# Patient Record
Sex: FEMALE | Race: BLACK OR AFRICAN AMERICAN | Employment: UNEMPLOYED | URBAN - METROPOLITAN AREA
[De-identification: names, ages, dates, MRNs, and addresses within clinical notes are randomized per-mention and may not be internally consistent; named-entity substitution may affect disease eponyms.]

---

## 2017-02-18 ENCOUNTER — TELEPHONE (OUTPATIENT)
Dept: INTERNAL MEDICINE CLINIC | Facility: CLINIC | Age: 47
End: 2017-02-18

## 2017-03-23 ENCOUNTER — PATIENT OUTREACH (OUTPATIENT)
Dept: INTERNAL MEDICINE CLINIC | Facility: CLINIC | Age: 47
End: 2017-03-23

## 2017-03-23 NOTE — PROGRESS NOTES
Spoke with pt. Reminded her that there were labs entered in October at her LOV that are due to be rechecked. Pt states she will go on Saturday to have them done. Instructed her to fast for blood work. PLAN:  Follow up 1 month to review results.  Pt in [place]

## 2017-03-25 ENCOUNTER — LAB ENCOUNTER (OUTPATIENT)
Dept: LAB | Facility: HOSPITAL | Age: 47
End: 2017-03-25
Attending: INTERNAL MEDICINE
Payer: COMMERCIAL

## 2017-03-25 DIAGNOSIS — IMO0001 UNCONTROLLED TYPE 2 DIABETES MELLITUS WITHOUT COMPLICATION, WITH LONG-TERM CURRENT USE OF INSULIN: ICD-10-CM

## 2017-03-25 LAB
ALBUMIN SERPL BCP-MCNC: 3.4 G/DL (ref 3.5–4.8)
ALBUMIN/GLOB SERPL: 0.8 {RATIO} (ref 1–2)
ALP SERPL-CCNC: 58 U/L (ref 32–100)
ALT SERPL-CCNC: 16 U/L (ref 14–54)
ANION GAP SERPL CALC-SCNC: 8 MMOL/L (ref 0–18)
AST SERPL-CCNC: 15 U/L (ref 15–41)
BASOPHILS # BLD: 0.1 K/UL (ref 0–0.2)
BASOPHILS NFR BLD: 1 %
BILIRUB SERPL-MCNC: 0.6 MG/DL (ref 0.3–1.2)
BUN SERPL-MCNC: 8 MG/DL (ref 8–20)
BUN/CREAT SERPL: 10.4 (ref 10–20)
CALCIUM SERPL-MCNC: 8.8 MG/DL (ref 8.5–10.5)
CHLORIDE SERPL-SCNC: 103 MMOL/L (ref 95–110)
CHOLEST SERPL-MCNC: 214 MG/DL (ref 110–200)
CO2 SERPL-SCNC: 24 MMOL/L (ref 22–32)
CREAT SERPL-MCNC: 0.77 MG/DL (ref 0.5–1.5)
EOSINOPHIL # BLD: 0.2 K/UL (ref 0–0.7)
EOSINOPHIL NFR BLD: 4 %
ERYTHROCYTE [DISTWIDTH] IN BLOOD BY AUTOMATED COUNT: 15 % (ref 11–15)
GLOBULIN PLAS-MCNC: 4.2 G/DL (ref 2.5–3.7)
GLUCOSE SERPL-MCNC: 242 MG/DL (ref 70–99)
HBA1C MFR BLD: 10.2 % (ref 4–6)
HCT VFR BLD AUTO: 41.1 % (ref 35–48)
HDLC SERPL-MCNC: 45 MG/DL
HGB BLD-MCNC: 13.8 G/DL (ref 12–16)
LDLC SERPL CALC-MCNC: 153 MG/DL (ref 0–99)
LYMPHOCYTES # BLD: 2.3 K/UL (ref 1–4)
LYMPHOCYTES NFR BLD: 40 %
MCH RBC QN AUTO: 29.2 PG (ref 27–32)
MCHC RBC AUTO-ENTMCNC: 33.5 G/DL (ref 32–37)
MCV RBC AUTO: 87.3 FL (ref 80–100)
MONOCYTES # BLD: 0.3 K/UL (ref 0–1)
MONOCYTES NFR BLD: 5 %
NEUTROPHILS # BLD AUTO: 2.9 K/UL (ref 1.8–7.7)
NEUTROPHILS NFR BLD: 50 %
NONHDLC SERPL-MCNC: 169 MG/DL
OSMOLALITY UR CALC.SUM OF ELEC: 286 MOSM/KG (ref 275–295)
PLATELET # BLD AUTO: 263 K/UL (ref 140–400)
PMV BLD AUTO: 7.9 FL (ref 7.4–10.3)
POTASSIUM SERPL-SCNC: 3.9 MMOL/L (ref 3.3–5.1)
PROT SERPL-MCNC: 7.6 G/DL (ref 5.9–8.4)
RBC # BLD AUTO: 4.71 M/UL (ref 3.7–5.4)
SODIUM SERPL-SCNC: 135 MMOL/L (ref 136–144)
TRIGL SERPL-MCNC: 80 MG/DL (ref 1–149)
WBC # BLD AUTO: 5.7 K/UL (ref 4–11)

## 2017-03-25 PROCEDURE — 85025 COMPLETE CBC W/AUTO DIFF WBC: CPT

## 2017-03-25 PROCEDURE — 80053 COMPREHEN METABOLIC PANEL: CPT

## 2017-03-25 PROCEDURE — 80061 LIPID PANEL: CPT

## 2017-03-25 PROCEDURE — 36415 COLL VENOUS BLD VENIPUNCTURE: CPT

## 2017-03-25 PROCEDURE — 83036 HEMOGLOBIN GLYCOSYLATED A1C: CPT

## 2017-05-09 ENCOUNTER — OFFICE VISIT (OUTPATIENT)
Dept: INTERNAL MEDICINE CLINIC | Facility: CLINIC | Age: 47
End: 2017-05-09

## 2017-05-09 VITALS
RESPIRATION RATE: 18 BRPM | SYSTOLIC BLOOD PRESSURE: 126 MMHG | TEMPERATURE: 98 F | WEIGHT: 230 LBS | HEIGHT: 63 IN | HEART RATE: 65 BPM | DIASTOLIC BLOOD PRESSURE: 76 MMHG | BODY MASS INDEX: 40.75 KG/M2

## 2017-05-09 DIAGNOSIS — IMO0001 UNCONTROLLED TYPE 2 DIABETES MELLITUS WITHOUT COMPLICATION, WITH LONG-TERM CURRENT USE OF INSULIN: ICD-10-CM

## 2017-05-09 DIAGNOSIS — E78.5 HYPERLIPIDEMIA, UNSPECIFIED HYPERLIPIDEMIA TYPE: ICD-10-CM

## 2017-05-09 DIAGNOSIS — Z12.31 VISIT FOR SCREENING MAMMOGRAM: ICD-10-CM

## 2017-05-09 DIAGNOSIS — E66.01 MORBID OBESITY DUE TO EXCESS CALORIES (HCC): ICD-10-CM

## 2017-05-09 DIAGNOSIS — I10 HYPERTENSION, BENIGN: Primary | ICD-10-CM

## 2017-05-09 DIAGNOSIS — J01.01 ACUTE RECURRENT MAXILLARY SINUSITIS: ICD-10-CM

## 2017-05-09 PROCEDURE — 99213 OFFICE O/P EST LOW 20 MIN: CPT | Performed by: INTERNAL MEDICINE

## 2017-05-09 PROCEDURE — 99215 OFFICE O/P EST HI 40 MIN: CPT | Performed by: INTERNAL MEDICINE

## 2017-05-09 RX ORDER — ATORVASTATIN CALCIUM 10 MG/1
TABLET, FILM COATED ORAL
Qty: 30 TABLET | Refills: 3 | Status: SHIPPED | OUTPATIENT
Start: 2017-05-09 | End: 2017-08-03

## 2017-05-09 RX ORDER — FLUTICASONE PROPIONATE 50 MCG
SPRAY, SUSPENSION (ML) NASAL
Qty: 1 BOTTLE | Refills: 5 | Status: ON HOLD | OUTPATIENT
Start: 2017-05-09 | End: 2019-11-21

## 2017-05-09 RX ORDER — INSULIN ASPART 100 [IU]/ML
INJECTION, SUSPENSION SUBCUTANEOUS
Qty: 10 DEVICE | Refills: 6 | Status: SHIPPED | OUTPATIENT
Start: 2017-05-09 | End: 2017-05-10

## 2017-05-09 RX ORDER — PREDNISONE 1 MG/1
TABLET ORAL
Qty: 15 TABLET | Refills: 0 | Status: SHIPPED | OUTPATIENT
Start: 2017-05-09 | End: 2017-06-01 | Stop reason: ALTCHOICE

## 2017-05-09 RX ORDER — AMOXICILLIN AND CLAVULANATE POTASSIUM 875; 125 MG/1; MG/1
1 TABLET, FILM COATED ORAL 2 TIMES DAILY
Qty: 20 TABLET | Refills: 0 | Status: SHIPPED | OUTPATIENT
Start: 2017-05-09 | End: 2017-05-19

## 2017-05-09 RX ORDER — METFORMIN HYDROCHLORIDE 500 MG/1
500 TABLET, EXTENDED RELEASE ORAL 2 TIMES DAILY WITH MEALS
Qty: 60 TABLET | Refills: 2 | Status: SHIPPED | OUTPATIENT
Start: 2017-05-09 | End: 2017-10-12

## 2017-05-09 RX ORDER — FLUCONAZOLE 150 MG/1
TABLET ORAL
Qty: 3 TABLET | Refills: 0 | Status: SHIPPED | OUTPATIENT
Start: 2017-05-09 | End: 2017-05-13 | Stop reason: ALTCHOICE

## 2017-05-10 ENCOUNTER — TELEPHONE (OUTPATIENT)
Dept: INTERNAL MEDICINE CLINIC | Facility: CLINIC | Age: 47
End: 2017-05-10

## 2017-05-10 NOTE — TELEPHONE ENCOUNTER
Pharmacy calling to inquire on two prescriptions:  (1) Insulin was sent over as a \"mix\" but patient has historically used the flexpen. Is this OK? If so, please send over revised script for Novolog flexpen.   (2) There is a potential drug interaction betw

## 2017-05-10 NOTE — ASSESSMENT & PLAN NOTE
History of recurrent sinus infections. This episode started at least 3 months back and has not resolved with over-the-counter medications and treatments. She does have severe hypertrophic turbinates with almost total occlusion of the nasal passages.   Sta

## 2017-05-10 NOTE — PATIENT INSTRUCTIONS
Problem List Items Addressed This Visit        Unprioritized    Acute maxillary sinusitis    Relevant Medications    Amoxicillin-Pot Clavulanate 875-125 MG Oral Tab    Other Relevant Orders    ENT - INTERNAL    Hyperlipidemia     Patient has been on atorva visit.          Relevant Medications    Insulin Aspart Prot & Aspart (INSULIN ASPART PROT & ASPART) (70-30) 100 UNIT/ML Subcutaneous Suspension    MetFORMIN HCl  MG Oral Tablet 24 Hr    Other Relevant Orders    COMP METABOLIC PANEL (14)    HEMOGLOBIN

## 2017-05-10 NOTE — ASSESSMENT & PLAN NOTE
Patient has been on atorvastatin at 10 mg 1 tablet once daily which she has tolerated well. Recheck labs have been ordered and refills have been ordered.

## 2017-05-10 NOTE — PROGRESS NOTES
HPI:    Patient ID: Roz Alarcon is a 56year old female. Diabetes  She presents for her follow-up diabetic visit. She has type 2 diabetes mellitus. Her disease course has been fluctuating. There are no hypoglycemic associated symptoms.  There are no provides moderate improvement. Compliance problems include diet and exercise. There is no history of angina, kidney disease, CVA, heart failure, PVD or a thyroid problem. There is no history of chronic renal disease or sleep apnea.    Hyperlipidemia  This with meals.  Disp: 60 tablet Rfl: 2   Glucose Blood (GITA CONTOUR NEXT TEST) In Vitro Strip Use to test Blood Sugar 2 times daily Disp: 100 each Rfl: 1   Blood Glucose Monitoring Suppl (GITA CONTOUR NEXT EZ) W/DEVICE Does not apply Kit 1 kit by Does not a are equal, round, and reactive to light. Right eye exhibits no discharge. Left eye exhibits no discharge. Neck: Normal range of motion. Neck supple. No JVD present. No thyromegaly present.    Cardiovascular: Normal rate, regular rhythm, normal heart sound provided. Relevant Orders    ENT - INTERNAL    Hyperlipidemia     Patient has been on atorvastatin at 10 mg 1 tablet once daily which she has tolerated well. Recheck labs have been ordered and refills have been ordered.          Relevant Medication PANEL      Other Visit Diagnoses     Visit for screening mammogram         Relevant Orders     GREGORY SCREENING BILAT (CPT=77067)          Return in about 4 weeks (around 6/6/2017).   PT UNDERSTANDS AND AGREES TO FOLLOW DIRECTIONS AND ADVICE      Orders Placed

## 2017-05-10 NOTE — ASSESSMENT & PLAN NOTE
Morbid obesity–gaining weight and patient is struggling with emotional distress from inability to lose it. Referral to bariatrics–Dr. eNttie Mccullough provided. We will follow-up.

## 2017-05-10 NOTE — ASSESSMENT & PLAN NOTE
Blood pressure 126/76, pulse 65, temperature 98 °F (36.7 °C), temperature source Oral, resp. rate 18, height 5' 3\" (1.6 m), weight 230 lb (104.327 kg). Stable blood pressure–well controlled on lisinopril at 2.5 mg once daily.   No lower extremity edema a

## 2017-05-10 NOTE — ASSESSMENT & PLAN NOTE
She does currently on NovoLog mix 70/30–50 units 2 times daily. She does not check her blood sugars and hence no sugar records available.   Last labs done in March showed extreme elevations in the hemoglobin A1c which have been persistent for greater than

## 2017-05-11 NOTE — TELEPHONE ENCOUNTER
Okay to override the interaction between the medications.   Prescription for the FlexPen has been sent in

## 2017-05-13 ENCOUNTER — OFFICE VISIT (OUTPATIENT)
Dept: OTOLARYNGOLOGY | Facility: CLINIC | Age: 47
End: 2017-05-13

## 2017-05-13 VITALS
TEMPERATURE: 98 F | SYSTOLIC BLOOD PRESSURE: 122 MMHG | DIASTOLIC BLOOD PRESSURE: 76 MMHG | BODY MASS INDEX: 39.27 KG/M2 | HEIGHT: 64 IN | WEIGHT: 230 LBS

## 2017-05-13 DIAGNOSIS — J32.9 CHRONIC SINUSITIS, UNSPECIFIED LOCATION: Primary | ICD-10-CM

## 2017-05-13 PROCEDURE — 99203 OFFICE O/P NEW LOW 30 MIN: CPT | Performed by: OTOLARYNGOLOGY

## 2017-05-13 PROCEDURE — 99212 OFFICE O/P EST SF 10 MIN: CPT | Performed by: OTOLARYNGOLOGY

## 2017-05-13 RX ORDER — AZELASTINE 1 MG/ML
2 SPRAY, METERED NASAL 2 TIMES DAILY
Qty: 1 BOTTLE | Refills: 0 | Status: ON HOLD | OUTPATIENT
Start: 2017-05-13 | End: 2019-11-21

## 2017-05-13 RX ORDER — MONTELUKAST SODIUM 10 MG/1
10 TABLET ORAL NIGHTLY
Qty: 30 TABLET | Refills: 3 | Status: ON HOLD | OUTPATIENT
Start: 2017-05-13 | End: 2019-11-21

## 2017-05-13 NOTE — PROGRESS NOTES
Aram Alejandro is a 56year old female.   Patient presents with:  Sinus Problem: nasal congestion, green discharge from nose, and headaches for 3 weeks, pt is currently on antibitoics and steroids       HISTORY OF PRESENT ILLNESS  She presents with a very Hyperlipidemia   • Heart Attack Cousin 47     Maternal cousin: MI, Cause of death   • Glaucoma Neg    • Macular degeneration Neg        Past Medical History   Diagnosis Date   • Other and unspecified hyperlipidemia    • Type II or unspecified type diabetes Normal.        Nasopharynx Normal External nose - Normal. Lips/teeth/gums - Normal. Tonsils - Normal. Oropharynx - Normal.   Ears Normal Inspection - Right: Normal, Left: Normal. Canal - Right: Normal, Left: Normal. TM - Right: Normal, Left: Normal.   Skin (GITA CONTOUR NEXT TEST) In Vitro Strip, Use to test Blood Sugar 2 times daily, Disp: 100 each, Rfl: 1  •  Blood Glucose Monitoring Suppl (GITA CONTOUR NEXT EZ) W/DEVICE Does not apply Kit, 1 kit by Does not apply route 2 (two) times daily. , Disp: 1 kit,

## 2017-05-22 ENCOUNTER — APPOINTMENT (OUTPATIENT)
Dept: ULTRASOUND IMAGING | Facility: HOSPITAL | Age: 47
End: 2017-05-22
Attending: EMERGENCY MEDICINE
Payer: COMMERCIAL

## 2017-05-22 ENCOUNTER — HOSPITAL ENCOUNTER (EMERGENCY)
Facility: HOSPITAL | Age: 47
Discharge: HOME | End: 2017-05-22
Attending: EMERGENCY MEDICINE
Payer: COMMERCIAL

## 2017-05-22 ENCOUNTER — TELEPHONE (OUTPATIENT)
Dept: INTERNAL MEDICINE CLINIC | Facility: CLINIC | Age: 47
End: 2017-05-22

## 2017-05-22 VITALS
OXYGEN SATURATION: 99 % | TEMPERATURE: 98 F | DIASTOLIC BLOOD PRESSURE: 71 MMHG | HEIGHT: 63 IN | WEIGHT: 230 LBS | RESPIRATION RATE: 19 BRPM | BODY MASS INDEX: 40.75 KG/M2 | HEART RATE: 66 BPM | SYSTOLIC BLOOD PRESSURE: 127 MMHG

## 2017-05-22 DIAGNOSIS — S83.92XA SPRAIN OF LEFT KNEE, UNSPECIFIED LIGAMENT, INITIAL ENCOUNTER: Primary | ICD-10-CM

## 2017-05-22 PROCEDURE — 93971 EXTREMITY STUDY: CPT | Performed by: EMERGENCY MEDICINE

## 2017-05-22 PROCEDURE — 99284 EMERGENCY DEPT VISIT MOD MDM: CPT

## 2017-05-22 PROCEDURE — 82962 GLUCOSE BLOOD TEST: CPT

## 2017-05-23 NOTE — ED NOTES
Pt woke with a knot in her left posterior knee and pain and swelling to left ankle this am. Pain is relieved by motrin. +CMS. Pt concerned she has a blood clot. Area is not warm, hard knot felt upon patient standing.  Pt informed of her blood sugar which sh

## 2017-05-23 NOTE — ED PROVIDER NOTES
Patient Seen in: [facility] AND [facility] Emergency Department    History   Patient presents with:  Pain      HPI    Patient presents complaining of a \"knot\" in the left lateral posterior knee and pain and swelling in her left medial ankle.   She states that s Comment:Coffee 1 cup daily; Review of Systems   Constitutional: Negative for fever and chills. HENT: Negative for congestion and sore throat. Eyes: Negative for pain and visual disturbance.    Respiratory: Negative for cough and shortness of br GLUCOSE - Abnormal; Notable for the following:     POC Glucose  358 (*)     All other components within normal limits         Us Venous Doppler Leg Left - Diag Img (cpt=93971)    5/22/2017  CONCLUSION: No evidence of left lower extremity DVT.          MDM

## 2017-05-23 NOTE — ED INITIAL ASSESSMENT (HPI)
Pt reports \"bump\" to left knee. Pt reports swelling to left lower extremity. PT denies injury/trauma. PT denies cp, or sob. Pt denies recent travel.

## 2017-06-01 ENCOUNTER — OFFICE VISIT (OUTPATIENT)
Dept: OTOLARYNGOLOGY | Facility: CLINIC | Age: 47
End: 2017-06-01

## 2017-06-01 VITALS
SYSTOLIC BLOOD PRESSURE: 106 MMHG | WEIGHT: 230 LBS | BODY MASS INDEX: 39.27 KG/M2 | DIASTOLIC BLOOD PRESSURE: 70 MMHG | TEMPERATURE: 98 F | HEIGHT: 64 IN

## 2017-06-01 DIAGNOSIS — J32.9 CHRONIC SINUSITIS, UNSPECIFIED LOCATION: Primary | ICD-10-CM

## 2017-06-01 DIAGNOSIS — J34.2 DEVIATED NASAL SEPTUM: ICD-10-CM

## 2017-06-01 PROCEDURE — 99214 OFFICE O/P EST MOD 30 MIN: CPT | Performed by: OTOLARYNGOLOGY

## 2017-06-01 PROCEDURE — 99212 OFFICE O/P EST SF 10 MIN: CPT | Performed by: OTOLARYNGOLOGY

## 2017-06-02 NOTE — PROGRESS NOTES
Orion Greene is a 56year old female. Patient presents with:   Follow - Up: 3 week follow up- chronic sinusitis-per pt there is some changes/improvement of symptoms      HISTORY OF PRESENT ILLNESS  She presents with a very long history of recurrent issu Alzheimer's disease, Cause of death   • Hypertension Mother    • Diabetes Mother    • Heart Attack Mother 62     MI, Cause of death   • Lipids Other      Family H/o Hyperlipidemia   • Heart Attack Cousin 47     Maternal cousin: MI, Cause of death   •  Reeves Neurological Normal Memory - Normal. Cranial nerves - Cranial nerves II through XII grossly intact.    Head/Face Normal Facial features - Normal. Eyebrows - Normal. Skull - Normal.        Nasopharynx Normal External nose - Normal. Lips/teeth/gums - Normal apply route 2 (two) times daily. , Disp: 1 kit, Rfl: 0  •  GITA MICROLET LANCETS Does not apply Misc, Use to test Blood Sugar 2 times daily, Disp: 100 each, Rfl: 1  •  Insulin Pen Needle (BD PEN NEEDLE MINI U/F) 31G X 5 MM Does not apply Misc, use as direc

## 2017-06-28 ENCOUNTER — OFFICE VISIT (OUTPATIENT)
Dept: OBGYN CLINIC | Facility: CLINIC | Age: 47
End: 2017-06-28

## 2017-06-28 VITALS
HEIGHT: 64 IN | BODY MASS INDEX: 38.89 KG/M2 | SYSTOLIC BLOOD PRESSURE: 130 MMHG | HEART RATE: 74 BPM | WEIGHT: 227.81 LBS | DIASTOLIC BLOOD PRESSURE: 78 MMHG

## 2017-06-28 DIAGNOSIS — Z01.419 ENCOUNTER FOR GYNECOLOGICAL EXAMINATION WITHOUT ABNORMAL FINDING: Primary | ICD-10-CM

## 2017-06-28 DIAGNOSIS — A64 STD (FEMALE): ICD-10-CM

## 2017-06-28 DIAGNOSIS — Z11.3 SCREENING FOR VENEREAL DISEASE: ICD-10-CM

## 2017-06-28 DIAGNOSIS — Z12.31 SCREENING MAMMOGRAM, ENCOUNTER FOR: ICD-10-CM

## 2017-06-28 DIAGNOSIS — Z12.4 SCREENING FOR MALIGNANT NEOPLASM OF CERVIX: ICD-10-CM

## 2017-06-28 PROCEDURE — 99396 PREV VISIT EST AGE 40-64: CPT | Performed by: OBSTETRICS & GYNECOLOGY

## 2017-06-29 ENCOUNTER — TELEPHONE (OUTPATIENT)
Dept: OBGYN CLINIC | Facility: CLINIC | Age: 47
End: 2017-06-29

## 2017-06-29 DIAGNOSIS — A64 STD (FEMALE): Primary | ICD-10-CM

## 2017-06-29 NOTE — PROGRESS NOTES
HPI:    Patient ID: Jef Carney is a 56year old female. HPI  with amenorrhea since ablation about 10 years ago. TL for [facility]. Rare hot flashes. She has struggled with diabetes management/ compliance with a current glycoHgb of 10.  She appears m 2 (two) times daily with meals.  Disp: 60 tablet Rfl: 2   Glucose Blood (GITA CONTOUR NEXT TEST) In Vitro Strip Use to test Blood Sugar 2 times daily Disp: 100 each Rfl: 1   Blood Glucose Monitoring Suppl (GITA CONTOUR NEXT EZ) W/DEVICE Does not apply Kit present. Neurological: She is alert. Skin: She is not diaphoretic.               ASSESSMENT/PLAN:   Encounter for gynecological examination without abnormal finding  (primary encounter diagnosis)  Screening mammogram, encounter for  Screening for malign

## 2017-06-30 LAB
C TRACH DNA SPEC QL NAA+PROBE: NEGATIVE
N GONORRHOEA DNA SPEC QL NAA+PROBE: NEGATIVE

## 2017-07-03 LAB — LAST PAP RESULT: NORMAL

## 2017-07-14 ENCOUNTER — APPOINTMENT (OUTPATIENT)
Dept: LAB | Facility: HOSPITAL | Age: 47
End: 2017-07-14
Attending: INTERNAL MEDICINE
Payer: COMMERCIAL

## 2017-07-14 ENCOUNTER — OFFICE VISIT (OUTPATIENT)
Dept: SURGERY | Facility: CLINIC | Age: 47
End: 2017-07-14

## 2017-07-14 VITALS
WEIGHT: 224.38 LBS | OXYGEN SATURATION: 100 % | HEIGHT: 64 IN | RESPIRATION RATE: 18 BRPM | SYSTOLIC BLOOD PRESSURE: 113 MMHG | DIASTOLIC BLOOD PRESSURE: 67 MMHG | HEART RATE: 65 BPM | BODY MASS INDEX: 38.31 KG/M2

## 2017-07-14 DIAGNOSIS — IMO0001 UNCONTROLLED TYPE 2 DIABETES MELLITUS WITHOUT COMPLICATION, WITH LONG-TERM CURRENT USE OF INSULIN: ICD-10-CM

## 2017-07-14 DIAGNOSIS — M16.0 OSTEOARTHRITIS OF BOTH HIPS, UNSPECIFIED OSTEOARTHRITIS TYPE: ICD-10-CM

## 2017-07-14 DIAGNOSIS — E66.9 OBESITY (BMI 30-39.9): ICD-10-CM

## 2017-07-14 DIAGNOSIS — E55.9 VITAMIN D DEFICIENCY: ICD-10-CM

## 2017-07-14 DIAGNOSIS — IMO0001 IDDM (INSULIN DEPENDENT DIABETES MELLITUS): Primary | ICD-10-CM

## 2017-07-14 DIAGNOSIS — E78.00 HYPERCHOLESTEREMIA: ICD-10-CM

## 2017-07-14 LAB
ALBUMIN SERPL BCP-MCNC: 3.4 G/DL (ref 3.5–4.8)
ALBUMIN/GLOB SERPL: 0.9 {RATIO} (ref 1–2)
ALP SERPL-CCNC: 55 U/L (ref 32–100)
ALT SERPL-CCNC: 14 U/L (ref 14–54)
ANION GAP SERPL CALC-SCNC: 5 MMOL/L (ref 0–18)
AST SERPL-CCNC: 13 U/L (ref 15–41)
BILIRUB SERPL-MCNC: 0.5 MG/DL (ref 0.3–1.2)
BUN SERPL-MCNC: 11 MG/DL (ref 8–20)
BUN/CREAT SERPL: 15.3 (ref 10–20)
CALCIUM SERPL-MCNC: 8.9 MG/DL (ref 8.5–10.5)
CHLORIDE SERPL-SCNC: 106 MMOL/L (ref 95–110)
CHOLEST SERPL-MCNC: 193 MG/DL (ref 110–200)
CO2 SERPL-SCNC: 26 MMOL/L (ref 22–32)
CREAT SERPL-MCNC: 0.72 MG/DL (ref 0.5–1.5)
GLOBULIN PLAS-MCNC: 3.6 G/DL (ref 2.5–3.7)
GLUCOSE SERPL-MCNC: 190 MG/DL (ref 70–99)
HBA1C MFR BLD: 10.6 % (ref 4–6)
HDLC SERPL-MCNC: 46 MG/DL
LDLC SERPL CALC-MCNC: 134 MG/DL (ref 0–99)
NONHDLC SERPL-MCNC: 147 MG/DL
OSMOLALITY UR CALC.SUM OF ELEC: 288 MOSM/KG (ref 275–295)
POTASSIUM SERPL-SCNC: 4 MMOL/L (ref 3.3–5.1)
PROT SERPL-MCNC: 7 G/DL (ref 5.9–8.4)
SODIUM SERPL-SCNC: 137 MMOL/L (ref 136–144)
TRIGL SERPL-MCNC: 63 MG/DL (ref 1–149)

## 2017-07-14 PROCEDURE — 99204 OFFICE O/P NEW MOD 45 MIN: CPT | Performed by: INTERNAL MEDICINE

## 2017-07-14 PROCEDURE — 80061 LIPID PANEL: CPT

## 2017-07-14 PROCEDURE — 83036 HEMOGLOBIN GLYCOSYLATED A1C: CPT

## 2017-07-14 PROCEDURE — 80053 COMPREHEN METABOLIC PANEL: CPT

## 2017-07-14 PROCEDURE — 36415 COLL VENOUS BLD VENIPUNCTURE: CPT

## 2017-07-14 NOTE — PROGRESS NOTES
[facility] Consultation Note       Date of Consult:  2017    Patient:  Pam España  :      3/4/1970  MRN:      [id number]    Referring Provider: Dr. Christofer Saravia       Chief Complaint:  Patient presents with:  Consult  Weight Lorena test Blood Sugar 2 times daily Disp: 100 each Rfl: 1   Blood Glucose Monitoring Suppl (GITA CONTOUR NEXT EZ) W/DEVICE Does not apply Kit 1 kit by Does not apply route 2 (two) times daily.  Disp: 1 kit Rfl: 0   GITA MICROLET LANCETS Does not apply Misc Use dd, cream and sugar.  Bagel, omlette,  Nuts, water [place], salad, chips, soda Mini twix Fast food, chicken, turkey, salmon, greens, corn     Soda Drinker?: Yes  If yes, how much?:  regular    Number of restaurant or fast food meals/week:  6 meals/week has been well controlled on her current medication.       Lab Results  Component Value Date/Time   CHOLEST 193 07/14/2017 11:56 AM    (H) 07/14/2017 11:56 AM   HDL 46 07/14/2017 11:56 AM   TRIG 63 07/14/2017 11:56 AM         Encounter Diagnosis(ses):

## 2017-08-03 ENCOUNTER — OFFICE VISIT (OUTPATIENT)
Dept: INTERNAL MEDICINE CLINIC | Facility: CLINIC | Age: 47
End: 2017-08-03

## 2017-08-03 VITALS
TEMPERATURE: 98 F | DIASTOLIC BLOOD PRESSURE: 75 MMHG | HEART RATE: 62 BPM | RESPIRATION RATE: 18 BRPM | SYSTOLIC BLOOD PRESSURE: 120 MMHG | BODY MASS INDEX: 38.73 KG/M2 | HEIGHT: 64 IN | WEIGHT: 226.88 LBS

## 2017-08-03 DIAGNOSIS — E11.8 TYPE 2 DIABETES MELLITUS WITH COMPLICATION, WITH LONG-TERM CURRENT USE OF INSULIN (HCC): Primary | ICD-10-CM

## 2017-08-03 DIAGNOSIS — Z79.4 TYPE 2 DIABETES MELLITUS WITH COMPLICATION, WITH LONG-TERM CURRENT USE OF INSULIN (HCC): Primary | ICD-10-CM

## 2017-08-03 DIAGNOSIS — E78.00 HYPERCHOLESTEREMIA: ICD-10-CM

## 2017-08-03 DIAGNOSIS — I10 HYPERTENSION, BENIGN: ICD-10-CM

## 2017-08-03 PROCEDURE — 99214 OFFICE O/P EST MOD 30 MIN: CPT | Performed by: INTERNAL MEDICINE

## 2017-08-03 PROCEDURE — 99212 OFFICE O/P EST SF 10 MIN: CPT | Performed by: INTERNAL MEDICINE

## 2017-08-03 RX ORDER — ATORVASTATIN CALCIUM 10 MG/1
TABLET, FILM COATED ORAL
Qty: 30 TABLET | Refills: 3 | Status: SHIPPED | OUTPATIENT
Start: 2017-08-03 | End: 2019-11-22

## 2017-08-04 NOTE — PROGRESS NOTES
HPI:    Patient ID: Maria Luisa Mcadams is a 56year old female. Hyperlipidemia   This is a chronic problem. The current episode started more than 1 year ago. The problem is controlled. Recent lipid tests were reviewed and are variable.  Exacerbating disease coronary artery disease include diabetes mellitus, dyslipidemia, obesity, hypertension and sedentary lifestyle. Current diabetic treatment includes diet and insulin injections. She is compliant with treatment some of the time. Her weight is stable.  She is daily. Disp: 1 Bottle Rfl: 0   Insulin Aspart Prot & Aspart (NOVOLOG MIX 70/30 FLEXPEN) (70-30) 100 UNIT/ML SC SUPN 50 units s/c bid with meals Disp: 20 pen Rfl: 5   Fluticasone Propionate 50 MCG/ACT Nasal Suspension USE TWO SPRAY IN EACH NOSTRIL EVERY DAY Normal range of motion. Neck supple. No JVD present. No thyromegaly present. Cardiovascular: Normal rate, regular rhythm, normal heart sounds and intact distal pulses. No murmur heard.   Pulmonary/Chest: Effort normal and breath sounds normal. She has hypoglycemic episode. Eat all day nurse by about 7 PM.  Bedtime snack at about 9 PM– a small pack of tuna.   Increase the morning insulin to 55 units with breakfast.  Wise to monitor the blood sugars before breakfast, before lunch and before dinner for the

## 2017-08-04 NOTE — ASSESSMENT & PLAN NOTE
Blood pressure 120/75, pulse 62, temperature 98.1 °F (36.7 °C), temperature source Oral, resp. rate 18, height 5' 4\" (1.626 m), weight 226 lb 14.4 oz (102.9 kg), not currently breastfeeding. Blood pressures look stable and well controlled.   Continue o

## 2017-08-04 NOTE — ASSESSMENT & PLAN NOTE
Lipid panel shows some improvement–LDL cholesterol remains elevated however. Patient has just started taking her atorvastatin. Will recheck her labs within next blood draw in about 4 weeks.

## 2017-08-04 NOTE — PATIENT INSTRUCTIONS
Problem List Items Addressed This Visit        Unprioritized    Hypercholesteremia     Lipid panel shows some improvement–LDL cholesterol remains elevated however. Patient has just started taking her atorvastatin.   Will recheck her labs within next blood

## 2017-08-04 NOTE — ASSESSMENT & PLAN NOTE
The hba1c was elevated. The sugars are dropping low at night and sugars are about 200 in am.    Currently on 50 units of novolog mix 2 times a day.   Will adv to reduce the night dose to 8 units with a meal.  Advised to eat a bedtime snack to avoid a midni

## 2017-08-15 ENCOUNTER — OFFICE VISIT (OUTPATIENT)
Dept: SURGERY | Facility: CLINIC | Age: 47
End: 2017-08-15

## 2017-08-15 VITALS
HEART RATE: 71 BPM | HEIGHT: 64 IN | BODY MASS INDEX: 39.16 KG/M2 | OXYGEN SATURATION: 98 % | RESPIRATION RATE: 16 BRPM | WEIGHT: 229.38 LBS | DIASTOLIC BLOOD PRESSURE: 62 MMHG | SYSTOLIC BLOOD PRESSURE: 120 MMHG

## 2017-08-15 DIAGNOSIS — Z79.4 TYPE 2 DIABETES MELLITUS WITH COMPLICATION, WITH LONG-TERM CURRENT USE OF INSULIN (HCC): Primary | ICD-10-CM

## 2017-08-15 DIAGNOSIS — E78.00 HYPERCHOLESTEREMIA: ICD-10-CM

## 2017-08-15 DIAGNOSIS — E66.9 OBESITY (BMI 30-39.9): ICD-10-CM

## 2017-08-15 DIAGNOSIS — I10 HYPERTENSION, BENIGN: ICD-10-CM

## 2017-08-15 DIAGNOSIS — E11.8 TYPE 2 DIABETES MELLITUS WITH COMPLICATION, WITH LONG-TERM CURRENT USE OF INSULIN (HCC): Primary | ICD-10-CM

## 2017-08-15 PROCEDURE — 99214 OFFICE O/P EST MOD 30 MIN: CPT | Performed by: INTERNAL MEDICINE

## 2017-08-15 NOTE — PROGRESS NOTES
[address], [address] [facility] [address]  Dept: [phone]     Date:   8/15/2017    Patient:  Bushra Ovalles  :      3/4/1970  MRN:      [id number]    Chief Complaint:  P times daily.  Disp: 1 Bottle Rfl: 0   Insulin Aspart Prot & Aspart (NOVOLOG MIX 70/30 FLEXPEN) (70-30) 100 UNIT/ML SC SUPN 50 units s/c bid with meals Disp: 20 pen Rfl: 5   Fluticasone Propionate 50 MCG/ACT Nasal Suspension USE TWO SPRAY IN EACH NOSTRIL IVONNE Father 61     Alzheimer's disease, Cause of death   • Hypertension Mother    • Diabetes Mother    • Heart Attack Mother 62     MI, Cause of death   • Heart Attack Cousin 47     Maternal cousin: MI, Cause of death   • Lipids Other      Family H/o Hyperlipid conjunctivae/corneas clear. PERRL, EOM's intact. Fundi benign.   Lungs: clear to auscultation bilaterally  Heart: S1, S2 normal, no murmur, click, rub or gallop, regular rate and rhythm  Abdomen: soft, obese, non tender  Extremities: extremities normal, atr Goals for next month:  1. Keep a food log. 2. Drink 48-64 ounces of non-caloric beverages per day. No fruit juices or regular soda. 3. Increase activity-upper body exercises, walk 10 minutes per day.   4. Increase fruit and vegetable servings to 5

## 2017-09-18 ENCOUNTER — OFFICE VISIT (OUTPATIENT)
Dept: SURGERY | Facility: CLINIC | Age: 47
End: 2017-09-18

## 2017-09-18 VITALS
BODY MASS INDEX: 39.48 KG/M2 | SYSTOLIC BLOOD PRESSURE: 131 MMHG | RESPIRATION RATE: 16 BRPM | HEIGHT: 64 IN | DIASTOLIC BLOOD PRESSURE: 74 MMHG | WEIGHT: 231.25 LBS

## 2017-09-18 DIAGNOSIS — Z79.4 TYPE 2 DIABETES MELLITUS WITH COMPLICATION, WITH LONG-TERM CURRENT USE OF INSULIN (HCC): ICD-10-CM

## 2017-09-18 DIAGNOSIS — E66.9 OBESITY (BMI 30-39.9): ICD-10-CM

## 2017-09-18 DIAGNOSIS — I10 ESSENTIAL HYPERTENSION: Primary | ICD-10-CM

## 2017-09-18 DIAGNOSIS — M16.0 OSTEOARTHRITIS OF BOTH HIPS, UNSPECIFIED OSTEOARTHRITIS TYPE: ICD-10-CM

## 2017-09-18 DIAGNOSIS — E11.8 TYPE 2 DIABETES MELLITUS WITH COMPLICATION, WITH LONG-TERM CURRENT USE OF INSULIN (HCC): ICD-10-CM

## 2017-09-18 PROCEDURE — 99213 OFFICE O/P EST LOW 20 MIN: CPT | Performed by: INTERNAL MEDICINE

## 2017-09-18 NOTE — PROGRESS NOTES
[address], [facility]  [address] [facility] [address] [facility] [address]  Dept: [phone]     Date:   8/15/2017    Patient:  Shakeel Ruiz  :      3/4/1970  MRN:      [id number]    Chief Complaint:  P Rfl: 0   Insulin Aspart Prot & Aspart (NOVOLOG MIX 70/30 FLEXPEN) (70-30) 100 UNIT/ML SC SUPN 50 units s/c bid with meals (Patient taking differently: 55 units s/c bid with meals ) Disp: 20 pen Rfl: 5   Fluticasone Propionate 50 MCG/ACT Nasal Suspension US Hypertension Mother    • Diabetes Mother    • Heart Attack Mother 62     MI, Cause of death   • Heart Attack Cousin 47     Maternal cousin: MI, Cause of death   • Lipids Other      Family H/o Hyperlipidemia   • Glaucoma Neg    • Macular degeneration Neg benign.   Lungs: clear to auscultation bilaterally  Heart: S1, S2 normal, no murmur, click, rub or gallop, regular rate and rhythm  Abdomen: soft, obese, non tender  Extremities: extremities normal, atraumatic, no cyanosis or edema  Pulses: 2+ and symmetric Goals for next month:  1. Keep a food log. 2. Drink 48-64 ounces of non-caloric beverages per day. No fruit juices or regular soda. 3. Increase activity-upper body exercises, walk 10 minutes per day.   4. Increase fruit and vegetable servings to 5

## 2017-09-23 ENCOUNTER — APPOINTMENT (OUTPATIENT)
Dept: GENERAL RADIOLOGY | Facility: HOSPITAL | Age: 47
End: 2017-09-23
Attending: EMERGENCY MEDICINE
Payer: COMMERCIAL

## 2017-09-23 ENCOUNTER — HOSPITAL ENCOUNTER (EMERGENCY)
Facility: HOSPITAL | Age: 47
Discharge: HOME | End: 2017-09-23
Attending: EMERGENCY MEDICINE
Payer: COMMERCIAL

## 2017-09-23 VITALS
RESPIRATION RATE: 18 BRPM | WEIGHT: 231 LBS | BODY MASS INDEX: 40.93 KG/M2 | HEART RATE: 70 BPM | SYSTOLIC BLOOD PRESSURE: 135 MMHG | HEIGHT: 63 IN | OXYGEN SATURATION: 99 % | TEMPERATURE: 98 F | DIASTOLIC BLOOD PRESSURE: 75 MMHG

## 2017-09-23 DIAGNOSIS — R73.9 HYPERGLYCEMIA: ICD-10-CM

## 2017-09-23 DIAGNOSIS — R07.89 CHEST PAIN, ATYPICAL: ICD-10-CM

## 2017-09-23 DIAGNOSIS — T50.905A MEDICATION REACTION, INITIAL ENCOUNTER: Primary | ICD-10-CM

## 2017-09-23 LAB
ANION GAP SERPL CALC-SCNC: 7 MMOL/L (ref 0–18)
BASOPHILS # BLD: 0.1 K/UL (ref 0–0.2)
BASOPHILS NFR BLD: 1 %
BNP SERPL-MCNC: 15 PG/ML (ref 0–100)
BUN SERPL-MCNC: 8 MG/DL (ref 8–20)
BUN/CREAT SERPL: 11.3 (ref 10–20)
CALCIUM SERPL-MCNC: 8.8 MG/DL (ref 8.5–10.5)
CHLORIDE SERPL-SCNC: 100 MMOL/L (ref 95–110)
CO2 SERPL-SCNC: 25 MMOL/L (ref 22–32)
CREAT SERPL-MCNC: 0.71 MG/DL (ref 0.5–1.5)
D DIMER PPP FEU-MCNC: <0.27 MCG/ML (ref ?–0.5)
EOSINOPHIL # BLD: 0.2 K/UL (ref 0–0.7)
EOSINOPHIL NFR BLD: 3 %
ERYTHROCYTE [DISTWIDTH] IN BLOOD BY AUTOMATED COUNT: 14.4 % (ref 11–15)
GLUCOSE SERPL-MCNC: 335 MG/DL (ref 70–99)
HCT VFR BLD AUTO: 40.8 % (ref 35–48)
HGB BLD-MCNC: 13.7 G/DL (ref 12–16)
LYMPHOCYTES # BLD: 1.9 K/UL (ref 1–4)
LYMPHOCYTES NFR BLD: 28 %
MCH RBC QN AUTO: 29.2 PG (ref 27–32)
MCHC RBC AUTO-ENTMCNC: 33.7 G/DL (ref 32–37)
MCV RBC AUTO: 86.7 FL (ref 80–100)
MONOCYTES # BLD: 0.5 K/UL (ref 0–1)
MONOCYTES NFR BLD: 7 %
NEUTROPHILS # BLD AUTO: 4.2 K/UL (ref 1.8–7.7)
NEUTROPHILS NFR BLD: 61 %
OSMOLALITY UR CALC.SUM OF ELEC: 285 MOSM/KG (ref 275–295)
PLATELET # BLD AUTO: 250 K/UL (ref 140–400)
PMV BLD AUTO: 7.5 FL (ref 7.4–10.3)
POTASSIUM SERPL-SCNC: 4 MMOL/L (ref 3.3–5.1)
RBC # BLD AUTO: 4.7 M/UL (ref 3.7–5.4)
SODIUM SERPL-SCNC: 132 MMOL/L (ref 136–144)
TROPONIN I SERPL-MCNC: 0 NG/ML (ref ?–0.03)
WBC # BLD AUTO: 6.8 K/UL (ref 4–11)

## 2017-09-23 PROCEDURE — 85379 FIBRIN DEGRADATION QUANT: CPT | Performed by: EMERGENCY MEDICINE

## 2017-09-23 PROCEDURE — 99285 EMERGENCY DEPT VISIT HI MDM: CPT

## 2017-09-23 PROCEDURE — 85025 COMPLETE CBC W/AUTO DIFF WBC: CPT | Performed by: EMERGENCY MEDICINE

## 2017-09-23 PROCEDURE — 83880 ASSAY OF NATRIURETIC PEPTIDE: CPT | Performed by: EMERGENCY MEDICINE

## 2017-09-23 PROCEDURE — 84484 ASSAY OF TROPONIN QUANT: CPT | Performed by: EMERGENCY MEDICINE

## 2017-09-23 PROCEDURE — 71020 XR CHEST PA + LAT CHEST (CPT=71020): CPT | Performed by: EMERGENCY MEDICINE

## 2017-09-23 PROCEDURE — 80048 BASIC METABOLIC PNL TOTAL CA: CPT | Performed by: EMERGENCY MEDICINE

## 2017-09-23 PROCEDURE — 36415 COLL VENOUS BLD VENIPUNCTURE: CPT

## 2017-09-23 PROCEDURE — 93010 ELECTROCARDIOGRAM REPORT: CPT | Performed by: EMERGENCY MEDICINE

## 2017-09-23 PROCEDURE — 93005 ELECTROCARDIOGRAM TRACING: CPT

## 2017-09-23 RX ORDER — INSULIN ASPART 100 [IU]/ML
0.15 INJECTION, SOLUTION INTRAVENOUS; SUBCUTANEOUS ONCE
Status: COMPLETED | OUTPATIENT
Start: 2017-09-23 | End: 2017-09-23

## 2017-09-23 NOTE — ED PROVIDER NOTES
Patient Seen in: [facility] AND [facility] Emergency Department    History   Patient presents with:  Fatigue (constitutional, neurologic)    Stated Complaint:     HPI    Patient is a 55-year-old female who presents with chest tightness, difficulty taking a deep 0142]  BP: 128/62  Pulse: 67  Resp: 18  Temp: 97.6 °F (36.4 °C)  Temp src: n/a  SpO2: 99 %  O2 Device: n/a    Current:/74   Pulse 68   Temp 97.6 °F (36.4 °C)   Resp 18   Ht 160 cm (5' 3\")   Wt 104.8 kg   SpO2 99%   BMI 40.92 kg/m²         Physical E edema  No effusion. No pneumothorax  Cardiomediastinal silhouette appears normal    Report faxed directly to ER 5 AM ET    Angel Edmondson M.D. Pulse ox 99% RA, normal  Cardiac monitor: NSR rate 60s  Pt notified of results.  Pt states the chest tightness ha

## 2017-10-12 ENCOUNTER — OFFICE VISIT (OUTPATIENT)
Dept: SURGERY | Facility: CLINIC | Age: 47
End: 2017-10-12

## 2017-10-12 VITALS
RESPIRATION RATE: 16 BRPM | BODY MASS INDEX: 40.43 KG/M2 | SYSTOLIC BLOOD PRESSURE: 130 MMHG | HEART RATE: 75 BPM | OXYGEN SATURATION: 98 % | DIASTOLIC BLOOD PRESSURE: 71 MMHG | WEIGHT: 228.19 LBS | HEIGHT: 63 IN

## 2017-10-12 DIAGNOSIS — E66.9 OBESITY (BMI 30-39.9): ICD-10-CM

## 2017-10-12 DIAGNOSIS — I10 ESSENTIAL HYPERTENSION: Primary | ICD-10-CM

## 2017-10-12 DIAGNOSIS — E78.00 HYPERCHOLESTEREMIA: ICD-10-CM

## 2017-10-12 DIAGNOSIS — E11.8 TYPE 2 DIABETES MELLITUS WITH COMPLICATION, WITH LONG-TERM CURRENT USE OF INSULIN (HCC): ICD-10-CM

## 2017-10-12 DIAGNOSIS — Z79.4 TYPE 2 DIABETES MELLITUS WITH COMPLICATION, WITH LONG-TERM CURRENT USE OF INSULIN (HCC): ICD-10-CM

## 2017-10-12 PROCEDURE — 99214 OFFICE O/P EST MOD 30 MIN: CPT | Performed by: INTERNAL MEDICINE

## 2017-10-12 NOTE — PROGRESS NOTES
[address] [place] [address] [address] [facility] [address]  Dept: [phone]     Date:  10/12/17    Patient:  Wilberto Abel  :      3/4/1970  MRN:      [id number]    Chief Complaint:  [facility] Insulin Aspart Prot & Aspart (NOVOLOG MIX 70/30 FLEXPEN) (70-30) 100 UNIT/ML SC SUPN 50 units s/c bid with meals (Patient taking differently: 55 units s/c bid with meals ) Disp: 20 pen Rfl: 5   Fluticasone Propionate 50 MCG/ACT Nasal Suspension USE TWO S Neurological Disorder Father 61     Alzheimer's disease, Cause of death   • Hypertension Mother    • Diabetes Mother    • Heart Attack Mother 62     MI, Cause of death   • Heart Attack Cousin 47     Maternal cousin: MI, Cause of death   • Lipids Other atraumatic  Eyes: conjunctivae/corneas clear. PERRL, EOM's intact. Fundi benign.   Lungs: clear to auscultation bilaterally  Heart: S1, S2 normal, no murmur, click, rub or gallop, regular rate and rhythm  Abdomen: soft, obese, non tender  Extremities: extre log.  2. Drink 48-64 ounces of non-caloric beverages per day. No fruit juices or regular soda. 3. Increase activity-upper body exercises, walk 10 minutes per day. 4. Increase fruit and vegetable servings to 5-6 per day.       Attended seminar    Started m

## 2017-10-14 ENCOUNTER — OFFICE VISIT (OUTPATIENT)
Dept: OPHTHALMOLOGY | Facility: CLINIC | Age: 47
End: 2017-10-14

## 2017-10-14 DIAGNOSIS — H25.13 AGE-RELATED NUCLEAR CATARACT OF BOTH EYES: ICD-10-CM

## 2017-10-14 DIAGNOSIS — E10.9 TYPE 1 DIABETES MELLITUS WITHOUT RETINOPATHY (HCC): Primary | ICD-10-CM

## 2017-10-14 PROCEDURE — 99243 OFF/OP CNSLTJ NEW/EST LOW 30: CPT | Performed by: OPHTHALMOLOGY

## 2017-10-14 PROCEDURE — 99212 OFFICE O/P EST SF 10 MIN: CPT | Performed by: OPHTHALMOLOGY

## 2017-10-14 NOTE — PROGRESS NOTES
James Velásquez is a 56year old female.     HPI:     HPI     Consult    Additional comments: Per Dr. Janeth Scott            Diabetic Eye Exam    Additional comments: Pt has been a diabetic for 17 years  0 years on pills/  17 years on Insulin   Pt checks her BS Loratadine-Pseudoephedrine ER 5-120 MG Oral Tablet 12 Hr Take 1 tablet by mouth every 12 (twelve) hours. Disp: 60 tablet Rfl: 3   Azelastine HCl 0.1 % Nasal Solution 2 sprays by Nasal route 2 (two) times daily.  Disp: 1 Bottle Rfl: 0   Insulin Aspart Prot Right Left     Full, Ortho Full, Ortho          Dilation     Both eyes:  1.0% Mydriacyl and 2.5% Rafal Synephrine @ 7:28 AM            Slit Lamp and Fundus Exam     Slit Lamp Exam       Right Left    Lids/Lashes Dermatochalasis, Meibomian gland dysfunction D

## 2017-10-14 NOTE — PATIENT INSTRUCTIONS
Type 1 diabetes mellitus without retinopathy ([facility] [address]  Diabetes type I: no background retinopathy, no signs of neovascularization noted. Discussed ocular and systemic benefits of blood sugar control.     Age-related nuclear cataract of both eyes  Discussed ea

## 2017-10-18 DIAGNOSIS — E10.9 TYPE 1 DIABETES MELLITUS WITHOUT COMPLICATION, WITH LONG-TERM CURRENT USE OF INSULIN (HCC): ICD-10-CM

## 2017-10-18 NOTE — TELEPHONE ENCOUNTER
Diabetes Medications  Protocol Criteria:  · Appointment scheduled in the past 6 months or the next 3 months  · A1C < 7.5 in the past 6 months  · Creatinine in the past 12 months  · Creatinine result < 1.5   Recent Outpatient Visits            4 days ago Ty

## 2017-10-23 ENCOUNTER — APPOINTMENT (OUTPATIENT)
Dept: OTHER | Facility: HOSPITAL | Age: 47
End: 2017-10-23
Attending: FAMILY MEDICINE

## 2017-10-24 ENCOUNTER — TELEPHONE (OUTPATIENT)
Dept: SURGERY | Facility: CLINIC | Age: 47
End: 2017-10-24

## 2017-10-24 NOTE — TELEPHONE ENCOUNTER
Per the request of DARRYL Paz, I called patient to discuss insurance benefits for 2017 versus 2018 for Bariatric services as patient expressed an interest in Bariatric surgery at her visit with Sanjana Castillo.  Patient currently has an HMO but empl

## 2017-11-03 ENCOUNTER — TELEPHONE (OUTPATIENT)
Dept: INTERNAL MEDICINE CLINIC | Facility: CLINIC | Age: 47
End: 2017-11-03

## 2017-11-03 DIAGNOSIS — E10.9 TYPE 1 DIABETES MELLITUS WITHOUT RETINOPATHY (HCC): Primary | ICD-10-CM

## 2017-11-03 NOTE — TELEPHONE ENCOUNTER
Patient is due for repeat Ha1c.   Please advise order pended. HEMOGLOBIN A1C   Order: [id number]   Collected:  7/14/2017 11:56 AM Status:  Final result Dx:  Uncontrolled type 2 diabetes mellitus. ..     Ref Range & Units 7/14/17 11:56 AM   Glycohemoglobin (H

## 2017-11-03 NOTE — TELEPHONE ENCOUNTER
Order CBC with differential, CMP, lipid panel, hemoglobin A1c, UA and a microalbumin as well as set up an appointment for this patient.   She is quite noncompliant

## 2017-11-06 ENCOUNTER — TELEPHONE (OUTPATIENT)
Dept: INTERNAL MEDICINE CLINIC | Facility: CLINIC | Age: 47
End: 2017-11-06

## 2017-11-06 DIAGNOSIS — E10.9 TYPE 1 DIABETES MELLITUS WITHOUT COMPLICATION, WITH LONG-TERM CURRENT USE OF INSULIN (HCC): ICD-10-CM

## 2017-11-06 NOTE — TELEPHONE ENCOUNTER
Pt called needs refill for     Current Outpatient Prescriptions:  Insulin Pen Needle (BD PEN NEEDLE MINI U/F) 31G X 5 MM Does not apply Misc use as directed Disp: 100 each Rfl: 1     Pt states dose was increased and [facility] will not refill due to new pres

## 2017-11-06 NOTE — TELEPHONE ENCOUNTER
Refill Protocol Appointment Criteria: Refilled per protocol    · Appointment scheduled in the past 12 months or in the next 3 months  Recent Outpatient Visits            3 weeks ago Type 1 diabetes mellitus without retinopathy [facility])    [facility]

## 2017-11-07 NOTE — TELEPHONE ENCOUNTER
Patient contacted advised on lab orders. She would like to come in November 28 first appointment. Everything is booked for that day. Patient states \" I have the day off and its the only day that will work. \"  Jayjay please advise.

## 2017-11-07 NOTE — TELEPHONE ENCOUNTER
The only appointment available in 28 November is 11:30 AM.  She does have the day off that day and hence she should take that appointment the morning appointments are taken already

## 2017-11-16 ENCOUNTER — OFFICE VISIT (OUTPATIENT)
Dept: SURGERY | Facility: CLINIC | Age: 47
End: 2017-11-16

## 2017-11-16 VITALS
WEIGHT: 230.69 LBS | HEIGHT: 63 IN | HEART RATE: 79 BPM | BODY MASS INDEX: 40.87 KG/M2 | OXYGEN SATURATION: 97 % | TEMPERATURE: 98 F | RESPIRATION RATE: 18 BRPM | DIASTOLIC BLOOD PRESSURE: 73 MMHG | SYSTOLIC BLOOD PRESSURE: 133 MMHG

## 2017-11-16 DIAGNOSIS — I10 ESSENTIAL HYPERTENSION: Primary | ICD-10-CM

## 2017-11-16 DIAGNOSIS — E11.8 TYPE 2 DIABETES MELLITUS WITH COMPLICATION, WITH LONG-TERM CURRENT USE OF INSULIN (HCC): ICD-10-CM

## 2017-11-16 DIAGNOSIS — Z79.4 TYPE 2 DIABETES MELLITUS WITH COMPLICATION, WITH LONG-TERM CURRENT USE OF INSULIN (HCC): ICD-10-CM

## 2017-11-16 DIAGNOSIS — E78.00 HYPERCHOLESTEREMIA: ICD-10-CM

## 2017-11-16 DIAGNOSIS — F32.A DEPRESSIVE DISORDER: ICD-10-CM

## 2017-11-16 DIAGNOSIS — E55.9 VITAMIN D DEFICIENCY: ICD-10-CM

## 2017-11-16 DIAGNOSIS — E66.01 MORBID OBESITY WITH BMI OF 40.0-44.9, ADULT (HCC): ICD-10-CM

## 2017-11-16 DIAGNOSIS — R73.09 ABNORMAL BLOOD SUGAR: ICD-10-CM

## 2017-11-16 PROCEDURE — 99214 OFFICE O/P EST MOD 30 MIN: CPT | Performed by: INTERNAL MEDICINE

## 2017-11-16 NOTE — PROGRESS NOTES
[address], [facility]  [address] [facility] [address] [facility] [address]  Dept: [phone]     Date:  17    Patient:  Pam España  :      3/4/1970  MRN:      [id number]    Chief Complaint:  Maxine English Rfl: 3   Azelastine HCl 0.1 % Nasal Solution 2 sprays by Nasal route 2 (two) times daily.  Disp: 1 Bottle Rfl: 0   Insulin Aspart Prot & Aspart (NOVOLOG MIX 70/30 FLEXPEN) (70-30) 100 UNIT/ML SC SUPN 50 units s/c bid with meals (Patient taking differently: • Heart Attack Cousin 47     Maternal cousin: MI, Cause of death   • Lipids Other      Family H/o Hyperlipidemia   • Glaucoma Neg    • Macular degeneration Neg        Food Journal  · Reviewed and Discussed:       · Patient has a Food Journal?: yes   · Pa S1, S2 normal, no murmur, click, rub or gallop, regular rate and rhythm  Abdomen: soft, obese, non tender  Extremities: extremities normal, atraumatic, no cyanosis or edema  Pulses: 2+ and symmetric  Skin: Skin color, texture, turgor normal. No rashes or l non-caloric beverages per day. No fruit juices or regular soda. 3. Increase activity-upper body exercises, walk 10 minutes per day. 4. Increase fruit and vegetable servings to 5-6 per day.       Attended seminar    Aware she needs to lose weight prior to

## 2017-11-24 ENCOUNTER — APPOINTMENT (OUTPATIENT)
Dept: LAB | Facility: HOSPITAL | Age: 47
End: 2017-11-24
Attending: INTERNAL MEDICINE
Payer: COMMERCIAL

## 2017-11-24 DIAGNOSIS — E11.8 TYPE 2 DIABETES MELLITUS WITH COMPLICATION, WITH LONG-TERM CURRENT USE OF INSULIN (HCC): ICD-10-CM

## 2017-11-24 DIAGNOSIS — E66.01 MORBID OBESITY WITH BMI OF 40.0-44.9, ADULT (HCC): ICD-10-CM

## 2017-11-24 DIAGNOSIS — E10.9 TYPE 1 DIABETES MELLITUS WITHOUT RETINOPATHY (HCC): ICD-10-CM

## 2017-11-24 DIAGNOSIS — R73.09 ABNORMAL BLOOD SUGAR: ICD-10-CM

## 2017-11-24 DIAGNOSIS — F32.A DEPRESSIVE DISORDER: ICD-10-CM

## 2017-11-24 DIAGNOSIS — E78.00 HYPERCHOLESTEREMIA: ICD-10-CM

## 2017-11-24 DIAGNOSIS — E55.9 VITAMIN D DEFICIENCY: ICD-10-CM

## 2017-11-24 DIAGNOSIS — Z79.4 TYPE 2 DIABETES MELLITUS WITH COMPLICATION, WITH LONG-TERM CURRENT USE OF INSULIN (HCC): ICD-10-CM

## 2017-11-24 DIAGNOSIS — I10 ESSENTIAL HYPERTENSION: ICD-10-CM

## 2017-11-24 PROCEDURE — 82570 ASSAY OF URINE CREATININE: CPT

## 2017-11-24 PROCEDURE — 84100 ASSAY OF PHOSPHORUS: CPT

## 2017-11-24 PROCEDURE — 83036 HEMOGLOBIN GLYCOSYLATED A1C: CPT | Performed by: INTERNAL MEDICINE

## 2017-11-24 PROCEDURE — 82607 VITAMIN B-12: CPT

## 2017-11-24 PROCEDURE — 83540 ASSAY OF IRON: CPT

## 2017-11-24 PROCEDURE — 80053 COMPREHEN METABOLIC PANEL: CPT | Performed by: INTERNAL MEDICINE

## 2017-11-24 PROCEDURE — 36415 COLL VENOUS BLD VENIPUNCTURE: CPT | Performed by: INTERNAL MEDICINE

## 2017-11-24 PROCEDURE — 80061 LIPID PANEL: CPT | Performed by: INTERNAL MEDICINE

## 2017-11-24 PROCEDURE — 84425 ASSAY OF VITAMIN B-1: CPT

## 2017-11-24 PROCEDURE — 83735 ASSAY OF MAGNESIUM: CPT

## 2017-11-24 PROCEDURE — 84466 ASSAY OF TRANSFERRIN: CPT

## 2017-11-24 PROCEDURE — 84443 ASSAY THYROID STIM HORMONE: CPT

## 2017-11-24 PROCEDURE — 85025 COMPLETE CBC W/AUTO DIFF WBC: CPT | Performed by: INTERNAL MEDICINE

## 2017-11-24 PROCEDURE — 82306 VITAMIN D 25 HYDROXY: CPT

## 2017-11-24 PROCEDURE — 81001 URINALYSIS AUTO W/SCOPE: CPT | Performed by: INTERNAL MEDICINE

## 2017-11-24 PROCEDURE — 82043 UR ALBUMIN QUANTITATIVE: CPT

## 2017-11-24 PROCEDURE — 82746 ASSAY OF FOLIC ACID SERUM: CPT

## 2017-11-28 ENCOUNTER — OFFICE VISIT (OUTPATIENT)
Dept: INTERNAL MEDICINE CLINIC | Facility: CLINIC | Age: 47
End: 2017-11-28

## 2017-11-28 VITALS
DIASTOLIC BLOOD PRESSURE: 78 MMHG | HEART RATE: 74 BPM | BODY MASS INDEX: 40 KG/M2 | WEIGHT: 227.88 LBS | SYSTOLIC BLOOD PRESSURE: 134 MMHG

## 2017-11-28 DIAGNOSIS — E11.8 TYPE 2 DIABETES MELLITUS WITH COMPLICATION, WITH LONG-TERM CURRENT USE OF INSULIN (HCC): Primary | ICD-10-CM

## 2017-11-28 DIAGNOSIS — Z79.4 TYPE 2 DIABETES MELLITUS WITH COMPLICATION, WITH LONG-TERM CURRENT USE OF INSULIN (HCC): Primary | ICD-10-CM

## 2017-11-28 DIAGNOSIS — E66.9 OBESITY (BMI 30-39.9): ICD-10-CM

## 2017-11-28 DIAGNOSIS — I10 ESSENTIAL HYPERTENSION: ICD-10-CM

## 2017-11-28 PROCEDURE — 99214 OFFICE O/P EST MOD 30 MIN: CPT | Performed by: INTERNAL MEDICINE

## 2017-11-28 PROCEDURE — 99212 OFFICE O/P EST SF 10 MIN: CPT | Performed by: INTERNAL MEDICINE

## 2017-11-28 NOTE — PATIENT INSTRUCTIONS
Problem List Items Addressed This Visit        Unprioritized    Essential hypertension     Blood pressure 134/78, pulse 74, weight 227 lb 14.4 oz (103.4 kg), not currently breastfeeding.      Stable blood pressure, patient has been on Zestril at 2.5 mg once slice whole-grain bread  ½ small whole-grain bagel  ½ whole-grain English muffin  ½ cup high-fiber cereal  ½ cup cooked cereal, oatmeal, grits  2 Tablespoons granola  1/3 cup cooked pasta  1/3 cup cooked brown rice  ½ whole-grain hamburger bun  1 small pot grill meats  **Lean beef or pork cuts include sirloin, flank, tenderloin, and loin chop  Fats – Limit to 3-5 serving per DAY  1 teaspoon – olive oil, mayonnaise, butter, margarine  1 tablespoon – regular salad dressing, light margarine, light machado  2 table calorie pack cinnamon roasted almonds or 2 tablespoons of any kind of nuts inside oatmeal  o 1 scoop of Whey protein powder inside oatmeal (add after cooking oatmeal)    Apple-Lime Nut Butter Toast:  o 1 slice 707% whole grain toast or 100 calorie bagel th a snack with hummus or low-fat dip     Cut crunchy vegetables (i.e. bell peppers) in shape of crackers/chips so it feels like a treat    Fresh veggies are good, but sometimes difficult to find and go bad easily; sometimes frozen can be even better, since t [place]     Step 1: Look at the Serving size; there may be more than 1 serving per container. Step 2: Total Carbohydrate. Your meals should be around 35-45 grams Total Carb and your snacks around 15-30 grams.  Fiber is good carb and helps keep us full; a

## 2017-11-28 NOTE — PROGRESS NOTES
HPI:    Patient ID: Rene Nova is a 56year old female. Labs discussed–hemoglobin A1c remains quite elevated. Patient's eating habits remain erratic. She does eat quite a bit of bread, pasta, potatoes and Western Kathia fries.   She does not eat much of d no hypoglycemic associated symptoms. There are no diabetic associated symptoms. Pertinent negatives for diabetes include no chest pain. There are no hypoglycemic complications. Symptoms are stable. There are no diabetic complications.  Pertinent negatives f apply Misc To use twice a day Disp: 100 each Rfl: 2   atorvastatin (LIPITOR) 10 MG Oral Tab 1 TAB PO QD Disp: 30 tablet Rfl: 3   Montelukast Sodium 10 MG Oral Tab Take 1 tablet (10 mg total) by mouth nightly.  Disp: 30 tablet Rfl: 3   Loratadine-Pseudoephed supple. No JVD present. No thyromegaly present. Cardiovascular: Normal rate, regular rhythm, normal heart sounds and intact distal pulses. No murmur heard. Pulmonary/Chest: Effort normal and breath sounds normal. She has no wheezes. She has no rales. potatoes, bread, pasta,     Reduce total calories in the diet to 1500 charlene per day. Reduce total starches as directed. Starches are all fruits, roots, grains.   All meals need to contain vegetables–2 cups, lean protein–15 g, a small starch–150 charlene that is carb:  One portion = 1 fist TOTAL    1 cup skim or 1% milk  6-8 ounces light yogurt  ¾ cup plain yogurt  1 cup vanilla 0% Greek yogurt  1 cup light soy milk    Tip: Try 1 cup unsweetened plain or vanilla almond milk instead… it doesn’t count as carb!    Veg “Laughing Cow” cheese wedge (protein) stacked with cucumber & tomato slices    1 high protein granola bar (both carb & protein - try [place])     half cup Edamame beans in shell (both carb & protein)    half turkey or peanut butter & jelly sandwich on whole grams or more of protein per slice)   o Add any spices like pepper, basil, oregano, rosemary, or garlic   o 1 slice 667% whole grain toast or bagel thin or whole grain English muffin    Breakfast Meal Replacements  o High protein meal bar like GINA snack     Cook 2 large batches veggies each week; keep leftovers in refrigerator to make easily accessible    Complete Your Perfect Plate    Soups make it effortless to achieve your half plate serving of veggies!  Add your extra cut up veggie scraps (carrot 1: Serving size. May be more than 1 serving/container  Step 2: Total Carb. Meals 35-45g; Snacks 15-30g  Step 3: Protein. > 5g for snacks; > 20g for meals   Step 4: Calories: Snacks 100 – 200; Meals 300-600  Step 5: Sodium: > 300mg = Danger food!  Meals < 60

## 2017-11-28 NOTE — ASSESSMENT & PLAN NOTE
Blood pressure 134/78, pulse 74, weight 227 lb 14.4 oz (103.4 kg), not currently breastfeeding. Stable blood pressure, patient has been on Zestril at 2.5 mg once daily. Continue the same dose of medications.

## 2017-11-28 NOTE — ASSESSMENT & PLAN NOTE
Blood sugars remained quite elevated–hemoglobin A1c is at 10.4. Currently on NovoLog mix 50 units 2 times daily. Advised to return in 2 weeks with blood sugar records before breakfast and before dinner in writing.   Diet control remains an issue as tigre fresh fruit (size of tennis ball)  1 cup melon  1 cup berries  ½ grapefruit  17 grapes   12 cherries  ½ banana  2 tbsp.  raisins or dried fruit  ½ cup canned fruit (light)  ½ cup fruit juice (limit ½c daily)      Limit carb portions to 1 fist at each meal M carrots, and bell pepper strips (free!) + 2 tablespoons of hummus or yogurt dip    1 small apple (carb) + 1 tablespoon of peanut butter (protein)    1 cup berries (carb) + half cup low-fat cottage cheese or plain 0% fat [place] yogurt (protein)     6 whole g Protein or Kashi – NOT granola; use only 2 Tablespoons if using granola)   o Optional: Sprinkled with cinnamon     Breakfast Reynolds:  o 2 egg whites or half cup egg substitute  o Add any vegetables like bell pepper strips, mushrooms, or onions   o 2 slic pork) and have a carb (such as corn, peas, or dried beans in the salad), or add fresh fruit, yogurt, or a glass of low-fat milk to the meal    Add extra vegetables to soups, chili, and stir-fries instead of starches like beans, rice, or pasta    Pack raw v to aim for no more than 2300mg of sodium per day. Step 6: Fat. There should be no more than 3 grams saturated fat and there should always be 0 grams trans fat per serving. Total Fat includes the good and the bad fats.  Saturated Fat and Trans Fat are bad

## 2017-11-28 NOTE — ASSESSMENT & PLAN NOTE
Wt Readings from Last 6 Encounters:  11/28/17 : 227 lb 14.4 oz (103.4 kg)  11/16/17 : 230 lb 11.2 oz (104.6 kg)  10/12/17 : 228 lb 3 oz (103.5 kg)  09/23/17 : 231 lb (104.8 kg)  09/18/17 : 231 lb 4 oz (104.9 kg)  08/15/17 : 229 lb 6 oz (104 kg)     Slight

## 2017-11-30 RX ORDER — INSULIN ASPART 100 [IU]/ML
INJECTION, SUSPENSION SUBCUTANEOUS
Qty: 20 PEN | Refills: 3 | Status: SHIPPED | OUTPATIENT
Start: 2017-11-30 | End: 2018-01-19

## 2017-12-14 ENCOUNTER — OFFICE VISIT (OUTPATIENT)
Dept: SURGERY | Facility: CLINIC | Age: 47
End: 2017-12-14

## 2017-12-14 ENCOUNTER — TELEPHONE (OUTPATIENT)
Dept: INTERNAL MEDICINE CLINIC | Facility: CLINIC | Age: 47
End: 2017-12-14

## 2017-12-14 VITALS
RESPIRATION RATE: 16 BRPM | WEIGHT: 230.5 LBS | SYSTOLIC BLOOD PRESSURE: 135 MMHG | OXYGEN SATURATION: 99 % | HEART RATE: 75 BPM | HEIGHT: 63 IN | BODY MASS INDEX: 40.84 KG/M2 | DIASTOLIC BLOOD PRESSURE: 75 MMHG

## 2017-12-14 DIAGNOSIS — E78.00 HYPERCHOLESTEREMIA: ICD-10-CM

## 2017-12-14 DIAGNOSIS — I10 ESSENTIAL HYPERTENSION: Primary | ICD-10-CM

## 2017-12-14 DIAGNOSIS — E66.01 MORBID OBESITY WITH BMI OF 40.0-44.9, ADULT (HCC): ICD-10-CM

## 2017-12-14 DIAGNOSIS — E10.9 TYPE 1 DIABETES MELLITUS WITHOUT RETINOPATHY (HCC): ICD-10-CM

## 2017-12-14 DIAGNOSIS — E55.9 VITAMIN D DEFICIENCY: ICD-10-CM

## 2017-12-14 PROCEDURE — 99214 OFFICE O/P EST MOD 30 MIN: CPT | Performed by: INTERNAL MEDICINE

## 2017-12-14 RX ORDER — ERGOCALCIFEROL 1.25 MG/1
50000 CAPSULE ORAL WEEKLY
Qty: 12 CAPSULE | Refills: 2 | Status: SHIPPED | OUTPATIENT
Start: 2017-12-14 | End: 2018-03-02

## 2017-12-14 NOTE — TELEPHONE ENCOUNTER
Blood sugar record (from 11/29 - 12/15) placed on [facility] [place] for review.

## 2017-12-14 NOTE — PROGRESS NOTES
[address], [facility]  [address]  [facility] [address] [facility] [address]  Dept: [phone]     Date:  17    Patient:  Yared Bagley  :      3/4/1970  MRN:      [id number]    Chief Complaint:  Esperanza Downy Sodium 10 MG Oral Tab Take 1 tablet (10 mg total) by mouth nightly. Disp: 30 tablet Rfl: 3   Loratadine-Pseudoephedrine ER 5-120 MG Oral Tablet 12 Hr Take 1 tablet by mouth every 12 (twelve) hours.  Disp: 60 tablet Rfl: 3   Azelastine HCl 0.1 % Nasal [place] Family History   Problem Relation Age of Onset   • Neurological Disorder Father 61     Alzheimer's disease, Cause of death   • Hypertension Mother    • Diabetes Mother    • Heart Attack Mother 62     MI, Cause of death   • Heart Attack Cousin 47     Mate appears stated age and cooperative  Head: Normocephalic, without obvious abnormality, atraumatic  Eyes: conjunctivae/corneas clear. PERRL, EOM's intact. Fundi benign.   Lungs: clear to auscultation bilaterally  Heart: S1, S2 normal, no murmur, click, rub or HDL 43 11/24/2017 10:02 AM   TRIG 61 11/24/2017 10:02 AM     Goals for next month:  1. Keep a food log. 2. Drink 48-64 ounces of non-caloric beverages per day. No fruit juices or regular soda.   3. Increase activity-upper body exercises, walk 10 minutes

## 2017-12-15 NOTE — TELEPHONE ENCOUNTER
Blood sugar records from home–  A.m.–123, 140, 136, 120, 147, 153, 133, 166, 120, 171, 164, 155, 178, 127, 139, 156, 173.  P.m. – 152, 174, 150, 183, 195, 169, 192, 209, 167, 201, 192, 188, 209, 137, 178, 146.     Patient is currently on NovoLog mix 50 units

## 2017-12-28 ENCOUNTER — TELEPHONE (OUTPATIENT)
Dept: INTERNAL MEDICINE CLINIC | Facility: CLINIC | Age: 47
End: 2017-12-28

## 2017-12-28 DIAGNOSIS — E66.01 MORBID OBESITY (HCC): Primary | ICD-10-CM

## 2017-12-28 NOTE — TELEPHONE ENCOUNTER
Dr. Jer Frias,    Pt needs a retro referral for Dr. Constantine Benitez. Pt was seen on 12/14/17. Please advise and sign off on referral.    Thank you, [facility] Care.

## 2018-01-19 ENCOUNTER — TELEPHONE (OUTPATIENT)
Dept: OTHER | Age: 48
End: 2018-01-19

## 2018-01-19 NOTE — TELEPHONE ENCOUNTER
Dr Barbara Allen, pt's pharmacy called, states that the Novolog mix 70/30 is not covered by insurance. They are requesting Humalog mix. There is not an equivalent in our formulary. I will pend the closest mix which is Humalog 75/25. Please advise.   According to t

## 2018-01-20 NOTE — TELEPHONE ENCOUNTER
[facility] pharmacy is closed but detailed VM left of MD message below about insulin pens being ordered and to call the office back if there are any questions. Pt contacted and informed of the insulin order change with the dosage.  Pt verbalizes Vaishali

## 2018-02-06 ENCOUNTER — TELEPHONE (OUTPATIENT)
Dept: SURGERY | Facility: CLINIC | Age: 48
End: 2018-02-06

## 2018-02-06 NOTE — TELEPHONE ENCOUNTER
1/17/18 @ 2:46pm Spoke to [place] [facility], [phone], PVE#[id number]. He verified that patient has following benefits for Bariatric services: No weight management criteria. No CECILY/Blue Distinction required.  LAVELL (ETW#[id number]) DX E66.01 Pt has benefits fo

## 2018-02-06 NOTE — TELEPHONE ENCOUNTER
Spoke to patient and reviewed all information received from insurance company regarding 2018 Bariatric Benefits. Patient is interviewing for a new job with [employer] and may be switching insurances as of 3/1/18.  Patient is going to ask a friend who also works f

## 2018-06-22 ENCOUNTER — TELEPHONE (OUTPATIENT)
Dept: INTERNAL MEDICINE CLINIC | Facility: CLINIC | Age: 48
End: 2018-06-22

## 2018-06-22 DIAGNOSIS — E11.8 TYPE 2 DIABETES MELLITUS WITH COMPLICATION, UNSPECIFIED WHETHER LONG TERM INSULIN USE: Primary | ICD-10-CM

## 2018-06-22 NOTE — TELEPHONE ENCOUNTER
Pt is scheduled to see Dr. Aure Ross for 8-8-17 and would like to do her order prior to her appt. Please, call pt when the orders are in the system.  Pt would also like to speak with the RN>

## 2018-06-25 RX ORDER — INSULIN ASPART 100 [IU]/ML
INJECTION, SUSPENSION SUBCUTANEOUS
Qty: 15 DEVICE | Refills: 3 | Status: SHIPPED | OUTPATIENT
Start: 2018-06-25 | End: 2019-07-31

## 2018-06-25 NOTE — TELEPHONE ENCOUNTER
CBC with differential, CMP, lipid panel, hemoglobin A1c, UA, urine microalbumin–T2 diabetes uncontrolled, insulin-dependent

## 2018-06-25 NOTE — TELEPHONE ENCOUNTER
Called patient - verified patient's name and  - informed pt of doctor's note - patient states she is very close to being out of insulin and will need a new order to last until OV in August.     Pt states the last insulin that was ordered was humalog but

## 2018-06-25 NOTE — TELEPHONE ENCOUNTER
I called pt. To let her know her labs were in the system and then she said her insurance has changed and now covers Novolog 70/30 55 units in am and pm.  Needs to be sent to [facility].

## 2018-06-25 NOTE — TELEPHONE ENCOUNTER
Diabetes Medications  Protocol Criteria:  · Appointment scheduled in the past 6 months or the next 3 months  · A1C < 7.5 in the past 6 months  · Creatinine in the past 12 months  · Creatinine result < 1.5   Recent Outpatient Visits            6 months ago

## 2018-06-27 ENCOUNTER — HOSPITAL ENCOUNTER (EMERGENCY)
Facility: HOSPITAL | Age: 48
Discharge: HOME | End: 2018-06-27
Attending: PHYSICIAN ASSISTANT
Payer: COMMERCIAL

## 2018-06-27 ENCOUNTER — APPOINTMENT (OUTPATIENT)
Dept: GENERAL RADIOLOGY | Facility: HOSPITAL | Age: 48
End: 2018-06-27
Attending: PHYSICIAN ASSISTANT
Payer: COMMERCIAL

## 2018-06-27 VITALS
BODY MASS INDEX: 40.75 KG/M2 | RESPIRATION RATE: 16 BRPM | WEIGHT: 230 LBS | HEART RATE: 66 BPM | TEMPERATURE: 98 F | DIASTOLIC BLOOD PRESSURE: 67 MMHG | SYSTOLIC BLOOD PRESSURE: 126 MMHG | OXYGEN SATURATION: 97 % | HEIGHT: 63 IN

## 2018-06-27 DIAGNOSIS — J02.9 VIRAL PHARYNGITIS: ICD-10-CM

## 2018-06-27 DIAGNOSIS — J01.00 ACUTE NON-RECURRENT MAXILLARY SINUSITIS: Primary | ICD-10-CM

## 2018-06-27 DIAGNOSIS — R05.9 COUGH: ICD-10-CM

## 2018-06-27 PROCEDURE — 71046 X-RAY EXAM CHEST 2 VIEWS: CPT | Performed by: PHYSICIAN ASSISTANT

## 2018-06-27 PROCEDURE — 99283 EMERGENCY DEPT VISIT LOW MDM: CPT

## 2018-06-27 PROCEDURE — 87430 STREP A AG IA: CPT

## 2018-06-27 RX ORDER — FLUTICASONE PROPIONATE 50 MCG
2 SPRAY, SUSPENSION (ML) NASAL DAILY
Qty: 16 G | Refills: 0 | Status: SHIPPED | OUTPATIENT
Start: 2018-06-27 | End: 2018-07-27

## 2018-06-27 RX ORDER — AMOXICILLIN AND CLAVULANATE POTASSIUM 875; 125 MG/1; MG/1
1 TABLET, FILM COATED ORAL 2 TIMES DAILY
Qty: 20 TABLET | Refills: 0 | Status: SHIPPED | OUTPATIENT
Start: 2018-06-27 | End: 2018-07-07

## 2018-06-27 NOTE — ED INITIAL ASSESSMENT (HPI)
Pt states she has \"had some sinus infection brewing for a week now. \" Subjective fevers. Productive cough with yellowish/grteenish phlegm.

## 2018-06-28 RX ORDER — INSULIN ASPART 100 [IU]/ML
INJECTION, SUSPENSION SUBCUTANEOUS
Qty: 15 ML | Refills: 5 | Status: SHIPPED | OUTPATIENT
Start: 2018-06-28 | End: 2019-07-31

## 2018-06-28 NOTE — ED NOTES
Pt provided with discharge instructions & prescriptions. Verbalized understanding for plan of care at home and follow up. All questions/concerns addressed prior to discharge.  Pt ambulated out of er with steady gait

## 2018-06-28 NOTE — ED NOTES
Pt is resting comfortably, watching tv. Pts work of breathing is easy an unlabored, skin color is appropriate. Will continue to monitor.

## 2018-06-28 NOTE — ED PROVIDER NOTES
Patient Seen in: [facility] AND [facility] Emergency Department    History   Patient presents with:  Sinus Problem    Stated Complaint: \"sinus infection\" x 1 week    HPI    55-year-old female presents with chief complaint of left-sided maxillary facial pain. NEEDLE MINI U/F) 31G X 5 MM Does not apply Misc,  To use twice a day   atorvastatin (LIPITOR) 10 MG Oral Tab,  1 TAB PO QD   Montelukast Sodium 10 MG Oral Tab,  Take 1 tablet (10 mg total) by mouth nightly.    Loratadine-Pseudoephedrine ER 5-120 MG Oral Tab (36.7 °C)  Temp src: Oral  SpO2: 100 %  O2 Device: None (Room air)    Current:/73   Pulse 67   Temp 98 °F (36.7 °C) (Oral)   Resp 16   Ht 160 cm (5' 3\")   Wt 104.3 kg   SpO2 98%   BMI 40.74 kg/m²      PULSE OX within normal limits on room air as int Dictated by (CST): Dylan Henriquez MD on 6/27/2018 at 19:05     Approved by (CST): Dylan Henriquez MD on 6/27/2018 at 19:06            Physical exam remained stable over serial reexaminations as previously documented.   Results reviewed and need for follow-up

## 2018-06-30 RX ORDER — INSULIN ASPART 100 [IU]/ML
INJECTION, SUSPENSION SUBCUTANEOUS
Qty: 15 ML | Refills: 0 | OUTPATIENT
Start: 2018-06-30

## 2018-08-06 ENCOUNTER — LAB ENCOUNTER (OUTPATIENT)
Dept: LAB | Facility: HOSPITAL | Age: 48
End: 2018-08-06
Attending: INTERNAL MEDICINE
Payer: COMMERCIAL

## 2018-08-06 DIAGNOSIS — E11.8 TYPE 2 DIABETES MELLITUS WITH COMPLICATION, UNSPECIFIED WHETHER LONG TERM INSULIN USE: ICD-10-CM

## 2018-08-06 LAB
ALBUMIN SERPL BCP-MCNC: 3.2 G/DL (ref 3.5–4.8)
ALBUMIN/GLOB SERPL: 0.9 {RATIO} (ref 1–2)
ALP SERPL-CCNC: 53 U/L (ref 32–100)
ALT SERPL-CCNC: 14 U/L (ref 14–54)
ANION GAP SERPL CALC-SCNC: 8 MMOL/L (ref 0–18)
AST SERPL-CCNC: 12 U/L (ref 15–41)
BASOPHILS # BLD: 0.1 K/UL (ref 0–0.2)
BASOPHILS NFR BLD: 1 %
BILIRUB SERPL-MCNC: 0.4 MG/DL (ref 0.3–1.2)
BUN SERPL-MCNC: 12 MG/DL (ref 8–20)
BUN/CREAT SERPL: 15.8 (ref 10–20)
CALCIUM SERPL-MCNC: 8.8 MG/DL (ref 8.5–10.5)
CHLORIDE SERPL-SCNC: 102 MMOL/L (ref 95–110)
CHOLEST SERPL-MCNC: 195 MG/DL (ref 110–200)
CO2 SERPL-SCNC: 23 MMOL/L (ref 22–32)
CREAT SERPL-MCNC: 0.76 MG/DL (ref 0.5–1.5)
CREAT UR-MCNC: 251.6 MG/DL
EOSINOPHIL # BLD: 0.4 K/UL (ref 0–0.7)
EOSINOPHIL NFR BLD: 5 %
ERYTHROCYTE [DISTWIDTH] IN BLOOD BY AUTOMATED COUNT: 15.2 % (ref 11–15)
GLOBULIN PLAS-MCNC: 3.7 G/DL (ref 2.5–3.7)
GLUCOSE SERPL-MCNC: 279 MG/DL (ref 70–99)
HBA1C MFR BLD: 12.4 % (ref 4–6)
HCT VFR BLD AUTO: 40.7 % (ref 35–48)
HDLC SERPL-MCNC: 41 MG/DL
HGB BLD-MCNC: 13.5 G/DL (ref 12–16)
LDLC SERPL CALC-MCNC: 132 MG/DL (ref 0–99)
LYMPHOCYTES # BLD: 2.7 K/UL (ref 1–4)
LYMPHOCYTES NFR BLD: 39 %
MCH RBC QN AUTO: 29.1 PG (ref 27–32)
MCHC RBC AUTO-ENTMCNC: 33.3 G/DL (ref 32–37)
MCV RBC AUTO: 87.3 FL (ref 80–100)
MICROALBUMIN UR-MCNC: 19.3 MG/DL (ref 0–1.8)
MICROALBUMIN/CREAT UR: 76.7 MG/G{CREAT} (ref 0–20)
MONOCYTES # BLD: 0.3 K/UL (ref 0–1)
MONOCYTES NFR BLD: 5 %
NEUTROPHILS # BLD AUTO: 3.5 K/UL (ref 1.8–7.7)
NEUTROPHILS NFR BLD: 50 %
NONHDLC SERPL-MCNC: 154 MG/DL
OSMOLALITY UR CALC.SUM OF ELEC: 286 MOSM/KG (ref 275–295)
PATIENT FASTING: YES
PLATELET # BLD AUTO: 277 K/UL (ref 140–400)
PMV BLD AUTO: 8.4 FL (ref 7.4–10.3)
POTASSIUM SERPL-SCNC: 3.8 MMOL/L (ref 3.3–5.1)
PROT SERPL-MCNC: 6.9 G/DL (ref 5.9–8.4)
RBC # BLD AUTO: 4.66 M/UL (ref 3.7–5.4)
SODIUM SERPL-SCNC: 133 MMOL/L (ref 136–144)
TRIGL SERPL-MCNC: 108 MG/DL (ref 1–149)
WBC # BLD AUTO: 6.9 K/UL (ref 4–11)

## 2018-08-06 PROCEDURE — 80061 LIPID PANEL: CPT

## 2018-08-06 PROCEDURE — 36415 COLL VENOUS BLD VENIPUNCTURE: CPT

## 2018-08-06 PROCEDURE — 80053 COMPREHEN METABOLIC PANEL: CPT

## 2018-08-06 PROCEDURE — 85025 COMPLETE CBC W/AUTO DIFF WBC: CPT

## 2018-08-06 PROCEDURE — 82570 ASSAY OF URINE CREATININE: CPT

## 2018-08-06 PROCEDURE — 83036 HEMOGLOBIN GLYCOSYLATED A1C: CPT

## 2018-08-06 PROCEDURE — 82043 UR ALBUMIN QUANTITATIVE: CPT

## 2018-08-08 ENCOUNTER — OFFICE VISIT (OUTPATIENT)
Dept: INTERNAL MEDICINE CLINIC | Facility: CLINIC | Age: 48
End: 2018-08-08
Payer: COMMERCIAL

## 2018-08-08 VITALS
HEIGHT: 63 IN | SYSTOLIC BLOOD PRESSURE: 126 MMHG | OXYGEN SATURATION: 97 % | DIASTOLIC BLOOD PRESSURE: 77 MMHG | RESPIRATION RATE: 22 BRPM | HEART RATE: 74 BPM | BODY MASS INDEX: 40.52 KG/M2 | WEIGHT: 228.69 LBS

## 2018-08-08 DIAGNOSIS — E10.9 TYPE 1 DIABETES MELLITUS WITHOUT COMPLICATION, WITH LONG-TERM CURRENT USE OF INSULIN (HCC): ICD-10-CM

## 2018-08-08 DIAGNOSIS — E11.8 TYPE 2 DIABETES MELLITUS WITH COMPLICATION, WITH LONG-TERM CURRENT USE OF INSULIN (HCC): Primary | ICD-10-CM

## 2018-08-08 DIAGNOSIS — Z79.4 TYPE 2 DIABETES MELLITUS WITH COMPLICATION, WITH LONG-TERM CURRENT USE OF INSULIN (HCC): Primary | ICD-10-CM

## 2018-08-08 PROCEDURE — 99215 OFFICE O/P EST HI 40 MIN: CPT | Performed by: INTERNAL MEDICINE

## 2018-08-08 PROCEDURE — 99212 OFFICE O/P EST SF 10 MIN: CPT | Performed by: INTERNAL MEDICINE

## 2018-08-09 NOTE — PROGRESS NOTES
HPI:    Patient ID: Elisabeth Abdi is a 53year old female. Labs discussed,  Diamond He elevated hemoglobin A1c with diabetic proteinuria and nephropathy.   Patient has been advised of the dangers of elevated protein in the urine and risk for dialysis if [facility] and calorie counting. She has not had a previous visit with a dietitian. She never participates in exercise. Her home blood glucose trend is fluctuating dramatically (no sugars at the visit today).  Her breakfast blood glucose is taken between 7-8 am. Her b breakfast lunch and dinner Disp: 15 pen Rfl: 3   Insulin Pen Needle (BD PEN NEEDLE MINI U/F) 31G X 5 MM Does not apply Misc Use 4 times a day as directed Disp: 400 each Rfl: 2   NOVOLOG MIX 70/30 FLEXPEN (70-30) 100 UNIT/ML Subcutaneous Suspension Pen-inje Nose: Nose normal.   Mouth/Throat: Oropharynx is clear and moist. No oropharyngeal exudate. Eyes: Conjunctivae and EOM are normal. Pupils are equal, round, and reactive to light. Right eye exhibits no discharge. Left eye exhibits no discharge.    Neck: NovoLog. If the blood sugars are below 90 take 5 unit of NovoLog. If the blood sugars are above 200 take 15+ 3–18 units of NovoLog. If the blood sugars are above 250 take 15+5 units–20 units of NovoLog.   If the blood sugars are higher than 300–take 15+

## 2018-08-09 NOTE — PATIENT INSTRUCTIONS
Problem List Items Addressed This Visit        Unprioritized    Type II diabetes mellitus ([facility] [address] - Primary     Diabetes very poorly controlled at this time. Patient had run out of her insurance and had not been compliant with medications.   Currently with d

## 2018-08-09 NOTE — ASSESSMENT & PLAN NOTE
Diabetes very poorly controlled at this time. Patient had run out of her insurance and had not been compliant with medications. Currently with diabetic nephropathy due to elevated urine microalbumin.   Patient has been informed of risks for kidney failure

## 2018-10-09 ENCOUNTER — TELEPHONE (OUTPATIENT)
Dept: OPHTHALMOLOGY | Facility: CLINIC | Age: 48
End: 2018-10-09

## 2018-10-09 NOTE — TELEPHONE ENCOUNTER
Patient did not show up for appointment. Unable to leave a message for patient. Missed appointment letter sent out via [address] [place] [address] [phone].

## 2018-10-31 ENCOUNTER — HOSPITAL ENCOUNTER (EMERGENCY)
Facility: HOSPITAL | Age: 48
Discharge: HOME | End: 2018-10-31
Attending: EMERGENCY MEDICINE
Payer: COMMERCIAL

## 2018-10-31 VITALS
RESPIRATION RATE: 20 BRPM | HEIGHT: 63 IN | HEART RATE: 56 BPM | OXYGEN SATURATION: 99 % | TEMPERATURE: 98 F | BODY MASS INDEX: 38.98 KG/M2 | WEIGHT: 220 LBS | DIASTOLIC BLOOD PRESSURE: 71 MMHG | SYSTOLIC BLOOD PRESSURE: 126 MMHG

## 2018-10-31 DIAGNOSIS — R42 ORTHOSTATIC DIZZINESS: Primary | ICD-10-CM

## 2018-10-31 PROCEDURE — 85025 COMPLETE CBC W/AUTO DIFF WBC: CPT | Performed by: EMERGENCY MEDICINE

## 2018-10-31 PROCEDURE — 80048 BASIC METABOLIC PNL TOTAL CA: CPT | Performed by: EMERGENCY MEDICINE

## 2018-10-31 PROCEDURE — 93005 ELECTROCARDIOGRAM TRACING: CPT

## 2018-10-31 PROCEDURE — 96360 HYDRATION IV INFUSION INIT: CPT

## 2018-10-31 PROCEDURE — 99284 EMERGENCY DEPT VISIT MOD MDM: CPT

## 2018-10-31 PROCEDURE — 83735 ASSAY OF MAGNESIUM: CPT | Performed by: EMERGENCY MEDICINE

## 2018-10-31 PROCEDURE — 93010 ELECTROCARDIOGRAM REPORT: CPT | Performed by: EMERGENCY MEDICINE

## 2018-10-31 RX ORDER — MECLIZINE HYDROCHLORIDE 25 MG/1
25 TABLET ORAL ONCE
Status: COMPLETED | OUTPATIENT
Start: 2018-10-31 | End: 2018-10-31

## 2018-10-31 RX ORDER — FLUTICASONE PROPIONATE 50 MCG
2 SPRAY, SUSPENSION (ML) NASAL DAILY
Qty: 16 G | Refills: 0 | Status: SHIPPED | OUTPATIENT
Start: 2018-10-31 | End: 2018-11-30

## 2018-10-31 NOTE — ED INITIAL ASSESSMENT (HPI)
Paradise Benavides is here for eval of intermittent episodes of positional dizziness x 3wks. Had episode of racing heart with dizziness, which brought her here today.

## 2018-10-31 NOTE — ED PROVIDER NOTES
Patient Seen in: [facility] AND [facility] Emergency Department    History   Patient presents with:  Arrythmia/Palpitations (cardiovascular)    Stated Complaint: dizziness, palpitations    HPI    56yoF with hx of obesity, DM, HTN, HL, here with c/o episodic dizz Negative for back pain. Skin: Negative for rash. Neurological: Positive for dizziness. Negative for weakness, light-headedness and headaches. All other systems reviewed and are negative.       Positive for stated complaint: dizziness, palpitations  [address]  Rhythm: Sinus Rhythm  Reading: abnormal for rate, normal for rhythm, T wave inversion in V1, unchanged from 9/2017      Cardiac Monitor:    Patient placed on the cardiac monitor and a rhythm strip obtained with the indication of palpitations and dizzin COURSE AND TREATMENT:  Patient's condition was stable during Emergency Department evaluation.      5yoF with dizziness and palpitations, now improved  - I personally reviewed and interpreted all the ED vitals  - afebrile, hemodynamically stable  - I ordered sinusitis; Strep pharyngitis; Impacted cerumen of left ear; Type 1 diabetes mellitus without retinopathy ([address]  Obesity (BMI 30-39.9); and Morbid obesity with BMI of 40.0-44.9, adult ([address] on their problem list. to contribute to the complexity of his ED ev

## 2018-10-31 NOTE — ED NOTES
53year old female here with dizziness not feeling well for a couple weeks, pt reports having an episode today with palpitations, diaphoresis and felt hot , pt reports at was work it passed after awhile waited 30 mins and then drove to ED for treatment, pt

## 2019-07-30 ENCOUNTER — TELEPHONE (OUTPATIENT)
Dept: INTERNAL MEDICINE CLINIC | Facility: CLINIC | Age: 49
End: 2019-07-30

## 2019-07-30 DIAGNOSIS — E11.69 TYPE 2 DIABETES MELLITUS WITH OTHER SPECIFIED COMPLICATION, WITH LONG-TERM CURRENT USE OF INSULIN (HCC): Primary | ICD-10-CM

## 2019-07-30 DIAGNOSIS — Z79.4 TYPE 2 DIABETES MELLITUS WITH OTHER SPECIFIED COMPLICATION, WITH LONG-TERM CURRENT USE OF INSULIN (HCC): Primary | ICD-10-CM

## 2019-07-30 NOTE — TELEPHONE ENCOUNTER
I called her to say that fasting labs are in system. She said she is going in the morning and is going to take her daughters appmt at 4pm tomorrow as her insurance ends tomorrow.   I said make sure your daughter knows as I don't want her coming here saying

## 2019-07-31 ENCOUNTER — OFFICE VISIT (OUTPATIENT)
Dept: INTERNAL MEDICINE CLINIC | Facility: CLINIC | Age: 49
End: 2019-07-31
Payer: COMMERCIAL

## 2019-07-31 ENCOUNTER — LAB ENCOUNTER (OUTPATIENT)
Dept: LAB | Facility: HOSPITAL | Age: 49
End: 2019-07-31
Attending: INTERNAL MEDICINE
Payer: COMMERCIAL

## 2019-07-31 VITALS
WEIGHT: 220.69 LBS | RESPIRATION RATE: 20 BRPM | HEIGHT: 63 IN | SYSTOLIC BLOOD PRESSURE: 119 MMHG | DIASTOLIC BLOOD PRESSURE: 72 MMHG | BODY MASS INDEX: 39.1 KG/M2 | HEART RATE: 74 BPM | TEMPERATURE: 98 F

## 2019-07-31 DIAGNOSIS — E11.69 TYPE 2 DIABETES MELLITUS WITH OTHER SPECIFIED COMPLICATION, WITH LONG-TERM CURRENT USE OF INSULIN (HCC): ICD-10-CM

## 2019-07-31 DIAGNOSIS — Z79.4 TYPE 2 DIABETES MELLITUS WITH OTHER SPECIFIED COMPLICATION, WITH LONG-TERM CURRENT USE OF INSULIN (HCC): ICD-10-CM

## 2019-07-31 DIAGNOSIS — Z79.4 TYPE 2 DIABETES MELLITUS WITH HYPERGLYCEMIA, WITH LONG-TERM CURRENT USE OF INSULIN (HCC): Primary | ICD-10-CM

## 2019-07-31 DIAGNOSIS — E11.65 TYPE 2 DIABETES MELLITUS WITH HYPERGLYCEMIA, WITH LONG-TERM CURRENT USE OF INSULIN (HCC): Primary | ICD-10-CM

## 2019-07-31 LAB
ALBUMIN SERPL-MCNC: 3.3 G/DL (ref 3.4–5)
ALBUMIN/GLOB SERPL: 0.8 {RATIO} (ref 1–2)
ALP LIVER SERPL-CCNC: 64 U/L (ref 39–100)
ALT SERPL-CCNC: 16 U/L (ref 13–56)
ANION GAP SERPL CALC-SCNC: 5 MMOL/L (ref 0–18)
AST SERPL-CCNC: 6 U/L (ref 15–37)
BASOPHILS # BLD AUTO: 0.06 X10(3) UL (ref 0–0.2)
BASOPHILS NFR BLD AUTO: 0.9 %
BILIRUB SERPL-MCNC: 0.4 MG/DL (ref 0.1–2)
BILIRUB UR QL: NEGATIVE
BUN BLD-MCNC: 11 MG/DL (ref 7–18)
BUN/CREAT SERPL: 15.1 (ref 10–20)
CALCIUM BLD-MCNC: 8.9 MG/DL (ref 8.5–10.1)
CHLORIDE SERPL-SCNC: 108 MMOL/L (ref 98–112)
CHOLEST SMN-MCNC: 193 MG/DL (ref ?–200)
CLARITY UR: CLEAR
CO2 SERPL-SCNC: 28 MMOL/L (ref 21–32)
COLOR UR: YELLOW
CREAT BLD-MCNC: 0.73 MG/DL (ref 0.55–1.02)
CREAT UR-SCNC: 185 MG/DL
DEPRECATED RDW RBC AUTO: 47.5 FL (ref 35.1–46.3)
EOSINOPHIL # BLD AUTO: 0.37 X10(3) UL (ref 0–0.7)
EOSINOPHIL NFR BLD AUTO: 5.4 %
ERYTHROCYTE [DISTWIDTH] IN BLOOD BY AUTOMATED COUNT: 14.4 % (ref 11–15)
EST. AVERAGE GLUCOSE BLD GHB EST-MCNC: 289 MG/DL (ref 68–126)
GLOBULIN PLAS-MCNC: 4.2 G/DL (ref 2.8–4.4)
GLUCOSE BLD-MCNC: 138 MG/DL (ref 70–99)
GLUCOSE UR-MCNC: NEGATIVE MG/DL
HBA1C MFR BLD HPLC: 11.7 % (ref ?–5.7)
HCT VFR BLD AUTO: 43.8 % (ref 35–48)
HDLC SERPL-MCNC: 48 MG/DL (ref 40–59)
HGB BLD-MCNC: 14 G/DL (ref 12–16)
HGB UR QL STRIP.AUTO: NEGATIVE
IMM GRANULOCYTES # BLD AUTO: 0.02 X10(3) UL (ref 0–1)
IMM GRANULOCYTES NFR BLD: 0.3 %
KETONES UR-MCNC: NEGATIVE MG/DL
LDLC SERPL CALC-MCNC: 130 MG/DL (ref ?–100)
LEUKOCYTE ESTERASE UR QL STRIP.AUTO: NEGATIVE
LYMPHOCYTES # BLD AUTO: 2.66 X10(3) UL (ref 1–4)
LYMPHOCYTES NFR BLD AUTO: 39.2 %
M PROTEIN MFR SERPL ELPH: 7.5 G/DL (ref 6.4–8.2)
MCH RBC QN AUTO: 28.9 PG (ref 26–34)
MCHC RBC AUTO-ENTMCNC: 32 G/DL (ref 31–37)
MCV RBC AUTO: 90.3 FL (ref 80–100)
MICROALBUMIN UR-MCNC: 5.25 MG/DL
MICROALBUMIN/CREAT 24H UR-RTO: 28.4 UG/MG (ref ?–30)
MONOCYTES # BLD AUTO: 0.36 X10(3) UL (ref 0.1–1)
MONOCYTES NFR BLD AUTO: 5.3 %
NEUTROPHILS # BLD AUTO: 3.32 X10 (3) UL (ref 1.5–7.7)
NEUTROPHILS # BLD AUTO: 3.32 X10(3) UL (ref 1.5–7.7)
NEUTROPHILS NFR BLD AUTO: 48.9 %
NITRITE UR QL STRIP.AUTO: NEGATIVE
NONHDLC SERPL-MCNC: 145 MG/DL (ref ?–130)
OSMOLALITY SERPL CALC.SUM OF ELEC: 294 MOSM/KG (ref 275–295)
PATIENT FASTING: YES
PATIENT FASTING: YES
PH UR: 7 [PH] (ref 5–8)
PLATELET # BLD AUTO: 289 10(3)UL (ref 150–450)
POTASSIUM SERPL-SCNC: 4.3 MMOL/L (ref 3.5–5.1)
PROT UR-MCNC: 30 MG/DL
RBC # BLD AUTO: 4.85 X10(6)UL (ref 3.8–5.3)
RBC #/AREA URNS AUTO: 1 /HPF
SODIUM SERPL-SCNC: 141 MMOL/L (ref 136–145)
SP GR UR STRIP: 1.02 (ref 1–1.03)
TRIGL SERPL-MCNC: 77 MG/DL (ref 30–149)
UROBILINOGEN UR STRIP-ACNC: <2
VIT C UR-MCNC: NEGATIVE MG/DL
VLDLC SERPL CALC-MCNC: 15 MG/DL (ref 0–30)
WBC # BLD AUTO: 6.8 X10(3) UL (ref 4–11)
WBC #/AREA URNS AUTO: 1 /HPF

## 2019-07-31 PROCEDURE — 83036 HEMOGLOBIN GLYCOSYLATED A1C: CPT

## 2019-07-31 PROCEDURE — 80053 COMPREHEN METABOLIC PANEL: CPT

## 2019-07-31 PROCEDURE — 82570 ASSAY OF URINE CREATININE: CPT

## 2019-07-31 PROCEDURE — 81001 URINALYSIS AUTO W/SCOPE: CPT

## 2019-07-31 PROCEDURE — 80061 LIPID PANEL: CPT

## 2019-07-31 PROCEDURE — 82043 UR ALBUMIN QUANTITATIVE: CPT

## 2019-07-31 PROCEDURE — 36415 COLL VENOUS BLD VENIPUNCTURE: CPT

## 2019-07-31 PROCEDURE — 99214 OFFICE O/P EST MOD 30 MIN: CPT | Performed by: INTERNAL MEDICINE

## 2019-07-31 PROCEDURE — 85025 COMPLETE CBC W/AUTO DIFF WBC: CPT

## 2019-07-31 RX ORDER — PENICILLIN V POTASSIUM 500 MG/1
TABLET ORAL
Refills: 0 | Status: ON HOLD | COMMUNITY
Start: 2019-07-23 | End: 2019-11-21

## 2019-07-31 RX ORDER — CHLORHEXIDINE GLUCONATE 0.12 MG/ML
RINSE ORAL
Refills: 0 | COMMUNITY
Start: 2019-07-23 | End: 2020-01-07

## 2019-07-31 NOTE — PATIENT INSTRUCTIONS
Problem List Items Addressed This Visit        Unprioritized    Type II diabetes mellitus ([facility] [address] - Primary     Blood sugars remain elevated–hemoglobin A1c is at 11.7. Patient has been taking her Tresiba 70 units once daily.   She has not taken her NovoLog m

## 2019-07-31 NOTE — ASSESSMENT & PLAN NOTE
Blood sugars remain elevated–hemoglobin A1c is at 11.7. Patient has been taking her Tresiba 70 units once daily. She has not taken her NovoLog mealtime insulin as prescribed. She thought controlling her diet would have helped.   Explained the need for me

## 2019-08-12 NOTE — PROGRESS NOTES
HPI:    Patient ID: Ronni Mercado is a 51year old female. Severe noncompliance with meds and follow up.   Discussed perils of such behaviour especially with kidney failure,strokes and heart attacks    Diabetes   She presents for her follow-up diabetic adherence to exercise. Risk factors for coronary artery disease include dyslipidemia, diabetes mellitus, hypertension, obesity and a sedentary lifestyle. Hypertension   This is a chronic problem. The current episode started more than 1 year ago.  The pro times a day as directed Disp: 400 each Rfl: 2   Montelukast Sodium 10 MG Oral Tab Take 1 tablet (10 mg total) by mouth nightly.  (Patient taking differently: Take 10 mg by mouth nightly as needed.  ) Disp: 30 tablet Rfl: 3   Loratadine-Pseudoephedrine ER 5- normal and breath sounds normal. She has no wheezes. She has no rales. She exhibits no tenderness. Abdominal: Soft. Bowel sounds are normal. She exhibits no distension and no mass. There is no tenderness. There is no rebound.    Musculoskeletal: Normal ra than 300–take 15+ 10–25 units of NovoLog. Relevant Medications    Insulin Aspart Pen (NOVOLOG FLEXPEN) 100 UNIT/ML Subcutaneous Solution Pen-injector          Return in about 4 weeks (around 8/28/2019), or if symptoms worsen or fail to improve.

## 2019-09-19 ENCOUNTER — HOSPITAL ENCOUNTER (EMERGENCY)
Facility: HOSPITAL | Age: 49
Discharge: HOME | End: 2019-09-19
Payer: MEDICAID

## 2019-09-19 ENCOUNTER — APPOINTMENT (OUTPATIENT)
Dept: GENERAL RADIOLOGY | Facility: HOSPITAL | Age: 49
End: 2019-09-19
Payer: MEDICAID

## 2019-09-19 VITALS
RESPIRATION RATE: 20 BRPM | BODY MASS INDEX: 38.45 KG/M2 | OXYGEN SATURATION: 100 % | HEIGHT: 63 IN | SYSTOLIC BLOOD PRESSURE: 138 MMHG | HEART RATE: 62 BPM | TEMPERATURE: 99 F | DIASTOLIC BLOOD PRESSURE: 61 MMHG | WEIGHT: 217 LBS

## 2019-09-19 DIAGNOSIS — J06.9 UPPER RESPIRATORY TRACT INFECTION, UNSPECIFIED TYPE: Primary | ICD-10-CM

## 2019-09-19 PROCEDURE — 99284 EMERGENCY DEPT VISIT MOD MDM: CPT

## 2019-09-19 PROCEDURE — 94640 AIRWAY INHALATION TREATMENT: CPT

## 2019-09-19 PROCEDURE — 71046 X-RAY EXAM CHEST 2 VIEWS: CPT

## 2019-09-19 RX ORDER — ALBUTEROL SULFATE 90 UG/1
2 AEROSOL, METERED RESPIRATORY (INHALATION) EVERY 4 HOURS PRN
Qty: 1 INHALER | Refills: 0 | Status: SHIPPED | OUTPATIENT
Start: 2019-09-19 | End: 2019-10-19

## 2019-09-19 RX ORDER — FLUTICASONE PROPIONATE 50 MCG
2 SPRAY, SUSPENSION (ML) NASAL DAILY
Qty: 16 G | Refills: 0 | Status: SHIPPED | OUTPATIENT
Start: 2019-09-19 | End: 2019-10-19

## 2019-09-19 RX ORDER — BENZONATATE 100 MG/1
100 CAPSULE ORAL 3 TIMES DAILY PRN
Qty: 15 CAPSULE | Refills: 0 | Status: SHIPPED | OUTPATIENT
Start: 2019-09-19 | End: 2019-09-26

## 2019-09-19 RX ORDER — ALBUTEROL SULFATE 2.5 MG/3ML
2.5 SOLUTION RESPIRATORY (INHALATION) ONCE
Status: COMPLETED | OUTPATIENT
Start: 2019-09-19 | End: 2019-09-19

## 2019-09-19 NOTE — ED PROVIDER NOTES
Patient Seen in: [facility] AND [facility] Emergency Department    History   CC: cough  HPI: Kate Ida 51year old female w/ hx insulin dependent DMII, HTN who presents to the ER c/o cough, runny nose, congestion x2 wks. No fever.  Has been taking Mucinex w needed for cough. Albuterol Sulfate  (90 Base) MCG/ACT Inhalation Aero Soln,  Inhale 2 puffs into the lungs every 4 (four) hours as needed for Wheezing.  Dispense w/ spacer   Chlorhexidine Gluconate 0.12 % Mouth/Throat Solution,  RINSE WITH 15 MLS (36.9 °C) (Oral)   Resp 20   Ht 160 cm (5' 3\")   Wt 98.4 kg   SpO2 98%   BMI 38.44 kg/m²         General - Appears well, non-toxic and in NAD  Head - Appears symmetrical without deformity/swelling cranium, scalp, or facial bones  Eyes - PERRL, sclera not as Tessalon as needed. Advise close follow-up with primary care as well as return precautions for ER reevaluation. Patient demonstrates understanding of all instruction and agrees with plan of care.       Disposition and Plan     Clinical Impression:  Upp

## 2019-09-19 NOTE — ED INITIAL ASSESSMENT (HPI)
Pt reports cough and cold for the past 2 weeks. Pt reports a productive cough with yellow and green sputum.  Pt denies reports recent travels to [place] [address]

## 2019-11-18 ENCOUNTER — TELEPHONE (OUTPATIENT)
Dept: INTERNAL MEDICINE CLINIC | Facility: CLINIC | Age: 49
End: 2019-11-18

## 2019-11-18 DIAGNOSIS — N63.20 LEFT BREAST LUMP: ICD-10-CM

## 2019-11-18 DIAGNOSIS — Z12.31 VISIT FOR SCREENING MAMMOGRAM: Primary | ICD-10-CM

## 2019-11-18 NOTE — TELEPHONE ENCOUNTER
Patient states feeling a lump in her left breast and wanting a mammogram order as well as an office visit with Dr. Ronda Valerio. Patient asking if Dr. Ronda Valerio can fit her into her schedule sometime soon. Please advise.

## 2019-11-20 ENCOUNTER — APPOINTMENT (OUTPATIENT)
Dept: GENERAL RADIOLOGY | Facility: HOSPITAL | Age: 49
End: 2019-11-20
Attending: EMERGENCY MEDICINE

## 2019-11-20 ENCOUNTER — APPOINTMENT (OUTPATIENT)
Dept: CT IMAGING | Facility: HOSPITAL | Age: 49
End: 2019-11-20
Attending: EMERGENCY MEDICINE

## 2019-11-20 ENCOUNTER — HOSPITAL ENCOUNTER (OUTPATIENT)
Facility: HOSPITAL | Age: 49
Setting detail: OBSERVATION
Discharge: HOME | End: 2019-11-21
Attending: EMERGENCY MEDICINE | Admitting: HOSPITALIST

## 2019-11-20 DIAGNOSIS — R07.9 ACUTE CHEST PAIN: Primary | ICD-10-CM

## 2019-11-20 DIAGNOSIS — R42 DIZZINESS: ICD-10-CM

## 2019-11-20 PROBLEM — R73.9 HYPERGLYCEMIA: Status: ACTIVE | Noted: 2019-11-20

## 2019-11-20 PROCEDURE — 99220 INITIAL OBSERVATION CARE,LEVL III: CPT | Performed by: HOSPITALIST

## 2019-11-20 PROCEDURE — 70450 CT HEAD/BRAIN W/O DYE: CPT | Performed by: EMERGENCY MEDICINE

## 2019-11-20 PROCEDURE — 71045 X-RAY EXAM CHEST 1 VIEW: CPT | Performed by: EMERGENCY MEDICINE

## 2019-11-20 RX ORDER — ENOXAPARIN SODIUM 100 MG/ML
40 INJECTION SUBCUTANEOUS DAILY
Status: DISCONTINUED | OUTPATIENT
Start: 2019-11-21 | End: 2019-11-21

## 2019-11-20 RX ORDER — ONDANSETRON 2 MG/ML
4 INJECTION INTRAMUSCULAR; INTRAVENOUS EVERY 6 HOURS PRN
Status: DISCONTINUED | OUTPATIENT
Start: 2019-11-20 | End: 2019-11-21

## 2019-11-20 RX ORDER — GUAIFENESIN 100 MG/5ML
200 SOLUTION ORAL EVERY 6 HOURS PRN
Status: DISCONTINUED | OUTPATIENT
Start: 2019-11-20 | End: 2019-11-21

## 2019-11-20 RX ORDER — ASPIRIN 325 MG
325 TABLET ORAL DAILY
Status: DISCONTINUED | OUTPATIENT
Start: 2019-11-20 | End: 2019-11-21

## 2019-11-20 RX ORDER — NITROGLYCERIN 0.4 MG/1
0.4 TABLET SUBLINGUAL EVERY 5 MIN PRN
Status: DISCONTINUED | OUTPATIENT
Start: 2019-11-20 | End: 2019-11-21

## 2019-11-20 RX ORDER — ACETAMINOPHEN 325 MG/1
650 TABLET ORAL EVERY 6 HOURS PRN
Status: DISCONTINUED | OUTPATIENT
Start: 2019-11-20 | End: 2019-11-21

## 2019-11-20 RX ORDER — SODIUM CHLORIDE 0.9 % (FLUSH) 0.9 %
3 SYRINGE (ML) INJECTION AS NEEDED
Status: DISCONTINUED | OUTPATIENT
Start: 2019-11-20 | End: 2019-11-21

## 2019-11-20 RX ORDER — TEMAZEPAM 15 MG/1
15 CAPSULE ORAL NIGHTLY PRN
Status: DISCONTINUED | OUTPATIENT
Start: 2019-11-20 | End: 2019-11-21

## 2019-11-20 RX ORDER — ATORVASTATIN CALCIUM 10 MG/1
10 TABLET, FILM COATED ORAL NIGHTLY
Status: DISCONTINUED | OUTPATIENT
Start: 2019-11-20 | End: 2019-11-21

## 2019-11-20 RX ORDER — DEXTROSE MONOHYDRATE 25 G/50ML
50 INJECTION, SOLUTION INTRAVENOUS AS NEEDED
Status: DISCONTINUED | OUTPATIENT
Start: 2019-11-20 | End: 2019-11-21

## 2019-11-20 RX ORDER — INSULIN LISPRO 100 [IU]/ML
16 INJECTION, SOLUTION INTRAVENOUS; SUBCUTANEOUS
COMMUNITY
End: 2020-05-01

## 2019-11-20 RX ORDER — FLUTICASONE PROPIONATE 50 MCG
1 SPRAY, SUSPENSION (ML) NASAL DAILY
Status: DISCONTINUED | OUTPATIENT
Start: 2019-11-21 | End: 2019-11-21

## 2019-11-21 ENCOUNTER — APPOINTMENT (OUTPATIENT)
Dept: CV DIAGNOSTICS | Facility: HOSPITAL | Age: 49
End: 2019-11-21
Attending: HOSPITALIST

## 2019-11-21 ENCOUNTER — APPOINTMENT (OUTPATIENT)
Dept: NUCLEAR MEDICINE | Facility: HOSPITAL | Age: 49
End: 2019-11-21
Attending: HOSPITALIST

## 2019-11-21 VITALS
TEMPERATURE: 98 F | RESPIRATION RATE: 20 BRPM | SYSTOLIC BLOOD PRESSURE: 111 MMHG | HEIGHT: 63 IN | OXYGEN SATURATION: 96 % | HEART RATE: 85 BPM | DIASTOLIC BLOOD PRESSURE: 92 MMHG | WEIGHT: 213.88 LBS | BODY MASS INDEX: 37.89 KG/M2

## 2019-11-21 PROCEDURE — 93017 CV STRESS TEST TRACING ONLY: CPT | Performed by: HOSPITALIST

## 2019-11-21 PROCEDURE — 99217 OBSERVATION CARE DISCHARGE: CPT | Performed by: HOSPITALIST

## 2019-11-21 PROCEDURE — 78452 HT MUSCLE IMAGE SPECT MULT: CPT | Performed by: HOSPITALIST

## 2019-11-21 RX ORDER — MONTELUKAST SODIUM 10 MG/1
10 TABLET ORAL NIGHTLY
Qty: 30 TABLET | Refills: 3 | Status: SHIPPED | OUTPATIENT
Start: 2019-11-21 | End: 2020-01-22

## 2019-11-21 RX ORDER — FLUTICASONE PROPIONATE 50 MCG
2 SPRAY, SUSPENSION (ML) NASAL DAILY
Qty: 1 BOTTLE | Refills: 5 | Status: SHIPPED | OUTPATIENT
Start: 2019-11-21 | End: 2020-07-30

## 2019-11-21 NOTE — PLAN OF CARE
Problem: Patient Centered Care  Goal: Patient preferences are identified and integrated in the patient's plan of care  Description  Interventions:  - What would you like us to know as we care for you?  Home with family   - Provide timely, complete, and ac visually impaired, hearing impaired and aphasic patients to use assistive/communication devices  Outcome: Progressing     Problem: PAIN - ADULT  Goal: Verbalizes/displays adequate comfort level or patient's stated pain goal  Description  INTERVENTIONS:  -

## 2019-11-21 NOTE — H&P
[facility]    PATIENT'S NAME: Louise Starkey   ATTENDING PHYSICIAN: Ely Flores MD   PATIENT ACCOUNT#:   [id number]    LOCATION:  [place] [address]  MEDICAL RECORD #:   [id number]       YOB: 1970  ADMISSION DATE:       11/20/2 Atraumatic. Oropharynx clear. Moist mucous membranes. Normal hard and soft palate. Eyes:  Anicteric sclerae. Pupils equal, round, reactive to light and accommodation. Extraocular muscle movement intact. Ears, nose normal.    NECK:  Supple.   No lymph

## 2019-11-21 NOTE — PLAN OF CARE
Problem: Patient Centered Care  Goal: Patient preferences are identified and integrated in the patient's plan of care  Description  Interventions:  - What would you like us to know as we care for you? I would like to return home.   - Provide timely, compl needed  - Reorient patient post seizure  - Seizure pads on all 4 side rails  - Instruct patient/family to notify RN of any seizure activity  - Instruct patient/family to call for assistance with activity based on assessment  Outcome: Adequate for Discharge Consider OT/PT consult to assist with strengthening/mobility  - Encourage toileting schedule  Outcome: Adequate for Discharge   Patient had a normal Nuclear stress test and can be discharged.

## 2019-11-21 NOTE — DISCHARGE SUMMARY
[facility]  Discharge Summary     Pacheco Richey  : 3/4/1970    Status: Observation  Day #: 0    Attending: Lakshmi Gomez MD  PCP: Kyra De La Cruz MD     Date of Admission: 2019  Date of Discharge: 2019     Hospital Discharge Diag FLONASE  What changed:    · how much to take  · how to take this  · when to take this      2 sprays by Nasal route daily.  USE TWO SPRAY IN EACH NOSTRIL EVERY DAY   Quantity:  1 Bottle  Refills:  5     Montelukast Sodium 10 MG Tabs  Commonly known as:  SING Take 1 tablet by mouth every 12 (twelve) hours.    Quantity:  60 tablet  Refills:  3        STOP taking these medications    Azelastine HCl 0.1 % Soln  Commonly known as:  ASTELIN        penicillin v potassium 500 MG Tabs  Commonly known as:  VEETID

## 2019-11-21 NOTE — ED PROVIDER NOTES
Patient Seen in: [facility] AND [facility] Emergency Department      History   Patient presents with:  Chest Pain Angina (cardiovascular)    Stated Complaint:     HPI    Patient presents to the emergency department complaining of chest pain.   She states that she (Oral)   Resp 18   Ht 160 cm (5' 3\")   Wt 99.8 kg   SpO2 98%   BMI 38.97 kg/m²         Physical Exam  Vitals signs and nursing note reviewed. Constitutional:       General: She is not in acute distress. Appearance: She is well-developed.  She is obes Sinus Rhythm  Reading: normal                      [facility]     Pulse Ox: 98%, Normal,     Cardiac Monitor: Pulse Readings from Last 1 Encounters:  11/20/19 : 66  , sinus,      Radiology findings: Ct Brain Or Head ([id number])    Result Date: 11/20/2019  CONCLUSION:

## 2019-11-22 ENCOUNTER — TELEPHONE (OUTPATIENT)
Dept: MEDSURG UNIT | Facility: HOSPITAL | Age: 49
End: 2019-11-22

## 2019-11-22 ENCOUNTER — PATIENT OUTREACH (OUTPATIENT)
Dept: CASE MANAGEMENT | Age: 49
End: 2019-11-22

## 2019-11-22 DIAGNOSIS — R07.9 ACUTE CHEST PAIN: ICD-10-CM

## 2019-11-22 PROCEDURE — 1111F DSCHRG MED/CURRENT MED MERGE: CPT

## 2019-11-22 NOTE — TELEPHONE ENCOUNTER
Spoke to pt for hospital f/u today. Pt needs to schedule HFU appt and pt prefers to wait until after 12-7-19.       While on the phone, pt stated her insurance lapsed on 10-31-19 and she will have coverage with [employer] on 12-1-19 (which is the reason she

## 2019-11-22 NOTE — PROGRESS NOTES
Initial Post Discharge Follow Up   Discharge Date: 11/21/19  Contact Date: 11/22/2019    Consent Verification:  Assessment Completed With: Patient  HIPAA Verified?   Yes    Discharge Dx:  Chest pain    General:   • How have you been since your discharge Nasal Suspension 2 sprays by Nasal route daily. USE TWO SPRAY IN EACH NOSTRIL EVERY DAY 1 Bottle 5   • Montelukast Sodium 10 MG Oral Tab Take 1 tablet (10 mg total) by mouth nightly.  30 tablet 3   • Insulin Lispro 100 UNIT/ML Subcutaneous Solution Inject 1 home.  Are you having any concerns with constipation? No    Referrals/orders at D/C:  Home Health/Services ordered at D/C? No  DME ordered at D/C? No     Needs post D/C:   Now that you are home, are there any needs or concerns you need addressed before you patient on symptoms of recurrent chest pain or her dizziness worsens, she is to return to the ER. Patient understands. Patient denies similar symptoms at time of call. Patient denies any concerns at this time.      NCM attempted to schedule HFU appt but

## 2019-11-25 RX ORDER — ATORVASTATIN CALCIUM 10 MG/1
10 TABLET, FILM COATED ORAL DAILY
Qty: 30 TABLET | Refills: 1 | Status: ON HOLD | OUTPATIENT
Start: 2019-11-25 | End: 2020-01-22

## 2019-11-25 RX ORDER — LISINOPRIL 2.5 MG/1
2.5 TABLET ORAL NIGHTLY
Qty: 30 TABLET | Refills: 1 | Status: SHIPPED | OUTPATIENT
Start: 2019-11-25 | End: 2020-03-12 | Stop reason: ALTCHOICE

## 2019-12-07 ENCOUNTER — HOSPITAL ENCOUNTER (OUTPATIENT)
Dept: MAMMOGRAPHY | Age: 49
Discharge: HOME | End: 2019-12-07
Attending: INTERNAL MEDICINE
Payer: COMMERCIAL

## 2019-12-07 DIAGNOSIS — Z12.31 VISIT FOR SCREENING MAMMOGRAM: ICD-10-CM

## 2019-12-07 PROCEDURE — 77063 BREAST TOMOSYNTHESIS BI: CPT | Performed by: INTERNAL MEDICINE

## 2019-12-07 PROCEDURE — 77067 SCR MAMMO BI INCL CAD: CPT | Performed by: INTERNAL MEDICINE

## 2019-12-27 ENCOUNTER — TELEPHONE (OUTPATIENT)
Dept: INTERNAL MEDICINE CLINIC | Facility: CLINIC | Age: 49
End: 2019-12-27

## 2019-12-27 ENCOUNTER — HOSPITAL ENCOUNTER (OUTPATIENT)
Dept: ULTRASOUND IMAGING | Facility: HOSPITAL | Age: 49
Discharge: HOME | End: 2019-12-27
Attending: INTERNAL MEDICINE
Payer: COMMERCIAL

## 2019-12-27 DIAGNOSIS — R92.8 ABNORMAL MAMMOGRAM: ICD-10-CM

## 2019-12-27 PROCEDURE — 76642 ULTRASOUND BREAST LIMITED: CPT | Performed by: INTERNAL MEDICINE

## 2019-12-27 NOTE — IMAGING NOTE
This nurse introduced self and role of breast coordinator. Discussed recommended breast biopsy with patient. Pt was recommended by Dr. Yari Barrios to have a Right breast ultrasound guided biopsy.    Pt history discussed as below:    INDICATIONS:  Abn Fluticasone Propionate 50 MCG/ACT Nasal Suspension 2 sprays by Nasal route daily. USE TWO SPRAY IN EACH NOSTRIL EVERY DAY 1 Bottle 5   • Montelukast Sodium 10 MG Oral Tab Take 1 tablet (10 mg total) by mouth nightly.  30 tablet 3   • Insulin Lispro 100 UNIT direction of your physician. Radiology's preference is to hold this medication for 7 days prior to biopsy. Informed patient to call cardiologist to go off aspirin. Denies use.     -Blood thinners/antiplatelet medications (Coumadin, Plavix  xeralto , eff hours after the procedure. Discussed with patient that some soreness and bruising is normal after biopsy but that prolonged or increased pain and bruising should be reported to the ordering physician.    Educated patient that they should bring a sports

## 2019-12-27 NOTE — TELEPHONE ENCOUNTER
Pt stopped by the  states her mammogram came back abnormal and would like orders placed right away for biopsy.  Pt would like  to give her a call

## 2019-12-30 ENCOUNTER — HOSPITAL ENCOUNTER (EMERGENCY)
Facility: HOSPITAL | Age: 49
Discharge: HOME | End: 2019-12-30
Attending: EMERGENCY MEDICINE
Payer: COMMERCIAL

## 2019-12-30 VITALS
RESPIRATION RATE: 20 BRPM | HEIGHT: 63 IN | HEART RATE: 69 BPM | DIASTOLIC BLOOD PRESSURE: 74 MMHG | TEMPERATURE: 97 F | SYSTOLIC BLOOD PRESSURE: 150 MMHG | WEIGHT: 218 LBS | OXYGEN SATURATION: 97 % | BODY MASS INDEX: 38.62 KG/M2

## 2019-12-30 DIAGNOSIS — M54.12 CERVICAL RADICULOPATHY: Primary | ICD-10-CM

## 2019-12-30 LAB — S PYO AG THROAT QL: NEGATIVE

## 2019-12-30 PROCEDURE — 99283 EMERGENCY DEPT VISIT LOW MDM: CPT

## 2019-12-30 PROCEDURE — 87430 STREP A AG IA: CPT

## 2019-12-30 RX ORDER — ORPHENADRINE CITRATE 100 MG/1
100 TABLET, EXTENDED RELEASE ORAL 2 TIMES DAILY PRN
Qty: 20 TABLET | Refills: 0 | Status: SHIPPED | OUTPATIENT
Start: 2019-12-30 | End: 2019-12-30

## 2019-12-30 RX ORDER — ORPHENADRINE CITRATE 100 MG/1
100 TABLET, EXTENDED RELEASE ORAL 2 TIMES DAILY PRN
Qty: 20 TABLET | Refills: 0 | Status: SHIPPED | OUTPATIENT
Start: 2019-12-30 | End: 2020-01-07

## 2019-12-30 RX ORDER — LIDOCAINE 50 MG/G
1 PATCH TOPICAL
Qty: 30 PATCH | Refills: 0 | Status: SHIPPED | OUTPATIENT
Start: 2019-12-30 | End: 2020-01-20 | Stop reason: ALTCHOICE

## 2019-12-30 RX ORDER — LIDOCAINE 50 MG/G
1 PATCH TOPICAL
Qty: 30 PATCH | Refills: 0 | Status: SHIPPED | OUTPATIENT
Start: 2019-12-30 | End: 2019-12-30

## 2019-12-30 NOTE — ED INITIAL ASSESSMENT (HPI)
PT c/o left sided facial pain which she reports is centered on her ear, also c/o pain when swallowing.

## 2019-12-30 NOTE — ED PROVIDER NOTES
Patient Seen in: [facility] AND [facility] Emergency Department      History   Patient presents with:  Pain    Stated Complaint: facial pain/ headache    HPI    51year old female with history of diabetes, hyperlipidemia, and hypertension who presents with pain Vitals signs and nursing note reviewed. Constitutional:       General: She is not in acute distress. Appearance: She is well-developed. HENT:      Head: Normocephalic and atraumatic. Jaw: No trismus, tenderness or pain on movement.       Right GCS: GCS eye subscore is 4. GCS verbal subscore is 5. GCS motor subscore is 6. Cranial Nerves: No cranial nerve deficit. Sensory: No sensory deficit. Motor: No tremor.    Psychiatric:         Behavior: Behavior normal.           ED Course

## 2020-01-02 ENCOUNTER — TELEPHONE (OUTPATIENT)
Dept: INTERNAL MEDICINE CLINIC | Facility: CLINIC | Age: 50
End: 2020-01-02

## 2020-01-02 NOTE — TELEPHONE ENCOUNTER
PA denied for Keya Chirinos. Patients plan states medication is not covered due to not meeting criteria. Preferred formulary medication is Christ Polka.

## 2020-01-02 NOTE — TELEPHONE ENCOUNTER
Called [facility] pharmacy to initiate PA, could not process request through [facility]. Completed PA request with [employer] turnaround time 24 hrs case ECD#[id number].

## 2020-01-07 ENCOUNTER — HOSPITAL ENCOUNTER (OUTPATIENT)
Dept: ULTRASOUND IMAGING | Facility: HOSPITAL | Age: 50
Discharge: HOME | End: 2020-01-07
Attending: INTERNAL MEDICINE
Payer: COMMERCIAL

## 2020-01-07 ENCOUNTER — HOSPITAL ENCOUNTER (OUTPATIENT)
Dept: MAMMOGRAPHY | Facility: HOSPITAL | Age: 50
Discharge: HOME | End: 2020-01-07
Attending: INTERNAL MEDICINE
Payer: COMMERCIAL

## 2020-01-07 ENCOUNTER — OFFICE VISIT (OUTPATIENT)
Dept: INTERNAL MEDICINE CLINIC | Facility: CLINIC | Age: 50
End: 2020-01-07
Payer: COMMERCIAL

## 2020-01-07 VITALS — SYSTOLIC BLOOD PRESSURE: 116 MMHG | DIASTOLIC BLOOD PRESSURE: 59 MMHG | HEART RATE: 75 BPM

## 2020-01-07 VITALS
SYSTOLIC BLOOD PRESSURE: 129 MMHG | RESPIRATION RATE: 16 BRPM | HEIGHT: 63 IN | BODY MASS INDEX: 37.92 KG/M2 | WEIGHT: 214 LBS | DIASTOLIC BLOOD PRESSURE: 79 MMHG | HEART RATE: 80 BPM

## 2020-01-07 DIAGNOSIS — Z79.4 TYPE 2 DIABETES MELLITUS WITH HYPERGLYCEMIA, WITH LONG-TERM CURRENT USE OF INSULIN (HCC): ICD-10-CM

## 2020-01-07 DIAGNOSIS — N63.0 BREAST LUMP: Primary | ICD-10-CM

## 2020-01-07 DIAGNOSIS — N63.10 BREAST MASS, RIGHT: ICD-10-CM

## 2020-01-07 DIAGNOSIS — C50.411 MALIGNANT NEOPLASM OF UPPER-OUTER QUADRANT OF RIGHT BREAST IN FEMALE, ESTROGEN RECEPTOR NEGATIVE (HCC): Primary | ICD-10-CM

## 2020-01-07 DIAGNOSIS — E11.65 TYPE 2 DIABETES MELLITUS WITH HYPERGLYCEMIA, WITH LONG-TERM CURRENT USE OF INSULIN (HCC): ICD-10-CM

## 2020-01-07 DIAGNOSIS — Z17.1 MALIGNANT NEOPLASM OF UPPER-OUTER QUADRANT OF RIGHT BREAST IN FEMALE, ESTROGEN RECEPTOR NEGATIVE (HCC): Primary | ICD-10-CM

## 2020-01-07 PROCEDURE — 88305 TISSUE EXAM BY PATHOLOGIST: CPT | Performed by: INTERNAL MEDICINE

## 2020-01-07 PROCEDURE — 88360 TUMOR IMMUNOHISTOCHEM/MANUAL: CPT | Performed by: INTERNAL MEDICINE

## 2020-01-07 PROCEDURE — 19083 BX BREAST 1ST LESION US IMAG: CPT | Performed by: INTERNAL MEDICINE

## 2020-01-07 PROCEDURE — 99213 OFFICE O/P EST LOW 20 MIN: CPT | Performed by: INTERNAL MEDICINE

## 2020-01-07 PROCEDURE — 77065 DX MAMMO INCL CAD UNI: CPT | Performed by: INTERNAL MEDICINE

## 2020-01-07 PROCEDURE — 88374 M/PHMTRC ALYS ISHQUANT/SEMIQ: CPT | Performed by: INTERNAL MEDICINE

## 2020-01-07 NOTE — ASSESSMENT & PLAN NOTE
Patient has been on Basaglar 70 units once daily with NovoLog 16 units with every meal.  Her blood sugars have been elevated. She is on NovoLog on the mealtime plan but she has not been adjusting it according to her blood sugars checked.   She has been lily

## 2020-01-07 NOTE — ASSESSMENT & PLAN NOTE
Right breast lump, status post biopsy today. Patient is quite anxious. Had an extensive discussion about possibilities at this time. If this is benign patient will still need to follow-up with surgical oncology to consider removal of this tissue.   If th

## 2020-01-07 NOTE — PROGRESS NOTES
HPI:    Patient ID: Tasha Brady is a 51year old female. 1/7/2020    PROCEDURE:  GREGORY POST PROCEDURE DIGITAL IMAGE RIGHT ([id number]=[id number])     COMPARISON: [facility] for Nusrat Ling ([id number]=[id number]), 12/27/2019, 13:23.   Javed Prado Dispense Refill   • lidocaine 5 % External Patch Place 1 patch onto the skin Q24H PRN (pain). 30 patch 0   • lisinopril 2.5 MG Oral Tab Take 1 tablet (2.5 mg total) by mouth nightly.  30 tablet 1   • atorvastatin (LIPITOR) 10 MG Oral Tab Take 1 tablet (10 m Oropharynx is clear and moist. No oropharyngeal exudate. Eyes: Pupils are equal, round, and reactive to light. Conjunctivae and EOM are normal. Right eye exhibits no discharge. Left eye exhibits no discharge. Neck: Normal range of motion. Neck supple. to 8 units  If the blood sugars are higher than 300, advised to give me a call.          Relevant Orders    COMP METABOLIC PANEL (14)    CBC WITH DIFFERENTIAL WITH PLATELET    HEMOGLOBIN A1C    LIPID PANEL    MICROALB/CREAT RATIO, RANDOM URINE    URINALYSIS

## 2020-01-07 NOTE — PROCEDURES
[facility] HOSP - [facility]  Procedure Note    Barbara Uri Patient Status:  Outpatient    3/4/1970 MRN [id number]   Location [address] [facility] Attending Jermaine Garcia MD   Hosp Day # 0 PCP Cesilia Ricketts MD     Procedure: Eleanor Hope

## 2020-01-07 NOTE — PATIENT INSTRUCTIONS
Problem List Items Addressed This Visit        Unprioritized    Breast lump - Primary     Right breast lump, status post biopsy today. Patient is quite anxious. Had an extensive discussion about possibilities at this time.   If this is benign patient will T4

## 2020-01-09 ENCOUNTER — APPOINTMENT (OUTPATIENT)
Dept: LAB | Facility: HOSPITAL | Age: 50
End: 2020-01-09
Attending: INTERNAL MEDICINE
Payer: COMMERCIAL

## 2020-01-09 ENCOUNTER — TELEPHONE (OUTPATIENT)
Dept: HEMATOLOGY/ONCOLOGY | Facility: HOSPITAL | Age: 50
End: 2020-01-09

## 2020-01-09 ENCOUNTER — TELEPHONE (OUTPATIENT)
Dept: INTERNAL MEDICINE CLINIC | Facility: CLINIC | Age: 50
End: 2020-01-09

## 2020-01-09 ENCOUNTER — NURSE NAVIGATOR ENCOUNTER (OUTPATIENT)
Dept: HEMATOLOGY/ONCOLOGY | Facility: HOSPITAL | Age: 50
End: 2020-01-09

## 2020-01-09 ENCOUNTER — OFFICE VISIT (OUTPATIENT)
Dept: HEMATOLOGY/ONCOLOGY | Facility: HOSPITAL | Age: 50
End: 2020-01-09
Attending: INTERNAL MEDICINE
Payer: COMMERCIAL

## 2020-01-09 VITALS
TEMPERATURE: 98 F | OXYGEN SATURATION: 98 % | HEART RATE: 79 BPM | HEIGHT: 63 IN | WEIGHT: 213 LBS | BODY MASS INDEX: 37.74 KG/M2 | RESPIRATION RATE: 16 BRPM | SYSTOLIC BLOOD PRESSURE: 141 MMHG | DIASTOLIC BLOOD PRESSURE: 60 MMHG

## 2020-01-09 DIAGNOSIS — E11.65 TYPE 2 DIABETES MELLITUS WITH HYPERGLYCEMIA, WITH LONG-TERM CURRENT USE OF INSULIN (HCC): ICD-10-CM

## 2020-01-09 DIAGNOSIS — Z17.1 MALIGNANT NEOPLASM OF UPPER-OUTER QUADRANT OF RIGHT BREAST IN FEMALE, ESTROGEN RECEPTOR NEGATIVE (HCC): ICD-10-CM

## 2020-01-09 DIAGNOSIS — C50.411 MALIGNANT NEOPLASM OF UPPER-OUTER QUADRANT OF RIGHT BREAST IN FEMALE, ESTROGEN RECEPTOR NEGATIVE (HCC): ICD-10-CM

## 2020-01-09 DIAGNOSIS — C50.411 MALIGNANT NEOPLASM OF UPPER-OUTER QUADRANT OF RIGHT BREAST IN FEMALE, ESTROGEN RECEPTOR NEGATIVE (HCC): Primary | ICD-10-CM

## 2020-01-09 DIAGNOSIS — Z51.81 ENCOUNTER FOR MONITORING CARDIOTOXIC DRUG THERAPY: ICD-10-CM

## 2020-01-09 DIAGNOSIS — Z17.1 MALIGNANT NEOPLASM OF UPPER-OUTER QUADRANT OF RIGHT BREAST IN FEMALE, ESTROGEN RECEPTOR NEGATIVE (HCC): Primary | ICD-10-CM

## 2020-01-09 DIAGNOSIS — Z79.899 ENCOUNTER FOR MONITORING CARDIOTOXIC DRUG THERAPY: ICD-10-CM

## 2020-01-09 DIAGNOSIS — Z79.4 TYPE 2 DIABETES MELLITUS WITH HYPERGLYCEMIA, WITH LONG-TERM CURRENT USE OF INSULIN (HCC): ICD-10-CM

## 2020-01-09 DIAGNOSIS — C50.911 NEOPLASM OF RIGHT BREAST, REGIONAL LYMPH NODE STAGING CATEGORY PN0 PER AMERICAN JOINT COMMITTEE ON CANCER STAGING GUIDELINES, 7TH EDITION (HCC): Primary | ICD-10-CM

## 2020-01-09 DIAGNOSIS — C50.911 NEOPLASM OF RIGHT BREAST, REGIONAL LYMPH NODE STAGING CATEGORY PN0 PER AMERICAN JOINT COMMITTEE ON CANCER STAGING GUIDELINES, 7TH EDITION (HCC): ICD-10-CM

## 2020-01-09 LAB
ALBUMIN SERPL-MCNC: 3.4 G/DL (ref 3.4–5)
ALBUMIN/GLOB SERPL: 0.7 {RATIO} (ref 1–2)
ALP LIVER SERPL-CCNC: 88 U/L (ref 39–100)
ALT SERPL-CCNC: 16 U/L (ref 13–56)
ANION GAP SERPL CALC-SCNC: 5 MMOL/L (ref 0–18)
AST SERPL-CCNC: 6 U/L (ref 15–37)
BASOPHILS # BLD AUTO: 0.06 X10(3) UL (ref 0–0.2)
BASOPHILS NFR BLD AUTO: 1 %
BILIRUB SERPL-MCNC: 0.3 MG/DL (ref 0.1–2)
BUN BLD-MCNC: 10 MG/DL (ref 7–18)
BUN/CREAT SERPL: 13 (ref 10–20)
CALCIUM BLD-MCNC: 9.5 MG/DL (ref 8.5–10.1)
CHLORIDE SERPL-SCNC: 104 MMOL/L (ref 98–112)
CO2 SERPL-SCNC: 29 MMOL/L (ref 21–32)
CREAT BLD-MCNC: 0.77 MG/DL (ref 0.55–1.02)
DEPRECATED RDW RBC AUTO: 44.6 FL (ref 35.1–46.3)
EOSINOPHIL # BLD AUTO: 0.28 X10(3) UL (ref 0–0.7)
EOSINOPHIL NFR BLD AUTO: 4.6 %
ERYTHROCYTE [DISTWIDTH] IN BLOOD BY AUTOMATED COUNT: 13.7 % (ref 11–15)
GLOBULIN PLAS-MCNC: 4.8 G/DL (ref 2.8–4.4)
GLUCOSE BLD-MCNC: 275 MG/DL (ref 70–99)
HCT VFR BLD AUTO: 45.5 % (ref 35–48)
HGB BLD-MCNC: 14.6 G/DL (ref 12–16)
IMM GRANULOCYTES # BLD AUTO: 0.01 X10(3) UL (ref 0–1)
IMM GRANULOCYTES NFR BLD: 0.2 %
LYMPHOCYTES # BLD AUTO: 2.25 X10(3) UL (ref 1–4)
LYMPHOCYTES NFR BLD AUTO: 36.6 %
M PROTEIN MFR SERPL ELPH: 8.2 G/DL (ref 6.4–8.2)
MCH RBC QN AUTO: 28.5 PG (ref 26–34)
MCHC RBC AUTO-ENTMCNC: 32.1 G/DL (ref 31–37)
MCV RBC AUTO: 88.7 FL (ref 80–100)
MONOCYTES # BLD AUTO: 0.28 X10(3) UL (ref 0.1–1)
MONOCYTES NFR BLD AUTO: 4.6 %
NEUTROPHILS # BLD AUTO: 3.27 X10 (3) UL (ref 1.5–7.7)
NEUTROPHILS # BLD AUTO: 3.27 X10(3) UL (ref 1.5–7.7)
NEUTROPHILS NFR BLD AUTO: 53 %
OSMOLALITY SERPL CALC.SUM OF ELEC: 295 MOSM/KG (ref 275–295)
PATIENT FASTING Y/N/NP: NO
PLATELET # BLD AUTO: 299 10(3)UL (ref 150–450)
POTASSIUM SERPL-SCNC: 3.8 MMOL/L (ref 3.5–5.1)
RBC # BLD AUTO: 5.13 X10(6)UL (ref 3.8–5.3)
SODIUM SERPL-SCNC: 138 MMOL/L (ref 136–145)
WBC # BLD AUTO: 6.2 X10(3) UL (ref 4–11)

## 2020-01-09 PROCEDURE — 99205 OFFICE O/P NEW HI 60 MIN: CPT | Performed by: INTERNAL MEDICINE

## 2020-01-09 PROCEDURE — 36415 COLL VENOUS BLD VENIPUNCTURE: CPT

## 2020-01-09 PROCEDURE — 93010 ELECTROCARDIOGRAM REPORT: CPT | Performed by: INTERNAL MEDICINE

## 2020-01-09 PROCEDURE — 93005 ELECTROCARDIOGRAM TRACING: CPT

## 2020-01-09 PROCEDURE — 85025 COMPLETE CBC W/AUTO DIFF WBC: CPT

## 2020-01-09 PROCEDURE — 80053 COMPREHEN METABOLIC PANEL: CPT

## 2020-01-09 NOTE — TELEPHONE ENCOUNTER
Leonora from the breast cancer center called to verify if patient's diabetes is controlled.  Patient is undergoing a breast cancer clinical trial study for immune therapy along with her chemotherapy and they want to know if patient is a candidate for this st

## 2020-01-09 NOTE — CONSULTS
[facility] History and Physical    Patient Name: Aracely Castañeda   YOB: 1970   Medical Record Number: [id number]   CSN: [id number]   Attending Physician:  Shayy Schmitt MD       Date of Visit: 1/9/2020     Chief Complaint:  Patient presents w Surgical History:  Past Surgical History:   Procedure Laterality Date   • ABLATION      abdominal   •        section   • NEEDLE BIOPSY RIGHT  2020    us bx       Family History:  Family History   Problem Relation Age of Onset   • times a day as directed, Disp: 400 each, Rfl: 2  •  Loratadine-Pseudoephedrine ER 5-120 MG Oral Tablet 12 Hr, Take 1 tablet by mouth every 12 (twelve) hours. , Disp: 60 tablet, Rfl: 3  •  Glucose Blood (GITA CONTOUR NEXT TEST) In Vitro Strip, Use to test B MCHC 32.1 01/09/2020    RDW 13.7 01/09/2020    .0 01/09/2020    MPV 7.5 10/31/2018     Lab Results   Component Value Date     01/09/2020    K 3.8 01/09/2020     01/09/2020    CO2 29.0 01/09/2020    BUN 10 01/09/2020    CREATSERUM 0.77 discharged in satisfactory condition. FINDINGS:  LOCATION, SPECIMEN #: 01:00 o'clock 8 cm from the nipple 5 core biopsies. BIOPSY NEEDLE: 12 gauge core biopsy gun. MARKERS(S) PLACED: A vision clip was placed in the appropriate targeted location.   Lindy Styles Dr exam.  Breast marker legend used on images  Triangle = Palpable lump [place] = Skin tag or mole BB = Nipple Linear dana = [place] = Pain    Dictated by (CST): Anu Sun MD on 1/07/2020 at 9:15     Approved by (CST): Anu Sun MD on 1/0 monoclonal, clone MM1) status: Unfavorable,  40%    Right breast mass, 1 o'clock, 8 cm from nipple; ultrasound guided core biopsy:   · Invasive ductal carcinoma, [place] grade III (tubules 3, nuclei 3, mitoses 3), measuring 12.5 mm in greatest linear di participation. Briefly, we discussed B-59 with neoadj immunotherapy with standard chemotherapy. Research nurse will be notified and screen the patient. All additional questions were answered today.  Will plan to  proceed with staging scans, labs,  PORT p

## 2020-01-09 NOTE — PROGRESS NOTES
Patient presents to the [facility] for consultation with Dr. Darrian Arcos. Patient is accompanied by her sister, Kailey Lee, and 2 of her cousins, Priya Toribio and Calvin Mobley. Patient is tearful following consultation. Emotional support provided to patient and her family.

## 2020-01-09 NOTE — TELEPHONE ENCOUNTER
KERI for Kira (in the [address] [place] [address]) informing her that pt has Type II DM, with current insulin use.

## 2020-01-09 NOTE — TELEPHONE ENCOUNTER
[place], Dr. Jiles [place] calling to confirm if patient has type 1 or type 2 diabetes due to she may be eligible for clinical study and requesting a callback.

## 2020-01-09 NOTE — TELEPHONE ENCOUNTER
Patient is s/p left breast biopsy, performed 1/7/20 at [address] [place] [address]. Patient has spoken with Dr. Ronda Valerio and is aware of the malignant pathology results. Patient has been referred to Dr. Deanna Maldonado and Dr. Risa Lyons for further management.     Phoned patient and introduced

## 2020-01-09 NOTE — TELEPHONE ENCOUNTER
Phoned patient and shared that all exams have been authorized by insurance. Exams scheduled as follows:    1. CT 1/10/20. Instructed patient to arrive at 1pm to [place].  No food after 11am.  Water encouraged.   Educated patient as to PO and IV c

## 2020-01-10 ENCOUNTER — HOSPITAL ENCOUNTER (OUTPATIENT)
Dept: CT IMAGING | Facility: HOSPITAL | Age: 50
Discharge: HOME | End: 2020-01-10
Attending: INTERNAL MEDICINE
Payer: COMMERCIAL

## 2020-01-10 DIAGNOSIS — Z17.1 MALIGNANT NEOPLASM OF UPPER-OUTER QUADRANT OF RIGHT BREAST IN FEMALE, ESTROGEN RECEPTOR NEGATIVE (HCC): ICD-10-CM

## 2020-01-10 DIAGNOSIS — C50.411 MALIGNANT NEOPLASM OF UPPER-OUTER QUADRANT OF RIGHT BREAST IN FEMALE, ESTROGEN RECEPTOR NEGATIVE (HCC): ICD-10-CM

## 2020-01-10 PROCEDURE — 71260 CT THORAX DX C+: CPT | Performed by: INTERNAL MEDICINE

## 2020-01-10 PROCEDURE — 74177 CT ABD & PELVIS W/CONTRAST: CPT | Performed by: INTERNAL MEDICINE

## 2020-01-11 ENCOUNTER — PATIENT MESSAGE (OUTPATIENT)
Dept: INTERNAL MEDICINE CLINIC | Facility: CLINIC | Age: 50
End: 2020-01-11

## 2020-01-11 NOTE — TELEPHONE ENCOUNTER
Blood sugars currently controlled due to compliance with medications. Insulin-dependent type 2 diabetes.

## 2020-01-12 NOTE — TELEPHONE ENCOUNTER
From: Rene Nova  To: Hector Friend MD  Sent: 1/11/2020 8:27 PM CST  Subject: Other    Blood sugar before dinner is 188.

## 2020-01-13 ENCOUNTER — HOSPITAL ENCOUNTER (OUTPATIENT)
Dept: MRI IMAGING | Facility: HOSPITAL | Age: 50
Discharge: HOME | End: 2020-01-13
Attending: INTERNAL MEDICINE
Payer: COMMERCIAL

## 2020-01-13 DIAGNOSIS — C50.411 MALIGNANT NEOPLASM OF UPPER-OUTER QUADRANT OF RIGHT BREAST IN FEMALE, ESTROGEN RECEPTOR NEGATIVE (HCC): ICD-10-CM

## 2020-01-13 DIAGNOSIS — Z17.1 MALIGNANT NEOPLASM OF UPPER-OUTER QUADRANT OF RIGHT BREAST IN FEMALE, ESTROGEN RECEPTOR NEGATIVE (HCC): ICD-10-CM

## 2020-01-13 PROCEDURE — 77049 MRI BREAST C-+ W/CAD BI: CPT | Performed by: INTERNAL MEDICINE

## 2020-01-13 PROCEDURE — A9575 INJ GADOTERATE MEGLUMI 0.1ML: HCPCS | Performed by: INTERNAL MEDICINE

## 2020-01-14 ENCOUNTER — TELEPHONE (OUTPATIENT)
Dept: HEMATOLOGY/ONCOLOGY | Facility: HOSPITAL | Age: 50
End: 2020-01-14

## 2020-01-14 ENCOUNTER — RESEARCH ENCOUNTER (OUTPATIENT)
Dept: HEMATOLOGY/ONCOLOGY | Facility: HOSPITAL | Age: 50
End: 2020-01-14

## 2020-01-14 NOTE — PROGRESS NOTES
B-59 Potential Candidate    Called patient to answer questions regarding clinical trial.  Patient currently reviewing consent with a friend who is a nurse practitioner and denied having any questions at this time.   Patient expressed interested in meeting t

## 2020-01-14 NOTE — TELEPHONE ENCOUNTER
Message left for patient concerning follow up appointment with Dr. Jorge Adhikari to discuss results from her work-up. This has been scheduled for tomorrow at 1:45 to coincide with previously scheduled appointments.

## 2020-01-15 ENCOUNTER — APPOINTMENT (OUTPATIENT)
Dept: HEMATOLOGY/ONCOLOGY | Facility: HOSPITAL | Age: 50
End: 2020-01-15
Attending: GENETIC COUNSELOR, MS
Payer: COMMERCIAL

## 2020-01-15 ENCOUNTER — OFFICE VISIT (OUTPATIENT)
Dept: SURGERY | Facility: CLINIC | Age: 50
End: 2020-01-15
Payer: COMMERCIAL

## 2020-01-15 ENCOUNTER — OFFICE VISIT (OUTPATIENT)
Dept: HEMATOLOGY/ONCOLOGY | Facility: HOSPITAL | Age: 50
End: 2020-01-15
Attending: INTERNAL MEDICINE
Payer: COMMERCIAL

## 2020-01-15 ENCOUNTER — GENETICS ENCOUNTER (OUTPATIENT)
Dept: GENETICS | Facility: HOSPITAL | Age: 50
End: 2020-01-15
Attending: INTERNAL MEDICINE
Payer: COMMERCIAL

## 2020-01-15 ENCOUNTER — RESEARCH ENCOUNTER (OUTPATIENT)
Dept: HEMATOLOGY/ONCOLOGY | Facility: HOSPITAL | Age: 50
End: 2020-01-15

## 2020-01-15 VITALS
BODY MASS INDEX: 37.74 KG/M2 | OXYGEN SATURATION: 99 % | DIASTOLIC BLOOD PRESSURE: 56 MMHG | WEIGHT: 213 LBS | HEART RATE: 88 BPM | RESPIRATION RATE: 18 BRPM | SYSTOLIC BLOOD PRESSURE: 133 MMHG | HEIGHT: 63 IN

## 2020-01-15 VITALS
DIASTOLIC BLOOD PRESSURE: 56 MMHG | OXYGEN SATURATION: 99 % | HEIGHT: 63 IN | RESPIRATION RATE: 18 BRPM | SYSTOLIC BLOOD PRESSURE: 133 MMHG | WEIGHT: 213 LBS | BODY MASS INDEX: 37.74 KG/M2 | HEART RATE: 88 BPM

## 2020-01-15 DIAGNOSIS — Z17.1 MALIGNANT NEOPLASM OF UPPER-OUTER QUADRANT OF RIGHT BREAST IN FEMALE, ESTROGEN RECEPTOR NEGATIVE (HCC): Primary | ICD-10-CM

## 2020-01-15 DIAGNOSIS — C50.411 MALIGNANT NEOPLASM OF UPPER-OUTER QUADRANT OF RIGHT BREAST IN FEMALE, ESTROGEN RECEPTOR NEGATIVE (HCC): Primary | ICD-10-CM

## 2020-01-15 DIAGNOSIS — C50.911 NEOPLASM OF RIGHT BREAST, REGIONAL LYMPH NODE STAGING CATEGORY PN0 PER AMERICAN JOINT COMMITTEE ON CANCER STAGING GUIDELINES, 7TH EDITION (HCC): Primary | ICD-10-CM

## 2020-01-15 DIAGNOSIS — R92.8 ABNORMAL FINDINGS ON DIAGNOSTIC IMAGING OF BREAST: ICD-10-CM

## 2020-01-15 DIAGNOSIS — N63.20 LEFT BREAST MASS: ICD-10-CM

## 2020-01-15 DIAGNOSIS — Z79.4 TYPE 2 DIABETES MELLITUS WITH HYPERGLYCEMIA, WITH LONG-TERM CURRENT USE OF INSULIN (HCC): ICD-10-CM

## 2020-01-15 DIAGNOSIS — E11.65 TYPE 2 DIABETES MELLITUS WITH HYPERGLYCEMIA, WITH LONG-TERM CURRENT USE OF INSULIN (HCC): ICD-10-CM

## 2020-01-15 PROCEDURE — 96040 MEDICAL GENETICS COUNSELING EA 30 MIN: CPT | Performed by: GENETIC COUNSELOR, MS

## 2020-01-15 PROCEDURE — 99214 OFFICE O/P EST MOD 30 MIN: CPT | Performed by: INTERNAL MEDICINE

## 2020-01-15 PROCEDURE — 96040 HC GENETIC COUNSELING EA 30 MIN: CPT | Performed by: GENETIC COUNSELOR, MS

## 2020-01-15 PROCEDURE — 99245 OFF/OP CONSLTJ NEW/EST HI 55: CPT | Performed by: SURGERY

## 2020-01-15 NOTE — PROGRESS NOTES
Reason for visit: Ms. Le French is a 67-year-old woman referred to genetic counseling due to her recent diagnosis of triple negative breast cancer.   She was seen today to discuss the option of genetic testing and how this may help with her management she is unaware of other family members on her maternal side with cancers. On her paternal side, she is unaware of any malignancies, but this side is of limited family structure as her father was an only child.   Her heritage is [place] American on her mate surgical decision-making, cancer risk assessment for the patient and relatives, and psychological/emotional well-being. Mutations in the genes BRCA1 and BRCA2 account for most cases (but not all) of hereditary breast cancer.   Mutations in other genes have this “STAT” priority, as she is pending clinical management. Turn-around-time is typically 7-12 calendar days from receipt of the sample. My office will call Ms. Federico Baltazar as soon as results are received; post-test counseling can be scheduled at that time.

## 2020-01-15 NOTE — PROGRESS NOTES
STUDY: NSABP B59  CONSENT NOTE  STUDY ID: [id number]     Research nurse met with patient and sisters at [facility] today.    Patient has previously discussed participation in the clinical trial, NASBP B-59, \"A randomized, Double-Blind, P through menopause currently.        No study related procedures were performed prior to obtaining informed consent.       Patient pre-registered to B-59 study. 12 unstained slides from patients diagnostic biopsy 1/7/2020 requested from [facility].

## 2020-01-16 ENCOUNTER — HOSPITAL ENCOUNTER (OUTPATIENT)
Dept: CV DIAGNOSTICS | Facility: HOSPITAL | Age: 50
Discharge: HOME | End: 2020-01-16
Attending: INTERNAL MEDICINE
Payer: COMMERCIAL

## 2020-01-16 ENCOUNTER — TELEPHONE (OUTPATIENT)
Dept: MAMMOGRAPHY | Facility: HOSPITAL | Age: 50
End: 2020-01-16

## 2020-01-16 ENCOUNTER — TELEPHONE (OUTPATIENT)
Dept: HEMATOLOGY/ONCOLOGY | Facility: HOSPITAL | Age: 50
End: 2020-01-16

## 2020-01-16 ENCOUNTER — NURSE ONLY (OUTPATIENT)
Dept: HEMATOLOGY/ONCOLOGY | Facility: HOSPITAL | Age: 50
End: 2020-01-16
Attending: INTERNAL MEDICINE
Payer: COMMERCIAL

## 2020-01-16 DIAGNOSIS — C50.411 MALIGNANT NEOPLASM OF UPPER-OUTER QUADRANT OF RIGHT BREAST IN FEMALE, ESTROGEN RECEPTOR NEGATIVE (HCC): Primary | ICD-10-CM

## 2020-01-16 DIAGNOSIS — Z17.1 MALIGNANT NEOPLASM OF UPPER-OUTER QUADRANT OF RIGHT BREAST IN FEMALE, ESTROGEN RECEPTOR NEGATIVE (HCC): Primary | ICD-10-CM

## 2020-01-16 DIAGNOSIS — C50.411 MALIGNANT NEOPLASM OF UPPER-OUTER QUADRANT OF RIGHT BREAST IN FEMALE, ESTROGEN RECEPTOR NEGATIVE (HCC): ICD-10-CM

## 2020-01-16 DIAGNOSIS — Z79.899 ENCOUNTER FOR MONITORING CARDIOTOXIC DRUG THERAPY: ICD-10-CM

## 2020-01-16 DIAGNOSIS — Z17.1 MALIGNANT NEOPLASM OF UPPER-OUTER QUADRANT OF RIGHT BREAST IN FEMALE, ESTROGEN RECEPTOR NEGATIVE (HCC): ICD-10-CM

## 2020-01-16 DIAGNOSIS — Z51.81 ENCOUNTER FOR MONITORING CARDIOTOXIC DRUG THERAPY: ICD-10-CM

## 2020-01-16 LAB
CORTIS SERPL-MCNC: 9.8 UG/DL
HBV CORE AB SERPL QL IA: NONREACTIVE
HBV SURFACE AG SER-ACNC: <0.1 [IU]/L
HBV SURFACE AG SERPL QL IA: NONREACTIVE
HCG SERPL QL: NEGATIVE
HCV AB SERPL QL IA: NONREACTIVE
TSI SER-ACNC: 1.76 MIU/ML (ref 0.36–3.74)

## 2020-01-16 PROCEDURE — 84703 CHORIONIC GONADOTROPIN ASSAY: CPT

## 2020-01-16 PROCEDURE — 84443 ASSAY THYROID STIM HORMONE: CPT

## 2020-01-16 PROCEDURE — 86803 HEPATITIS C AB TEST: CPT

## 2020-01-16 PROCEDURE — 93306 TTE W/DOPPLER COMPLETE: CPT | Performed by: INTERNAL MEDICINE

## 2020-01-16 PROCEDURE — 36415 COLL VENOUS BLD VENIPUNCTURE: CPT

## 2020-01-16 PROCEDURE — 82533 TOTAL CORTISOL: CPT

## 2020-01-16 PROCEDURE — 86704 HEP B CORE ANTIBODY TOTAL: CPT

## 2020-01-16 PROCEDURE — 87340 HEPATITIS B SURFACE AG IA: CPT

## 2020-01-16 NOTE — TELEPHONE ENCOUNTER
Spoke with anastasia.   Let her know that for the study, we need to do another biopsy of the right breast.  I have arranged for that to happen on the 21 st prior to the MRI biopsy of the left breast.  I also let her know that the study is requiring a nuc med

## 2020-01-16 NOTE — PROGRESS NOTES
Breast Surgery New Patient Consultation    This is the first visit for this 51year old woman, referred by Dr. Daniel Gonsales, who presents for evaluation of breast cancer.     History of Present Illness:   Ms. Elisabeth Abdi is a 51year old woman who presents wi abdominal   •        section   • NEEDLE BIOPSY RIGHT  2020    us bx       Gynecological History:  Pt is a   Pt was 26years old at time of first pregnancy. She denies any cumulative breastfeeding history.   She achieved 12 Hr, Take 1 tablet by mouth every 12 (twelve) hours. , Disp: 60 tablet, Rfl: 3  Glucose Blood (GITA CONTOUR NEXT TEST) In Vitro Strip, Use to test Blood Sugar 2 times daily, Disp: 100 each, Rfl: 1  Blood Glucose Monitoring Suppl (GITA CONTOUR NEXT EZ) W or abnormal sound when breathing. Cardiovascular:   There is no history of chest pain, chest pressure/discomfort, palpitations, irregular heartbeat, fainting or near-fainting, difficulty breathing when lying flat, SOB/Coughing at night, swelling of the 88   Resp 18   Ht 1.6 m (5' 3\")   Wt 96.6 kg (213 lb)   SpO2 99%   BMI 37.73 kg/m²     Physical Exam:  The patient is an alert, oriented, well-nourished and  well-developed woman who appears her stated age.  Her speech patterns and movements are normal. He upper-outer quadrant of right breast in female, estrogen receptor negative ([facility] [address]  Staging form: Breast, AJCC 8th Edition  - Clinical: Stage IIIB (cT3, cN0, cM0, G3, ER-, IA-, HER2-) - Signed by Lidia Quezada MD on 1/16/2020    Discussion and Plan:  I had a pending. The risks and possible complications of the procedure were explained to the patient and her family and she understood and agreed to the proposed plan.  She was given ample opportunity for questions and those questions were answered to her satisfac

## 2020-01-16 NOTE — PROGRESS NOTES
Cancer Center Progress Note    Patient Name: Shakeel Ruiz   YOB: 1970   Medical Record Number: [id number]   Attending Physician: Gaston Marsh M.D.      Chief Complaint:  Breast cancer    Oncology History:  Shakeel Ruiz is a 51year old fe abdominal   •        section   • NEEDLE BIOPSY RIGHT  2020    us bx       Family History:  Family History   Problem Relation Age of Onset   • Neurological Disorder Father 61        Alzheimer's disease, Cause of death   • Hyperten •  Insulin Aspart Pen (NOVOLOG FLEXPEN) 100 UNIT/ML Subcutaneous Solution Pen-injector, 15 units subcutaneous with breakfast lunch and dinner, Disp: 15 pen, Rfl: 3  •  Insulin Pen Needle (BD PEN NEEDLE MINI U/F) 31G X 5 MM Does not apply Misc, Use 4 time ALKPHO 88 01/09/2020    AST 6 (L) 01/09/2020    ALT 16 01/09/2020    ALKPHOS 51 08/13/2016    BILT 0.3 01/09/2020    TP 8.2 01/09/2020    ALB 3.4 01/09/2020    GLOBULIN 4.8 (H) 01/09/2020    AGRATIO 0.9 (L) 08/13/2016     01/09/2020    K 3.8 01/09/20 consolidation. No pleural effusion. The trachea and central airways are unremarkable. CHEST WALL/BONES: There is degenerative disease of the thoracic spine.   Enhancing right blast mass measuring 2.2 cm with a biopsy clip consistent with patient's known b Right axillary and right subpectoral lymph nodes measure less than 1 cm in short axis and are likely reactive. No metastatic disease within the chest, abdomen or pelvis. Hepatic steatosis. Diverticulosis without diverticulitis.   Mild wall thickening of with post biopsy aftercare instructions. Prior to discharge the patient did go to mammography and the clip was within the mammographic finding. She was discharged in satisfactory condition.    FINDINGS:  LOCATION, SPECIMEN #: 01:00 o'clock 8 cm from the n for the patient's next mammogram has been entered into a reminder system.    Patient received a discharge summary from the technologist after completion of exam.  Breast marker legend used on images  Triangle = Palpable lump [place] = Skin tag or mole BB = N the level of the skin. No other abnormal enhancement is seen. There is an axillary lymph node with focal cortical thickening measuring up to 0.9 centimeters is seen in the high right axilla. No other abnormal lymph nodes are seen in the right axilla. ([id number])    Result Date: 12/27/2019  PROCEDURE: US BREAST RIGHT TARGETED (SJA=[id number])  COMPARISON: [facility] for [facility] PF DIGITAL SCREEN W CAD, 10/03/2015, 11:03.   [facility] in [place], [place] ISIDRA 2D+3D SCREENING BILA support for 4 cycles followed by weekly Taxol for 12 weeks with or without carboplatin.   I explained the rationale for this treatment regimen, the curative intent, and the side effect profile including gastrointestinal toxicity,  myelosuppression, fatigue,

## 2020-01-16 NOTE — TELEPHONE ENCOUNTER
Attempted to reach Yared Crease to review mri procedure no answer left message to return call to [phone]

## 2020-01-17 ENCOUNTER — TELEPHONE (OUTPATIENT)
Dept: HEMATOLOGY/ONCOLOGY | Facility: HOSPITAL | Age: 50
End: 2020-01-17

## 2020-01-20 ENCOUNTER — APPOINTMENT (OUTPATIENT)
Dept: LAB | Facility: HOSPITAL | Age: 50
End: 2020-01-20
Attending: INTERNAL MEDICINE
Payer: COMMERCIAL

## 2020-01-20 ENCOUNTER — HOSPITAL ENCOUNTER (OUTPATIENT)
Dept: NUCLEAR MEDICINE | Facility: HOSPITAL | Age: 50
Discharge: HOME | End: 2020-01-20
Attending: INTERNAL MEDICINE
Payer: COMMERCIAL

## 2020-01-20 DIAGNOSIS — C50.411 MALIGNANT NEOPLASM OF UPPER-OUTER QUADRANT OF RIGHT BREAST IN FEMALE, ESTROGEN RECEPTOR NEGATIVE (HCC): ICD-10-CM

## 2020-01-20 DIAGNOSIS — Z17.1 MALIGNANT NEOPLASM OF UPPER-OUTER QUADRANT OF RIGHT BREAST IN FEMALE, ESTROGEN RECEPTOR NEGATIVE (HCC): ICD-10-CM

## 2020-01-20 DIAGNOSIS — C50.911 NEOPLASM OF RIGHT BREAST, REGIONAL LYMPH NODE STAGING CATEGORY PN0 PER AMERICAN JOINT COMMITTEE ON CANCER STAGING GUIDELINES, 7TH EDITION (HCC): ICD-10-CM

## 2020-01-20 LAB
INR BLD: 1 (ref 0.9–1.2)
PROTHROMBIN TIME: 13 SECONDS (ref 11.8–14.5)

## 2020-01-20 PROCEDURE — 36415 COLL VENOUS BLD VENIPUNCTURE: CPT

## 2020-01-20 PROCEDURE — 78306 BONE IMAGING WHOLE BODY: CPT | Performed by: INTERNAL MEDICINE

## 2020-01-20 PROCEDURE — 85610 PROTHROMBIN TIME: CPT

## 2020-01-21 ENCOUNTER — HOSPITAL ENCOUNTER (OUTPATIENT)
Dept: ULTRASOUND IMAGING | Facility: HOSPITAL | Age: 50
Discharge: HOME | End: 2020-01-21
Attending: INTERNAL MEDICINE
Payer: COMMERCIAL

## 2020-01-21 ENCOUNTER — HOSPITAL ENCOUNTER (OUTPATIENT)
Dept: MRI IMAGING | Facility: HOSPITAL | Age: 50
Discharge: HOME | End: 2020-01-21
Attending: INTERNAL MEDICINE
Payer: COMMERCIAL

## 2020-01-21 ENCOUNTER — APPOINTMENT (OUTPATIENT)
Dept: MAMMOGRAPHY | Facility: HOSPITAL | Age: 50
End: 2020-01-21
Attending: INTERNAL MEDICINE
Payer: COMMERCIAL

## 2020-01-21 ENCOUNTER — TELEPHONE (OUTPATIENT)
Dept: HEMATOLOGY/ONCOLOGY | Facility: HOSPITAL | Age: 50
End: 2020-01-21

## 2020-01-21 VITALS
SYSTOLIC BLOOD PRESSURE: 121 MMHG | HEART RATE: 74 BPM | RESPIRATION RATE: 16 BRPM | DIASTOLIC BLOOD PRESSURE: 62 MMHG | BODY MASS INDEX: 37.74 KG/M2 | HEIGHT: 63 IN | WEIGHT: 213 LBS

## 2020-01-21 DIAGNOSIS — Z17.1 MALIGNANT NEOPLASM OF UPPER-OUTER QUADRANT OF RIGHT BREAST IN FEMALE, ESTROGEN RECEPTOR NEGATIVE (HCC): ICD-10-CM

## 2020-01-21 DIAGNOSIS — R92.8 ABNORMAL FINDINGS ON DIAGNOSTIC IMAGING OF BREAST: ICD-10-CM

## 2020-01-21 DIAGNOSIS — C50.411 MALIGNANT NEOPLASM OF UPPER-OUTER QUADRANT OF RIGHT BREAST IN FEMALE, ESTROGEN RECEPTOR NEGATIVE (HCC): ICD-10-CM

## 2020-01-21 RX ORDER — LIDOCAINE HYDROCHLORIDE 10 MG/ML
5 INJECTION, SOLUTION EPIDURAL; INFILTRATION; INTRACAUDAL; PERINEURAL ONCE
Status: DISCONTINUED | OUTPATIENT
Start: 2020-01-21 | End: 2020-01-23

## 2020-01-21 RX ORDER — LIDOCAINE HYDROCHLORIDE AND EPINEPHRINE 10; 10 MG/ML; UG/ML
INJECTION, SOLUTION INFILTRATION; PERINEURAL
Status: DISCONTINUED
Start: 2020-01-21 | End: 2020-01-21

## 2020-01-21 RX ORDER — LIDOCAINE HYDROCHLORIDE AND EPINEPHRINE 10; 10 MG/ML; UG/ML
20 INJECTION, SOLUTION INFILTRATION; PERINEURAL ONCE
Status: DISCONTINUED | OUTPATIENT
Start: 2020-01-21 | End: 2020-01-23

## 2020-01-21 RX ORDER — LIDOCAINE HYDROCHLORIDE 10 MG/ML
INJECTION, SOLUTION EPIDURAL; INFILTRATION; INTRACAUDAL; PERINEURAL
Status: DISCONTINUED
Start: 2020-01-21 | End: 2020-01-21

## 2020-01-22 ENCOUNTER — HOSPITAL ENCOUNTER (OUTPATIENT)
Dept: MAMMOGRAPHY | Facility: HOSPITAL | Age: 50
Discharge: HOME | End: 2020-01-22
Attending: INTERNAL MEDICINE
Payer: COMMERCIAL

## 2020-01-22 ENCOUNTER — HOSPITAL ENCOUNTER (OUTPATIENT)
Dept: INTERVENTIONAL RADIOLOGY/VASCULAR | Facility: HOSPITAL | Age: 50
Discharge: HOME | End: 2020-01-22
Attending: INTERNAL MEDICINE | Admitting: INTERNAL MEDICINE
Payer: COMMERCIAL

## 2020-01-22 ENCOUNTER — HOSPITAL ENCOUNTER (OUTPATIENT)
Dept: MRI IMAGING | Facility: HOSPITAL | Age: 50
Discharge: HOME | End: 2020-01-22
Attending: INTERNAL MEDICINE
Payer: COMMERCIAL

## 2020-01-22 VITALS
RESPIRATION RATE: 15 BRPM | DIASTOLIC BLOOD PRESSURE: 64 MMHG | BODY MASS INDEX: 38 KG/M2 | HEART RATE: 68 BPM | SYSTOLIC BLOOD PRESSURE: 116 MMHG | OXYGEN SATURATION: 95 % | WEIGHT: 212.94 LBS

## 2020-01-22 VITALS
HEART RATE: 72 BPM | BODY MASS INDEX: 37.74 KG/M2 | RESPIRATION RATE: 18 BRPM | OXYGEN SATURATION: 97 % | SYSTOLIC BLOOD PRESSURE: 153 MMHG | HEIGHT: 63 IN | DIASTOLIC BLOOD PRESSURE: 72 MMHG | WEIGHT: 213 LBS

## 2020-01-22 DIAGNOSIS — R92.8 ABNORMAL FINDINGS ON DIAGNOSTIC IMAGING OF BREAST: ICD-10-CM

## 2020-01-22 DIAGNOSIS — Z17.1 MALIGNANT NEOPLASM OF UPPER-OUTER QUADRANT OF RIGHT BREAST IN FEMALE, ESTROGEN RECEPTOR NEGATIVE (HCC): ICD-10-CM

## 2020-01-22 DIAGNOSIS — C50.411 MALIGNANT NEOPLASM OF UPPER-OUTER QUADRANT OF RIGHT BREAST IN FEMALE, ESTROGEN RECEPTOR NEGATIVE (HCC): ICD-10-CM

## 2020-01-22 LAB — GLUCOSE BLDC GLUCOMTR-MCNC: 229 MG/DL (ref 70–99)

## 2020-01-22 PROCEDURE — 36561 INSERT TUNNELED CV CATH: CPT

## 2020-01-22 PROCEDURE — A9575 INJ GADOTERATE MEGLUMI 0.1ML: HCPCS | Performed by: INTERNAL MEDICINE

## 2020-01-22 PROCEDURE — B5181ZA FLUOROSCOPY OF SUPERIOR VENA CAVA USING LOW OSMOLAR CONTRAST, GUIDANCE: ICD-10-PCS | Performed by: RADIOLOGY

## 2020-01-22 PROCEDURE — 02H633Z INSERTION OF INFUSION DEVICE INTO RIGHT ATRIUM, PERCUTANEOUS APPROACH: ICD-10-PCS | Performed by: RADIOLOGY

## 2020-01-22 PROCEDURE — 88305 TISSUE EXAM BY PATHOLOGIST: CPT | Performed by: INTERNAL MEDICINE

## 2020-01-22 PROCEDURE — 99152 MOD SED SAME PHYS/QHP 5/>YRS: CPT

## 2020-01-22 PROCEDURE — 19085 BX BREAST 1ST LESION MR IMAG: CPT | Performed by: INTERNAL MEDICINE

## 2020-01-22 PROCEDURE — 77001 FLUOROGUIDE FOR VEIN DEVICE: CPT

## 2020-01-22 PROCEDURE — 99153 MOD SED SAME PHYS/QHP EA: CPT

## 2020-01-22 PROCEDURE — 82962 GLUCOSE BLOOD TEST: CPT

## 2020-01-22 PROCEDURE — 77065 DX MAMMO INCL CAD UNI: CPT | Performed by: INTERNAL MEDICINE

## 2020-01-22 RX ORDER — HEPARIN SODIUM (PORCINE) LOCK FLUSH IV SOLN 100 UNIT/ML 100 UNIT/ML
SOLUTION INTRAVENOUS
Status: COMPLETED
Start: 2020-01-22 | End: 2020-01-22

## 2020-01-22 RX ORDER — LIDOCAINE HYDROCHLORIDE AND EPINEPHRINE 10; 10 MG/ML; UG/ML
INJECTION, SOLUTION INFILTRATION; PERINEURAL
Status: DISCONTINUED
Start: 2020-01-22 | End: 2020-01-22

## 2020-01-22 RX ORDER — SODIUM CHLORIDE 9 MG/ML
INJECTION, SOLUTION INTRAVENOUS CONTINUOUS
Status: DISCONTINUED | OUTPATIENT
Start: 2020-01-22 | End: 2020-01-22

## 2020-01-22 RX ORDER — LIDOCAINE HYDROCHLORIDE 10 MG/ML
INJECTION, SOLUTION EPIDURAL; INFILTRATION; INTRACAUDAL; PERINEURAL
Status: DISCONTINUED
Start: 2020-01-22 | End: 2020-01-22

## 2020-01-22 RX ORDER — LIDOCAINE HYDROCHLORIDE 20 MG/ML
INJECTION, SOLUTION EPIDURAL; INFILTRATION; INTRACAUDAL; PERINEURAL
Status: COMPLETED
Start: 2020-01-22 | End: 2020-01-22

## 2020-01-22 RX ORDER — CEFAZOLIN SODIUM/WATER 2 G/20 ML
SYRINGE (ML) INTRAVENOUS
Status: COMPLETED
Start: 2020-01-22 | End: 2020-01-22

## 2020-01-22 RX ORDER — ACETAMINOPHEN 325 MG/1
TABLET ORAL
Status: COMPLETED
Start: 2020-01-22 | End: 2020-01-22

## 2020-01-22 RX ORDER — MIDAZOLAM HYDROCHLORIDE 1 MG/ML
INJECTION INTRAMUSCULAR; INTRAVENOUS
Status: COMPLETED
Start: 2020-01-22 | End: 2020-01-22

## 2020-01-22 RX ADMIN — ACETAMINOPHEN 650 MG: 325 TABLET ORAL at 12:33:00

## 2020-01-22 NOTE — PRE-SEDATION ASSESSMENT
[facility]  IR Pre-Procedure Sedation Assessment    History of snoring or sleep or apnea?    No    History of previous problems with anesthesia or sedation  No    Physical Findings:  Neck: nl ROM  CV: RRR  PULM: normal respiratory rate/effor

## 2020-01-22 NOTE — INTERVAL H&P NOTE
The above referenced H&P was reviewed by Rafita Islas MD on 1/22/2020, the patient was examined and no significant changes have occurred in the patient's condition since the H&P was performed.   Risks, benefits, alternative treatments and consequences

## 2020-01-22 NOTE — PROCEDURES
[facility]  Procedure Note    Aracely Castañeda Patient Status:  Outpatient in a Bed    3/4/1970 MRN [id number]   Location [facility] Attending Sindhu Quiroz,  [facility] Day # 0 PCP Victorino Mccullough MD     Procedure:

## 2020-01-22 NOTE — IMAGING NOTE
6533TN Radiology holding area hx as follow:     0650  IV 22 gauge started by Luigi Andrews Dr [facility]  here procedure explained questions answered .     6367  Consent verified and obtained  Site marked LEFT LATERAL Breast    0705 Patient to mri table

## 2020-01-22 NOTE — PROCEDURES
[facility]  Procedure Note    Wilberto Abel Patient Status:  Outpatient    3/4/1970 MRN [id number]   Location [address] [facility] Attending Salma Lord MD   [facility] Day # 0 PCP Eduardo Coombs MD     Procedure

## 2020-01-23 ENCOUNTER — TELEPHONE (OUTPATIENT)
Dept: GENETICS | Facility: HOSPITAL | Age: 50
End: 2020-01-23

## 2020-01-23 ENCOUNTER — TELEPHONE (OUTPATIENT)
Dept: HEMATOLOGY/ONCOLOGY | Facility: HOSPITAL | Age: 50
End: 2020-01-23

## 2020-01-23 NOTE — TELEPHONE ENCOUNTER
Spoke with Deb. Let her know, per Dr Peterson Barbosa, the biopsy was negative. Pt was elated. We will see her tomorrow for her education.

## 2020-01-23 NOTE — TELEPHONE ENCOUNTER
Reported genetic testing results to Nereyda Martinez.  She had opted for 9 gene breast-focused panel through [facility] ([facility] Breast Cancer STAT Panel with DONTE and CHEK2) with a re-requisition to their 84 gene panel ([facility] Multi-Cancer Panel.)  Testing did not yie

## 2020-01-24 ENCOUNTER — OFFICE VISIT (OUTPATIENT)
Dept: HEMATOLOGY/ONCOLOGY | Facility: HOSPITAL | Age: 50
End: 2020-01-24
Attending: INTERNAL MEDICINE
Payer: COMMERCIAL

## 2020-01-24 DIAGNOSIS — Z17.1 MALIGNANT NEOPLASM OF UPPER-OUTER QUADRANT OF RIGHT BREAST IN FEMALE, ESTROGEN RECEPTOR NEGATIVE (HCC): Primary | ICD-10-CM

## 2020-01-24 DIAGNOSIS — C50.411 MALIGNANT NEOPLASM OF UPPER-OUTER QUADRANT OF RIGHT BREAST IN FEMALE, ESTROGEN RECEPTOR NEGATIVE (HCC): Primary | ICD-10-CM

## 2020-01-24 PROCEDURE — 99215 OFFICE O/P EST HI 40 MIN: CPT | Performed by: PHYSICIAN ASSISTANT

## 2020-01-24 RX ORDER — PROCHLORPERAZINE MALEATE 10 MG
10 TABLET ORAL EVERY 6 HOURS PRN
Qty: 60 TABLET | Refills: 3 | Status: SHIPPED | OUTPATIENT
Start: 2020-01-24 | End: 2020-01-24

## 2020-01-24 RX ORDER — ONDANSETRON HYDROCHLORIDE 8 MG/1
8 TABLET, FILM COATED ORAL EVERY 8 HOURS PRN
Qty: 30 TABLET | Refills: 3 | Status: SHIPPED | OUTPATIENT
Start: 2020-01-24 | End: 2020-01-24

## 2020-01-24 RX ORDER — ONDANSETRON HYDROCHLORIDE 8 MG/1
8 TABLET, FILM COATED ORAL EVERY 8 HOURS PRN
Qty: 30 TABLET | Refills: 3 | Status: SHIPPED | OUTPATIENT
Start: 2020-01-24 | End: 2020-07-30

## 2020-01-24 RX ORDER — PROCHLORPERAZINE MALEATE 10 MG
10 TABLET ORAL EVERY 6 HOURS PRN
Qty: 60 TABLET | Refills: 3 | Status: SHIPPED | OUTPATIENT
Start: 2020-01-24 | End: 2020-07-30

## 2020-01-24 NOTE — PROGRESS NOTES
Medication Education Record: IV Therapy    Learner:  Patient and Family Member    Barriers / Limitations:  None    Psychosocial Assessment:  patient psychosocial response appropriate    Diagnosis:   Breast cancer    IV Cancer Treatment Name(s): Paclitaxel, swelling, pain, burning at the site of administration  o Notify Infusion Nurse immediately if any of these symptoms occur during or after the infusion  Allergic reaction: there is a chance for allergic reaction with some medications.   If your prescribed th then take 1 tab (2mg) every 2 hours until you have had no diarrhea for at least 12 hours; during the night take 2 tabs (4mg) every 4 hours as needed. Maximum of 8 tablets in 24 hours.    o Constipation:  Over-the-counter recommendations include Senokot, Du IV site  • If you go to Emergency Room for any reason or seek medical attention elsewhere    What Phone Number to Call:  [facility] [phone] / Triage Nurse    Teaching Materials Provided:   Chemo Care chemotherapy information sheets  and Safety an visit, sit with, hug and kiss the children in your life. 3. You can be around pregnant women, though (if possible) they should not clean up any of your body fluids after you have treatment. 4. Sexual activity is safe while throughout treatment.   It is

## 2020-01-24 NOTE — PATIENT INSTRUCTIONS
Medication Education Record: IV Therapy    Learner:  Patient and Family Member    Barriers / Limitations:  None    Psychosocial Assessment:  patient psychosocial response appropriate    Diagnosis:   Breast cancer    IV Cancer Treatment Name(s): Paclitaxel, swelling, pain, burning at the site of administration  o Notify Infusion Nurse immediately if any of these symptoms occur during or after the infusion  Allergic reaction: there is a chance for allergic reaction with some medications.   If your prescribed th then take 1 tab (2mg) every 2 hours until you have had no diarrhea for at least 12 hours; during the night take 2 tabs (4mg) every 4 hours as needed. Maximum of 8 tablets in 24 hours.    o Constipation:  Over-the-counter recommendations include Senokot, Du IV site  • If you go to Emergency Room for any reason or seek medical attention elsewhere    What Phone Number to Call:  [facility] [phone] / Triage Nurse    Teaching Materials Provided:   Chemo Care chemotherapy information sheets  and Safety an though (if possible) they should not clean up any of your body fluids after you have treatment. 4. Sexual activity is safe while throughout treatment.   It is possible that traces of the oral chemotherapy may be present in vaginal fluid or semen for 48 ho

## 2020-01-30 ENCOUNTER — NURSE ONLY (OUTPATIENT)
Dept: HEMATOLOGY/ONCOLOGY | Facility: HOSPITAL | Age: 50
End: 2020-01-30
Attending: INTERNAL MEDICINE
Payer: COMMERCIAL

## 2020-01-30 ENCOUNTER — RESEARCH ENCOUNTER (OUTPATIENT)
Dept: HEMATOLOGY/ONCOLOGY | Facility: HOSPITAL | Age: 50
End: 2020-01-30

## 2020-01-30 VITALS
TEMPERATURE: 98 F | SYSTOLIC BLOOD PRESSURE: 117 MMHG | HEART RATE: 78 BPM | OXYGEN SATURATION: 98 % | WEIGHT: 218 LBS | DIASTOLIC BLOOD PRESSURE: 53 MMHG | BODY MASS INDEX: 39 KG/M2 | RESPIRATION RATE: 18 BRPM

## 2020-01-30 VITALS
HEIGHT: 63 IN | RESPIRATION RATE: 18 BRPM | DIASTOLIC BLOOD PRESSURE: 56 MMHG | WEIGHT: 218 LBS | TEMPERATURE: 99 F | SYSTOLIC BLOOD PRESSURE: 130 MMHG | HEART RATE: 76 BPM | BODY MASS INDEX: 38.62 KG/M2 | OXYGEN SATURATION: 99 %

## 2020-01-30 DIAGNOSIS — R11.2 CINV (CHEMOTHERAPY-INDUCED NAUSEA AND VOMITING): ICD-10-CM

## 2020-01-30 DIAGNOSIS — Z17.1 MALIGNANT NEOPLASM OF UPPER-OUTER QUADRANT OF RIGHT BREAST IN FEMALE, ESTROGEN RECEPTOR NEGATIVE (HCC): Primary | ICD-10-CM

## 2020-01-30 DIAGNOSIS — Z51.11 CHEMOTHERAPY MANAGEMENT, ENCOUNTER FOR: ICD-10-CM

## 2020-01-30 DIAGNOSIS — T45.1X5A CINV (CHEMOTHERAPY-INDUCED NAUSEA AND VOMITING): ICD-10-CM

## 2020-01-30 DIAGNOSIS — D70.1 CHEMOTHERAPY INDUCED NEUTROPENIA (HCC): ICD-10-CM

## 2020-01-30 DIAGNOSIS — T45.1X5A CHEMOTHERAPY INDUCED NEUTROPENIA (HCC): ICD-10-CM

## 2020-01-30 DIAGNOSIS — C50.411 MALIGNANT NEOPLASM OF UPPER-OUTER QUADRANT OF RIGHT BREAST IN FEMALE, ESTROGEN RECEPTOR NEGATIVE (HCC): Primary | ICD-10-CM

## 2020-01-30 DIAGNOSIS — T80.90XA INFUSION REACTION, INITIAL ENCOUNTER: ICD-10-CM

## 2020-01-30 DIAGNOSIS — E11.65 TYPE 2 DIABETES MELLITUS WITH HYPERGLYCEMIA, WITH LONG-TERM CURRENT USE OF INSULIN (HCC): ICD-10-CM

## 2020-01-30 DIAGNOSIS — Z79.4 TYPE 2 DIABETES MELLITUS WITH HYPERGLYCEMIA, WITH LONG-TERM CURRENT USE OF INSULIN (HCC): ICD-10-CM

## 2020-01-30 PROBLEM — R73.9 HYPERGLYCEMIA: Status: RESOLVED | Noted: 2019-11-20 | Resolved: 2020-01-30

## 2020-01-30 PROBLEM — R07.9 ACUTE CHEST PAIN: Status: RESOLVED | Noted: 2019-11-20 | Resolved: 2020-01-30

## 2020-01-30 PROBLEM — R42 DIZZINESS: Status: RESOLVED | Noted: 2019-11-20 | Resolved: 2020-01-30

## 2020-01-30 PROBLEM — R07.9 CHEST PAIN: Status: RESOLVED | Noted: 2019-11-20 | Resolved: 2020-01-30

## 2020-01-30 LAB
ALBUMIN SERPL-MCNC: 2.9 G/DL (ref 3.4–5)
ALBUMIN/GLOB SERPL: 0.6 {RATIO} (ref 1–2)
ALP LIVER SERPL-CCNC: 66 U/L (ref 39–100)
ALT SERPL-CCNC: 13 U/L (ref 13–56)
ANION GAP SERPL CALC-SCNC: 5 MMOL/L (ref 0–18)
AST SERPL-CCNC: 12 U/L (ref 15–37)
BASOPHILS # BLD AUTO: 0.05 X10(3) UL (ref 0–0.2)
BASOPHILS NFR BLD AUTO: 0.7 %
BILIRUB SERPL-MCNC: 0.3 MG/DL (ref 0.1–2)
BUN BLD-MCNC: 7 MG/DL (ref 7–18)
BUN/CREAT SERPL: 11.3 (ref 10–20)
CALCIUM BLD-MCNC: 8.8 MG/DL (ref 8.5–10.1)
CHLORIDE SERPL-SCNC: 110 MMOL/L (ref 98–112)
CO2 SERPL-SCNC: 26 MMOL/L (ref 21–32)
CREAT BLD-MCNC: 0.62 MG/DL (ref 0.55–1.02)
DEPRECATED RDW RBC AUTO: 44.1 FL (ref 35.1–46.3)
EOSINOPHIL # BLD AUTO: 0.37 X10(3) UL (ref 0–0.7)
EOSINOPHIL NFR BLD AUTO: 4.9 %
ERYTHROCYTE [DISTWIDTH] IN BLOOD BY AUTOMATED COUNT: 13.8 % (ref 11–15)
GLOBULIN PLAS-MCNC: 4.6 G/DL (ref 2.8–4.4)
GLUCOSE BLD-MCNC: 129 MG/DL (ref 70–99)
HCT VFR BLD AUTO: 39.5 % (ref 35–48)
HGB BLD-MCNC: 13 G/DL (ref 12–16)
IMM GRANULOCYTES # BLD AUTO: 0.02 X10(3) UL (ref 0–1)
IMM GRANULOCYTES NFR BLD: 0.3 %
LYMPHOCYTES # BLD AUTO: 2.42 X10(3) UL (ref 1–4)
LYMPHOCYTES NFR BLD AUTO: 31.8 %
M PROTEIN MFR SERPL ELPH: 7.5 G/DL (ref 6.4–8.2)
MCH RBC QN AUTO: 29 PG (ref 26–34)
MCHC RBC AUTO-ENTMCNC: 32.9 G/DL (ref 31–37)
MCV RBC AUTO: 88 FL (ref 80–100)
MONOCYTES # BLD AUTO: 0.35 X10(3) UL (ref 0.1–1)
MONOCYTES NFR BLD AUTO: 4.6 %
NEUTROPHILS # BLD AUTO: 4.41 X10 (3) UL (ref 1.5–7.7)
NEUTROPHILS # BLD AUTO: 4.41 X10(3) UL (ref 1.5–7.7)
NEUTROPHILS NFR BLD AUTO: 57.7 %
OSMOLALITY SERPL CALC.SUM OF ELEC: 292 MOSM/KG (ref 275–295)
PATIENT FASTING Y/N/NP: NO
PLATELET # BLD AUTO: 266 10(3)UL (ref 150–450)
POTASSIUM SERPL-SCNC: 3.7 MMOL/L (ref 3.5–5.1)
RBC # BLD AUTO: 4.49 X10(6)UL (ref 3.8–5.3)
SODIUM SERPL-SCNC: 141 MMOL/L (ref 136–145)
WBC # BLD AUTO: 7.6 X10(3) UL (ref 4–11)

## 2020-01-30 PROCEDURE — 96375 TX/PRO/DX INJ NEW DRUG ADDON: CPT

## 2020-01-30 PROCEDURE — 96415 CHEMO IV INFUSION ADDL HR: CPT

## 2020-01-30 PROCEDURE — 96367 TX/PROPH/DG ADDL SEQ IV INF: CPT

## 2020-01-30 PROCEDURE — 85025 COMPLETE CBC W/AUTO DIFF WBC: CPT

## 2020-01-30 PROCEDURE — 99215 OFFICE O/P EST HI 40 MIN: CPT | Performed by: INTERNAL MEDICINE

## 2020-01-30 PROCEDURE — S0028 INJECTION, FAMOTIDINE, 20 MG: HCPCS

## 2020-01-30 PROCEDURE — 96413 CHEMO IV INFUSION 1 HR: CPT

## 2020-01-30 PROCEDURE — 96376 TX/PRO/DX INJ SAME DRUG ADON: CPT

## 2020-01-30 PROCEDURE — 96417 CHEMO IV INFUS EACH ADDL SEQ: CPT

## 2020-01-30 PROCEDURE — 80053 COMPREHEN METABOLIC PANEL: CPT

## 2020-01-30 RX ORDER — 0.9 % SODIUM CHLORIDE 0.9 %
VIAL (ML) INJECTION
Status: DISCONTINUED
Start: 2020-01-30 | End: 2020-01-30

## 2020-01-30 RX ORDER — FAMOTIDINE 10 MG/ML
20 INJECTION, SOLUTION INTRAVENOUS ONCE
Status: CANCELLED | OUTPATIENT
Start: 2020-01-30

## 2020-01-30 RX ORDER — HEPARIN SODIUM (PORCINE) LOCK FLUSH IV SOLN 100 UNIT/ML 100 UNIT/ML
5 SOLUTION INTRAVENOUS ONCE
Status: CANCELLED | OUTPATIENT
Start: 2020-01-30

## 2020-01-30 RX ORDER — 0.9 % SODIUM CHLORIDE 0.9 %
10 VIAL (ML) INJECTION ONCE
Status: CANCELLED | OUTPATIENT
Start: 2020-01-30

## 2020-01-30 RX ORDER — HEPARIN SODIUM (PORCINE) LOCK FLUSH IV SOLN 100 UNIT/ML 100 UNIT/ML
SOLUTION INTRAVENOUS
Status: COMPLETED
Start: 2020-01-30 | End: 2020-01-30

## 2020-01-30 RX ORDER — HEPARIN SODIUM (PORCINE) LOCK FLUSH IV SOLN 100 UNIT/ML 100 UNIT/ML
5 SOLUTION INTRAVENOUS ONCE
Status: COMPLETED | OUTPATIENT
Start: 2020-01-30 | End: 2020-01-30

## 2020-01-30 RX ORDER — DEXAMETHASONE SODIUM PHOSPHATE 4 MG/ML
VIAL (ML) INJECTION
Status: COMPLETED
Start: 2020-01-30 | End: 2020-01-30

## 2020-01-30 RX ORDER — FAMOTIDINE 10 MG/ML
20 INJECTION, SOLUTION INTRAVENOUS ONCE
Status: COMPLETED | OUTPATIENT
Start: 2020-01-30 | End: 2020-01-30

## 2020-01-30 RX ORDER — FAMOTIDINE 10 MG/ML
INJECTION, SOLUTION INTRAVENOUS
Status: COMPLETED
Start: 2020-01-30 | End: 2020-01-30

## 2020-01-30 RX ORDER — DIPHENHYDRAMINE HYDROCHLORIDE 50 MG/ML
25 INJECTION INTRAMUSCULAR; INTRAVENOUS ONCE
Status: CANCELLED | OUTPATIENT
Start: 2020-01-30

## 2020-01-30 RX ORDER — DIPHENHYDRAMINE HYDROCHLORIDE 50 MG/ML
25 INJECTION INTRAMUSCULAR; INTRAVENOUS ONCE
Status: COMPLETED | OUTPATIENT
Start: 2020-01-30 | End: 2020-01-30

## 2020-01-30 RX ORDER — DEXAMETHASONE SODIUM PHOSPHATE 4 MG/ML
10 VIAL (ML) INJECTION ONCE
Status: COMPLETED | OUTPATIENT
Start: 2020-01-30 | End: 2020-01-30

## 2020-01-30 RX ORDER — LIDOCAINE AND PRILOCAINE 25; 25 MG/G; MG/G
CREAM TOPICAL
Qty: 1 TUBE | Refills: 0 | Status: SHIPPED | OUTPATIENT
Start: 2020-01-30 | End: 2020-03-12

## 2020-01-30 RX ORDER — DIPHENHYDRAMINE HYDROCHLORIDE 50 MG/ML
INJECTION INTRAMUSCULAR; INTRAVENOUS
Status: COMPLETED
Start: 2020-01-30 | End: 2020-01-30

## 2020-01-30 RX ADMIN — HEPARIN SODIUM (PORCINE) LOCK FLUSH IV SOLN 100 UNIT/ML 500 UNITS: 100 SOLUTION INTRAVENOUS at 15:30:00

## 2020-01-30 RX ADMIN — FAMOTIDINE 20 MG: 10 INJECTION, SOLUTION INTRAVENOUS at 09:27:00

## 2020-01-30 RX ADMIN — DIPHENHYDRAMINE HYDROCHLORIDE 25 MG: 50 INJECTION INTRAMUSCULAR; INTRAVENOUS at 09:31:00

## 2020-01-30 RX ADMIN — DIPHENHYDRAMINE HYDROCHLORIDE 25 MG: 50 INJECTION INTRAMUSCULAR; INTRAVENOUS at 10:41:00

## 2020-01-30 RX ADMIN — DEXAMETHASONE SODIUM PHOSPHATE 10 MG: 4 MG/ML VIAL (ML) INJECTION at 10:52:00

## 2020-01-30 NOTE — PROGRESS NOTES
STUDY: [facility] B-59  PATIENT ID: [id number]  Cycle 1 Day 1 Taxol,Carboplatin, Atezolizumab/polacebo        Patient here with family to receive her first cycle of Taxol/Carbo and Atezo/Placebo. Reviewed her current medications.  Patient denies any new m contact numbers for research and Dr. Peterson Barbosa Encouraged patient to call with any questions or concerns. Patient d/c’d ambulatory accompanied by family.

## 2020-01-30 NOTE — PROGRESS NOTES
Pt here for C1D1 Taxol ,Carboplatin and atezolizomab /Placebo (Study B-59). Arrives Ambulating independently from MD visit, accompanied by Other sisters and children.            Patient reports possible pregnancy since last therapy cycle: No    Modificatio management  monitor lab values  Expected outcomes:  free of infection  maintains/gains weight  nutritional needs met  patient supported/coping well  symptoms relieved/minimized  understands plan of care  verbalize how to care for self  Progress towards out

## 2020-01-30 NOTE — PROGRESS NOTES
Cancer Center Progress Note    Patient Name: Nellie Soulier   YOB: 1970   Medical Record Number: [id number]   Attending Physician: Cralos Pickett M.D.      Chief Complaint:  Breast cancer    Oncology History:  12/2019: mammogram (last mammogram • Unspecified essential hypertension        Past Surgical History:  Past Surgical History:   Procedure Laterality Date   • ABLATION      abdominal   •        section   • NEEDLE BIOPSY LEFT  2020    MRI bx   • NEEDLE BIOPSY RIG EVERY DAY, Disp: 1 Bottle, Rfl: 5  •  Insulin Lispro 100 UNIT/ML Subcutaneous Solution, Inject 16 Units into the skin 2 (two) times daily before meals. , Disp: , Rfl:   •  Insulin Aspart Pen (NOVOLOG FLEXPEN) 100 UNIT/ML Subcutaneous Solution Pen-injector, Value Date     (H) 01/30/2020    BUN 7 01/30/2020    BUNCREA 11.3 01/30/2020    CREATSERUM 0.62 01/30/2020    ANIONGAP 5 01/30/2020    GFRNAA 107 01/30/2020    GFRAA 123 01/30/2020    CA 8.8 01/30/2020    OSMOCALC 292 01/30/2020    ALKPHO 66 01/30/2 3.2 cm which can be seen with pulmonary hypertension. There is a retroesophageal course of the right subclavian artery, normal variant. LUNGS AND PLEURA: There is bibasilar and lingular atelectasis and scarring. No pneumothorax. No consolidation.   No pl Degenerative changes are seen within the sacroiliac joints and pubic symphysis. Degenerative changes are seen in the bilateral hips. CONCLUSION:   2.2 cm enhancing right breast mass consistent with patient's biopsy-proven neoplasm.   Right axillary below). A vision clip was placed. These were sent to the laboratory for processing.   Compression was applied to the puncture site to control bleeding, and a sterile dressing was placed by the department nurse, who also provided the patient with post biop the upper inner breast.  Please see the complete ultrasound-guided biopsy report.          CONCLUSION: Successful ultrasound-guided biopsy of lobulated mass in the right upper inner breast.   For patients over the age of [address], the target due date for the tigre irregular mass seen in the right breast upper inner quadrant representing the biopsy-proven invasive ductal carcinoma.   A 4.2 centimeter area of linear enhancement is seen extending medially  from the biopsy-proven invasive ductal carcinoma to the level of CLINICALLY SUSPICIOUS PALPABLE LUMP SHOULD BE BIOPSIED.          Dictated by (CST): Sylvester Mishra MD on 1/14/2020 at 7:42     Approved by (CST): Sylvester Mishra MD on 1/14/2020 at 16:00           Breast Right Targeted (YSJ=[id number])    Result Date: taxol/carboplatin B8X5--nsxidpvynmi by infusion reaction, treated with H1, incr steroid, benadryl    CINV- add Emend with zofran    CI neutropenia- neupogen pending labs. Left breast mass  -bx benigin    Insulin dependent DMII  - on insulin.  Will have

## 2020-01-30 NOTE — PATIENT INSTRUCTIONS
Recommended Anti-nausea medications (as directed by your provider):  Prochloperazine (Compazine) 10 mg every 6 hours and Ondansetron (Zofran) 8 mg every 8 hours  Take as needed    Helpful hints during cancer treatment:    Diet:  o Avoid greasy or spicy constipation, please contact the triage nurse for further instructions. Skin Care  o Avoid direct sunlight.  o Wear a broad-spectrum sunscreen with an SPF of 30 or higher on any skin exposed to the sun.   Re-apply every 2 hours if in the sun and after bath

## 2020-02-06 ENCOUNTER — NURSE ONLY (OUTPATIENT)
Dept: HEMATOLOGY/ONCOLOGY | Facility: HOSPITAL | Age: 50
End: 2020-02-06
Attending: INTERNAL MEDICINE
Payer: COMMERCIAL

## 2020-02-06 ENCOUNTER — RESEARCH ENCOUNTER (OUTPATIENT)
Dept: HEMATOLOGY/ONCOLOGY | Facility: HOSPITAL | Age: 50
End: 2020-02-06

## 2020-02-06 VITALS
HEART RATE: 79 BPM | WEIGHT: 220 LBS | BODY MASS INDEX: 39 KG/M2 | SYSTOLIC BLOOD PRESSURE: 119 MMHG | DIASTOLIC BLOOD PRESSURE: 59 MMHG | TEMPERATURE: 98 F | RESPIRATION RATE: 18 BRPM | OXYGEN SATURATION: 99 %

## 2020-02-06 VITALS
HEART RATE: 79 BPM | RESPIRATION RATE: 18 BRPM | DIASTOLIC BLOOD PRESSURE: 59 MMHG | OXYGEN SATURATION: 99 % | WEIGHT: 220 LBS | SYSTOLIC BLOOD PRESSURE: 119 MMHG | HEIGHT: 63 IN | TEMPERATURE: 98 F | BODY MASS INDEX: 38.98 KG/M2

## 2020-02-06 DIAGNOSIS — E11.65 TYPE 2 DIABETES MELLITUS WITH HYPERGLYCEMIA, WITH LONG-TERM CURRENT USE OF INSULIN (HCC): ICD-10-CM

## 2020-02-06 DIAGNOSIS — Z17.1 MALIGNANT NEOPLASM OF UPPER-OUTER QUADRANT OF RIGHT BREAST IN FEMALE, ESTROGEN RECEPTOR NEGATIVE (HCC): ICD-10-CM

## 2020-02-06 DIAGNOSIS — C50.411 MALIGNANT NEOPLASM OF UPPER-OUTER QUADRANT OF RIGHT BREAST IN FEMALE, ESTROGEN RECEPTOR NEGATIVE (HCC): Primary | ICD-10-CM

## 2020-02-06 DIAGNOSIS — R11.2 CINV (CHEMOTHERAPY-INDUCED NAUSEA AND VOMITING): ICD-10-CM

## 2020-02-06 DIAGNOSIS — Z51.11 CHEMOTHERAPY MANAGEMENT, ENCOUNTER FOR: Primary | ICD-10-CM

## 2020-02-06 DIAGNOSIS — T80.90XD INFUSION REACTION, SUBSEQUENT ENCOUNTER: ICD-10-CM

## 2020-02-06 DIAGNOSIS — C50.411 MALIGNANT NEOPLASM OF UPPER-OUTER QUADRANT OF RIGHT BREAST IN FEMALE, ESTROGEN RECEPTOR NEGATIVE (HCC): ICD-10-CM

## 2020-02-06 DIAGNOSIS — T45.1X5A CINV (CHEMOTHERAPY-INDUCED NAUSEA AND VOMITING): ICD-10-CM

## 2020-02-06 DIAGNOSIS — Z79.4 TYPE 2 DIABETES MELLITUS WITH HYPERGLYCEMIA, WITH LONG-TERM CURRENT USE OF INSULIN (HCC): ICD-10-CM

## 2020-02-06 DIAGNOSIS — Z17.1 MALIGNANT NEOPLASM OF UPPER-OUTER QUADRANT OF RIGHT BREAST IN FEMALE, ESTROGEN RECEPTOR NEGATIVE (HCC): Primary | ICD-10-CM

## 2020-02-06 DIAGNOSIS — D70.1 CHEMOTHERAPY INDUCED NEUTROPENIA (HCC): Primary | ICD-10-CM

## 2020-02-06 DIAGNOSIS — T45.1X5A CHEMOTHERAPY INDUCED NEUTROPENIA (HCC): Primary | ICD-10-CM

## 2020-02-06 LAB
BASOPHILS # BLD AUTO: 0.04 X10(3) UL (ref 0–0.2)
BASOPHILS NFR BLD AUTO: 1 %
DEPRECATED RDW RBC AUTO: 42.7 FL (ref 35.1–46.3)
EOSINOPHIL # BLD AUTO: 0.14 X10(3) UL (ref 0–0.7)
EOSINOPHIL NFR BLD AUTO: 3.5 %
ERYTHROCYTE [DISTWIDTH] IN BLOOD BY AUTOMATED COUNT: 13.4 % (ref 11–15)
HCT VFR BLD AUTO: 36.3 % (ref 35–48)
HGB BLD-MCNC: 11.8 G/DL (ref 12–16)
IMM GRANULOCYTES # BLD AUTO: 0.02 X10(3) UL (ref 0–1)
IMM GRANULOCYTES NFR BLD: 0.5 %
LYMPHOCYTES # BLD AUTO: 1.47 X10(3) UL (ref 1–4)
LYMPHOCYTES NFR BLD AUTO: 36.6 %
MCH RBC QN AUTO: 28.4 PG (ref 26–34)
MCHC RBC AUTO-ENTMCNC: 32.5 G/DL (ref 31–37)
MCV RBC AUTO: 87.3 FL (ref 80–100)
MONOCYTES # BLD AUTO: 0.12 X10(3) UL (ref 0.1–1)
MONOCYTES NFR BLD AUTO: 3 %
NEUTROPHILS # BLD AUTO: 2.23 X10 (3) UL (ref 1.5–7.7)
NEUTROPHILS # BLD AUTO: 2.23 X10(3) UL (ref 1.5–7.7)
NEUTROPHILS NFR BLD AUTO: 55.4 %
PLATELET # BLD AUTO: 248 10(3)UL (ref 150–450)
RBC # BLD AUTO: 4.16 X10(6)UL (ref 3.8–5.3)
WBC # BLD AUTO: 4 X10(3) UL (ref 4–11)

## 2020-02-06 PROCEDURE — 85025 COMPLETE CBC W/AUTO DIFF WBC: CPT

## 2020-02-06 PROCEDURE — S0028 INJECTION, FAMOTIDINE, 20 MG: HCPCS

## 2020-02-06 PROCEDURE — 96375 TX/PRO/DX INJ NEW DRUG ADDON: CPT

## 2020-02-06 PROCEDURE — 96413 CHEMO IV INFUSION 1 HR: CPT

## 2020-02-06 PROCEDURE — 96367 TX/PROPH/DG ADDL SEQ IV INF: CPT

## 2020-02-06 PROCEDURE — 99215 OFFICE O/P EST HI 40 MIN: CPT | Performed by: INTERNAL MEDICINE

## 2020-02-06 RX ORDER — HEPARIN SODIUM (PORCINE) LOCK FLUSH IV SOLN 100 UNIT/ML 100 UNIT/ML
5 SOLUTION INTRAVENOUS ONCE
Status: COMPLETED | OUTPATIENT
Start: 2020-02-06 | End: 2020-02-06

## 2020-02-06 RX ORDER — DIPHENHYDRAMINE HYDROCHLORIDE 50 MG/ML
50 INJECTION INTRAMUSCULAR; INTRAVENOUS ONCE
Status: CANCELLED | OUTPATIENT
Start: 2020-02-06

## 2020-02-06 RX ORDER — HEPARIN SODIUM (PORCINE) LOCK FLUSH IV SOLN 100 UNIT/ML 100 UNIT/ML
SOLUTION INTRAVENOUS
Status: COMPLETED
Start: 2020-02-06 | End: 2020-02-06

## 2020-02-06 RX ORDER — FAMOTIDINE 10 MG/ML
20 INJECTION, SOLUTION INTRAVENOUS ONCE
Status: CANCELLED | OUTPATIENT
Start: 2020-02-06

## 2020-02-06 RX ORDER — FAMOTIDINE 10 MG/ML
INJECTION, SOLUTION INTRAVENOUS
Status: COMPLETED
Start: 2020-02-06 | End: 2020-02-06

## 2020-02-06 RX ORDER — 0.9 % SODIUM CHLORIDE 0.9 %
VIAL (ML) INJECTION
Status: DISCONTINUED
Start: 2020-02-06 | End: 2020-02-06

## 2020-02-06 RX ORDER — DIPHENHYDRAMINE HYDROCHLORIDE 50 MG/ML
50 INJECTION INTRAMUSCULAR; INTRAVENOUS ONCE
Status: COMPLETED | OUTPATIENT
Start: 2020-02-06 | End: 2020-02-06

## 2020-02-06 RX ORDER — FAMOTIDINE 10 MG/ML
20 INJECTION, SOLUTION INTRAVENOUS ONCE
Status: COMPLETED | OUTPATIENT
Start: 2020-02-06 | End: 2020-02-06

## 2020-02-06 RX ORDER — DIPHENHYDRAMINE HYDROCHLORIDE 50 MG/ML
INJECTION INTRAMUSCULAR; INTRAVENOUS
Status: COMPLETED
Start: 2020-02-06 | End: 2020-02-06

## 2020-02-06 RX ORDER — 0.9 % SODIUM CHLORIDE 0.9 %
10 VIAL (ML) INJECTION ONCE
Status: CANCELLED | OUTPATIENT
Start: 2020-02-06

## 2020-02-06 RX ORDER — HEPARIN SODIUM (PORCINE) LOCK FLUSH IV SOLN 100 UNIT/ML 100 UNIT/ML
5 SOLUTION INTRAVENOUS ONCE
Status: CANCELLED | OUTPATIENT
Start: 2020-02-06

## 2020-02-06 RX ADMIN — FAMOTIDINE 20 MG: 10 INJECTION, SOLUTION INTRAVENOUS at 08:53:00

## 2020-02-06 RX ADMIN — HEPARIN SODIUM (PORCINE) LOCK FLUSH IV SOLN 100 UNIT/ML 500 UNITS: 100 SOLUTION INTRAVENOUS at 11:03:00

## 2020-02-06 RX ADMIN — DIPHENHYDRAMINE HYDROCHLORIDE 50 MG: 50 INJECTION INTRAMUSCULAR; INTRAVENOUS at 08:56:00

## 2020-02-06 NOTE — PROGRESS NOTES
Cancer Center Progress Note    Patient Name: Ashwin Cordova   YOB: 1970   Medical Record Number: [id number]   Attending Physician: Kendell Chung M.D.      Chief Complaint:  Breast cancer    Oncology History:  12/2019: mammogram (last mammogram and unspecified hyperlipidemia    • Type II or unspecified type diabetes mellitus without mention of complication, not stated as uncontrolled    • Unspecified essential hypertension        Past Surgical History:  Past Surgical History:   Procedure Erika Billing subcutaneous once daily at 6pm, Disp: 15 pen, Rfl: 2  •  lisinopril 2.5 MG Oral Tab, Take 1 tablet (2.5 mg total) by mouth nightly., Disp: 30 tablet, Rfl: 1  •  Fluticasone Propionate 50 MCG/ACT Nasal Suspension, 2 sprays by Nasal route daily.  USE TWO SPRA close to 43 cm in size, improved  Extremities: No edema, cyanosis, or bruising  Neurological: motor strength grossly intact  Psych: appropriate mood and affect    Skin: no changes.        Laboratory assessed and reviewed:  Lab Results   Component Value Date nonspecific and measure up to 1 cm in short axis. The thoracic aorta is unremarkable and not dilated. Mediastinal fat planes are preserved. Cardiac chambers are unremarkable.  Pericardium is normal. Main pulmonary artery is dilated measuring up to 3.2 cm wh hernia with wall thickening at the gastroesophageal junction. MESENTERY: Normal.  No mass or hernia. PELVIS: No enlarged mass or adenopathy. No bladder wall thickening. BONES:   There is degenerative disease of the thoracic and lumbar spine.  Degenerative time out was taken. The skin was cleaned with Chloraprep. 1% lidocaine was injected for local anesthesia. A nick was made in the skin with a #11 scalpel. Under ultrasound guidance, 5 biopsy specimens were obtained, (using the needle listed below).   JACOB v unspecified quadrant. 56-year-old female status post ultrasound-guided biopsy of mass in the right breast in the upper inner breast with vision clip placement.   BREAST COMPOSITION:      FINDINGS: The vision clip is within the lobulated mass seen in the up TISSUE:  There is heterogeneous fibroglandular tissue  BACKGROUND PARENCHYMAL ENHANCEMENT PATTERN:  Mild, with 25-50% of glandular tissue demonstrating enhancement.    FINDINGS: Right breast:  There is a 5.2 x 2.9 x 3.4 centimeter heterogeneously enhancing RECOMMENDATIONS:  ULTRASOUND: RIGHT AXILLA  --We will call the patient back for an ultrasound and provide an additional report. MRI-GUIDED BREAST BIOPSY: LEFT BREAST     PLEASE NOTE: A NORMAL MRI DOES NOT EXCLUDE THE POSSIBILITY OF BREAST CANCER.   A CLI breast in female, estrogen receptor negative ([facility] [address]  Staging form: Breast, AJCC 8th Edition  - Clinical: Stage IIIB (cT3, cN0, cM0, G3, ER-, DC-, HER2-) - Signed by Tricia Zarate MD on 1/16/2020  - Echo reviewed, EF preserved  - C1D8 taxol/carboplatin per B5

## 2020-02-06 NOTE — PROGRESS NOTES
Porsha to infusion today for C1 D8 Taxol (Study B-59). Arrives Ambulating independently from MD visit, accompanied by one sister and one daughter. She states she did well after her first treatment.  She had some mild nausea which was relieved with use of relieved/minimized  understands plan of care  verbalize how to care for self  Progress towards outcome:  making progress    Education Record    Learner:  Patient and Family Member  Barriers / Limitations:  Drowsy from medication  Method:  Nell Millan

## 2020-02-13 ENCOUNTER — OFFICE VISIT (OUTPATIENT)
Dept: HEMATOLOGY/ONCOLOGY | Facility: HOSPITAL | Age: 50
End: 2020-02-13
Attending: INTERNAL MEDICINE
Payer: COMMERCIAL

## 2020-02-13 VITALS
OXYGEN SATURATION: 98 % | WEIGHT: 224.19 LBS | SYSTOLIC BLOOD PRESSURE: 131 MMHG | TEMPERATURE: 98 F | DIASTOLIC BLOOD PRESSURE: 63 MMHG | BODY MASS INDEX: 40 KG/M2 | RESPIRATION RATE: 18 BRPM | HEART RATE: 81 BPM

## 2020-02-13 VITALS
BODY MASS INDEX: 40 KG/M2 | SYSTOLIC BLOOD PRESSURE: 131 MMHG | OXYGEN SATURATION: 98 % | WEIGHT: 224.19 LBS | RESPIRATION RATE: 18 BRPM | HEART RATE: 81 BPM | DIASTOLIC BLOOD PRESSURE: 63 MMHG | TEMPERATURE: 98 F

## 2020-02-13 DIAGNOSIS — Z79.4 TYPE 2 DIABETES MELLITUS WITH HYPERGLYCEMIA, WITH LONG-TERM CURRENT USE OF INSULIN (HCC): ICD-10-CM

## 2020-02-13 DIAGNOSIS — T45.1X5A CINV (CHEMOTHERAPY-INDUCED NAUSEA AND VOMITING): ICD-10-CM

## 2020-02-13 DIAGNOSIS — D70.1 CHEMOTHERAPY INDUCED NEUTROPENIA (HCC): ICD-10-CM

## 2020-02-13 DIAGNOSIS — Z17.1 MALIGNANT NEOPLASM OF UPPER-OUTER QUADRANT OF RIGHT BREAST IN FEMALE, ESTROGEN RECEPTOR NEGATIVE (HCC): Primary | ICD-10-CM

## 2020-02-13 DIAGNOSIS — C50.411 MALIGNANT NEOPLASM OF UPPER-OUTER QUADRANT OF RIGHT BREAST IN FEMALE, ESTROGEN RECEPTOR NEGATIVE (HCC): Primary | ICD-10-CM

## 2020-02-13 DIAGNOSIS — T45.1X5A CHEMOTHERAPY INDUCED NEUTROPENIA (HCC): ICD-10-CM

## 2020-02-13 DIAGNOSIS — E11.65 TYPE 2 DIABETES MELLITUS WITH HYPERGLYCEMIA, WITH LONG-TERM CURRENT USE OF INSULIN (HCC): ICD-10-CM

## 2020-02-13 DIAGNOSIS — R11.2 CINV (CHEMOTHERAPY-INDUCED NAUSEA AND VOMITING): ICD-10-CM

## 2020-02-13 DIAGNOSIS — Z51.11 CHEMOTHERAPY MANAGEMENT, ENCOUNTER FOR: ICD-10-CM

## 2020-02-13 DIAGNOSIS — I10 ESSENTIAL HYPERTENSION: ICD-10-CM

## 2020-02-13 LAB
BASOPHILS # BLD AUTO: 0.05 X10(3) UL (ref 0–0.2)
BASOPHILS NFR BLD AUTO: 1 %
DEPRECATED RDW RBC AUTO: 44.2 FL (ref 35.1–46.3)
EOSINOPHIL # BLD AUTO: 0.09 X10(3) UL (ref 0–0.7)
EOSINOPHIL NFR BLD AUTO: 1.8 %
ERYTHROCYTE [DISTWIDTH] IN BLOOD BY AUTOMATED COUNT: 13.9 % (ref 11–15)
HCT VFR BLD AUTO: 33.4 % (ref 35–48)
HGB BLD-MCNC: 11 G/DL (ref 12–16)
IMM GRANULOCYTES # BLD AUTO: 0.02 X10(3) UL (ref 0–1)
IMM GRANULOCYTES NFR BLD: 0.4 %
LYMPHOCYTES # BLD AUTO: 2.37 X10(3) UL (ref 1–4)
LYMPHOCYTES NFR BLD AUTO: 47.2 %
MCH RBC QN AUTO: 29 PG (ref 26–34)
MCHC RBC AUTO-ENTMCNC: 32.9 G/DL (ref 31–37)
MCV RBC AUTO: 88.1 FL (ref 80–100)
MONOCYTES # BLD AUTO: 0.25 X10(3) UL (ref 0.1–1)
MONOCYTES NFR BLD AUTO: 5 %
NEUTROPHILS # BLD AUTO: 2.24 X10 (3) UL (ref 1.5–7.7)
NEUTROPHILS # BLD AUTO: 2.24 X10(3) UL (ref 1.5–7.7)
NEUTROPHILS NFR BLD AUTO: 44.6 %
PLATELET # BLD AUTO: 280 10(3)UL (ref 150–450)
RBC # BLD AUTO: 3.79 X10(6)UL (ref 3.8–5.3)
WBC # BLD AUTO: 5 X10(3) UL (ref 4–11)

## 2020-02-13 PROCEDURE — 96413 CHEMO IV INFUSION 1 HR: CPT

## 2020-02-13 PROCEDURE — 85025 COMPLETE CBC W/AUTO DIFF WBC: CPT

## 2020-02-13 PROCEDURE — 99215 OFFICE O/P EST HI 40 MIN: CPT | Performed by: INTERNAL MEDICINE

## 2020-02-13 PROCEDURE — 96375 TX/PRO/DX INJ NEW DRUG ADDON: CPT

## 2020-02-13 PROCEDURE — S0028 INJECTION, FAMOTIDINE, 20 MG: HCPCS

## 2020-02-13 RX ORDER — FAMOTIDINE 10 MG/ML
20 INJECTION, SOLUTION INTRAVENOUS ONCE
Status: COMPLETED | OUTPATIENT
Start: 2020-02-13 | End: 2020-02-13

## 2020-02-13 RX ORDER — FAMOTIDINE 10 MG/ML
20 INJECTION, SOLUTION INTRAVENOUS ONCE
Status: CANCELLED | OUTPATIENT
Start: 2020-02-13

## 2020-02-13 RX ORDER — FAMOTIDINE 10 MG/ML
INJECTION, SOLUTION INTRAVENOUS
Status: COMPLETED
Start: 2020-02-13 | End: 2020-02-13

## 2020-02-13 RX ORDER — 0.9 % SODIUM CHLORIDE 0.9 %
VIAL (ML) INJECTION
Status: DISCONTINUED
Start: 2020-02-13 | End: 2020-02-13

## 2020-02-13 RX ORDER — HEPARIN SODIUM (PORCINE) LOCK FLUSH IV SOLN 100 UNIT/ML 100 UNIT/ML
5 SOLUTION INTRAVENOUS ONCE
Status: COMPLETED | OUTPATIENT
Start: 2020-02-13 | End: 2020-02-13

## 2020-02-13 RX ORDER — DIPHENHYDRAMINE HYDROCHLORIDE 50 MG/ML
50 INJECTION INTRAMUSCULAR; INTRAVENOUS ONCE
Status: CANCELLED
Start: 2020-02-13

## 2020-02-13 RX ORDER — 0.9 % SODIUM CHLORIDE 0.9 %
10 VIAL (ML) INJECTION ONCE
Status: CANCELLED | OUTPATIENT
Start: 2020-02-13

## 2020-02-13 RX ORDER — DIPHENHYDRAMINE HYDROCHLORIDE 50 MG/ML
INJECTION INTRAMUSCULAR; INTRAVENOUS
Status: COMPLETED
Start: 2020-02-13 | End: 2020-02-13

## 2020-02-13 RX ORDER — DIPHENHYDRAMINE HYDROCHLORIDE 50 MG/ML
50 INJECTION INTRAMUSCULAR; INTRAVENOUS ONCE
Status: COMPLETED | OUTPATIENT
Start: 2020-02-13 | End: 2020-02-13

## 2020-02-13 RX ORDER — HEPARIN SODIUM (PORCINE) LOCK FLUSH IV SOLN 100 UNIT/ML 100 UNIT/ML
SOLUTION INTRAVENOUS
Status: COMPLETED
Start: 2020-02-13 | End: 2020-02-13

## 2020-02-13 RX ORDER — HEPARIN SODIUM (PORCINE) LOCK FLUSH IV SOLN 100 UNIT/ML 100 UNIT/ML
5 SOLUTION INTRAVENOUS ONCE
Status: CANCELLED | OUTPATIENT
Start: 2020-02-13

## 2020-02-13 RX ORDER — DIPHENHYDRAMINE HYDROCHLORIDE 50 MG/ML
25 INJECTION INTRAMUSCULAR; INTRAVENOUS ONCE
Status: CANCELLED | OUTPATIENT
Start: 2020-02-13

## 2020-02-13 RX ORDER — DIPHENHYDRAMINE HYDROCHLORIDE 50 MG/ML
25 INJECTION INTRAMUSCULAR; INTRAVENOUS ONCE
Status: DISCONTINUED | OUTPATIENT
Start: 2020-02-13 | End: 2020-02-13

## 2020-02-13 RX ADMIN — FAMOTIDINE 20 MG: 10 INJECTION, SOLUTION INTRAVENOUS at 11:31:00

## 2020-02-13 RX ADMIN — HEPARIN SODIUM (PORCINE) LOCK FLUSH IV SOLN 100 UNIT/ML 500 UNITS: 100 SOLUTION INTRAVENOUS at 13:45:00

## 2020-02-13 RX ADMIN — DIPHENHYDRAMINE HYDROCHLORIDE 50 MG: 50 INJECTION INTRAMUSCULAR; INTRAVENOUS at 11:24:00

## 2020-02-13 NOTE — PROGRESS NOTES
Cancer Center Progress Note    Patient Name: Ricky Carlin   YOB: 1970   Medical Record Number: [id number]   Attending Physician: Lexi Thompson M.D.      Chief Complaint:  Breast cancer    Oncology History:  12/2019: mammogram (last mammogram unspecified type diabetes mellitus without mention of complication, not stated as uncontrolled    • Unspecified essential hypertension        Past Surgical History:  Past Surgical History:   Procedure Laterality Date   • ABLATION      abdominal   • C-SECTI Rfl: 2  •  lisinopril 2.5 MG Oral Tab, Take 1 tablet (2.5 mg total) by mouth nightly., Disp: 30 tablet, Rfl: 1  •  Fluticasone Propionate 50 MCG/ACT Nasal Suspension, 2 sprays by Nasal route daily.  USE TWO SPRAY IN EACH NOSTRIL EVERY DAY, Disp: 1 Bottle, R bruising  Neurological: motor strength grossly intact  Psych: appropriate mood and affect    Skin: no changes.        Laboratory assessed and reviewed:  Lab Results   Component Value Date     (H) 01/30/2020    BUN 7 01/30/2020    BUNCREA 11.3 01/30/2 is unremarkable and not dilated. Mediastinal fat planes are preserved. Cardiac chambers are unremarkable. Pericardium is normal. Main pulmonary artery is dilated measuring up to 3.2 cm which can be seen with pulmonary hypertension.   There is a retroesophag MESENTERY: Normal.  No mass or hernia. PELVIS: No enlarged mass or adenopathy. No bladder wall thickening. BONES:   There is degenerative disease of the thoracic and lumbar spine.  Degenerative changes are seen within the sacroiliac joints and pubic symph lidocaine was injected for local anesthesia. A nick was made in the skin with a #11 scalpel. Under ultrasound guidance, 5 biopsy specimens were obtained, (using the needle listed below). A vision clip was placed.   These were sent to the laboratory for p biopsy of mass in the right breast in the upper inner breast with vision clip placement.   BREAST COMPOSITION:      FINDINGS: The vision clip is within the lobulated mass seen in the upper inner breast.  Please see the complete ultrasound-guided biopsy repo ENHANCEMENT PATTERN:  Mild, with 25-50% of glandular tissue demonstrating enhancement.    FINDINGS: Right breast:  There is a 5.2 x 2.9 x 3.4 centimeter heterogeneously enhancing irregular mass seen in the right breast upper inner quadrant representing the for an ultrasound and provide an additional report. MRI-GUIDED BREAST BIOPSY: LEFT BREAST     PLEASE NOTE: A NORMAL MRI DOES NOT EXCLUDE THE POSSIBILITY OF BREAST CANCER. A CLINICALLY SUSPICIOUS PALPABLE LUMP SHOULD BE BIOPSIED.          Dictated by (CST 8th Edition  - Clinical: Stage IIIB (cT3, cN0, cM0, G3, ER-, SC-, HER2-) - Signed by Barby Foley MD on 1/16/2020  - Echo reviewed, EF preserved  - C1D15 taxol/carboplatin per B59 trial.     Taxol reaction  - improved with slow rate and increase steroids.

## 2020-02-13 NOTE — PROGRESS NOTES
Porsha to infusion today for C1 D15 Taxol (Study B-59). Arrives Ambulating independently from MD visit, accompanied by family. States she did feel very fatigued for a few days and had occasional mild nausea.      Patient reports possible pregnancy since

## 2020-02-20 ENCOUNTER — OFFICE VISIT (OUTPATIENT)
Dept: HEMATOLOGY/ONCOLOGY | Facility: HOSPITAL | Age: 50
End: 2020-02-20
Attending: INTERNAL MEDICINE
Payer: COMMERCIAL

## 2020-02-20 ENCOUNTER — RESEARCH ENCOUNTER (OUTPATIENT)
Dept: HEMATOLOGY/ONCOLOGY | Facility: HOSPITAL | Age: 50
End: 2020-02-20

## 2020-02-20 VITALS
RESPIRATION RATE: 16 BRPM | SYSTOLIC BLOOD PRESSURE: 131 MMHG | HEIGHT: 63 IN | DIASTOLIC BLOOD PRESSURE: 93 MMHG | HEART RATE: 80 BPM | WEIGHT: 215 LBS | OXYGEN SATURATION: 99 % | TEMPERATURE: 98 F | BODY MASS INDEX: 38.09 KG/M2

## 2020-02-20 VITALS
TEMPERATURE: 98 F | RESPIRATION RATE: 16 BRPM | OXYGEN SATURATION: 99 % | BODY MASS INDEX: 38 KG/M2 | HEART RATE: 80 BPM | SYSTOLIC BLOOD PRESSURE: 131 MMHG | WEIGHT: 215 LBS | DIASTOLIC BLOOD PRESSURE: 93 MMHG

## 2020-02-20 DIAGNOSIS — C50.411 MALIGNANT NEOPLASM OF UPPER-OUTER QUADRANT OF RIGHT BREAST IN FEMALE, ESTROGEN RECEPTOR NEGATIVE (HCC): ICD-10-CM

## 2020-02-20 DIAGNOSIS — Z51.11 CHEMOTHERAPY MANAGEMENT, ENCOUNTER FOR: Primary | ICD-10-CM

## 2020-02-20 DIAGNOSIS — C50.411 MALIGNANT NEOPLASM OF UPPER-OUTER QUADRANT OF RIGHT BREAST IN FEMALE, ESTROGEN RECEPTOR NEGATIVE (HCC): Primary | ICD-10-CM

## 2020-02-20 DIAGNOSIS — Z17.1 MALIGNANT NEOPLASM OF UPPER-OUTER QUADRANT OF RIGHT BREAST IN FEMALE, ESTROGEN RECEPTOR NEGATIVE (HCC): Primary | ICD-10-CM

## 2020-02-20 DIAGNOSIS — R21 SKIN RASH: ICD-10-CM

## 2020-02-20 DIAGNOSIS — R11.2 CINV (CHEMOTHERAPY-INDUCED NAUSEA AND VOMITING): ICD-10-CM

## 2020-02-20 DIAGNOSIS — T45.1X5A CINV (CHEMOTHERAPY-INDUCED NAUSEA AND VOMITING): ICD-10-CM

## 2020-02-20 DIAGNOSIS — E11.65 TYPE 2 DIABETES MELLITUS WITH HYPERGLYCEMIA, WITH LONG-TERM CURRENT USE OF INSULIN (HCC): ICD-10-CM

## 2020-02-20 DIAGNOSIS — Z17.1 MALIGNANT NEOPLASM OF UPPER-OUTER QUADRANT OF RIGHT BREAST IN FEMALE, ESTROGEN RECEPTOR NEGATIVE (HCC): ICD-10-CM

## 2020-02-20 DIAGNOSIS — Z79.4 TYPE 2 DIABETES MELLITUS WITH HYPERGLYCEMIA, WITH LONG-TERM CURRENT USE OF INSULIN (HCC): ICD-10-CM

## 2020-02-20 DIAGNOSIS — D70.1 CHEMOTHERAPY INDUCED NEUTROPENIA (HCC): ICD-10-CM

## 2020-02-20 DIAGNOSIS — T45.1X5A CHEMOTHERAPY INDUCED NEUTROPENIA (HCC): ICD-10-CM

## 2020-02-20 LAB
ALBUMIN SERPL-MCNC: 3 G/DL (ref 3.4–5)
ALBUMIN/GLOB SERPL: 0.7 {RATIO} (ref 1–2)
ALP LIVER SERPL-CCNC: 66 U/L (ref 39–100)
ALT SERPL-CCNC: 29 U/L (ref 13–56)
ANION GAP SERPL CALC-SCNC: 5 MMOL/L (ref 0–18)
AST SERPL-CCNC: 14 U/L (ref 15–37)
BASOPHILS # BLD AUTO: 0.07 X10(3) UL (ref 0–0.2)
BASOPHILS NFR BLD AUTO: 1.6 %
BILIRUB SERPL-MCNC: 0.3 MG/DL (ref 0.1–2)
BILIRUB UR QL: NEGATIVE
BUN BLD-MCNC: 8 MG/DL (ref 7–18)
BUN/CREAT SERPL: 12.5 (ref 10–20)
CALCIUM BLD-MCNC: 8.9 MG/DL (ref 8.5–10.1)
CHLORIDE SERPL-SCNC: 107 MMOL/L (ref 98–112)
CHOLEST SMN-MCNC: 222 MG/DL (ref ?–200)
CLARITY UR: CLEAR
CO2 SERPL-SCNC: 27 MMOL/L (ref 21–32)
COLOR UR: YELLOW
CREAT BLD-MCNC: 0.64 MG/DL (ref 0.55–1.02)
CREAT UR-SCNC: 87.3 MG/DL
DEPRECATED RDW RBC AUTO: 45.4 FL (ref 35.1–46.3)
EOSINOPHIL # BLD AUTO: 0.05 X10(3) UL (ref 0–0.7)
EOSINOPHIL NFR BLD AUTO: 1.2 %
ERYTHROCYTE [DISTWIDTH] IN BLOOD BY AUTOMATED COUNT: 14.4 % (ref 11–15)
EST. AVERAGE GLUCOSE BLD GHB EST-MCNC: 295 MG/DL (ref 68–126)
GLOBULIN PLAS-MCNC: 4.2 G/DL (ref 2.8–4.4)
GLUCOSE BLD-MCNC: 190 MG/DL (ref 70–99)
GLUCOSE UR-MCNC: >=500 MG/DL
HBA1C MFR BLD HPLC: 11.9 % (ref ?–5.7)
HCT VFR BLD AUTO: 36.5 % (ref 35–48)
HDLC SERPL-MCNC: 44 MG/DL (ref 40–59)
HGB BLD-MCNC: 12 G/DL (ref 12–16)
HGB UR QL STRIP.AUTO: NEGATIVE
IMM GRANULOCYTES # BLD AUTO: 0.02 X10(3) UL (ref 0–1)
IMM GRANULOCYTES NFR BLD: 0.5 %
KETONES UR-MCNC: NEGATIVE MG/DL
LDLC SERPL CALC-MCNC: 154 MG/DL (ref ?–100)
LEUKOCYTE ESTERASE UR QL STRIP.AUTO: NEGATIVE
LYMPHOCYTES # BLD AUTO: 2.09 X10(3) UL (ref 1–4)
LYMPHOCYTES NFR BLD AUTO: 49.1 %
M PROTEIN MFR SERPL ELPH: 7.2 G/DL (ref 6.4–8.2)
MCH RBC QN AUTO: 29.1 PG (ref 26–34)
MCHC RBC AUTO-ENTMCNC: 32.9 G/DL (ref 31–37)
MCV RBC AUTO: 88.4 FL (ref 80–100)
MICROALBUMIN UR-MCNC: 8.26 MG/DL
MICROALBUMIN/CREAT 24H UR-RTO: 94.6 UG/MG (ref ?–30)
MONOCYTES # BLD AUTO: 0.22 X10(3) UL (ref 0.1–1)
MONOCYTES NFR BLD AUTO: 5.2 %
NEUTROPHILS # BLD AUTO: 1.81 X10 (3) UL (ref 1.5–7.7)
NEUTROPHILS # BLD AUTO: 1.81 X10(3) UL (ref 1.5–7.7)
NEUTROPHILS NFR BLD AUTO: 42.4 %
NITRITE UR QL STRIP.AUTO: NEGATIVE
NONHDLC SERPL-MCNC: 178 MG/DL (ref ?–130)
OSMOLALITY SERPL CALC.SUM OF ELEC: 291 MOSM/KG (ref 275–295)
PATIENT FASTING Y/N/NP: NO
PATIENT FASTING Y/N/NP: YES
PH UR: 5 [PH] (ref 5–8)
PLATELET # BLD AUTO: 195 10(3)UL (ref 150–450)
POTASSIUM SERPL-SCNC: 3.8 MMOL/L (ref 3.5–5.1)
PROT UR-MCNC: NEGATIVE MG/DL
RBC # BLD AUTO: 4.13 X10(6)UL (ref 3.8–5.3)
SODIUM SERPL-SCNC: 139 MMOL/L (ref 136–145)
SP GR UR STRIP: 1.02 (ref 1–1.03)
TRIGL SERPL-MCNC: 119 MG/DL (ref 30–149)
TSI SER-ACNC: 1.85 MIU/ML (ref 0.36–3.74)
UROBILINOGEN UR STRIP-ACNC: <2
VLDLC SERPL CALC-MCNC: 24 MG/DL (ref 0–30)
WBC # BLD AUTO: 4.3 X10(3) UL (ref 4–11)

## 2020-02-20 PROCEDURE — 80061 LIPID PANEL: CPT

## 2020-02-20 PROCEDURE — 96417 CHEMO IV INFUS EACH ADDL SEQ: CPT

## 2020-02-20 PROCEDURE — 96413 CHEMO IV INFUSION 1 HR: CPT

## 2020-02-20 PROCEDURE — 80053 COMPREHEN METABOLIC PANEL: CPT

## 2020-02-20 PROCEDURE — 85025 COMPLETE CBC W/AUTO DIFF WBC: CPT

## 2020-02-20 PROCEDURE — 96367 TX/PROPH/DG ADDL SEQ IV INF: CPT

## 2020-02-20 PROCEDURE — 81003 URINALYSIS AUTO W/O SCOPE: CPT

## 2020-02-20 PROCEDURE — 83036 HEMOGLOBIN GLYCOSYLATED A1C: CPT

## 2020-02-20 PROCEDURE — 82043 UR ALBUMIN QUANTITATIVE: CPT

## 2020-02-20 PROCEDURE — 84443 ASSAY THYROID STIM HORMONE: CPT

## 2020-02-20 PROCEDURE — 96375 TX/PRO/DX INJ NEW DRUG ADDON: CPT

## 2020-02-20 PROCEDURE — 80050 GENERAL HEALTH PANEL: CPT

## 2020-02-20 PROCEDURE — S0028 INJECTION, FAMOTIDINE, 20 MG: HCPCS

## 2020-02-20 PROCEDURE — 82570 ASSAY OF URINE CREATININE: CPT

## 2020-02-20 PROCEDURE — 99215 OFFICE O/P EST HI 40 MIN: CPT | Performed by: INTERNAL MEDICINE

## 2020-02-20 RX ORDER — HEPARIN SODIUM (PORCINE) LOCK FLUSH IV SOLN 100 UNIT/ML 100 UNIT/ML
5 SOLUTION INTRAVENOUS ONCE
Status: COMPLETED | OUTPATIENT
Start: 2020-02-20 | End: 2020-02-20

## 2020-02-20 RX ORDER — DIPHENHYDRAMINE HYDROCHLORIDE 50 MG/ML
50 INJECTION INTRAMUSCULAR; INTRAVENOUS ONCE
Status: COMPLETED | OUTPATIENT
Start: 2020-02-20 | End: 2020-02-20

## 2020-02-20 RX ORDER — FAMOTIDINE 10 MG/ML
INJECTION, SOLUTION INTRAVENOUS
Status: COMPLETED
Start: 2020-02-20 | End: 2020-02-20

## 2020-02-20 RX ORDER — 0.9 % SODIUM CHLORIDE 0.9 %
VIAL (ML) INJECTION
Status: DISCONTINUED
Start: 2020-02-20 | End: 2020-02-20

## 2020-02-20 RX ORDER — DIPHENHYDRAMINE HYDROCHLORIDE 50 MG/ML
INJECTION INTRAMUSCULAR; INTRAVENOUS
Status: COMPLETED
Start: 2020-02-20 | End: 2020-02-20

## 2020-02-20 RX ORDER — FAMOTIDINE 10 MG/ML
20 INJECTION, SOLUTION INTRAVENOUS ONCE
Status: COMPLETED | OUTPATIENT
Start: 2020-02-20 | End: 2020-02-20

## 2020-02-20 RX ORDER — HEPARIN SODIUM (PORCINE) LOCK FLUSH IV SOLN 100 UNIT/ML 100 UNIT/ML
5 SOLUTION INTRAVENOUS ONCE
Status: CANCELLED | OUTPATIENT
Start: 2020-02-20

## 2020-02-20 RX ORDER — AZITHROMYCIN 250 MG/1
250 TABLET, FILM COATED ORAL DAILY
Qty: 6 TABLET | Refills: 0 | Status: SHIPPED | OUTPATIENT
Start: 2020-02-20 | End: 2020-03-19

## 2020-02-20 RX ORDER — DIPHENHYDRAMINE HYDROCHLORIDE 50 MG/ML
50 INJECTION INTRAMUSCULAR; INTRAVENOUS ONCE
Status: CANCELLED
Start: 2020-02-20

## 2020-02-20 RX ORDER — HEPARIN SODIUM (PORCINE) LOCK FLUSH IV SOLN 100 UNIT/ML 100 UNIT/ML
SOLUTION INTRAVENOUS
Status: COMPLETED
Start: 2020-02-20 | End: 2020-02-20

## 2020-02-20 RX ORDER — 0.9 % SODIUM CHLORIDE 0.9 %
10 VIAL (ML) INJECTION ONCE
Status: CANCELLED | OUTPATIENT
Start: 2020-02-20

## 2020-02-20 RX ORDER — FAMOTIDINE 10 MG/ML
20 INJECTION, SOLUTION INTRAVENOUS ONCE
Status: CANCELLED | OUTPATIENT
Start: 2020-02-20

## 2020-02-20 RX ADMIN — DIPHENHYDRAMINE HYDROCHLORIDE 50 MG: 50 INJECTION INTRAMUSCULAR; INTRAVENOUS at 08:58:00

## 2020-02-20 RX ADMIN — FAMOTIDINE 20 MG: 10 INJECTION, SOLUTION INTRAVENOUS at 09:00:00

## 2020-02-20 RX ADMIN — HEPARIN SODIUM (PORCINE) LOCK FLUSH IV SOLN 100 UNIT/ML 500 UNITS: 100 SOLUTION INTRAVENOUS at 13:25:00

## 2020-02-20 NOTE — PROGRESS NOTES
STUDY: [facility] B-59  PATIENT ID: [id number]  Cycle 2 Day 1 Taxol,Carboplatin, Atezolizumab/placebo     Patient seen and examined per MD. Jacinta Marshall noted within parameters for study treatment. Patient denies any fevers, chills, cough, diarrhea, or pain.  Mil

## 2020-02-20 NOTE — PROGRESS NOTES
Porsha to infusion today for C2 D1 Taxol/Carbo/Atezo or placebo (Study B-59). Arrives Ambulating independently from MD visit, accompanied by family. States she has occasional mild nausea.   Reports that MD is aware and she has a plan in place for anti-na understanding  Comments:

## 2020-02-20 NOTE — PROGRESS NOTES
Cancer Center Progress Note    Patient Name: Deepthi Loza   YOB: 1970   Medical Record Number: [id number]   Attending Physician: Firman Cockayne, M.D.      Chief Complaint:  Breast cancer    Oncology History:  12/2019: mammogram (last mammogram unspecified type diabetes mellitus without mention of complication, not stated as uncontrolled    • Unspecified essential hypertension        Past Surgical History:  Past Surgical History:   Procedure Laterality Date   • ABLATION      abdominal   • C-SECTI Rfl: 2  •  lisinopril 2.5 MG Oral Tab, Take 1 tablet (2.5 mg total) by mouth nightly., Disp: 30 tablet, Rfl: 1  •  Fluticasone Propionate 50 MCG/ACT Nasal Suspension, 2 sprays by Nasal route daily.  USE TWO SPRAY IN EACH NOSTRIL EVERY DAY, Disp: 1 Bottle, R bruising  Neurological: motor strength grossly intact  Psych: appropriate mood and affect    Skin: mild karatotic changes upper cheek both sides.      Laboratory assessed and reviewed:  Lab Results   Component Value Date     (H) 01/30/2020    BUN 7 0 short axis. The thoracic aorta is unremarkable and not dilated. Mediastinal fat planes are preserved. Cardiac chambers are unremarkable.  Pericardium is normal. Main pulmonary artery is dilated measuring up to 3.2 cm which can be seen with pulmonary hyperte gastroesophageal junction. MESENTERY: Normal.  No mass or hernia. PELVIS: No enlarged mass or adenopathy. No bladder wall thickening. BONES:   There is degenerative disease of the thoracic and lumbar spine.  Degenerative changes are seen within the sacroi cleaned with Chloraprep. 1% lidocaine was injected for local anesthesia. A nick was made in the skin with a #11 scalpel. Under ultrasound guidance, 5 biopsy specimens were obtained, (using the needle listed below). A vision clip was placed.   These were status post ultrasound-guided biopsy of mass in the right breast in the upper inner breast with vision clip placement.   BREAST COMPOSITION:      FINDINGS: The vision clip is within the lobulated mass seen in the upper inner breast.  Please see the complete tissue  BACKGROUND PARENCHYMAL ENHANCEMENT PATTERN:  Mild, with 25-50% of glandular tissue demonstrating enhancement.    FINDINGS: Right breast:  There is a 5.2 x 2.9 x 3.4 centimeter heterogeneously enhancing irregular mass seen in the right breast upper i --We will call the patient back for an ultrasound and provide an additional report. MRI-GUIDED BREAST BIOPSY: LEFT BREAST     PLEASE NOTE: A NORMAL MRI DOES NOT EXCLUDE THE POSSIBILITY OF BREAST CANCER.   A CLINICALLY SUSPICIOUS PALPABLE LUMP SHOULD BE BI [facility])  Staging form: Breast, AJCC 8th Edition  - Clinical: Stage IIIB (cT3, cN0, cM0, G3, ER-, OK-, HER2-) - Signed by Allyn Sandifer, MD on 1/16/2020  - Echo reviewed, EF preserved  - C2 taxol/carboplatin per B59 trial.     Taxol reaction  - improved with sl

## 2020-02-27 ENCOUNTER — OFFICE VISIT (OUTPATIENT)
Dept: HEMATOLOGY/ONCOLOGY | Facility: HOSPITAL | Age: 50
End: 2020-02-27
Attending: INTERNAL MEDICINE
Payer: COMMERCIAL

## 2020-02-27 ENCOUNTER — RESEARCH ENCOUNTER (OUTPATIENT)
Dept: HEMATOLOGY/ONCOLOGY | Facility: HOSPITAL | Age: 50
End: 2020-02-27

## 2020-02-27 VITALS
HEART RATE: 75 BPM | TEMPERATURE: 98 F | BODY MASS INDEX: 39 KG/M2 | DIASTOLIC BLOOD PRESSURE: 60 MMHG | WEIGHT: 221.81 LBS | SYSTOLIC BLOOD PRESSURE: 130 MMHG | RESPIRATION RATE: 16 BRPM | OXYGEN SATURATION: 99 %

## 2020-02-27 VITALS
RESPIRATION RATE: 16 BRPM | HEART RATE: 75 BPM | TEMPERATURE: 98 F | WEIGHT: 221 LBS | DIASTOLIC BLOOD PRESSURE: 60 MMHG | BODY MASS INDEX: 39.16 KG/M2 | SYSTOLIC BLOOD PRESSURE: 130 MMHG | OXYGEN SATURATION: 99 % | HEIGHT: 63 IN

## 2020-02-27 DIAGNOSIS — Z17.1 MALIGNANT NEOPLASM OF UPPER-OUTER QUADRANT OF RIGHT BREAST IN FEMALE, ESTROGEN RECEPTOR NEGATIVE (HCC): Primary | ICD-10-CM

## 2020-02-27 DIAGNOSIS — E11.65 TYPE 2 DIABETES MELLITUS WITH HYPERGLYCEMIA, WITH LONG-TERM CURRENT USE OF INSULIN (HCC): ICD-10-CM

## 2020-02-27 DIAGNOSIS — Z51.11 CHEMOTHERAPY MANAGEMENT, ENCOUNTER FOR: ICD-10-CM

## 2020-02-27 DIAGNOSIS — L27.0 DRUG-INDUCED SKIN RASH: ICD-10-CM

## 2020-02-27 DIAGNOSIS — T45.1X5A CHEMOTHERAPY INDUCED NEUTROPENIA (HCC): Primary | ICD-10-CM

## 2020-02-27 DIAGNOSIS — Z17.1 MALIGNANT NEOPLASM OF UPPER-OUTER QUADRANT OF RIGHT BREAST IN FEMALE, ESTROGEN RECEPTOR NEGATIVE (HCC): ICD-10-CM

## 2020-02-27 DIAGNOSIS — C50.411 MALIGNANT NEOPLASM OF UPPER-OUTER QUADRANT OF RIGHT BREAST IN FEMALE, ESTROGEN RECEPTOR NEGATIVE (HCC): Primary | ICD-10-CM

## 2020-02-27 DIAGNOSIS — C50.411 MALIGNANT NEOPLASM OF UPPER-OUTER QUADRANT OF RIGHT BREAST IN FEMALE, ESTROGEN RECEPTOR NEGATIVE (HCC): ICD-10-CM

## 2020-02-27 DIAGNOSIS — R11.2 CINV (CHEMOTHERAPY-INDUCED NAUSEA AND VOMITING): ICD-10-CM

## 2020-02-27 DIAGNOSIS — T45.1X5A CINV (CHEMOTHERAPY-INDUCED NAUSEA AND VOMITING): ICD-10-CM

## 2020-02-27 DIAGNOSIS — K59.03 DRUG INDUCED CONSTIPATION: ICD-10-CM

## 2020-02-27 DIAGNOSIS — D70.1 CHEMOTHERAPY INDUCED NEUTROPENIA (HCC): Primary | ICD-10-CM

## 2020-02-27 DIAGNOSIS — Z79.4 TYPE 2 DIABETES MELLITUS WITH HYPERGLYCEMIA, WITH LONG-TERM CURRENT USE OF INSULIN (HCC): ICD-10-CM

## 2020-02-27 PROBLEM — T80.90XA INFUSION REACTION: Status: RESOLVED | Noted: 2020-01-30 | Resolved: 2020-02-27

## 2020-02-27 LAB
BASOPHILS # BLD AUTO: 0.04 X10(3) UL (ref 0–0.2)
BASOPHILS NFR BLD AUTO: 1 %
DEPRECATED RDW RBC AUTO: 44.2 FL (ref 35.1–46.3)
EOSINOPHIL # BLD AUTO: 0.06 X10(3) UL (ref 0–0.7)
EOSINOPHIL NFR BLD AUTO: 1.5 %
ERYTHROCYTE [DISTWIDTH] IN BLOOD BY AUTOMATED COUNT: 14.1 % (ref 11–15)
HCT VFR BLD AUTO: 35.7 % (ref 35–48)
HGB BLD-MCNC: 11.9 G/DL (ref 12–16)
IMM GRANULOCYTES # BLD AUTO: 0.01 X10(3) UL (ref 0–1)
IMM GRANULOCYTES NFR BLD: 0.2 %
LYMPHOCYTES # BLD AUTO: 2.02 X10(3) UL (ref 1–4)
LYMPHOCYTES NFR BLD AUTO: 50 %
MCH RBC QN AUTO: 29 PG (ref 26–34)
MCHC RBC AUTO-ENTMCNC: 33.3 G/DL (ref 31–37)
MCV RBC AUTO: 87.1 FL (ref 80–100)
MONOCYTES # BLD AUTO: 0.12 X10(3) UL (ref 0.1–1)
MONOCYTES NFR BLD AUTO: 3 %
NEUTROPHILS # BLD AUTO: 1.79 X10 (3) UL (ref 1.5–7.7)
NEUTROPHILS # BLD AUTO: 1.79 X10(3) UL (ref 1.5–7.7)
NEUTROPHILS NFR BLD AUTO: 44.3 %
PLATELET # BLD AUTO: 175 10(3)UL (ref 150–450)
RBC # BLD AUTO: 4.1 X10(6)UL (ref 3.8–5.3)
WBC # BLD AUTO: 4 X10(3) UL (ref 4–11)

## 2020-02-27 PROCEDURE — 99215 OFFICE O/P EST HI 40 MIN: CPT | Performed by: INTERNAL MEDICINE

## 2020-02-27 PROCEDURE — 96413 CHEMO IV INFUSION 1 HR: CPT

## 2020-02-27 PROCEDURE — 96367 TX/PROPH/DG ADDL SEQ IV INF: CPT

## 2020-02-27 PROCEDURE — S0028 INJECTION, FAMOTIDINE, 20 MG: HCPCS

## 2020-02-27 PROCEDURE — 85025 COMPLETE CBC W/AUTO DIFF WBC: CPT

## 2020-02-27 PROCEDURE — 96375 TX/PRO/DX INJ NEW DRUG ADDON: CPT

## 2020-02-27 RX ORDER — FAMOTIDINE 10 MG/ML
INJECTION, SOLUTION INTRAVENOUS
Status: COMPLETED
Start: 2020-02-27 | End: 2020-02-27

## 2020-02-27 RX ORDER — HEPARIN SODIUM (PORCINE) LOCK FLUSH IV SOLN 100 UNIT/ML 100 UNIT/ML
5 SOLUTION INTRAVENOUS ONCE
Status: CANCELLED | OUTPATIENT
Start: 2020-02-27

## 2020-02-27 RX ORDER — DIPHENHYDRAMINE HYDROCHLORIDE 50 MG/ML
50 INJECTION INTRAMUSCULAR; INTRAVENOUS ONCE
Status: COMPLETED | OUTPATIENT
Start: 2020-02-27 | End: 2020-02-27

## 2020-02-27 RX ORDER — DIPHENHYDRAMINE HYDROCHLORIDE 50 MG/ML
INJECTION INTRAMUSCULAR; INTRAVENOUS
Status: COMPLETED
Start: 2020-02-27 | End: 2020-02-27

## 2020-02-27 RX ORDER — HEPARIN SODIUM (PORCINE) LOCK FLUSH IV SOLN 100 UNIT/ML 100 UNIT/ML
5 SOLUTION INTRAVENOUS ONCE
Status: COMPLETED | OUTPATIENT
Start: 2020-02-27 | End: 2020-02-27

## 2020-02-27 RX ORDER — HEPARIN SODIUM (PORCINE) LOCK FLUSH IV SOLN 100 UNIT/ML 100 UNIT/ML
SOLUTION INTRAVENOUS
Status: COMPLETED
Start: 2020-02-27 | End: 2020-02-27

## 2020-02-27 RX ORDER — FAMOTIDINE 10 MG/ML
20 INJECTION, SOLUTION INTRAVENOUS ONCE
Status: CANCELLED | OUTPATIENT
Start: 2020-02-27

## 2020-02-27 RX ORDER — 0.9 % SODIUM CHLORIDE 0.9 %
VIAL (ML) INJECTION
Status: DISCONTINUED
Start: 2020-02-27 | End: 2020-02-27

## 2020-02-27 RX ORDER — DIPHENHYDRAMINE HYDROCHLORIDE 50 MG/ML
50 INJECTION INTRAMUSCULAR; INTRAVENOUS ONCE
Status: CANCELLED
Start: 2020-02-27

## 2020-02-27 RX ORDER — 0.9 % SODIUM CHLORIDE 0.9 %
10 VIAL (ML) INJECTION ONCE
Status: CANCELLED | OUTPATIENT
Start: 2020-02-27

## 2020-02-27 RX ORDER — FAMOTIDINE 10 MG/ML
20 INJECTION, SOLUTION INTRAVENOUS ONCE
Status: COMPLETED | OUTPATIENT
Start: 2020-02-27 | End: 2020-02-27

## 2020-02-27 RX ORDER — 0.9 % SODIUM CHLORIDE 0.9 %
10 VIAL (ML) INJECTION ONCE
Status: DISCONTINUED | OUTPATIENT
Start: 2020-02-27 | End: 2020-02-27

## 2020-02-27 RX ADMIN — FAMOTIDINE 20 MG: 10 INJECTION, SOLUTION INTRAVENOUS at 09:53:00

## 2020-02-27 RX ADMIN — HEPARIN SODIUM (PORCINE) LOCK FLUSH IV SOLN 100 UNIT/ML 500 UNITS: 100 SOLUTION INTRAVENOUS at 11:52:00

## 2020-02-27 RX ADMIN — DIPHENHYDRAMINE HYDROCHLORIDE 50 MG: 50 INJECTION INTRAMUSCULAR; INTRAVENOUS at 09:55:00

## 2020-02-27 NOTE — PROGRESS NOTES
STUDY: [facility] B-59  PATIENT ID: [id number]  Cycle 2 Day 8 Taxol (Week 5)    Patient seen and examined by MD. Labs noted within parameters for treatment. Patient denies any fevers, chills, cough, diarrhea, or pain.  Nasal congestion/drainage remains cl

## 2020-02-27 NOTE — PROGRESS NOTES
Cancer Center Progress Note    Patient Name: Nellie Soulier   YOB: 1970   Medical Record Number: [id number]   Attending Physician: Carlos Pickett M.D.      Chief Complaint:  Breast cancer    Oncology History:  12/2019: mammogram (last mammogram OU   • Obesity (BMI 30-39. 9)    • Obesity, unspecified    • Other and unspecified hyperlipidemia    • Type II or unspecified type diabetes mellitus without mention of complication, not stated as uncontrolled    • Unspecified essential hypertension Glucose Blood (ACCU-CHEK JOSE PLUS) In Vitro Strip, Use 3 times a day, Disp: 100 strip, Rfl: 3  •  insulin glargine (BASAGLAR KWIKPEN) 100 UNIT/ML Subcutaneous Solution Pen-injector, 70 units subcutaneous once daily at 6pm, Disp: 15 pen, Rfl: 2  •  lisino Oropharynx is clear. Chest: nonlabored breathing. CTAB  Cardiovascular: Regular, no murmur  Abdomen: Soft, non tender. Obese  Breast: right breast mass about 1: 00 location, unable to palpate today.   Bilateral axilla normal.  Extremities: No edema, cyano transmitted to the [facility]) [address]) which includes the Dose Index Registry. FINDINGS: LINES AND TUBES: None.   MEDIASTINUM/VASCULATURE: There are multiple mildly prominent mediastinal lymph nodes w There is diverticulosis involving the distal descending colon and sigmoid colon without evidence of diverticulitis. There is no dilation or wall thickening. The appendix is normal. There are no inflammatory changes within the right lower quadrant.   Small infection. Informed consent was obtained. An ultrasound guided percutaneous biopsy of the right breast was performed in the following manner: The breast was scanned and the region of interest was localized.  A site for needle puncture was selected and ma COMPARISON: [facility], [address] BREAST RIGHT TARGETED ([id number]), 12/27/2019, 13:23. [facility] in [place], [place] ISIDRA 2D+3D SCREENING BILAT ([id number]), 12/07/2019, 8:10.   INDICATIONS: Unspecified lump in the right b sections, and a dedicated breast coil. Contrast enhancement analysis was assisted by computer-aided detection.    LMP:  Per patient 14 years ago  Of note: (#/#)= (series/image number) All measurements are listed in AP x trans x CC  AMOUNT OF FIBROGLANDULAR evaluation. 3. 0.8 x 0.9 x 0.9 centimeter enhancing irregular mass in the left breast lower outer quadrant (best seen at location-67.0) is low suspicion for malignancy. MRI-guided biopsy is recommended.    BI-RADS CATEGORY:   DIAGNOSTIC CATEGORY 4--SUSPI RIGHT BREAST             Dictated by (CST): Nicola Gonzalez MD on 12/27/2019 at 13:41     Approved by (CST): Nicola Gonzalez MD on 12/27/2019 at 13:52             Impression and Plan:  Cancer Staging  Malignant neoplasm of upper-outer quadrant of r

## 2020-02-27 NOTE — PROGRESS NOTES
Porsha to infusion today for C2 D8 Taxol  Arrives Ambulating independently from MD visit, accompanied by family.     Premed was changed to Aloxi due to nausea, she is going to be more on top of her nausea meds at home this week, reinforced education on yulissa

## 2020-03-05 ENCOUNTER — HOSPITAL ENCOUNTER (EMERGENCY)
Facility: HOSPITAL | Age: 50
Discharge: HOME | End: 2020-03-06
Attending: EMERGENCY MEDICINE
Payer: COMMERCIAL

## 2020-03-05 ENCOUNTER — OFFICE VISIT (OUTPATIENT)
Dept: HEMATOLOGY/ONCOLOGY | Facility: HOSPITAL | Age: 50
End: 2020-03-05
Attending: INTERNAL MEDICINE
Payer: COMMERCIAL

## 2020-03-05 ENCOUNTER — RESEARCH ENCOUNTER (OUTPATIENT)
Dept: HEMATOLOGY/ONCOLOGY | Facility: HOSPITAL | Age: 50
End: 2020-03-05

## 2020-03-05 VITALS
HEIGHT: 63 IN | BODY MASS INDEX: 39.34 KG/M2 | TEMPERATURE: 98 F | HEART RATE: 88 BPM | SYSTOLIC BLOOD PRESSURE: 134 MMHG | WEIGHT: 222 LBS | RESPIRATION RATE: 16 BRPM | OXYGEN SATURATION: 100 % | DIASTOLIC BLOOD PRESSURE: 63 MMHG

## 2020-03-05 VITALS
DIASTOLIC BLOOD PRESSURE: 63 MMHG | RESPIRATION RATE: 16 BRPM | BODY MASS INDEX: 39.34 KG/M2 | WEIGHT: 222 LBS | SYSTOLIC BLOOD PRESSURE: 134 MMHG | TEMPERATURE: 98 F | HEIGHT: 63 IN | OXYGEN SATURATION: 100 % | HEART RATE: 88 BPM

## 2020-03-05 DIAGNOSIS — C50.411 MALIGNANT NEOPLASM OF UPPER-OUTER QUADRANT OF RIGHT BREAST IN FEMALE, ESTROGEN RECEPTOR NEGATIVE (HCC): Primary | ICD-10-CM

## 2020-03-05 DIAGNOSIS — Z51.11 CHEMOTHERAPY MANAGEMENT, ENCOUNTER FOR: ICD-10-CM

## 2020-03-05 DIAGNOSIS — T45.1X5A CHEMOTHERAPY INDUCED NEUTROPENIA (HCC): ICD-10-CM

## 2020-03-05 DIAGNOSIS — D70.1 CHEMOTHERAPY INDUCED NEUTROPENIA (HCC): ICD-10-CM

## 2020-03-05 DIAGNOSIS — Z17.1 MALIGNANT NEOPLASM OF UPPER-OUTER QUADRANT OF RIGHT BREAST IN FEMALE, ESTROGEN RECEPTOR NEGATIVE (HCC): Primary | ICD-10-CM

## 2020-03-05 DIAGNOSIS — L27.0 DRUG-INDUCED SKIN RASH: ICD-10-CM

## 2020-03-05 DIAGNOSIS — R73.9 HYPERGLYCEMIA: ICD-10-CM

## 2020-03-05 DIAGNOSIS — K59.03 DRUG INDUCED CONSTIPATION: ICD-10-CM

## 2020-03-05 DIAGNOSIS — R00.2 PALPITATIONS: Primary | ICD-10-CM

## 2020-03-05 DIAGNOSIS — R11.2 CINV (CHEMOTHERAPY-INDUCED NAUSEA AND VOMITING): ICD-10-CM

## 2020-03-05 DIAGNOSIS — R07.89 CHEST PAIN, ATYPICAL: ICD-10-CM

## 2020-03-05 DIAGNOSIS — T45.1X5A CINV (CHEMOTHERAPY-INDUCED NAUSEA AND VOMITING): ICD-10-CM

## 2020-03-05 LAB
ANION GAP SERPL CALC-SCNC: 8 MMOL/L (ref 0–18)
BASOPHILS # BLD AUTO: 0.03 X10(3) UL (ref 0–0.2)
BASOPHILS # BLD: 0.04 X10(3) UL (ref 0–0.2)
BASOPHILS NFR BLD AUTO: 0.7 %
BASOPHILS NFR BLD: 1 %
BUN BLD-MCNC: 13 MG/DL (ref 7–18)
BUN/CREAT SERPL: 12.7 (ref 10–20)
CALCIUM BLD-MCNC: 8.9 MG/DL (ref 8.5–10.1)
CHLORIDE SERPL-SCNC: 104 MMOL/L (ref 98–112)
CO2 SERPL-SCNC: 23 MMOL/L (ref 21–32)
CREAT BLD-MCNC: 1.02 MG/DL (ref 0.55–1.02)
D DIMER PPP FEU-MCNC: 0.49 UG/ML FEU (ref ?–0.5)
DEPRECATED RDW RBC AUTO: 46.5 FL (ref 35.1–46.3)
DEPRECATED RDW RBC AUTO: 46.5 FL (ref 35.1–46.3)
EOSINOPHIL # BLD AUTO: 0.05 X10(3) UL (ref 0–0.7)
EOSINOPHIL # BLD: 0 X10(3) UL (ref 0–0.7)
EOSINOPHIL NFR BLD AUTO: 1.1 %
EOSINOPHIL NFR BLD: 0 %
ERYTHROCYTE [DISTWIDTH] IN BLOOD BY AUTOMATED COUNT: 14.7 % (ref 11–15)
ERYTHROCYTE [DISTWIDTH] IN BLOOD BY AUTOMATED COUNT: 14.9 % (ref 11–15)
GLUCOSE BLD-MCNC: 536 MG/DL (ref 70–99)
HCT VFR BLD AUTO: 33.2 % (ref 35–48)
HCT VFR BLD AUTO: 34.9 % (ref 35–48)
HGB BLD-MCNC: 11.3 G/DL (ref 12–16)
HGB BLD-MCNC: 11.5 G/DL (ref 12–16)
IMM GRANULOCYTES # BLD AUTO: 0.02 X10(3) UL (ref 0–1)
IMM GRANULOCYTES NFR BLD: 0.4 %
LYMPHOCYTES # BLD AUTO: 2.38 X10(3) UL (ref 1–4)
LYMPHOCYTES NFR BLD AUTO: 53 %
LYMPHOCYTES NFR BLD: 0.98 X10(3) UL (ref 1–4)
LYMPHOCYTES NFR BLD: 25 %
MCH RBC QN AUTO: 29.2 PG (ref 26–34)
MCH RBC QN AUTO: 30 PG (ref 26–34)
MCHC RBC AUTO-ENTMCNC: 33 G/DL (ref 31–37)
MCHC RBC AUTO-ENTMCNC: 34 G/DL (ref 31–37)
MCV RBC AUTO: 88.1 FL (ref 80–100)
MCV RBC AUTO: 88.6 FL (ref 80–100)
MONOCYTES # BLD AUTO: 0.22 X10(3) UL (ref 0.1–1)
MONOCYTES # BLD: 0.08 X10(3) UL (ref 0.1–1)
MONOCYTES NFR BLD AUTO: 4.9 %
MONOCYTES NFR BLD: 2 %
MYELOCYTES # BLD: 0.04 X10(3) UL
MYELOCYTES NFR BLD: 1 %
NEUTROPHILS # BLD AUTO: 1.79 X10 (3) UL (ref 1.5–7.7)
NEUTROPHILS # BLD AUTO: 1.79 X10(3) UL (ref 1.5–7.7)
NEUTROPHILS # BLD AUTO: 2.71 X10 (3) UL (ref 1.5–7.7)
NEUTROPHILS NFR BLD AUTO: 39.9 %
NEUTROPHILS NFR BLD: 64 %
NEUTS BAND NFR BLD: 7 %
NEUTS HYPERSEG # BLD: 2.77 X10(3) UL (ref 1.5–7.7)
OSMOLALITY SERPL CALC.SUM OF ELEC: 304 MOSM/KG (ref 275–295)
PLATELET # BLD AUTO: 296 10(3)UL (ref 150–450)
PLATELET # BLD AUTO: 301 10(3)UL (ref 150–450)
PLATELET MORPHOLOGY: NORMAL
POTASSIUM SERPL-SCNC: 4.6 MMOL/L (ref 3.5–5.1)
RBC # BLD AUTO: 3.77 X10(6)UL (ref 3.8–5.3)
RBC # BLD AUTO: 3.94 X10(6)UL (ref 3.8–5.3)
SODIUM SERPL-SCNC: 135 MMOL/L (ref 136–145)
TOTAL CELLS COUNTED: 100
TROPONIN I SERPL-MCNC: <0.045 NG/ML (ref ?–0.04)
WBC # BLD AUTO: 3.9 X10(3) UL (ref 4–11)
WBC # BLD AUTO: 4.5 X10(3) UL (ref 4–11)

## 2020-03-05 PROCEDURE — 85379 FIBRIN DEGRADATION QUANT: CPT | Performed by: EMERGENCY MEDICINE

## 2020-03-05 PROCEDURE — S0028 INJECTION, FAMOTIDINE, 20 MG: HCPCS

## 2020-03-05 PROCEDURE — 85025 COMPLETE CBC W/AUTO DIFF WBC: CPT | Performed by: EMERGENCY MEDICINE

## 2020-03-05 PROCEDURE — 93010 ELECTROCARDIOGRAM REPORT: CPT | Performed by: EMERGENCY MEDICINE

## 2020-03-05 PROCEDURE — 96361 HYDRATE IV INFUSION ADD-ON: CPT

## 2020-03-05 PROCEDURE — 85027 COMPLETE CBC AUTOMATED: CPT | Performed by: EMERGENCY MEDICINE

## 2020-03-05 PROCEDURE — 99284 EMERGENCY DEPT VISIT MOD MDM: CPT

## 2020-03-05 PROCEDURE — 85007 BL SMEAR W/DIFF WBC COUNT: CPT | Performed by: EMERGENCY MEDICINE

## 2020-03-05 PROCEDURE — 96413 CHEMO IV INFUSION 1 HR: CPT

## 2020-03-05 PROCEDURE — 96374 THER/PROPH/DIAG INJ IV PUSH: CPT

## 2020-03-05 PROCEDURE — 96375 TX/PRO/DX INJ NEW DRUG ADDON: CPT

## 2020-03-05 PROCEDURE — 84484 ASSAY OF TROPONIN QUANT: CPT | Performed by: EMERGENCY MEDICINE

## 2020-03-05 PROCEDURE — 93005 ELECTROCARDIOGRAM TRACING: CPT

## 2020-03-05 PROCEDURE — 99215 OFFICE O/P EST HI 40 MIN: CPT | Performed by: INTERNAL MEDICINE

## 2020-03-05 PROCEDURE — 80048 BASIC METABOLIC PNL TOTAL CA: CPT | Performed by: EMERGENCY MEDICINE

## 2020-03-05 PROCEDURE — 85025 COMPLETE CBC W/AUTO DIFF WBC: CPT

## 2020-03-05 PROCEDURE — 96372 THER/PROPH/DIAG INJ SC/IM: CPT

## 2020-03-05 RX ORDER — INSULIN ASPART 100 [IU]/ML
14 INJECTION, SOLUTION INTRAVENOUS; SUBCUTANEOUS ONCE
Status: COMPLETED | OUTPATIENT
Start: 2020-03-05 | End: 2020-03-06

## 2020-03-05 RX ORDER — DIPHENHYDRAMINE HYDROCHLORIDE 50 MG/ML
50 INJECTION INTRAMUSCULAR; INTRAVENOUS ONCE
Status: CANCELLED
Start: 2020-03-05

## 2020-03-05 RX ORDER — DIPHENHYDRAMINE HYDROCHLORIDE 50 MG/ML
50 INJECTION INTRAMUSCULAR; INTRAVENOUS ONCE
Status: COMPLETED | OUTPATIENT
Start: 2020-03-05 | End: 2020-03-05

## 2020-03-05 RX ORDER — 0.9 % SODIUM CHLORIDE 0.9 %
10 VIAL (ML) INJECTION ONCE
Status: CANCELLED | OUTPATIENT
Start: 2020-03-05

## 2020-03-05 RX ORDER — FAMOTIDINE 10 MG/ML
20 INJECTION, SOLUTION INTRAVENOUS ONCE
Status: CANCELLED | OUTPATIENT
Start: 2020-03-05

## 2020-03-05 RX ORDER — HEPARIN SODIUM (PORCINE) LOCK FLUSH IV SOLN 100 UNIT/ML 100 UNIT/ML
SOLUTION INTRAVENOUS
Status: COMPLETED
Start: 2020-03-05 | End: 2020-03-05

## 2020-03-05 RX ORDER — 0.9 % SODIUM CHLORIDE 0.9 %
VIAL (ML) INJECTION
Status: DISCONTINUED
Start: 2020-03-05 | End: 2020-03-05

## 2020-03-05 RX ORDER — DEXAMETHASONE SODIUM PHOSPHATE 10 MG/ML
10 INJECTION, SOLUTION INTRAMUSCULAR; INTRAVENOUS ONCE
Status: COMPLETED | OUTPATIENT
Start: 2020-03-05 | End: 2020-03-05

## 2020-03-05 RX ORDER — HEPARIN SODIUM (PORCINE) LOCK FLUSH IV SOLN 100 UNIT/ML 100 UNIT/ML
5 SOLUTION INTRAVENOUS ONCE
Status: CANCELLED | OUTPATIENT
Start: 2020-03-05

## 2020-03-05 RX ORDER — FAMOTIDINE 10 MG/ML
20 INJECTION, SOLUTION INTRAVENOUS ONCE
Status: COMPLETED | OUTPATIENT
Start: 2020-03-05 | End: 2020-03-05

## 2020-03-05 RX ORDER — DIPHENHYDRAMINE HYDROCHLORIDE 50 MG/ML
INJECTION INTRAMUSCULAR; INTRAVENOUS
Status: COMPLETED
Start: 2020-03-05 | End: 2020-03-05

## 2020-03-05 RX ORDER — FAMOTIDINE 10 MG/ML
INJECTION, SOLUTION INTRAVENOUS
Status: COMPLETED
Start: 2020-03-05 | End: 2020-03-05

## 2020-03-05 RX ORDER — HEPARIN SODIUM (PORCINE) LOCK FLUSH IV SOLN 100 UNIT/ML 100 UNIT/ML
5 SOLUTION INTRAVENOUS ONCE
Status: COMPLETED | OUTPATIENT
Start: 2020-03-05 | End: 2020-03-05

## 2020-03-05 RX ADMIN — HEPARIN SODIUM (PORCINE) LOCK FLUSH IV SOLN 100 UNIT/ML 500 UNITS: 100 SOLUTION INTRAVENOUS at 16:10:00

## 2020-03-05 RX ADMIN — FAMOTIDINE 20 MG: 10 INJECTION, SOLUTION INTRAVENOUS at 14:06:00

## 2020-03-05 RX ADMIN — DIPHENHYDRAMINE HYDROCHLORIDE 50 MG: 50 INJECTION INTRAMUSCULAR; INTRAVENOUS at 14:02:00

## 2020-03-05 NOTE — PROGRESS NOTES
Porsha to infusion today for C2 D15 Taxol  Arrives Ambulating independently from MD visit, accompanied by family.     Patient reports possible pregnancy since last therapy cycle: No    Modifications in dose or schedule:  No.  She still has nausea post merrick

## 2020-03-05 NOTE — PROGRESS NOTES
Cancer Center Progress Note    Patient Name: Donal Henderson   YOB: 1970   Medical Record Number: [id number]   Attending Physician: Nic Ramey M.D.      Chief Complaint:  Breast cancer    Oncology History:  12/2019: mammogram (last mammogram hyperlipidemia    • Type II or unspecified type diabetes mellitus without mention of complication, not stated as uncontrolled    • Unspecified essential hypertension        Past Surgical History:  Past Surgical History:   Procedure Laterality Date   • ABLA Rfl: 3  •  insulin glargine (BASAGLAR KWIKPEN) 100 UNIT/ML Subcutaneous Solution Pen-injector, 70 units subcutaneous once daily at 6pm, Disp: 15 pen, Rfl: 2  •  lisinopril 2.5 MG Oral Tab, Take 1 tablet (2.5 mg total) by mouth nightly., Disp: 30 tablet, Rf no murmur  Abdomen: Soft, non tender. Obese  Breast: right breast mass about 1: 00 location, some assymtry about 1cm in size.   Bilateral axilla normal.  Extremities: No edema, cyanosis, or bruising  Neurological: motor strength grossly intact  Psych: appro which includes the Dose Index Registry. FINDINGS: LINES AND TUBES: None. MEDIASTINUM/VASCULATURE: There are multiple mildly prominent mediastinal lymph nodes which are nonspecific and measure up to 1 cm in short axis.  The thoracic aorta is unremarkable a of diverticulitis. There is no dilation or wall thickening. The appendix is normal. There are no inflammatory changes within the right lower quadrant. Small hiatal hernia with wall thickening at the gastroesophageal junction.  MESENTERY: Normal.  No mass o breast was performed in the following manner: The breast was scanned and the region of interest was localized. A site for needle puncture was selected and marked. A time out was taken. The skin was cleaned with Chloraprep.   1% lidocaine was injected for 13:23. [facility] in [place], [place] ISIDRA 2D+3D SCREENING BILAT ([id number]), 12/07/2019, 8:10. INDICATIONS: Unspecified lump in the right breast, unspecified quadrant.   61-year-old female status post ultrasound-guided biopsy of mass in th computer-aided detection.    LMP:  Per patient 14 years ago  Of note: (#/#)= (series/image number) All measurements are listed in AP x trans x CC  AMOUNT OF FIBROGLANDULAR TISSUE:  There is heterogeneous fibroglandular tissue  BACKGROUND PARENCHYMAL ENHANCE lower outer quadrant (best seen at location-67.0) is low suspicion for malignancy. MRI-guided biopsy is recommended.    BI-RADS CATEGORY:   DIAGNOSTIC CATEGORY 4--SUSPICIOUS   RECOMMENDATIONS:  ULTRASOUND: RIGHT AXILLA  --We will call the patient back for Approved by (CST): Sylvester Mishra MD on 12/27/2019 at 13:52             Impression and Plan:  Cancer Staging  Malignant neoplasm of upper-outer quadrant of right breast in female, estrogen receptor negative ([facility] [address]  Staging form: Breast, AJCC 8th Ed

## 2020-03-05 NOTE — PROGRESS NOTES
STUDY: [facility] B-59  PATIENT ID: [id number]  Cycle 2 Day 15 Taxol (Week 6)     Patient seen and examined by MD and research RN prior to infusion. Vital signs stable. Labs reviewed by research RN and Dr. Kathrine Armijo. Hgb 11.3, asymptomatic, Grade 1 anemia.

## 2020-03-06 ENCOUNTER — APPOINTMENT (OUTPATIENT)
Dept: GENERAL RADIOLOGY | Facility: HOSPITAL | Age: 50
End: 2020-03-06
Attending: EMERGENCY MEDICINE
Payer: COMMERCIAL

## 2020-03-06 ENCOUNTER — TELEPHONE (OUTPATIENT)
Dept: HEMATOLOGY/ONCOLOGY | Facility: HOSPITAL | Age: 50
End: 2020-03-06

## 2020-03-06 VITALS
SYSTOLIC BLOOD PRESSURE: 140 MMHG | TEMPERATURE: 98 F | RESPIRATION RATE: 19 BRPM | HEART RATE: 73 BPM | DIASTOLIC BLOOD PRESSURE: 68 MMHG | OXYGEN SATURATION: 99 %

## 2020-03-06 LAB
GLUCOSE BLDC GLUCOMTR-MCNC: 468 MG/DL (ref 70–99)
TROPONIN I SERPL-MCNC: <0.045 NG/ML (ref ?–0.04)

## 2020-03-06 PROCEDURE — 82962 GLUCOSE BLOOD TEST: CPT

## 2020-03-06 PROCEDURE — 93010 ELECTROCARDIOGRAM REPORT: CPT | Performed by: EMERGENCY MEDICINE

## 2020-03-06 PROCEDURE — 84484 ASSAY OF TROPONIN QUANT: CPT | Performed by: EMERGENCY MEDICINE

## 2020-03-06 PROCEDURE — 93005 ELECTROCARDIOGRAM TRACING: CPT

## 2020-03-06 RX ORDER — HEPARIN SODIUM (PORCINE) LOCK FLUSH IV SOLN 100 UNIT/ML 100 UNIT/ML
5 SOLUTION INTRAVENOUS ONCE
Status: COMPLETED | OUTPATIENT
Start: 2020-03-06 | End: 2020-03-06

## 2020-03-06 NOTE — TELEPHONE ENCOUNTER
Spoke with Deb. She states that she feels so much better after receiving the additional steroids in the ER yesterday. No heart palpitations or sweating. She feels energetic today. Verbalized understanding. We will f/u next week with her.

## 2020-03-06 NOTE — ED NOTES
Pt alert and interactive. Last received chemo this afternoon around 2pm. Chest palpitations started right after treatment.  This was patients 6th round of chemo-this happened to her after her first round and was placed on steroids; the palpitations did not

## 2020-03-06 NOTE — ED NOTES
Port to left chest accessed with sterile technique per hospital protocol. No complications, with good blood return.

## 2020-03-06 NOTE — ED PROVIDER NOTES
Patient Seen in: [facility] AND [facility] Emergency Department      History   Patient presents with:  Chest Pain Angina    Stated Complaint: chest pain after chemo with SOB.      HPI    52 y/o female who received chemo today for breast CA w/ Dr. Max Diaz who is expe SpO2 98%         Physical Exam    Constitutional: Oriented to person, place, and time. Appears well-developed. No distress. Head: Normocephalic and atraumatic. Eyes: Conjunctivae are normal. Pupils are equal, round, and reactive to light.    Neck: Norm -----------         ------                     CBC W/ DIFFERENTIAL[[id number]]          Abnormal            Final result                 Please view results for these tests on the individual orders.    [address]

## 2020-03-06 NOTE — ED INITIAL ASSESSMENT (HPI)
Pt c/o midsternal CP described as a \"tightness. \" Pt denies any radiation to L side, does endorse MCGILL. Pt has hx of stage II breast CA, currently receiving chemo treatments, last infusion at 1400 this afternoon.  Pt states when she is up moving, she feels

## 2020-03-06 NOTE — ED NOTES
Discharge instructions reviewed. Pt verbalized understanding with no further questions. Pain controlled. Steady gait. Speaking in full clear sentences at discharge. Port to left chest de-accessed without complication.

## 2020-03-12 ENCOUNTER — OFFICE VISIT (OUTPATIENT)
Dept: HEMATOLOGY/ONCOLOGY | Facility: HOSPITAL | Age: 50
End: 2020-03-12
Attending: INTERNAL MEDICINE
Payer: COMMERCIAL

## 2020-03-12 ENCOUNTER — RESEARCH ENCOUNTER (OUTPATIENT)
Dept: HEMATOLOGY/ONCOLOGY | Facility: HOSPITAL | Age: 50
End: 2020-03-12

## 2020-03-12 VITALS
RESPIRATION RATE: 16 BRPM | HEIGHT: 63 IN | SYSTOLIC BLOOD PRESSURE: 134 MMHG | WEIGHT: 220 LBS | BODY MASS INDEX: 38.98 KG/M2 | TEMPERATURE: 98 F | OXYGEN SATURATION: 98 % | DIASTOLIC BLOOD PRESSURE: 66 MMHG | HEART RATE: 84 BPM

## 2020-03-12 VITALS
HEART RATE: 84 BPM | WEIGHT: 220 LBS | DIASTOLIC BLOOD PRESSURE: 66 MMHG | OXYGEN SATURATION: 98 % | SYSTOLIC BLOOD PRESSURE: 134 MMHG | TEMPERATURE: 98 F | BODY MASS INDEX: 39 KG/M2 | RESPIRATION RATE: 16 BRPM

## 2020-03-12 DIAGNOSIS — C50.411 MALIGNANT NEOPLASM OF UPPER-OUTER QUADRANT OF RIGHT BREAST IN FEMALE, ESTROGEN RECEPTOR NEGATIVE (HCC): Primary | ICD-10-CM

## 2020-03-12 DIAGNOSIS — K59.03 DRUG INDUCED CONSTIPATION: ICD-10-CM

## 2020-03-12 DIAGNOSIS — R11.2 CINV (CHEMOTHERAPY-INDUCED NAUSEA AND VOMITING): ICD-10-CM

## 2020-03-12 DIAGNOSIS — Z17.1 MALIGNANT NEOPLASM OF UPPER-OUTER QUADRANT OF RIGHT BREAST IN FEMALE, ESTROGEN RECEPTOR NEGATIVE (HCC): Primary | ICD-10-CM

## 2020-03-12 DIAGNOSIS — Z51.11 CHEMOTHERAPY MANAGEMENT, ENCOUNTER FOR: ICD-10-CM

## 2020-03-12 DIAGNOSIS — K12.31 MUCOSITIS DUE TO CHEMOTHERAPY: ICD-10-CM

## 2020-03-12 DIAGNOSIS — T45.1X5A CINV (CHEMOTHERAPY-INDUCED NAUSEA AND VOMITING): ICD-10-CM

## 2020-03-12 LAB
ALBUMIN SERPL-MCNC: 3.2 G/DL (ref 3.4–5)
ALBUMIN/GLOB SERPL: 0.8 {RATIO} (ref 1–2)
ALP LIVER SERPL-CCNC: 74 U/L (ref 39–100)
ALT SERPL-CCNC: 28 U/L (ref 13–56)
ANION GAP SERPL CALC-SCNC: 8 MMOL/L (ref 0–18)
AST SERPL-CCNC: 10 U/L (ref 15–37)
BASOPHILS # BLD AUTO: 0.04 X10(3) UL (ref 0–0.2)
BASOPHILS NFR BLD AUTO: 0.9 %
BILIRUB SERPL-MCNC: 0.3 MG/DL (ref 0.1–2)
BUN BLD-MCNC: 10 MG/DL (ref 7–18)
BUN/CREAT SERPL: 15.2 (ref 10–20)
CALCIUM BLD-MCNC: 8.7 MG/DL (ref 8.5–10.1)
CHLORIDE SERPL-SCNC: 106 MMOL/L (ref 98–112)
CO2 SERPL-SCNC: 24 MMOL/L (ref 21–32)
CREAT BLD-MCNC: 0.66 MG/DL (ref 0.55–1.02)
DEPRECATED RDW RBC AUTO: 46.7 FL (ref 35.1–46.3)
EOSINOPHIL # BLD AUTO: 0.04 X10(3) UL (ref 0–0.7)
EOSINOPHIL NFR BLD AUTO: 0.9 %
ERYTHROCYTE [DISTWIDTH] IN BLOOD BY AUTOMATED COUNT: 15.3 % (ref 11–15)
GLOBULIN PLAS-MCNC: 4 G/DL (ref 2.8–4.4)
GLUCOSE BLD-MCNC: 337 MG/DL (ref 70–99)
HCT VFR BLD AUTO: 36.3 % (ref 35–48)
HGB BLD-MCNC: 12.3 G/DL (ref 12–16)
IMM GRANULOCYTES # BLD AUTO: 0.03 X10(3) UL (ref 0–1)
IMM GRANULOCYTES NFR BLD: 0.7 %
LYMPHOCYTES # BLD AUTO: 2.46 X10(3) UL (ref 1–4)
LYMPHOCYTES NFR BLD AUTO: 53.8 %
M PROTEIN MFR SERPL ELPH: 7.2 G/DL (ref 6.4–8.2)
MCH RBC QN AUTO: 29.6 PG (ref 26–34)
MCHC RBC AUTO-ENTMCNC: 33.9 G/DL (ref 31–37)
MCV RBC AUTO: 87.3 FL (ref 80–100)
MONOCYTES # BLD AUTO: 0.17 X10(3) UL (ref 0.1–1)
MONOCYTES NFR BLD AUTO: 3.7 %
NEUTROPHILS # BLD AUTO: 1.83 X10 (3) UL (ref 1.5–7.7)
NEUTROPHILS # BLD AUTO: 1.83 X10(3) UL (ref 1.5–7.7)
NEUTROPHILS NFR BLD AUTO: 40 %
OSMOLALITY SERPL CALC.SUM OF ELEC: 298 MOSM/KG (ref 275–295)
PATIENT FASTING Y/N/NP: NO
PLATELET # BLD AUTO: 202 10(3)UL (ref 150–450)
POTASSIUM SERPL-SCNC: 4.1 MMOL/L (ref 3.5–5.1)
RBC # BLD AUTO: 4.16 X10(6)UL (ref 3.8–5.3)
SODIUM SERPL-SCNC: 138 MMOL/L (ref 136–145)
WBC # BLD AUTO: 4.6 X10(3) UL (ref 4–11)

## 2020-03-12 PROCEDURE — 96417 CHEMO IV INFUS EACH ADDL SEQ: CPT

## 2020-03-12 PROCEDURE — 96413 CHEMO IV INFUSION 1 HR: CPT

## 2020-03-12 PROCEDURE — S0028 INJECTION, FAMOTIDINE, 20 MG: HCPCS

## 2020-03-12 PROCEDURE — 80053 COMPREHEN METABOLIC PANEL: CPT

## 2020-03-12 PROCEDURE — 96367 TX/PROPH/DG ADDL SEQ IV INF: CPT

## 2020-03-12 PROCEDURE — 96375 TX/PRO/DX INJ NEW DRUG ADDON: CPT

## 2020-03-12 PROCEDURE — 99215 OFFICE O/P EST HI 40 MIN: CPT | Performed by: INTERNAL MEDICINE

## 2020-03-12 PROCEDURE — 85025 COMPLETE CBC W/AUTO DIFF WBC: CPT

## 2020-03-12 RX ORDER — DIPHENHYDRAMINE HYDROCHLORIDE 50 MG/ML
INJECTION INTRAMUSCULAR; INTRAVENOUS
Status: DISCONTINUED
Start: 2020-03-12 | End: 2020-03-12

## 2020-03-12 RX ORDER — DIPHENHYDRAMINE HYDROCHLORIDE 50 MG/ML
50 INJECTION INTRAMUSCULAR; INTRAVENOUS ONCE
Status: COMPLETED | OUTPATIENT
Start: 2020-03-12 | End: 2020-03-12

## 2020-03-12 RX ORDER — FAMOTIDINE 10 MG/ML
20 INJECTION, SOLUTION INTRAVENOUS ONCE
Status: CANCELLED | OUTPATIENT
Start: 2020-03-12

## 2020-03-12 RX ORDER — DIPHENHYDRAMINE HYDROCHLORIDE 50 MG/ML
50 INJECTION INTRAMUSCULAR; INTRAVENOUS ONCE
Status: CANCELLED
Start: 2020-03-12

## 2020-03-12 RX ORDER — 0.9 % SODIUM CHLORIDE 0.9 %
VIAL (ML) INJECTION
Status: DISCONTINUED
Start: 2020-03-12 | End: 2020-03-12

## 2020-03-12 RX ORDER — FAMOTIDINE 10 MG/ML
INJECTION, SOLUTION INTRAVENOUS
Status: DISCONTINUED
Start: 2020-03-12 | End: 2020-03-12

## 2020-03-12 RX ORDER — HEPARIN SODIUM (PORCINE) LOCK FLUSH IV SOLN 100 UNIT/ML 100 UNIT/ML
SOLUTION INTRAVENOUS
Status: DISCONTINUED
Start: 2020-03-12 | End: 2020-03-12

## 2020-03-12 RX ORDER — FAMOTIDINE 10 MG/ML
20 INJECTION, SOLUTION INTRAVENOUS ONCE
Status: COMPLETED | OUTPATIENT
Start: 2020-03-12 | End: 2020-03-12

## 2020-03-12 RX ADMIN — FAMOTIDINE 20 MG: 10 INJECTION, SOLUTION INTRAVENOUS at 10:43:00

## 2020-03-12 RX ADMIN — DIPHENHYDRAMINE HYDROCHLORIDE 50 MG: 50 INJECTION INTRAMUSCULAR; INTRAVENOUS at 10:46:00

## 2020-03-12 NOTE — PROGRESS NOTES
Porsha to infusion today for C3 D 1 Taxol/carbo/Atezo (study drug)   Research RN's to infusion, labs reviewed, ok to proceed  , ok to proceed with treatment today. MD aware of glucose today per research RN.    This RN dd reach out to Kyra Wong RN regardi

## 2020-03-12 NOTE — PROGRESS NOTES
Cancer Center Progress Note    Patient Name: Nellie Soulier   YOB: 1970   Medical Record Number: [id number]   Attending Physician: Carlos Pickett M.D.      Chief Complaint:  Breast cancer    Oncology History:  12/2019: mammogram (last mammogram yet.      Past Medical History:  Past Medical History:   Diagnosis Date   • Anemia    • Cup to disc asymmetry 2006    OU   • No diabetic retinopathy in both eyes 2006    OU   • Obesity (BMI 30-39. 9)    • Obesity, unspecified    • Other and unspecified hype Disp: 30 tablet, Rfl: 3  •  Blood Glucose Monitoring Suppl (ACCU-CHEK JOSE) Does not apply Device, 1 Device by Does not apply route 3 (three) times daily. , Disp: 1 Device, Rfl: 0  •  Glucose Blood (ACCU-CHEK JOSE PLUS) In Vitro Strip, Use 3 times a day, Patient is alert and oriented x 3, not in acute distress. HEENT: EOMs intact. PERRL. Oropharynx is clear. Chest: nonlabored breathing. CTAB  Cardiovascular: Regular, no murmur  Abdomen: Soft, non tender.  Obese  Breast: right breast mass about 1: 00 loca reconstruction technique for dose reduction was used. Dose information is transmitted to the ACR [facility] [employer] of Radiology) NRDR ([address]) which includes the Dose Index Registry. FINDINGS: LINES AND TUBES: None.   MEDIASTINUM scattered subcentimeter nonspecific retroperitoneal lymph nodes. BOWEL:  There is diverticulosis involving the distal descending colon and sigmoid colon without evidence of diverticulitis. There is no dilation or wall thickening.  The appendix is normal. Th the benefits and risks including, but not limited to, hemorrhage and infection. Informed consent was obtained. An ultrasound guided percutaneous biopsy of the right breast was performed in the following manner:   The breast was scanned and the region of i 1/7/2020  PROCEDURE: GREGORY POST PROCEDURE DIGITAL IMAGE RIGHT ([id number]=[id number])  COMPARISON: [facility] for N[address] (MHC=[id number]), 12/27/2019, 13:23.   [facility] in [place], [place] ISIDRA 2D+3D SCREENING BILAT (6519 TECHNIQUE: Breast MRI was performed using dynamic intravenous gadolinium infusion, thin sections, and a dedicated breast coil. Contrast enhancement analysis was assisted by computer-aided detection.    LMP:  Per patient 14 years ago  Of note: (#/#)= (serie borderline suspicious. Targeted right axillary ultrasound is recommended for further evaluation. 3. 0.8 x 0.9 x 0.9 centimeter enhancing irregular mass in the left breast lower outer quadrant (best seen at location-67.0) is low suspicion for malignancy. (moderate suspicion for malignancy)  RECOMMENDATIONS:  ULTRASOUND-GUIDED CORE BIOPSY: RIGHT BREAST             Dictated by (CST): Nela Grant MD on 12/27/2019 at 13:41     Approved by (CST): Nela Grant MD on 12/27/2019 at 13:52

## 2020-03-12 NOTE — PROGRESS NOTES
STUDY: [facility] B-59  PATIENT ID: [id number]  Cycle 3 Day 1 Taxol,Carboplatin, Atezolizumab/placebo (Week 7)    Patient seen and examined by MD. Labs noted within parameters for treatment.  Patient experienced an infusion related reaction G2 after her l

## 2020-03-13 NOTE — PROGRESS NOTES
Noted patients lab glucose level was 337 today. Per patient, she did not fast and she did not take her sliding scale insulin before coming in today.    She also received dexamethasone as premeds today as well as last week after Taxol infusion for complaints

## 2020-03-19 ENCOUNTER — OFFICE VISIT (OUTPATIENT)
Dept: HEMATOLOGY/ONCOLOGY | Facility: HOSPITAL | Age: 50
End: 2020-03-19
Attending: INTERNAL MEDICINE
Payer: COMMERCIAL

## 2020-03-19 ENCOUNTER — RESEARCH ENCOUNTER (OUTPATIENT)
Dept: HEMATOLOGY/ONCOLOGY | Facility: HOSPITAL | Age: 50
End: 2020-03-19

## 2020-03-19 VITALS
HEIGHT: 63 IN | RESPIRATION RATE: 16 BRPM | BODY MASS INDEX: 38.8 KG/M2 | WEIGHT: 219 LBS | DIASTOLIC BLOOD PRESSURE: 66 MMHG | HEART RATE: 86 BPM | TEMPERATURE: 98 F | SYSTOLIC BLOOD PRESSURE: 133 MMHG | OXYGEN SATURATION: 99 %

## 2020-03-19 VITALS
DIASTOLIC BLOOD PRESSURE: 66 MMHG | SYSTOLIC BLOOD PRESSURE: 133 MMHG | HEART RATE: 86 BPM | HEIGHT: 63 IN | WEIGHT: 219 LBS | RESPIRATION RATE: 16 BRPM | BODY MASS INDEX: 38.8 KG/M2 | OXYGEN SATURATION: 99 % | TEMPERATURE: 98 F

## 2020-03-19 DIAGNOSIS — T45.1X5A CHEMOTHERAPY INDUCED NEUTROPENIA (HCC): ICD-10-CM

## 2020-03-19 DIAGNOSIS — C50.411 MALIGNANT NEOPLASM OF UPPER-OUTER QUADRANT OF RIGHT BREAST IN FEMALE, ESTROGEN RECEPTOR NEGATIVE (HCC): Primary | ICD-10-CM

## 2020-03-19 DIAGNOSIS — Z51.11 CHEMOTHERAPY MANAGEMENT, ENCOUNTER FOR: ICD-10-CM

## 2020-03-19 DIAGNOSIS — C50.411 MALIGNANT NEOPLASM OF UPPER-OUTER QUADRANT OF RIGHT BREAST IN FEMALE, ESTROGEN RECEPTOR NEGATIVE (HCC): ICD-10-CM

## 2020-03-19 DIAGNOSIS — Z17.1 MALIGNANT NEOPLASM OF UPPER-OUTER QUADRANT OF RIGHT BREAST IN FEMALE, ESTROGEN RECEPTOR NEGATIVE (HCC): ICD-10-CM

## 2020-03-19 DIAGNOSIS — R11.2 CINV (CHEMOTHERAPY-INDUCED NAUSEA AND VOMITING): ICD-10-CM

## 2020-03-19 DIAGNOSIS — Z17.1 MALIGNANT NEOPLASM OF UPPER-OUTER QUADRANT OF RIGHT BREAST IN FEMALE, ESTROGEN RECEPTOR NEGATIVE (HCC): Primary | ICD-10-CM

## 2020-03-19 DIAGNOSIS — T45.1X5A CHEMOTHERAPY INDUCED NEUTROPENIA (HCC): Primary | ICD-10-CM

## 2020-03-19 DIAGNOSIS — D70.1 CHEMOTHERAPY INDUCED NEUTROPENIA (HCC): ICD-10-CM

## 2020-03-19 DIAGNOSIS — D70.1 CHEMOTHERAPY INDUCED NEUTROPENIA (HCC): Primary | ICD-10-CM

## 2020-03-19 DIAGNOSIS — T45.1X5A CINV (CHEMOTHERAPY-INDUCED NAUSEA AND VOMITING): ICD-10-CM

## 2020-03-19 DIAGNOSIS — L27.0 DRUG-INDUCED SKIN RASH: ICD-10-CM

## 2020-03-19 DIAGNOSIS — K12.31 MUCOSITIS DUE TO CHEMOTHERAPY: ICD-10-CM

## 2020-03-19 DIAGNOSIS — Z51.11 CHEMOTHERAPY MANAGEMENT, ENCOUNTER FOR: Primary | ICD-10-CM

## 2020-03-19 LAB
BASOPHILS # BLD AUTO: 0.03 X10(3) UL (ref 0–0.2)
BASOPHILS NFR BLD AUTO: 0.8 %
DEPRECATED RDW RBC AUTO: 47.6 FL (ref 35.1–46.3)
EOSINOPHIL # BLD AUTO: 0.02 X10(3) UL (ref 0–0.7)
EOSINOPHIL NFR BLD AUTO: 0.5 %
ERYTHROCYTE [DISTWIDTH] IN BLOOD BY AUTOMATED COUNT: 15.1 % (ref 11–15)
GLUCOSE BLD-MCNC: 201 MG/DL (ref 70–99)
HCT VFR BLD AUTO: 34.8 % (ref 35–48)
HGB BLD-MCNC: 11.7 G/DL (ref 12–16)
IMM GRANULOCYTES # BLD AUTO: 0.03 X10(3) UL (ref 0–1)
IMM GRANULOCYTES NFR BLD: 0.8 %
LYMPHOCYTES # BLD AUTO: 1.93 X10(3) UL (ref 1–4)
LYMPHOCYTES NFR BLD AUTO: 52.9 %
MCH RBC QN AUTO: 29.5 PG (ref 26–34)
MCHC RBC AUTO-ENTMCNC: 33.6 G/DL (ref 31–37)
MCV RBC AUTO: 87.7 FL (ref 80–100)
MONOCYTES # BLD AUTO: 0.13 X10(3) UL (ref 0.1–1)
MONOCYTES NFR BLD AUTO: 3.6 %
NEUTROPHILS # BLD AUTO: 1.51 X10 (3) UL (ref 1.5–7.7)
NEUTROPHILS # BLD AUTO: 1.51 X10(3) UL (ref 1.5–7.7)
NEUTROPHILS NFR BLD AUTO: 41.4 %
PLATELET # BLD AUTO: 236 10(3)UL (ref 150–450)
RBC # BLD AUTO: 3.97 X10(6)UL (ref 3.8–5.3)
WBC # BLD AUTO: 3.7 X10(3) UL (ref 4–11)

## 2020-03-19 PROCEDURE — 36593 DECLOT VASCULAR DEVICE: CPT

## 2020-03-19 PROCEDURE — 99215 OFFICE O/P EST HI 40 MIN: CPT | Performed by: INTERNAL MEDICINE

## 2020-03-19 PROCEDURE — S0028 INJECTION, FAMOTIDINE, 20 MG: HCPCS

## 2020-03-19 PROCEDURE — A4216 STERILE WATER/SALINE, 10 ML: HCPCS

## 2020-03-19 PROCEDURE — 96375 TX/PRO/DX INJ NEW DRUG ADDON: CPT

## 2020-03-19 PROCEDURE — 96413 CHEMO IV INFUSION 1 HR: CPT

## 2020-03-19 PROCEDURE — 85025 COMPLETE CBC W/AUTO DIFF WBC: CPT

## 2020-03-19 PROCEDURE — 82947 ASSAY GLUCOSE BLOOD QUANT: CPT

## 2020-03-19 RX ORDER — HEPARIN SODIUM (PORCINE) LOCK FLUSH IV SOLN 100 UNIT/ML 100 UNIT/ML
5 SOLUTION INTRAVENOUS ONCE
Status: CANCELLED | OUTPATIENT
Start: 2020-03-19

## 2020-03-19 RX ORDER — 0.9 % SODIUM CHLORIDE 0.9 %
10 VIAL (ML) INJECTION ONCE
Status: DISCONTINUED | OUTPATIENT
Start: 2020-03-19 | End: 2020-03-19

## 2020-03-19 RX ORDER — FAMOTIDINE 10 MG/ML
20 INJECTION, SOLUTION INTRAVENOUS ONCE
Status: CANCELLED | OUTPATIENT
Start: 2020-03-19

## 2020-03-19 RX ORDER — 0.9 % SODIUM CHLORIDE 0.9 %
VIAL (ML) INJECTION
Status: DISCONTINUED
Start: 2020-03-19 | End: 2020-03-19

## 2020-03-19 RX ORDER — FAMOTIDINE 10 MG/ML
20 INJECTION, SOLUTION INTRAVENOUS ONCE
Status: COMPLETED | OUTPATIENT
Start: 2020-03-19 | End: 2020-03-19

## 2020-03-19 RX ORDER — AZITHROMYCIN 250 MG/1
250 TABLET, FILM COATED ORAL DAILY
Qty: 6 TABLET | Refills: 0 | Status: SHIPPED | OUTPATIENT
Start: 2020-03-19 | End: 2020-03-25

## 2020-03-19 RX ORDER — FAMOTIDINE 10 MG/ML
INJECTION, SOLUTION INTRAVENOUS
Status: COMPLETED
Start: 2020-03-19 | End: 2020-03-19

## 2020-03-19 RX ORDER — 0.9 % SODIUM CHLORIDE 0.9 %
10 VIAL (ML) INJECTION ONCE
Status: CANCELLED | OUTPATIENT
Start: 2020-03-19

## 2020-03-19 RX ORDER — DIPHENHYDRAMINE HYDROCHLORIDE 50 MG/ML
50 INJECTION INTRAMUSCULAR; INTRAVENOUS ONCE
Status: COMPLETED | OUTPATIENT
Start: 2020-03-19 | End: 2020-03-19

## 2020-03-19 RX ORDER — DIPHENHYDRAMINE HYDROCHLORIDE 50 MG/ML
INJECTION INTRAMUSCULAR; INTRAVENOUS
Status: COMPLETED
Start: 2020-03-19 | End: 2020-03-19

## 2020-03-19 RX ORDER — HEPARIN SODIUM (PORCINE) LOCK FLUSH IV SOLN 100 UNIT/ML 100 UNIT/ML
SOLUTION INTRAVENOUS
Status: COMPLETED
Start: 2020-03-19 | End: 2020-03-19

## 2020-03-19 RX ORDER — HEPARIN SODIUM (PORCINE) LOCK FLUSH IV SOLN 100 UNIT/ML 100 UNIT/ML
5 SOLUTION INTRAVENOUS ONCE
Status: COMPLETED | OUTPATIENT
Start: 2020-03-19 | End: 2020-03-19

## 2020-03-19 RX ORDER — DIPHENHYDRAMINE HYDROCHLORIDE 50 MG/ML
50 INJECTION INTRAMUSCULAR; INTRAVENOUS ONCE
Status: CANCELLED
Start: 2020-03-19

## 2020-03-19 RX ADMIN — HEPARIN SODIUM (PORCINE) LOCK FLUSH IV SOLN 100 UNIT/ML 500 UNITS: 100 SOLUTION INTRAVENOUS at 12:36:00

## 2020-03-19 RX ADMIN — FAMOTIDINE 20 MG: 10 INJECTION, SOLUTION INTRAVENOUS at 10:23:00

## 2020-03-19 RX ADMIN — DIPHENHYDRAMINE HYDROCHLORIDE 50 MG: 50 INJECTION INTRAMUSCULAR; INTRAVENOUS at 10:20:00

## 2020-03-19 NOTE — PROGRESS NOTES
STUDY: [facility] B-[facility]  PATIENT ID: [id number]  Cycle 3 Day 8 Taxol      Patient seen and examined by MD and research RN prior to infusion. Vital signs stable. Labs reviewed by research RN and Dr. Terrence Gilmore. Hgb 11.7, Grade 1 anemia.   Patient reports Jaxon Hoang week.  Taxol infused without adverse event. Patient denies questions. D/C'd ambulatory.  Patient will return to clinic in one week for cycle 3 day 15 infusion.      AE's:  Nausea Gr1  Constipation Gr1  Fatigue Gr 2  Dry Skin Gr 1  Rash Gr 1  Alopecia Gr2

## 2020-03-19 NOTE — PROGRESS NOTES
Cancer Center Progress Note    Patient Name: Ashwin Cordova   YOB: 1970   Medical Record Number: [id number]   Attending Physician: Kendell Chung M.D.      Chief Complaint:  Breast cancer    Oncology History:  12/2019: mammogram (last mammogram worsened last week. Had tongue pain without actual lesions, lidocaine swish and swallow helped she is using Biotene and also baking soda which she has not started. Did not impact her oral intake.     Past Medical History:  Past Medical History:   Kush Lang by mouth every 6 (six) hours as needed for Nausea., Disp: 60 tablet, Rfl: 3  •  Ondansetron HCl (ZOFRAN) 8 MG tablet, Take 1 tablet (8 mg total) by mouth every 8 (eight) hours as needed for Nausea., Disp: 30 tablet, Rfl: 3  •  Blood Glucose Monitoring Supp Size: adult)   Pulse 86   Temp 98.1 °F (36.7 °C) (Oral)   Resp 16   Ht 1.6 m (5' 3\")   Wt 99.3 kg (219 lb)   SpO2 99%   BMI 38.79 kg/m²     Physical Examination:  Performance Status:0  General: Patient is alert and oriented x 3, not in acute distress.   HE preliminary scan done before stating treatment. TECHNIQUE:   CT images of the chest, abdomen and pelvis were obtained with intravenous contrast material.  Automated exposure control for dose reduction was used.  Adjustment of the mA and/or kV was done base PANCREAS: The pancreas enhances symmetrically. No ductal dilation. ADRENALS: Unremarkable KIDNEYS: The kidneys enhance symmetrically. There is no hydronephrosis. 1.9 cm cyst is seen within the posterior left kidney.  AORTA/VASCULAR:   Normal.  No aneurysm with a palpable mass in the right breast at 01:00 o'clock about 8 cm from the nipple. Patient presents today for ultrasound-guided biopsy. The mass measures approximately 3.08 x 1.96 x 3.82 cm.   DESCRIPTION: The patient was informed of the nature of the Concordant pathology findings:  Surgical and oncological consultation recommended.       Dictated by (CST): Dung Tomas MD on 1/08/2020 at 12:17     Approved by (CST): Dung Tomas MD on 1/08/2020 at [address] [place] [address] Procedure Image Righ TARGETED ([id number]), 12/27/2019, 13:23. [facility] in [place], [place] ISIDRA 2D+3D SCREENING BILAT ([id number]), 12/07/2019, 8:10.   INDICATIONS: 44-year-old female with newly diagnosed right breast invasive ductal carcinoma for evaluation of extending medially from the biopsy-proven invasive ductal carcinoma to the level of the skin. This may represent post biopsy changes/biopsy track, however, additional disease cannot be excluded.   2. Right axillary lymph with focal cortical thickening in t the nipple accounting for the mammographic finding is moderately suspicious for malignancy. Ultrasound-guided biopsy is recommended.   Findings and recommendations were discussed with the patient immediately following exam.  BI-RADS CATEGORY:   DIAGNOSTIC but did yield 2 VUS, one in DONTE (c.5319+6_5319+7del [intronic]) and the other in RAD50 (c.3292C>T)    MDM: high due to chemo    Kira Pain, MD  -

## 2020-03-19 NOTE — PROGRESS NOTES
Porsha to infusion today for C3 D8 Taxol  Arrives Ambulating independently from MD visit, accompanied by self    Patient reports possible pregnancy since last therapy cycle: No    Modifications in dose or schedule:  No.  Pt states she is feeling ok, has s

## 2020-03-26 ENCOUNTER — APPOINTMENT (OUTPATIENT)
Dept: GENERAL RADIOLOGY | Facility: HOSPITAL | Age: 50
End: 2020-03-26
Attending: EMERGENCY MEDICINE
Payer: COMMERCIAL

## 2020-03-26 ENCOUNTER — RESEARCH ENCOUNTER (OUTPATIENT)
Dept: HEMATOLOGY/ONCOLOGY | Facility: HOSPITAL | Age: 50
End: 2020-03-26

## 2020-03-26 ENCOUNTER — TELEPHONE (OUTPATIENT)
Dept: HEMATOLOGY/ONCOLOGY | Facility: HOSPITAL | Age: 50
End: 2020-03-26

## 2020-03-26 ENCOUNTER — OFFICE VISIT (OUTPATIENT)
Dept: HEMATOLOGY/ONCOLOGY | Facility: HOSPITAL | Age: 50
End: 2020-03-26
Attending: INTERNAL MEDICINE
Payer: COMMERCIAL

## 2020-03-26 ENCOUNTER — HOSPITAL ENCOUNTER (EMERGENCY)
Facility: HOSPITAL | Age: 50
Discharge: HOME | End: 2020-03-26
Attending: EMERGENCY MEDICINE
Payer: COMMERCIAL

## 2020-03-26 VITALS
WEIGHT: 217 LBS | OXYGEN SATURATION: 96 % | RESPIRATION RATE: 24 BRPM | HEART RATE: 91 BPM | TEMPERATURE: 99 F | HEIGHT: 63 IN | SYSTOLIC BLOOD PRESSURE: 125 MMHG | DIASTOLIC BLOOD PRESSURE: 65 MMHG | BODY MASS INDEX: 38.45 KG/M2

## 2020-03-26 VITALS
BODY MASS INDEX: 38 KG/M2 | DIASTOLIC BLOOD PRESSURE: 55 MMHG | HEART RATE: 92 BPM | TEMPERATURE: 100 F | SYSTOLIC BLOOD PRESSURE: 120 MMHG | RESPIRATION RATE: 16 BRPM | WEIGHT: 217 LBS | OXYGEN SATURATION: 97 %

## 2020-03-26 DIAGNOSIS — T45.1X5A CHEMOTHERAPY INDUCED NEUTROPENIA (HCC): ICD-10-CM

## 2020-03-26 DIAGNOSIS — D70.1 CHEMOTHERAPY INDUCED NEUTROPENIA (HCC): ICD-10-CM

## 2020-03-26 DIAGNOSIS — R50.9 FEBRILE ILLNESS, ACUTE: Primary | ICD-10-CM

## 2020-03-26 DIAGNOSIS — E11.65 TYPE 2 DIABETES MELLITUS WITH HYPERGLYCEMIA, WITH LONG-TERM CURRENT USE OF INSULIN (HCC): ICD-10-CM

## 2020-03-26 DIAGNOSIS — C50.411 MALIGNANT NEOPLASM OF UPPER-OUTER QUADRANT OF RIGHT BREAST IN FEMALE, ESTROGEN RECEPTOR NEGATIVE (HCC): ICD-10-CM

## 2020-03-26 DIAGNOSIS — R11.2 CINV (CHEMOTHERAPY-INDUCED NAUSEA AND VOMITING): ICD-10-CM

## 2020-03-26 DIAGNOSIS — C50.411 MALIGNANT NEOPLASM OF UPPER-OUTER QUADRANT OF RIGHT BREAST IN FEMALE, ESTROGEN RECEPTOR NEGATIVE (HCC): Primary | ICD-10-CM

## 2020-03-26 DIAGNOSIS — K12.31 MUCOSITIS DUE TO CHEMOTHERAPY: ICD-10-CM

## 2020-03-26 DIAGNOSIS — Z17.1 MALIGNANT NEOPLASM OF UPPER-OUTER QUADRANT OF RIGHT BREAST IN FEMALE, ESTROGEN RECEPTOR NEGATIVE (HCC): Primary | ICD-10-CM

## 2020-03-26 DIAGNOSIS — T45.1X5A CHEMOTHERAPY INDUCED NEUTROPENIA (HCC): Primary | ICD-10-CM

## 2020-03-26 DIAGNOSIS — Z51.11 CHEMOTHERAPY MANAGEMENT, ENCOUNTER FOR: ICD-10-CM

## 2020-03-26 DIAGNOSIS — Z79.4 TYPE 2 DIABETES MELLITUS WITH HYPERGLYCEMIA, WITH LONG-TERM CURRENT USE OF INSULIN (HCC): ICD-10-CM

## 2020-03-26 DIAGNOSIS — Z17.1 MALIGNANT NEOPLASM OF UPPER-OUTER QUADRANT OF RIGHT BREAST IN FEMALE, ESTROGEN RECEPTOR NEGATIVE (HCC): ICD-10-CM

## 2020-03-26 DIAGNOSIS — T45.1X5A CINV (CHEMOTHERAPY-INDUCED NAUSEA AND VOMITING): ICD-10-CM

## 2020-03-26 DIAGNOSIS — D70.1 CHEMOTHERAPY INDUCED NEUTROPENIA (HCC): Primary | ICD-10-CM

## 2020-03-26 LAB
ANION GAP SERPL CALC-SCNC: 6 MMOL/L (ref 0–18)
BASOPHILS # BLD AUTO: 0.02 X10(3) UL (ref 0–0.2)
BASOPHILS # BLD AUTO: 0.02 X10(3) UL (ref 0–0.2)
BASOPHILS NFR BLD AUTO: 0.3 %
BASOPHILS NFR BLD AUTO: 0.7 %
BILIRUB UR QL: NEGATIVE
BILIRUB UR QL: NEGATIVE
BUN BLD-MCNC: 7 MG/DL (ref 7–18)
BUN/CREAT SERPL: 8.4 (ref 10–20)
CALCIUM BLD-MCNC: 8.2 MG/DL (ref 8.5–10.1)
CHLORIDE SERPL-SCNC: 102 MMOL/L (ref 98–112)
CO2 SERPL-SCNC: 26 MMOL/L (ref 21–32)
COLOR UR: YELLOW
COLOR UR: YELLOW
CREAT BLD-MCNC: 0.83 MG/DL (ref 0.55–1.02)
DEPRECATED RDW RBC AUTO: 49.1 FL (ref 35.1–46.3)
DEPRECATED RDW RBC AUTO: 49.7 FL (ref 35.1–46.3)
EOSINOPHIL # BLD AUTO: 0 X10(3) UL (ref 0–0.7)
EOSINOPHIL # BLD AUTO: 0.01 X10(3) UL (ref 0–0.7)
EOSINOPHIL NFR BLD AUTO: 0 %
EOSINOPHIL NFR BLD AUTO: 0.4 %
ERYTHROCYTE [DISTWIDTH] IN BLOOD BY AUTOMATED COUNT: 15.9 % (ref 11–15)
ERYTHROCYTE [DISTWIDTH] IN BLOOD BY AUTOMATED COUNT: 16.1 % (ref 11–15)
GLUCOSE BLD-MCNC: 276 MG/DL (ref 70–99)
GLUCOSE UR-MCNC: >=500 MG/DL
GLUCOSE UR-MCNC: NEGATIVE MG/DL
HCT VFR BLD AUTO: 32.2 % (ref 35–48)
HCT VFR BLD AUTO: 33.7 % (ref 35–48)
HGB BLD-MCNC: 11 G/DL (ref 12–16)
HGB BLD-MCNC: 11.8 G/DL (ref 12–16)
HGB UR QL STRIP.AUTO: NEGATIVE
HGB UR QL STRIP.AUTO: NEGATIVE
HYALINE CASTS #/AREA URNS AUTO: 3 /LPF
IMM GRANULOCYTES # BLD AUTO: 0.02 X10(3) UL (ref 0–1)
IMM GRANULOCYTES # BLD AUTO: 0.1 X10(3) UL (ref 0–1)
IMM GRANULOCYTES NFR BLD: 0.7 %
IMM GRANULOCYTES NFR BLD: 1.5 %
KETONES UR-MCNC: 20 MG/DL
KETONES UR-MCNC: NEGATIVE MG/DL
LEUKOCYTE ESTERASE UR QL STRIP.AUTO: NEGATIVE
LEUKOCYTE ESTERASE UR QL STRIP.AUTO: NEGATIVE
LYMPHOCYTES # BLD AUTO: 1.57 X10(3) UL (ref 1–4)
LYMPHOCYTES # BLD AUTO: 1.65 X10(3) UL (ref 1–4)
LYMPHOCYTES NFR BLD AUTO: 24.9 %
LYMPHOCYTES NFR BLD AUTO: 58.8 %
MCH RBC QN AUTO: 29.6 PG (ref 26–34)
MCH RBC QN AUTO: 30.3 PG (ref 26–34)
MCHC RBC AUTO-ENTMCNC: 34.2 G/DL (ref 31–37)
MCHC RBC AUTO-ENTMCNC: 35 G/DL (ref 31–37)
MCV RBC AUTO: 86.6 FL (ref 80–100)
MCV RBC AUTO: 86.8 FL (ref 80–100)
MONOCYTES # BLD AUTO: 0.27 X10(3) UL (ref 0.1–1)
MONOCYTES # BLD AUTO: 0.39 X10(3) UL (ref 0.1–1)
MONOCYTES NFR BLD AUTO: 10.1 %
MONOCYTES NFR BLD AUTO: 5.9 %
NEUTROPHILS # BLD AUTO: 0.78 X10 (3) UL (ref 1.5–7.7)
NEUTROPHILS # BLD AUTO: 0.78 X10(3) UL (ref 1.5–7.7)
NEUTROPHILS # BLD AUTO: 4.47 X10 (3) UL (ref 1.5–7.7)
NEUTROPHILS # BLD AUTO: 4.47 X10(3) UL (ref 1.5–7.7)
NEUTROPHILS NFR BLD AUTO: 29.3 %
NEUTROPHILS NFR BLD AUTO: 67.4 %
NITRITE UR QL STRIP.AUTO: NEGATIVE
NITRITE UR QL STRIP.AUTO: NEGATIVE
OSMOLALITY SERPL CALC.SUM OF ELEC: 286 MOSM/KG (ref 275–295)
PH UR: 5 [PH] (ref 5–8)
PH UR: 5 [PH] (ref 5–8)
PLATELET # BLD AUTO: 218 10(3)UL (ref 150–450)
PLATELET # BLD AUTO: 240 10(3)UL (ref 150–450)
POTASSIUM SERPL-SCNC: 4 MMOL/L (ref 3.5–5.1)
PROT UR-MCNC: 100 MG/DL
PROT UR-MCNC: 100 MG/DL
RBC # BLD AUTO: 3.71 X10(6)UL (ref 3.8–5.3)
RBC # BLD AUTO: 3.89 X10(6)UL (ref 3.8–5.3)
RBC #/AREA URNS AUTO: 1 /HPF
RBC #/AREA URNS AUTO: 3 /HPF
SODIUM SERPL-SCNC: 134 MMOL/L (ref 136–145)
SP GR UR STRIP: 1.02 (ref 1–1.03)
SP GR UR STRIP: 1.03 (ref 1–1.03)
UROBILINOGEN UR STRIP-ACNC: <2
UROBILINOGEN UR STRIP-ACNC: <2
WBC # BLD AUTO: 2.7 X10(3) UL (ref 4–11)
WBC # BLD AUTO: 6.6 X10(3) UL (ref 4–11)
WBC #/AREA URNS AUTO: 2 /HPF
WBC #/AREA URNS AUTO: 3 /HPF

## 2020-03-26 PROCEDURE — 87040 BLOOD CULTURE FOR BACTERIA: CPT

## 2020-03-26 PROCEDURE — 81001 URINALYSIS AUTO W/SCOPE: CPT | Performed by: EMERGENCY MEDICINE

## 2020-03-26 PROCEDURE — 99283 EMERGENCY DEPT VISIT LOW MDM: CPT

## 2020-03-26 PROCEDURE — 71045 X-RAY EXAM CHEST 1 VIEW: CPT | Performed by: EMERGENCY MEDICINE

## 2020-03-26 PROCEDURE — 85060 BLOOD SMEAR INTERPRETATION: CPT

## 2020-03-26 PROCEDURE — 85025 COMPLETE CBC W/AUTO DIFF WBC: CPT | Performed by: EMERGENCY MEDICINE

## 2020-03-26 PROCEDURE — 36415 COLL VENOUS BLD VENIPUNCTURE: CPT

## 2020-03-26 PROCEDURE — 96360 HYDRATION IV INFUSION INIT: CPT

## 2020-03-26 PROCEDURE — 85025 COMPLETE CBC W/AUTO DIFF WBC: CPT

## 2020-03-26 PROCEDURE — 81001 URINALYSIS AUTO W/SCOPE: CPT

## 2020-03-26 PROCEDURE — 99215 OFFICE O/P EST HI 40 MIN: CPT | Performed by: INTERNAL MEDICINE

## 2020-03-26 PROCEDURE — 80048 BASIC METABOLIC PNL TOTAL CA: CPT | Performed by: EMERGENCY MEDICINE

## 2020-03-26 PROCEDURE — 96372 THER/PROPH/DIAG INJ SC/IM: CPT

## 2020-03-26 RX ORDER — HEPARIN SODIUM (PORCINE) LOCK FLUSH IV SOLN 100 UNIT/ML 100 UNIT/ML
5 SOLUTION INTRAVENOUS ONCE
Status: CANCELLED | OUTPATIENT
Start: 2020-03-26

## 2020-03-26 RX ORDER — 0.9 % SODIUM CHLORIDE 0.9 %
10 VIAL (ML) INJECTION ONCE
Status: CANCELLED | OUTPATIENT
Start: 2020-03-26

## 2020-03-26 RX ORDER — HEPARIN SODIUM (PORCINE) LOCK FLUSH IV SOLN 100 UNIT/ML 100 UNIT/ML
SOLUTION INTRAVENOUS
Status: COMPLETED
Start: 2020-03-26 | End: 2020-03-26

## 2020-03-26 RX ORDER — 0.9 % SODIUM CHLORIDE 0.9 %
10 VIAL (ML) INJECTION ONCE
Status: DISCONTINUED | OUTPATIENT
Start: 2020-03-26 | End: 2020-03-26

## 2020-03-26 RX ORDER — 0.9 % SODIUM CHLORIDE 0.9 %
VIAL (ML) INJECTION
Status: DISCONTINUED
Start: 2020-03-26 | End: 2020-03-26

## 2020-03-26 RX ORDER — CIPROFLOXACIN 500 MG/1
500 TABLET, FILM COATED ORAL DAILY
Qty: 7 TABLET | Refills: 0 | Status: SHIPPED | OUTPATIENT
Start: 2020-03-26 | End: 2020-04-02

## 2020-03-26 RX ORDER — HEPARIN SODIUM (PORCINE) LOCK FLUSH IV SOLN 100 UNIT/ML 100 UNIT/ML
5 SOLUTION INTRAVENOUS ONCE
Status: COMPLETED | OUTPATIENT
Start: 2020-03-26 | End: 2020-03-26

## 2020-03-26 RX ADMIN — HEPARIN SODIUM (PORCINE) LOCK FLUSH IV SOLN 100 UNIT/ML 500 UNITS: 100 SOLUTION INTRAVENOUS at 11:16:00

## 2020-03-26 NOTE — PROGRESS NOTES
[facility]  PATIENT ID: [id number]  Cycle 3 Day 15 Taxol only (Week 9)-Tx being delayed x 1 week due to neutropenia    Patient seen and examined by MD and research RN. Vitals stable.  Patient came in today with a 100.3 fever, denies any fevers

## 2020-03-26 NOTE — PROGRESS NOTES
Patient arrived to center today for Labs/ MD visit prior to infusion. She presented with low grade temp 100.3. She was seen by Dr. Darrian Arcos and her WBC count was 2.7. C3D15 was held today per Dr. Darrian Arcos.   Patient was ordered to receive 500ml of NS over 30 min

## 2020-03-26 NOTE — PROGRESS NOTES
Cancer Center Progress Note    Patient Name: Cayden Faustin   YOB: 1970   Medical Record Number: [id number]   Attending Physician: Evelyn De La Garza M.D.      Chief Complaint:  Breast cancer    Oncology History:  12/2019: mammogram (last mammogram • Obesity (BMI 30-39. 9)    • Obesity, unspecified    • Other and unspecified hyperlipidemia    • Type II or unspecified type diabetes mellitus without mention of complication, not stated as uncontrolled    • Unspecified essential hypertension        Past 0  •  Glucose Blood (ACCU-CHEK JOSE PLUS) In Vitro Strip, Use 3 times a day, Disp: 100 strip, Rfl: 3  •  insulin glargine (BASAGLAR KWIKPEN) 100 UNIT/ML Subcutaneous Solution Pen-injector, 70 units subcutaneous once daily at 6pm, Disp: 15 pen, Rfl: 2  • bruising  Neurological: motor strength grossly intact  Psych: appropriate mood and affect    Skin: Hyperkeratosis with eczematous changes both cheek, no open lesions, nodules, or vesicles.  Same keratosis present dorsum of both hands    Laboratory assessed mildly prominent mediastinal lymph nodes which are nonspecific and measure up to 1 cm in short axis. The thoracic aorta is unremarkable and not dilated. Mediastinal fat planes are preserved. Cardiac chambers are unremarkable.  Pericardium is normal. Main pu within the right lower quadrant. Small hiatal hernia with wall thickening at the gastroesophageal junction. MESENTERY: Normal.  No mass or hernia. PELVIS: No enlarged mass or adenopathy. No bladder wall thickening.  BONES:   There is degenerative disease needle puncture was selected and marked. A time out was taken. The skin was cleaned with Chloraprep. 1% lidocaine was injected for local anesthesia. A nick was made in the skin with a #11 scalpel.   Under ultrasound guidance, 5 biopsy specimens were obt INDICATIONS: Unspecified lump in the right breast, unspecified quadrant. 46-year-old female status post ultrasound-guided biopsy of mass in the right breast in the upper inner breast with vision clip placement.   BREAST COMPOSITION:      FINDINGS: The visi in AP x trans x CC  AMOUNT OF FIBROGLANDULAR TISSUE:  There is heterogeneous fibroglandular tissue  BACKGROUND PARENCHYMAL ENHANCEMENT PATTERN:  Mild, with 25-50% of glandular tissue demonstrating enhancement. FINDINGS: Right breast:  There is a 5.2 x 2. CATEGORY:   DIAGNOSTIC CATEGORY 4--SUSPICIOUS   RECOMMENDATIONS:  ULTRASOUND: RIGHT AXILLA  --We will call the patient back for an ultrasound and provide an additional report.    MRI-GUIDED BREAST BIOPSY: LEFT BREAST     PLEASE NOTE: A NORMAL MRI DOES NOT E Staging  Malignant neoplasm of upper-outer quadrant of right breast in female, estrogen receptor negative ([facility] [address]  Staging form: Breast, AJCC 8th Edition  - Clinical: Stage IIIB (cT3, cN0, cM0, G3, ER-, NM-, HER2-) - Signed by Albina Marie MD on 1/16/2020  -

## 2020-03-27 ENCOUNTER — TELEPHONE (OUTPATIENT)
Dept: FAMILY MEDICINE CLINIC | Facility: CLINIC | Age: 50
End: 2020-03-27

## 2020-03-27 ENCOUNTER — NURSE ONLY (OUTPATIENT)
Dept: HEMATOLOGY/ONCOLOGY | Facility: HOSPITAL | Age: 50
End: 2020-03-27
Attending: INTERNAL MEDICINE
Payer: COMMERCIAL

## 2020-03-27 ENCOUNTER — TELEPHONE (OUTPATIENT)
Dept: HEMATOLOGY/ONCOLOGY | Facility: HOSPITAL | Age: 50
End: 2020-03-27

## 2020-03-27 DIAGNOSIS — Z17.1 MALIGNANT NEOPLASM OF UPPER-OUTER QUADRANT OF RIGHT BREAST IN FEMALE, ESTROGEN RECEPTOR NEGATIVE (HCC): ICD-10-CM

## 2020-03-27 DIAGNOSIS — D70.1 CHEMOTHERAPY INDUCED NEUTROPENIA (HCC): Primary | ICD-10-CM

## 2020-03-27 DIAGNOSIS — C50.411 MALIGNANT NEOPLASM OF UPPER-OUTER QUADRANT OF RIGHT BREAST IN FEMALE, ESTROGEN RECEPTOR NEGATIVE (HCC): ICD-10-CM

## 2020-03-27 DIAGNOSIS — T45.1X5A CHEMOTHERAPY INDUCED NEUTROPENIA (HCC): Primary | ICD-10-CM

## 2020-03-27 LAB — SARS-COV-2 RNA RESP QL NAA+PROBE: DETECTED

## 2020-03-27 PROCEDURE — 96372 THER/PROPH/DIAG INJ SC/IM: CPT

## 2020-03-27 RX ORDER — HEPARIN SODIUM (PORCINE) LOCK FLUSH IV SOLN 100 UNIT/ML 100 UNIT/ML
5 SOLUTION INTRAVENOUS ONCE
Status: CANCELLED | OUTPATIENT
Start: 2020-03-27

## 2020-03-27 RX ORDER — 0.9 % SODIUM CHLORIDE 0.9 %
10 VIAL (ML) INJECTION ONCE
Status: CANCELLED | OUTPATIENT
Start: 2020-03-27

## 2020-03-27 NOTE — ED INITIAL ASSESSMENT (HPI)
Masked prior to arrival.  This RN wearing gown,gloves,mask, goggles. Patient states she went for chemo today and was told she had a fever of 100.6. Patient took tylenol 1 hour prior to arrival. Patient diaphoretic.    Patient states cough that began tod

## 2020-03-27 NOTE — TELEPHONE ENCOUNTER
Dr Dustin Colbert spoke with Deb. She let her know that she is covid positive. Pt is asymptomatic and has not had a fever since last night. Reviewed the recommendations with her. Asked that she call us Monday, Tuesday and Wednesday to update us.   Verbalized

## 2020-03-27 NOTE — TELEPHONE ENCOUNTER
Spoke with patient and confirmed identity (verified name and ). Called to notify patient that the COVID 19 test results are POSITIVE.  Patient directed to isolate away form any household members as much as possible and the general public COMPLETELY for 3

## 2020-03-27 NOTE — TELEPHONE ENCOUNTER
Spoke to Deb. Let her know that the results wont be back till 5 pm.  Asked her to please come in now to have the injection. She will announce herself at the cancer center entrance where they will give her a mask and bring her to a private room.   Verb

## 2020-03-27 NOTE — ED PROVIDER NOTES
Patient Seen in: [facility] Emergency Department    History   Patient presents with:  Fever      HPI    Patient presents to the ED after developing a fever of 100.6 at home today.   She went to chemotherapy this morning but was told that she cannot file      Highest education level: Not on file    Tobacco Use      Smoking status: Never Smoker      Smokeless tobacco: Never Used    Substance and Sexual Activity      Alcohol use: No        Alcohol/week: 0.0 standard drinks        Comment: None.         no tenderness. Musculoskeletal:         General: No deformity or edema. Neurological: She is alert and oriented to person, place, and time. Skin: Skin is warm and dry. Psychiatric: She has a normal mood and affect.  Her behavior is normal.   Nursing Medications   Heparin Sodium Lock Flush 100 UNIT/ML injection 500 Units (500 Units Intravenous Given 3/26/20 2101)         MDM      03/26/20 1912 03/26/20 2000   BP: 125/65    Pulse: 101 91   Resp: 24    Temp: 99.3 °F (37.4 °C)    TempSrc: Oral    SpO2

## 2020-03-27 NOTE — TELEPHONE ENCOUNTER
Spoke with Deb. She states that after she left the ER yesterday her fever broke, she was down to 99.4. She states that she feels fine, no other symptoms.   I let her know that we are going to wait till her test results come back before we give the lizabeth

## 2020-03-28 ENCOUNTER — APPOINTMENT (OUTPATIENT)
Dept: HEMATOLOGY/ONCOLOGY | Facility: HOSPITAL | Age: 50
End: 2020-03-28
Attending: INTERNAL MEDICINE
Payer: COMMERCIAL

## 2020-03-31 ENCOUNTER — TELEPHONE (OUTPATIENT)
Dept: HEMATOLOGY/ONCOLOGY | Facility: HOSPITAL | Age: 50
End: 2020-03-31

## 2020-03-31 ENCOUNTER — HOSPITAL ENCOUNTER (EMERGENCY)
Facility: HOSPITAL | Age: 50
Discharge: HOME | End: 2020-03-31
Attending: EMERGENCY MEDICINE
Payer: COMMERCIAL

## 2020-03-31 VITALS
TEMPERATURE: 99 F | BODY MASS INDEX: 38.45 KG/M2 | RESPIRATION RATE: 18 BRPM | WEIGHT: 217 LBS | HEART RATE: 92 BPM | DIASTOLIC BLOOD PRESSURE: 67 MMHG | OXYGEN SATURATION: 97 % | HEIGHT: 63 IN | SYSTOLIC BLOOD PRESSURE: 116 MMHG

## 2020-03-31 DIAGNOSIS — U07.1 INFECTION DUE TO 2019 NOVEL CORONAVIRUS: Primary | ICD-10-CM

## 2020-03-31 DIAGNOSIS — F43.0 ACUTE STRESS REACTION: ICD-10-CM

## 2020-03-31 PROCEDURE — 85007 BL SMEAR W/DIFF WBC COUNT: CPT | Performed by: EMERGENCY MEDICINE

## 2020-03-31 PROCEDURE — 85025 COMPLETE CBC W/AUTO DIFF WBC: CPT | Performed by: EMERGENCY MEDICINE

## 2020-03-31 PROCEDURE — 99283 EMERGENCY DEPT VISIT LOW MDM: CPT

## 2020-03-31 PROCEDURE — 85027 COMPLETE CBC AUTOMATED: CPT | Performed by: EMERGENCY MEDICINE

## 2020-03-31 PROCEDURE — 36415 COLL VENOUS BLD VENIPUNCTURE: CPT

## 2020-03-31 NOTE — TELEPHONE ENCOUNTER
Attempted to reach Deb. Dr Peterson Barbosa would like to delay her another week. Could not leave  bc the box was full. Will try to call again later.

## 2020-03-31 NOTE — ED NOTES
After Hospital Care Plan:  Discharge Instructions Lumbar Fusion Surgery    [facility]  Patient Name:  Magali Lenz    Date of procedure:  9/18/2019  Date of discharge: 9/20/2019    Procedure:  Procedure(s):  LUMBAR LAMINECTOMY WITH POSTERIOR SPINAL FUSION L4-5        PCP: Grace Woods MD    Follow up appointments  -[facility]. Call Dr. Kaleigh Mcbride at [phone], Ext [phone], once you get home from the hospital to make an appointment for your 2 week postoperative visit. Home Health Agency: ____________________   phone: _______________________  The agency will contact you to arrange dates/times for visits. Please call them if you do not hear from them within 24 hours after you are discharged  Physical therapy 3 times a week for 3 weeks  Nursing-initial assessment and as needed    When to call your Orthopaedic Surgeon:  -Signs of infection-if your incision is red; continues to have drainage; drainage has a foul odor or if you have a persistent fever over 101 degrees for 24 hours  -Nausea or vomiting, severe headache  -Loss of bowel or bladder function, inability to urinate  -Changes in sensation in your arms or legs (numbness, tingling, loss of color)  -Increased weakness-greater than before your surgery  -Severe pain or pain not relieved by medications  -Signs of a blood clot in your leg-calf pain, tenderness, redness, swelling of lower leg    When to call your Primary Care Physician:  -Concerns about medical conditions such as diabetes, high blood pressure, asthma, congestive heart failure  -Call if blood sugars are elevated, persistent headache or dizziness, coughing or congestion, constipation or diarrhea, burning with urination, abnormal heart rate    When to call 911 and go to the nearest emergency room:  -Acute onset of chest pain, shortness of breath, difficulty breathing    Activity  -You are going home a well person, be as active as possible.   Your only Pt arrived from triage via wheelchair. exercise should be walking. Start with short frequent walks and increase your walking distance each day.  -Limit the amount of time you sit to 20-30 minute intervals. Sitting for prolonged periods of time will be uncomfortable for you following surgery.  -Do NOT lift anything over 5 pounds  -Do NOT do any straining, twisting or bending  -When you are in bed, you may lay on your back or on either side. Do NOT lie on your stomach    Brace  -If you have a back brace, you should wear your brace at all times when you are out of bed. Do not wear the brace while in bed or showering.  -Remember to always wear a cotton t-shirt underneath your brace.  -Do not bend or twist when your brace is off. Diet  -Resume usual diet; drink plenty of fluids; eat foods high in fiber.  -It is important to have regular bowel movements. Pain medications may cause constipation. You may want to take a stool softener (such as Senokot-S or Colace) to prevent constipation.   -If constipation occurs, take a laxative (such as Dulcolax tablets, Milk of Magnesia, or a suppository). Laxatives should only be used if the above preventable measures have failed and you still have not had a bowel movement after three days. Driving  -You may not drive or return to work until instructed by your physician. However, you may ride in the car for short periods of time. Incision Care  -You may take brief showers but do not run the water run directly onto the wound. After showering or bathing, remove the wet dressing and gently blot the wound dry with a soft towel.  -Do not rub or apply any lotions or ointments to your incision site.   -Do not soak or scrub your wound  -A new dressing has been applied to your incision prior to discharge. This dressing needs to stay in place for 6 days after being discharged from the hospital.  After 6 days, the dressing may be removed and the incision left open to the air.   Have your caregiver wash their hands thoroughly before changing your dressing. If the dressing becomes saturated, please call the office.  -You will have absorbable sutures and steri-strips (white tape) on your incision. Leave the steri-strips on until they fall off. Showering  -You may shower in approximately 4 days after your surgery.    -Leave the dressing on during your shower. Do NOT allow the water to run directly onto your dressing. Once you get out of the shower, gently pat the dressing dry. If the dressing gets wet change the dressing.  -Reminder- your brace can be removed while showering. Remember to not bend or twist while your brace is off.    -Do not take a tub bath. Preventing blood clots  -You have been given T.E.D. stockings to wear. Continue to wear these for 7 days after your discharge. Put them on in the morning and take them off at night.    -They are used to increase your circulation and prevent blood clots from forming in your legs  -T. E.D. stockings can be machine washed, temperature not to exceed 160° F (71°C) and machine dried for 15 to 20 minutes, temperature not to exceed 250° F (121°C). Pain management  -Take pain medication as prescribed; decrease the amount you use as your pain lessens. -DO not wait until you are in extreme pain to take your medication.  -Avoid alcoholic beverages while taking pain medication    Pain Medication Safety  DO:  -Read the Medication Guide   -Take your medicine exactly as prescribed   -Store your medicine away from children and in a safe place   -Flush unused medicine down the toilet   -Call your healthcare provider for medical advice about side effects. You may report side effects to FDA at 1-463-FDA-1142.   -Please be aware that many medications contain Tylenol. We do not want you to over medicate so please read the information below as a guide. Do not take more than 4 Grams of Tylenol in a 24 hour period.   (There are 1000 milligrams in one Gram) Percocet contains 325 mg of Tylenol per tablet (do not take more than 12 tablets in 24 hours)  Lortab contains 500 mg of Tylenol per tablet (do not take more than 8 tablets in 24 hours)  Norco contains 325 mg of Tylenol per tablet (do not take more than 12 tablets in 24 hours). DO NOT:  -Do not give your medicine to others   -Do not take medicine unless it was prescribed for you   -Do not stop taking your medicine without talking to your healthcare provider   -Do not break, chew, crush, dissolve, or inject your medicine. If you cannot swallow your medicine whole, talk to your healthcare provider.  -Do not drink alcohol while taking this medicine  -Do not take anti-inflammatory medications or aspirin unless instructed by your     physician.

## 2020-03-31 NOTE — ED PROVIDER NOTES
Patient Seen in: [facility] AND [facility] Emergency Department    History   Patient presents with:  Cough/URI  Dyspnea ZORAIDA SOB    Stated Complaint: Dsypnea and cough x 1 day.  +COVID patient      HPI    50-year-old female with past medical history of breast cancer needed for Nausea. Blood Glucose Monitoring Suppl (ACCU-CHEK JOSE) Does not apply Device,  1 Device by Does not apply route 3 (three) times daily.    Glucose Blood (ACCU-CHEK JOSE PLUS) In Vitro Strip,  Use 3 times a day   insulin glargine (Avtar Samson Alcohol/week: 0.0 standard drinks      Comment: None. Drug use: No      Review of Systems :  Constitutional: As per HPI  Respiratory: (+) cough. Positive for stated complaint: Dsypnea and cough x 1 day.  +COVID patient   Other systems are as noted in DIFFERENTIAL[[id number]]          Abnormal            Final result                 Please view results for these tests on the individual orders.    RAINBOW DRAW BLUE   RAINBOW DRAW LAVENDER   RAINBOW DRAW LIGHT GREEN   RAINBOW DRAW GOLD            MDM     DI coronavirus  (primary encounter diagnosis)  Acute stress reaction    Disposition:  Discharge    Follow-up:  William Noyola [address] [place] [address]  [phone]    Call  As needed for followup and re-evaluation.     Steph Comer, [facility]

## 2020-03-31 NOTE — ED INITIAL ASSESSMENT (HPI)
Patient was seen in ER last Friday and tested positive for covid-19, here today because she is \"just not feeling good. \" Denies fever, chest pain, sob.

## 2020-03-31 NOTE — TELEPHONE ENCOUNTER
Spoke with Deb. She says that she is still expereincing the fevers, takes tylenol every 4 hours if her temp is 100.1 or higher. It has stayed around 100.1. She still has the dry cough and at times she is coughing up mucus.   Deb is also very depr

## 2020-04-01 ENCOUNTER — TELEPHONE (OUTPATIENT)
Dept: HEMATOLOGY/ONCOLOGY | Facility: HOSPITAL | Age: 50
End: 2020-04-01

## 2020-04-02 ENCOUNTER — APPOINTMENT (OUTPATIENT)
Dept: HEMATOLOGY/ONCOLOGY | Facility: HOSPITAL | Age: 50
End: 2020-04-02
Attending: INTERNAL MEDICINE
Payer: COMMERCIAL

## 2020-04-02 ENCOUNTER — RESEARCH ENCOUNTER (OUTPATIENT)
Dept: HEMATOLOGY/ONCOLOGY | Facility: HOSPITAL | Age: 50
End: 2020-04-02

## 2020-04-03 ENCOUNTER — RESEARCH ENCOUNTER (OUTPATIENT)
Dept: HEMATOLOGY/ONCOLOGY | Facility: HOSPITAL | Age: 50
End: 2020-04-03

## 2020-04-03 NOTE — PROGRESS NOTES
Late entry for Tuesday - 3-31  B-59 Delaying Treatment due to COVID positive    Notified [address] staff of the following regarding patient and requesting guidance:   Patient presented to clinic on 3/26/20 in the morning, was neutropenic in clinic (

## 2020-04-03 NOTE — PROGRESS NOTES
STUDY: [facility] B[facility]  ID # [id number]  PHONE CALL     Patient has tested positive for Covid-19 and chemo treatment was held this week for symptoms. Patient states her last day of fever was Wed 4/01. Her cough is improved and milder today.  She is no

## 2020-04-09 ENCOUNTER — OFFICE VISIT (OUTPATIENT)
Dept: HEMATOLOGY/ONCOLOGY | Facility: HOSPITAL | Age: 50
End: 2020-04-09
Attending: INTERNAL MEDICINE
Payer: COMMERCIAL

## 2020-04-09 ENCOUNTER — RESEARCH ENCOUNTER (OUTPATIENT)
Dept: HEMATOLOGY/ONCOLOGY | Facility: HOSPITAL | Age: 50
End: 2020-04-09

## 2020-04-09 VITALS
HEART RATE: 78 BPM | DIASTOLIC BLOOD PRESSURE: 71 MMHG | TEMPERATURE: 98 F | RESPIRATION RATE: 16 BRPM | SYSTOLIC BLOOD PRESSURE: 138 MMHG

## 2020-04-09 DIAGNOSIS — K12.31 MUCOSITIS DUE TO CHEMOTHERAPY: ICD-10-CM

## 2020-04-09 DIAGNOSIS — D70.1 CHEMOTHERAPY INDUCED NEUTROPENIA (HCC): ICD-10-CM

## 2020-04-09 DIAGNOSIS — Z17.1 MALIGNANT NEOPLASM OF UPPER-OUTER QUADRANT OF RIGHT BREAST IN FEMALE, ESTROGEN RECEPTOR NEGATIVE (HCC): Primary | ICD-10-CM

## 2020-04-09 DIAGNOSIS — C50.411 MALIGNANT NEOPLASM OF UPPER-OUTER QUADRANT OF RIGHT BREAST IN FEMALE, ESTROGEN RECEPTOR NEGATIVE (HCC): Primary | ICD-10-CM

## 2020-04-09 DIAGNOSIS — Z51.11 CHEMOTHERAPY MANAGEMENT, ENCOUNTER FOR: ICD-10-CM

## 2020-04-09 DIAGNOSIS — T45.1X5A CHEMOTHERAPY INDUCED NEUTROPENIA (HCC): ICD-10-CM

## 2020-04-09 DIAGNOSIS — U07.1 COVID-19 VIRUS INFECTION: ICD-10-CM

## 2020-04-09 PROCEDURE — 85025 COMPLETE CBC W/AUTO DIFF WBC: CPT

## 2020-04-09 PROCEDURE — 99215 OFFICE O/P EST HI 40 MIN: CPT | Performed by: INTERNAL MEDICINE

## 2020-04-09 PROCEDURE — 96413 CHEMO IV INFUSION 1 HR: CPT

## 2020-04-09 PROCEDURE — 96375 TX/PRO/DX INJ NEW DRUG ADDON: CPT

## 2020-04-09 PROCEDURE — S0028 INJECTION, FAMOTIDINE, 20 MG: HCPCS

## 2020-04-09 RX ORDER — 0.9 % SODIUM CHLORIDE 0.9 %
VIAL (ML) INJECTION
Status: DISCONTINUED
Start: 2020-04-09 | End: 2020-04-09

## 2020-04-09 RX ORDER — HEPARIN SODIUM (PORCINE) LOCK FLUSH IV SOLN 100 UNIT/ML 100 UNIT/ML
SOLUTION INTRAVENOUS
Status: COMPLETED
Start: 2020-04-09 | End: 2020-04-09

## 2020-04-09 RX ORDER — 0.9 % SODIUM CHLORIDE 0.9 %
10 VIAL (ML) INJECTION ONCE
Status: DISCONTINUED | OUTPATIENT
Start: 2020-04-09 | End: 2020-04-09

## 2020-04-09 RX ORDER — HEPARIN SODIUM (PORCINE) LOCK FLUSH IV SOLN 100 UNIT/ML 100 UNIT/ML
5 SOLUTION INTRAVENOUS ONCE
Status: CANCELLED | OUTPATIENT
Start: 2020-04-09

## 2020-04-09 RX ORDER — DIPHENHYDRAMINE HYDROCHLORIDE 50 MG/ML
50 INJECTION INTRAMUSCULAR; INTRAVENOUS ONCE
Status: COMPLETED | OUTPATIENT
Start: 2020-04-09 | End: 2020-04-09

## 2020-04-09 RX ORDER — HEPARIN SODIUM (PORCINE) LOCK FLUSH IV SOLN 100 UNIT/ML 100 UNIT/ML
5 SOLUTION INTRAVENOUS ONCE
Status: COMPLETED | OUTPATIENT
Start: 2020-04-09 | End: 2020-04-09

## 2020-04-09 RX ORDER — FAMOTIDINE 10 MG/ML
20 INJECTION, SOLUTION INTRAVENOUS ONCE
Status: COMPLETED | OUTPATIENT
Start: 2020-04-09 | End: 2020-04-09

## 2020-04-09 RX ORDER — FAMOTIDINE 10 MG/ML
INJECTION, SOLUTION INTRAVENOUS
Status: COMPLETED
Start: 2020-04-09 | End: 2020-04-09

## 2020-04-09 RX ORDER — FAMOTIDINE 10 MG/ML
20 INJECTION, SOLUTION INTRAVENOUS ONCE
Status: CANCELLED | OUTPATIENT
Start: 2020-04-09

## 2020-04-09 RX ORDER — 0.9 % SODIUM CHLORIDE 0.9 %
10 VIAL (ML) INJECTION ONCE
Status: CANCELLED | OUTPATIENT
Start: 2020-04-09

## 2020-04-09 RX ORDER — DIPHENHYDRAMINE HYDROCHLORIDE 50 MG/ML
INJECTION INTRAMUSCULAR; INTRAVENOUS
Status: COMPLETED
Start: 2020-04-09 | End: 2020-04-09

## 2020-04-09 RX ORDER — DIPHENHYDRAMINE HYDROCHLORIDE 50 MG/ML
50 INJECTION INTRAMUSCULAR; INTRAVENOUS ONCE
Status: CANCELLED
Start: 2020-04-09

## 2020-04-09 RX ADMIN — FAMOTIDINE 20 MG: 10 INJECTION, SOLUTION INTRAVENOUS at 08:54:00

## 2020-04-09 RX ADMIN — HEPARIN SODIUM (PORCINE) LOCK FLUSH IV SOLN 100 UNIT/ML 500 UNITS: 100 SOLUTION INTRAVENOUS at 10:52:00

## 2020-04-09 RX ADMIN — DIPHENHYDRAMINE HYDROCHLORIDE 50 MG: 50 INJECTION INTRAMUSCULAR; INTRAVENOUS at 09:05:00

## 2020-04-09 NOTE — PROGRESS NOTES
Pt here for C3D15. taxol Arrives Ambulating independently, accompanied by Self           Patient reports possible pregnancy since last therapy cycle: No     Patient denies any fevers, nausea, diarrhea in the last week.   Pt is wearing mask and states has be

## 2020-04-09 NOTE — PROGRESS NOTES
Cancer Center Progress Note    Patient Name: Nellie Soulier   YOB: 1970   Medical Record Number: [id number]   Attending Physician: Carlos Pickett M.D.      Chief Complaint:  Breast cancer    Oncology History:  12/2019: mammogram (last mammogram disc asymmetry 2006    OU   • No diabetic retinopathy in both eyes 2006    OU   • Obesity (BMI 30-39. 9)    • Obesity, unspecified    • Other and unspecified hyperlipidemia    • Type II or unspecified type diabetes mellitus without mention of complication, PLUS) In Vitro Strip, Use 3 times a day, Disp: 100 strip, Rfl: 3  •  insulin glargine (BASAGLAR KWIKPEN) 100 UNIT/ML Subcutaneous Solution Pen-injector, 70 units subcutaneous once daily at 6pm, Disp: 15 pen, Rfl: 2  •  Fluticasone Propionate 50 MCG/ACT Arnol appropriate mood and affect    Skin: Hyperkeratosis is btter on the face    Laboratory assessed and reviewed:  Lab Results   Component Value Date     (H) 03/26/2020    BUN 7 03/26/2020    BUNCREA 8.4 (L) 03/26/2020    CREATSERUM 0.83 03/26/2020    A Mediastinal fat planes are preserved. Cardiac chambers are unremarkable. Pericardium is normal. Main pulmonary artery is dilated measuring up to 3.2 cm which can be seen with pulmonary hypertension.   There is a retroesophageal course of the right subclavia PELVIS: No enlarged mass or adenopathy. No bladder wall thickening. BONES:   There is degenerative disease of the thoracic and lumbar spine. Degenerative changes are seen within the sacroiliac joints and pubic symphysis.  Degenerative changes are seen in th anesthesia. A nick was made in the skin with a #11 scalpel. Under ultrasound guidance, 5 biopsy specimens were obtained, (using the needle listed below). A vision clip was placed. These were sent to the laboratory for processing.   Compression was appli breast in the upper inner breast with vision clip placement. BREAST COMPOSITION:      FINDINGS: The vision clip is within the lobulated mass seen in the upper inner breast.  Please see the complete ultrasound-guided biopsy report.          CONCLUSION: Succ with 25-50% of glandular tissue demonstrating enhancement.    FINDINGS: Right breast:  There is a 5.2 x 2.9 x 3.4 centimeter heterogeneously enhancing irregular mass seen in the right breast upper inner quadrant representing the biopsy-proven invasive ducta provide an additional report. MRI-GUIDED BREAST BIOPSY: LEFT BREAST     PLEASE NOTE: A NORMAL MRI DOES NOT EXCLUDE THE POSSIBILITY OF BREAST CANCER. A CLINICALLY SUSPICIOUS PALPABLE LUMP SHOULD BE BIOPSIED.          Dictated by (CST): Mabel Juárez, Clinical: Stage IIIB (cT3, cN0, cM0, G3, ER-, NH-, HER2-) - Signed by Tea Tinajero MD on 1/16/2020  - Echo reviewed, EF preserved  - Taxol reaction- Dex 20, pepcid, benadryl  - C3D15 Taxol/carbo per B59 trial with neupogen support    Chemo induced neutrope

## 2020-04-09 NOTE — PROGRESS NOTES
[facility]  PATIENT ID: [id number]  Cycle 3 Day 15 Taxol only (Week 9)    Patient seen and examined by MD and research RN. Vitals stable. Patient's treatment was delayed x 2 weeks due to positive Covid-19 and related symptoms.  Patient denies

## 2020-04-10 ENCOUNTER — NURSE ONLY (OUTPATIENT)
Dept: HEMATOLOGY/ONCOLOGY | Facility: HOSPITAL | Age: 50
End: 2020-04-10
Attending: INTERNAL MEDICINE
Payer: COMMERCIAL

## 2020-04-10 DIAGNOSIS — C50.411 MALIGNANT NEOPLASM OF UPPER-OUTER QUADRANT OF RIGHT BREAST IN FEMALE, ESTROGEN RECEPTOR NEGATIVE (HCC): Primary | ICD-10-CM

## 2020-04-10 DIAGNOSIS — Z17.1 MALIGNANT NEOPLASM OF UPPER-OUTER QUADRANT OF RIGHT BREAST IN FEMALE, ESTROGEN RECEPTOR NEGATIVE (HCC): Primary | ICD-10-CM

## 2020-04-10 PROCEDURE — 96372 THER/PROPH/DIAG INJ SC/IM: CPT

## 2020-04-10 NOTE — PROGRESS NOTES
Patient to center for Zarixo injection. Brent Lopez arrived ambulatory wearing mask. She states she is feeling well and offers no complaints. denies any fever  Zarixo injection administered in right upper arm. 2 x 2 and band aid to site. discharged stable.  Raegan

## 2020-04-16 ENCOUNTER — OFFICE VISIT (OUTPATIENT)
Dept: HEMATOLOGY/ONCOLOGY | Facility: HOSPITAL | Age: 50
End: 2020-04-16
Attending: INTERNAL MEDICINE
Payer: COMMERCIAL

## 2020-04-16 ENCOUNTER — RESEARCH ENCOUNTER (OUTPATIENT)
Dept: HEMATOLOGY/ONCOLOGY | Facility: HOSPITAL | Age: 50
End: 2020-04-16

## 2020-04-16 VITALS
HEART RATE: 79 BPM | DIASTOLIC BLOOD PRESSURE: 66 MMHG | BODY MASS INDEX: 38.45 KG/M2 | SYSTOLIC BLOOD PRESSURE: 139 MMHG | OXYGEN SATURATION: 100 % | RESPIRATION RATE: 16 BRPM | TEMPERATURE: 98 F | HEIGHT: 63 IN | WEIGHT: 217 LBS

## 2020-04-16 VITALS
WEIGHT: 217 LBS | BODY MASS INDEX: 38.45 KG/M2 | DIASTOLIC BLOOD PRESSURE: 66 MMHG | RESPIRATION RATE: 16 BRPM | HEART RATE: 79 BPM | OXYGEN SATURATION: 100 % | HEIGHT: 63 IN | SYSTOLIC BLOOD PRESSURE: 139 MMHG | TEMPERATURE: 98 F

## 2020-04-16 DIAGNOSIS — C50.411 MALIGNANT NEOPLASM OF UPPER-OUTER QUADRANT OF RIGHT BREAST IN FEMALE, ESTROGEN RECEPTOR NEGATIVE (HCC): Primary | ICD-10-CM

## 2020-04-16 DIAGNOSIS — K12.31 MUCOSITIS DUE TO CHEMOTHERAPY: ICD-10-CM

## 2020-04-16 DIAGNOSIS — Z51.11 CHEMOTHERAPY MANAGEMENT, ENCOUNTER FOR: ICD-10-CM

## 2020-04-16 DIAGNOSIS — D70.1 CHEMOTHERAPY INDUCED NEUTROPENIA (HCC): ICD-10-CM

## 2020-04-16 DIAGNOSIS — Z17.1 MALIGNANT NEOPLASM OF UPPER-OUTER QUADRANT OF RIGHT BREAST IN FEMALE, ESTROGEN RECEPTOR NEGATIVE (HCC): Primary | ICD-10-CM

## 2020-04-16 DIAGNOSIS — L27.0 DRUG-INDUCED SKIN RASH: ICD-10-CM

## 2020-04-16 DIAGNOSIS — T45.1X5A CHEMOTHERAPY INDUCED NEUTROPENIA (HCC): ICD-10-CM

## 2020-04-16 DIAGNOSIS — K59.03 DRUG INDUCED CONSTIPATION: ICD-10-CM

## 2020-04-16 PROCEDURE — S0028 INJECTION, FAMOTIDINE, 20 MG: HCPCS

## 2020-04-16 PROCEDURE — 80053 COMPREHEN METABOLIC PANEL: CPT

## 2020-04-16 PROCEDURE — 96417 CHEMO IV INFUS EACH ADDL SEQ: CPT

## 2020-04-16 PROCEDURE — 96367 TX/PROPH/DG ADDL SEQ IV INF: CPT

## 2020-04-16 PROCEDURE — 96413 CHEMO IV INFUSION 1 HR: CPT

## 2020-04-16 PROCEDURE — 99215 OFFICE O/P EST HI 40 MIN: CPT | Performed by: INTERNAL MEDICINE

## 2020-04-16 PROCEDURE — 96375 TX/PRO/DX INJ NEW DRUG ADDON: CPT

## 2020-04-16 PROCEDURE — 85025 COMPLETE CBC W/AUTO DIFF WBC: CPT

## 2020-04-16 RX ORDER — 0.9 % SODIUM CHLORIDE 0.9 %
VIAL (ML) INJECTION
Status: DISCONTINUED
Start: 2020-04-16 | End: 2020-04-16

## 2020-04-16 RX ORDER — 0.9 % SODIUM CHLORIDE 0.9 %
10 VIAL (ML) INJECTION ONCE
Status: CANCELLED | OUTPATIENT
Start: 2020-04-16

## 2020-04-16 RX ORDER — DIPHENHYDRAMINE HYDROCHLORIDE 50 MG/ML
50 INJECTION INTRAMUSCULAR; INTRAVENOUS ONCE
Status: CANCELLED
Start: 2020-04-16

## 2020-04-16 RX ORDER — FAMOTIDINE 10 MG/ML
INJECTION, SOLUTION INTRAVENOUS
Status: COMPLETED
Start: 2020-04-16 | End: 2020-04-16

## 2020-04-16 RX ORDER — HEPARIN SODIUM (PORCINE) LOCK FLUSH IV SOLN 100 UNIT/ML 100 UNIT/ML
SOLUTION INTRAVENOUS
Status: DISCONTINUED
Start: 2020-04-16 | End: 2020-04-16

## 2020-04-16 RX ORDER — FAMOTIDINE 10 MG/ML
20 INJECTION, SOLUTION INTRAVENOUS ONCE
Status: CANCELLED | OUTPATIENT
Start: 2020-04-16

## 2020-04-16 RX ORDER — DIPHENHYDRAMINE HYDROCHLORIDE 50 MG/ML
50 INJECTION INTRAMUSCULAR; INTRAVENOUS ONCE
Status: DISCONTINUED | OUTPATIENT
Start: 2020-04-16 | End: 2020-04-16

## 2020-04-16 RX ORDER — FAMOTIDINE 10 MG/ML
20 INJECTION, SOLUTION INTRAVENOUS ONCE
Status: DISCONTINUED | OUTPATIENT
Start: 2020-04-16 | End: 2020-04-16

## 2020-04-16 RX ORDER — DIPHENHYDRAMINE HYDROCHLORIDE 50 MG/ML
INJECTION INTRAMUSCULAR; INTRAVENOUS
Status: COMPLETED
Start: 2020-04-16 | End: 2020-04-16

## 2020-04-16 RX ORDER — HEPARIN SODIUM (PORCINE) LOCK FLUSH IV SOLN 100 UNIT/ML 100 UNIT/ML
5 SOLUTION INTRAVENOUS ONCE
Status: COMPLETED | OUTPATIENT
Start: 2020-04-16 | End: 2020-04-16

## 2020-04-16 RX ORDER — HEPARIN SODIUM (PORCINE) LOCK FLUSH IV SOLN 100 UNIT/ML 100 UNIT/ML
5 SOLUTION INTRAVENOUS ONCE
Status: CANCELLED | OUTPATIENT
Start: 2020-04-16

## 2020-04-16 RX ADMIN — DIPHENHYDRAMINE HYDROCHLORIDE 50 MG: 50 INJECTION INTRAMUSCULAR; INTRAVENOUS at 12:39:00

## 2020-04-16 RX ADMIN — FAMOTIDINE 20 MG: 10 INJECTION, SOLUTION INTRAVENOUS at 12:34:00

## 2020-04-16 RX ADMIN — HEPARIN SODIUM (PORCINE) LOCK FLUSH IV SOLN 100 UNIT/ML 500 UNITS: 100 SOLUTION INTRAVENOUS at 16:40:00

## 2020-04-16 NOTE — PROGRESS NOTES
Cancer Center Progress Note    Patient Name: Roz Alarcon   YOB: 1970   Medical Record Number: [id number]   Attending Physician: Dianne Philip M.D.      Chief Complaint:  Breast cancer    Oncology History:  12/2019: mammogram (last mammogram SOB, fevers, chills, fatigue, cough, sinus congestion    Past Medical History:  Past Medical History:   Diagnosis Date   • Anemia    • Cancer ([facility] [address]    • Cup to disc asymmetry 2006    OU   • No diabetic retinopathy in both eyes 2006    OU   • Obesity (BMI 3 (ACCU-CHEK JOSE) Does not apply Device, 1 Device by Does not apply route 3 (three) times daily. , Disp: 1 Device, Rfl: 0  •  Glucose Blood (ACCU-CHEK JOSE PLUS) In Vitro Strip, Use 3 times a day, Disp: 100 strip, Rfl: 3  •  insulin glargine (Darian Vivian distress. HEENT: EOMs intact. Chest: nonlabored breathing. Symmetric expansion  Cardiovascular: Regular, no murmur  Abdomen:  non tender.  Obese  Breast: right breast 1: 00 location, some assymtry but no mass  Bilateral axilla normal.  Extremities: No lana dose reduction was used. Dose information is transmitted to the  [facility]) NRDR ([address]) which includes the Dose Index Registry. FINDINGS: LINES AND TUBES: None.   MEDIASTINUM/VASCULATURE: There are multi nonspecific retroperitoneal lymph nodes. BOWEL:  There is diverticulosis involving the distal descending colon and sigmoid colon without evidence of diverticulitis. There is no dilation or wall thickening.  The appendix is normal. There are no inflammatory including, but not limited to, hemorrhage and infection. Informed consent was obtained. An ultrasound guided percutaneous biopsy of the right breast was performed in the following manner: The breast was scanned and the region of interest was localized. PROCEDURE DIGITAL IMAGE RIGHT ([id number])  COMPARISON: [facility] for [address] (KPN=[id number]), 12/27/2019, 13:23. [facility] in [place], [place] ISIDRA 2D+3D SCREENING BILAT ([id number]), 12/07/2019, 8:10.   I dynamic intravenous gadolinium infusion, thin sections, and a dedicated breast coil. Contrast enhancement analysis was assisted by computer-aided detection.    LMP:  Per patient 14 years ago  Of note: (#/#)= (series/image number) All measurements are liste axillary ultrasound is recommended for further evaluation. 3. 0.8 x 0.9 x 0.9 centimeter enhancing irregular mass in the left breast lower outer quadrant (best seen at location-67.0) is low suspicion for malignancy. MRI-guided biopsy is recommended.    B RECOMMENDATIONS:  ULTRASOUND-GUIDED CORE BIOPSY: RIGHT BREAST             Dictated by (CST): Sylvester Mishra MD on 12/27/2019 at 13:41     Approved by (CST): Sylvester Mishra MD on 12/27/2019 at 13:52             Impression and Plan:  Cancer Staging

## 2020-04-16 NOTE — PROGRESS NOTES
Pt here for C4 D1  taxol Arrives Ambulating independently, accompanied by Self           Patient reports possible pregnancy since last therapy cycle: No     Patient denies any fevers, nausea, diarrhea in the last week.   Pt is wearing mask and states has be

## 2020-04-16 NOTE — PROGRESS NOTES
STUDY: [facility] B-59  PATIENT ID: [id number]  Cycle 4 Day 1 Taxol,Carboplatin, Atezolizumab/placebo (Week 10)    Patient evaluated by Dr. Loyda Calle and research RN. Labs drawn per protocol. Vitals stable.  Patient denies any fevers, chills, or any other s/s

## 2020-04-16 NOTE — PROGRESS NOTES
Completed treatment today with no s/s of adverse reaction noted. Refer to charting of port. Discharged stable from infusion, ambulating steady.

## 2020-04-17 ENCOUNTER — NURSE ONLY (OUTPATIENT)
Dept: HEMATOLOGY/ONCOLOGY | Facility: HOSPITAL | Age: 50
End: 2020-04-17
Attending: INTERNAL MEDICINE
Payer: COMMERCIAL

## 2020-04-17 VITALS — TEMPERATURE: 98 F | RESPIRATION RATE: 16 BRPM

## 2020-04-17 DIAGNOSIS — Z17.1 MALIGNANT NEOPLASM OF UPPER-OUTER QUADRANT OF RIGHT BREAST IN FEMALE, ESTROGEN RECEPTOR NEGATIVE (HCC): Primary | ICD-10-CM

## 2020-04-17 DIAGNOSIS — C50.411 MALIGNANT NEOPLASM OF UPPER-OUTER QUADRANT OF RIGHT BREAST IN FEMALE, ESTROGEN RECEPTOR NEGATIVE (HCC): Primary | ICD-10-CM

## 2020-04-17 PROCEDURE — 96372 THER/PROPH/DIAG INJ SC/IM: CPT

## 2020-04-17 NOTE — PROGRESS NOTES
Pt here for C4 D2 Zarxio SQ  States she had mild nausea last night  Took anti-nausea pill and prune juice (pro-actively prune juice because the pill causes constipation for her). Denies nausea this morning. Will eat breakfast once home.     She feels some

## 2020-04-18 ENCOUNTER — NURSE ONLY (OUTPATIENT)
Dept: HEMATOLOGY/ONCOLOGY | Facility: HOSPITAL | Age: 50
End: 2020-04-18
Attending: INTERNAL MEDICINE
Payer: COMMERCIAL

## 2020-04-18 VITALS
DIASTOLIC BLOOD PRESSURE: 61 MMHG | SYSTOLIC BLOOD PRESSURE: 150 MMHG | RESPIRATION RATE: 16 BRPM | OXYGEN SATURATION: 100 % | HEART RATE: 105 BPM | TEMPERATURE: 98 F

## 2020-04-18 DIAGNOSIS — Z17.1 MALIGNANT NEOPLASM OF UPPER-OUTER QUADRANT OF RIGHT BREAST IN FEMALE, ESTROGEN RECEPTOR NEGATIVE (HCC): Primary | ICD-10-CM

## 2020-04-18 DIAGNOSIS — C50.411 MALIGNANT NEOPLASM OF UPPER-OUTER QUADRANT OF RIGHT BREAST IN FEMALE, ESTROGEN RECEPTOR NEGATIVE (HCC): Primary | ICD-10-CM

## 2020-04-18 PROCEDURE — 96372 THER/PROPH/DIAG INJ SC/IM: CPT

## 2020-04-18 NOTE — PROGRESS NOTES
Pt arrives ambulatory with strong steady gait for xarxio 480 mcg subq post chemo from 4/16/20, carbo/taxol/study drugy. Reporting mild nausea, controlled with antiemetics. Energy level good. Denies fever, chills and any resp symptoms.  Eating and drinkin

## 2020-04-19 ENCOUNTER — NURSE ONLY (OUTPATIENT)
Dept: HEMATOLOGY/ONCOLOGY | Facility: HOSPITAL | Age: 50
End: 2020-04-19
Attending: INTERNAL MEDICINE
Payer: COMMERCIAL

## 2020-04-19 VITALS
TEMPERATURE: 99 F | SYSTOLIC BLOOD PRESSURE: 146 MMHG | HEART RATE: 91 BPM | RESPIRATION RATE: 16 BRPM | DIASTOLIC BLOOD PRESSURE: 74 MMHG

## 2020-04-19 DIAGNOSIS — Z17.1 MALIGNANT NEOPLASM OF UPPER-OUTER QUADRANT OF RIGHT BREAST IN FEMALE, ESTROGEN RECEPTOR NEGATIVE (HCC): Primary | ICD-10-CM

## 2020-04-19 DIAGNOSIS — C50.411 MALIGNANT NEOPLASM OF UPPER-OUTER QUADRANT OF RIGHT BREAST IN FEMALE, ESTROGEN RECEPTOR NEGATIVE (HCC): Primary | ICD-10-CM

## 2020-04-19 PROCEDURE — 96372 THER/PROPH/DIAG INJ SC/IM: CPT

## 2020-04-19 NOTE — PROGRESS NOTES
Pt arrives ambulatory with strong steady gait for xarxio 480 mcg subq post chemo from 4/16/20, carbo/taxol/study drugy. Reports feeling better than yesterday. Was fatigued yesterday, today better. Denies fever, chills and any resp symptoms.  Eating a

## 2020-04-23 ENCOUNTER — OFFICE VISIT (OUTPATIENT)
Dept: HEMATOLOGY/ONCOLOGY | Facility: HOSPITAL | Age: 50
End: 2020-04-23
Attending: INTERNAL MEDICINE
Payer: COMMERCIAL

## 2020-04-23 ENCOUNTER — RESEARCH ENCOUNTER (OUTPATIENT)
Dept: HEMATOLOGY/ONCOLOGY | Facility: HOSPITAL | Age: 50
End: 2020-04-23

## 2020-04-23 VITALS
DIASTOLIC BLOOD PRESSURE: 69 MMHG | HEART RATE: 94 BPM | WEIGHT: 209 LBS | OXYGEN SATURATION: 97 % | BODY MASS INDEX: 37 KG/M2 | RESPIRATION RATE: 16 BRPM | TEMPERATURE: 98 F | SYSTOLIC BLOOD PRESSURE: 119 MMHG

## 2020-04-23 VITALS
RESPIRATION RATE: 16 BRPM | TEMPERATURE: 98 F | BODY MASS INDEX: 37.03 KG/M2 | OXYGEN SATURATION: 97 % | HEIGHT: 63 IN | SYSTOLIC BLOOD PRESSURE: 119 MMHG | DIASTOLIC BLOOD PRESSURE: 69 MMHG | HEART RATE: 94 BPM | WEIGHT: 209 LBS

## 2020-04-23 DIAGNOSIS — T45.1X5A CINV (CHEMOTHERAPY-INDUCED NAUSEA AND VOMITING): ICD-10-CM

## 2020-04-23 DIAGNOSIS — T45.1X5A CHEMOTHERAPY INDUCED NEUTROPENIA (HCC): ICD-10-CM

## 2020-04-23 DIAGNOSIS — C50.411 MALIGNANT NEOPLASM OF UPPER-OUTER QUADRANT OF RIGHT BREAST IN FEMALE, ESTROGEN RECEPTOR NEGATIVE (HCC): Primary | ICD-10-CM

## 2020-04-23 DIAGNOSIS — D70.1 CHEMOTHERAPY INDUCED NEUTROPENIA (HCC): ICD-10-CM

## 2020-04-23 DIAGNOSIS — Z17.1 MALIGNANT NEOPLASM OF UPPER-OUTER QUADRANT OF RIGHT BREAST IN FEMALE, ESTROGEN RECEPTOR NEGATIVE (HCC): Primary | ICD-10-CM

## 2020-04-23 DIAGNOSIS — K59.03 DRUG INDUCED CONSTIPATION: ICD-10-CM

## 2020-04-23 DIAGNOSIS — Z51.11 CHEMOTHERAPY MANAGEMENT, ENCOUNTER FOR: ICD-10-CM

## 2020-04-23 DIAGNOSIS — G47.01 INSOMNIA DUE TO MEDICAL CONDITION: ICD-10-CM

## 2020-04-23 DIAGNOSIS — R11.2 CINV (CHEMOTHERAPY-INDUCED NAUSEA AND VOMITING): ICD-10-CM

## 2020-04-23 DIAGNOSIS — K12.31 MUCOSITIS DUE TO CHEMOTHERAPY: ICD-10-CM

## 2020-04-23 PROCEDURE — 96367 TX/PROPH/DG ADDL SEQ IV INF: CPT

## 2020-04-23 PROCEDURE — 85025 COMPLETE CBC W/AUTO DIFF WBC: CPT

## 2020-04-23 PROCEDURE — 96413 CHEMO IV INFUSION 1 HR: CPT

## 2020-04-23 PROCEDURE — S0028 INJECTION, FAMOTIDINE, 20 MG: HCPCS

## 2020-04-23 PROCEDURE — 96375 TX/PRO/DX INJ NEW DRUG ADDON: CPT

## 2020-04-23 PROCEDURE — 99215 OFFICE O/P EST HI 40 MIN: CPT | Performed by: PHYSICIAN ASSISTANT

## 2020-04-23 RX ORDER — HEPARIN SODIUM (PORCINE) LOCK FLUSH IV SOLN 100 UNIT/ML 100 UNIT/ML
SOLUTION INTRAVENOUS
Status: DISPENSED
Start: 2020-04-23 | End: 2020-04-24

## 2020-04-23 RX ORDER — FAMOTIDINE 10 MG/ML
20 INJECTION, SOLUTION INTRAVENOUS ONCE
Status: COMPLETED | OUTPATIENT
Start: 2020-04-23 | End: 2020-04-23

## 2020-04-23 RX ORDER — DIPHENHYDRAMINE HYDROCHLORIDE 50 MG/ML
50 INJECTION INTRAMUSCULAR; INTRAVENOUS ONCE
Status: COMPLETED | OUTPATIENT
Start: 2020-04-23 | End: 2020-04-23

## 2020-04-23 RX ORDER — 0.9 % SODIUM CHLORIDE 0.9 %
10 VIAL (ML) INJECTION ONCE
Status: CANCELLED | OUTPATIENT
Start: 2020-04-23

## 2020-04-23 RX ORDER — ZOLPIDEM TARTRATE 10 MG/1
10 TABLET ORAL NIGHTLY PRN
Qty: 30 TABLET | Refills: 1 | Status: SHIPPED | OUTPATIENT
Start: 2020-04-23 | End: 2020-07-30

## 2020-04-23 RX ORDER — FAMOTIDINE 10 MG/ML
INJECTION, SOLUTION INTRAVENOUS
Status: COMPLETED
Start: 2020-04-23 | End: 2020-04-23

## 2020-04-23 RX ORDER — HEPARIN SODIUM (PORCINE) LOCK FLUSH IV SOLN 100 UNIT/ML 100 UNIT/ML
5 SOLUTION INTRAVENOUS ONCE
Status: COMPLETED | OUTPATIENT
Start: 2020-04-23 | End: 2020-04-23

## 2020-04-23 RX ORDER — HEPARIN SODIUM (PORCINE) LOCK FLUSH IV SOLN 100 UNIT/ML 100 UNIT/ML
5 SOLUTION INTRAVENOUS ONCE
Status: CANCELLED | OUTPATIENT
Start: 2020-04-23

## 2020-04-23 RX ORDER — DIPHENHYDRAMINE HYDROCHLORIDE 50 MG/ML
50 INJECTION INTRAMUSCULAR; INTRAVENOUS ONCE
Status: CANCELLED
Start: 2020-04-23

## 2020-04-23 RX ORDER — DIPHENHYDRAMINE HYDROCHLORIDE 50 MG/ML
INJECTION INTRAMUSCULAR; INTRAVENOUS
Status: COMPLETED
Start: 2020-04-23 | End: 2020-04-23

## 2020-04-23 RX ORDER — 0.9 % SODIUM CHLORIDE 0.9 %
VIAL (ML) INJECTION
Status: DISCONTINUED
Start: 2020-04-23 | End: 2020-04-23

## 2020-04-23 RX ORDER — FAMOTIDINE 10 MG/ML
20 INJECTION, SOLUTION INTRAVENOUS ONCE
Status: CANCELLED | OUTPATIENT
Start: 2020-04-23

## 2020-04-23 RX ADMIN — FAMOTIDINE 20 MG: 10 INJECTION, SOLUTION INTRAVENOUS at 10:18:00

## 2020-04-23 RX ADMIN — DIPHENHYDRAMINE HYDROCHLORIDE 50 MG: 50 INJECTION INTRAMUSCULAR; INTRAVENOUS at 10:15:00

## 2020-04-23 RX ADMIN — HEPARIN SODIUM (PORCINE) LOCK FLUSH IV SOLN 100 UNIT/ML 500 UNITS: 100 SOLUTION INTRAVENOUS at 12:30:00

## 2020-04-23 NOTE — PROGRESS NOTES
Cancer Center Progress Note    Patient Name: Jenn Lee   YOB: 1970   Medical Record Number: [id number]   Attending Physician: Richi Rome M.D.      Chief Complaint:  Breast cancer    Oncology History:  12/2019: mammogram (last mammogram denies decreased oral intake due to mouth sores. Dysgeusia is present. She reports intermittent, mild tingling in her fingertips only.   She denies progression of this PN since last treatment,  Per RN inquiry, patients home BS has been running in the 230- working    Current Medications:    Current Outpatient Medications:   •  Zolpidem Tartrate 10 MG Oral Tab, Take 1 tablet (10 mg total) by mouth nightly as needed for Sleep., Disp: 30 tablet, Rfl: 1  •  maalox/diphenhydramine/lidocaine viscous Oral Suspensio NEXT EZ) W/DEVICE Does not apply Kit, 1 kit by Does not apply route 2 (two) times daily. , Disp: 1 kit, Rfl: 0  •  GITA MICROLET LANCETS Does not apply Misc, Use to test Blood Sugar 2 times daily, Disp: 100 each, Rfl: 1    Allergies:  No Known Allergies Health, CT ABDOMEN & PELVIS W CON, 9/24/2011, 12:05. [address], 6/16/2011, 23:52. [facility] , [address] BREAST RIGHT TARGETED ([id number]=[id number]), 12/27/2019, 13:23.   INDICATIONS: New diagnosis with osseous structures are otherwise unremarkable. LIVER: Diffuse low attenuation seen throughout the liver compatible with fatty infiltration. GALLBLADDER: Normal wall thickness. No pericholecystic fluid.  BILIARY: No intra-or extrahepatic biliary ductal dil 1/10/2020 at 14:54     Approved by (CST): Jose Temple MD on 1/10/2020 at 15:08          Us Breast Biopsy 1 Site Right (cpt=[id number])    Result Date: 1/8/2020  PROCEDURE: US BREAST BIOPSY W CLIP 1 SITE RIGHT  COMPARISON: None. INDICATIONS: Right breast mass. ultrasound guided core biopsy: * Invasive ductal carcinoma, [place] grade III (tubules 3, nuclei 3, mitoses 3), measuring 12.5 mm in greatest linear dimension * See comment          CONCLUSION:   Uneventful ultrasound-guided biopsy of the right breast. ÁNGEL ([id number])  COMPARISON: [facility] IMAGE RIGHT ([id number]), 1/07/2020, 9:09. [facility], [place], [place] BREAST BIOPSY 1 SITE RIGHT ([id number]=[id number]), 1/07/2020, 8:10.   FanBoom internal mammary lymph nodes are seen. CONCLUSION:  1. 5.2 x 2.9 x 3.4 centimeter heterogeneously enhancing irregular mass in the right breast upper inner quadrant represents the biopsy-proven invasive ductal carcinoma.   A 4.2 centimeter area of li 01:00 o'clock 8 centimeters from the nipple accounting for the right breast mammographic finding. Ultrasound of the right axilla was performed. No abnormal lymph nodes are seen.           CONCLUSION:  4.1 x 2.0 x 3.1 centimeter right breast mass at 01:00 bedtime prn.      Left breast mass  -[facility]    [place]  - 84 gene panel ([employer] Multi-Cancer Panel.)  Testing did not yield any known mutations but did yield 2 VUS, one in DONTE (c.5319+6_5319+7del [intronic]) and the other in RAD50 (c.3292C>T)    MDM:

## 2020-04-23 NOTE — PROGRESS NOTES
STUDY: [facility] B-59  PATIENT ID: [id number]  Cycle 4 Day 8 Taxol (Week 11)    Patient evaluated by IHSAN Reed. Labs drawn per protocol. Vitals stable. Patient denies any fevers, chills, or any other s/s of infection.  Patient reporting mild naus

## 2020-04-23 NOTE — PROGRESS NOTES
Completed treatment today with no s/s ofs adverse reaction noted. Returns tomorrow for zarixo injection. With this cycle, needs only one injection. To start ambien at hs. Refer to charting of port, discharged stable from infusion, ambulating steady.

## 2020-04-23 NOTE — PROGRESS NOTES
Pt here for C4 D8  taxol Arrives Ambulating independently, accompanied by Self           Patient denies any fevers, nausea, diarrhea in the last week. Pt is wearing mask and states has been eating and drinking well. Is having nausea and constipation.   Rec

## 2020-04-24 ENCOUNTER — NURSE ONLY (OUTPATIENT)
Dept: HEMATOLOGY/ONCOLOGY | Facility: HOSPITAL | Age: 50
End: 2020-04-24
Attending: INTERNAL MEDICINE
Payer: COMMERCIAL

## 2020-04-24 VITALS — TEMPERATURE: 98 F | RESPIRATION RATE: 16 BRPM

## 2020-04-24 DIAGNOSIS — C50.411 MALIGNANT NEOPLASM OF UPPER-OUTER QUADRANT OF RIGHT BREAST IN FEMALE, ESTROGEN RECEPTOR NEGATIVE (HCC): Primary | ICD-10-CM

## 2020-04-24 DIAGNOSIS — Z17.1 MALIGNANT NEOPLASM OF UPPER-OUTER QUADRANT OF RIGHT BREAST IN FEMALE, ESTROGEN RECEPTOR NEGATIVE (HCC): Primary | ICD-10-CM

## 2020-04-24 PROCEDURE — 96372 THER/PROPH/DIAG INJ SC/IM: CPT

## 2020-04-24 NOTE — PROGRESS NOTES
Pt here for C4 D9 Zarxio SQ  States she had some sleep issues last night - fell asleep early then woke around 0400. Now, she is tired (1400). Eating/drinking well, denies n/v/c/d.   States mild bony leg aches with Zarxio - has elevated legs & taken tylen

## 2020-04-25 ENCOUNTER — APPOINTMENT (OUTPATIENT)
Dept: HEMATOLOGY/ONCOLOGY | Facility: HOSPITAL | Age: 50
End: 2020-04-25
Attending: INTERNAL MEDICINE
Payer: COMMERCIAL

## 2020-04-26 ENCOUNTER — APPOINTMENT (OUTPATIENT)
Dept: HEMATOLOGY/ONCOLOGY | Facility: HOSPITAL | Age: 50
End: 2020-04-26
Attending: INTERNAL MEDICINE
Payer: COMMERCIAL

## 2020-04-30 ENCOUNTER — OFFICE VISIT (OUTPATIENT)
Dept: HEMATOLOGY/ONCOLOGY | Facility: HOSPITAL | Age: 50
End: 2020-04-30
Attending: INTERNAL MEDICINE
Payer: COMMERCIAL

## 2020-04-30 ENCOUNTER — RESEARCH ENCOUNTER (OUTPATIENT)
Dept: HEMATOLOGY/ONCOLOGY | Facility: HOSPITAL | Age: 50
End: 2020-04-30

## 2020-04-30 VITALS
WEIGHT: 204 LBS | HEIGHT: 63 IN | OXYGEN SATURATION: 99 % | HEART RATE: 86 BPM | BODY MASS INDEX: 36.14 KG/M2 | RESPIRATION RATE: 16 BRPM | SYSTOLIC BLOOD PRESSURE: 144 MMHG | DIASTOLIC BLOOD PRESSURE: 74 MMHG | TEMPERATURE: 98 F

## 2020-04-30 VITALS
OXYGEN SATURATION: 99 % | SYSTOLIC BLOOD PRESSURE: 144 MMHG | BODY MASS INDEX: 36 KG/M2 | TEMPERATURE: 98 F | WEIGHT: 204 LBS | RESPIRATION RATE: 16 BRPM | DIASTOLIC BLOOD PRESSURE: 74 MMHG | HEART RATE: 86 BPM

## 2020-04-30 DIAGNOSIS — Z17.1 MALIGNANT NEOPLASM OF UPPER-OUTER QUADRANT OF RIGHT BREAST IN FEMALE, ESTROGEN RECEPTOR NEGATIVE (HCC): Primary | ICD-10-CM

## 2020-04-30 DIAGNOSIS — L27.0 DRUG-INDUCED SKIN RASH: ICD-10-CM

## 2020-04-30 DIAGNOSIS — C50.411 MALIGNANT NEOPLASM OF UPPER-OUTER QUADRANT OF RIGHT BREAST IN FEMALE, ESTROGEN RECEPTOR NEGATIVE (HCC): Primary | ICD-10-CM

## 2020-04-30 DIAGNOSIS — Z51.11 CHEMOTHERAPY MANAGEMENT, ENCOUNTER FOR: ICD-10-CM

## 2020-04-30 DIAGNOSIS — G47.01 INSOMNIA DUE TO MEDICAL CONDITION: ICD-10-CM

## 2020-04-30 PROCEDURE — 99215 OFFICE O/P EST HI 40 MIN: CPT | Performed by: INTERNAL MEDICINE

## 2020-04-30 PROCEDURE — 96413 CHEMO IV INFUSION 1 HR: CPT

## 2020-04-30 PROCEDURE — 96367 TX/PROPH/DG ADDL SEQ IV INF: CPT

## 2020-04-30 PROCEDURE — 85007 BL SMEAR W/DIFF WBC COUNT: CPT

## 2020-04-30 PROCEDURE — 85027 COMPLETE CBC AUTOMATED: CPT

## 2020-04-30 PROCEDURE — 85025 COMPLETE CBC W/AUTO DIFF WBC: CPT

## 2020-04-30 PROCEDURE — 96375 TX/PRO/DX INJ NEW DRUG ADDON: CPT

## 2020-04-30 PROCEDURE — S0028 INJECTION, FAMOTIDINE, 20 MG: HCPCS

## 2020-04-30 RX ORDER — DIPHENHYDRAMINE HYDROCHLORIDE 50 MG/ML
50 INJECTION INTRAMUSCULAR; INTRAVENOUS ONCE
Status: CANCELLED
Start: 2020-04-30

## 2020-04-30 RX ORDER — DIPHENHYDRAMINE HYDROCHLORIDE 50 MG/ML
50 INJECTION INTRAMUSCULAR; INTRAVENOUS ONCE
Status: COMPLETED | OUTPATIENT
Start: 2020-04-30 | End: 2020-04-30

## 2020-04-30 RX ORDER — FAMOTIDINE 10 MG/ML
INJECTION, SOLUTION INTRAVENOUS
Status: DISCONTINUED
Start: 2020-04-30 | End: 2020-04-30

## 2020-04-30 RX ORDER — 0.9 % SODIUM CHLORIDE 0.9 %
VIAL (ML) INJECTION
Status: DISCONTINUED
Start: 2020-04-30 | End: 2020-04-30

## 2020-04-30 RX ORDER — DIPHENHYDRAMINE HYDROCHLORIDE 50 MG/ML
INJECTION INTRAMUSCULAR; INTRAVENOUS
Status: DISCONTINUED
Start: 2020-04-30 | End: 2020-04-30

## 2020-04-30 RX ORDER — HEPARIN SODIUM (PORCINE) LOCK FLUSH IV SOLN 100 UNIT/ML 100 UNIT/ML
SOLUTION INTRAVENOUS
Status: COMPLETED
Start: 2020-04-30 | End: 2020-04-30

## 2020-04-30 RX ORDER — FAMOTIDINE 10 MG/ML
20 INJECTION, SOLUTION INTRAVENOUS ONCE
Status: CANCELLED | OUTPATIENT
Start: 2020-04-30

## 2020-04-30 RX ORDER — FAMOTIDINE 10 MG/ML
20 INJECTION, SOLUTION INTRAVENOUS ONCE
Status: COMPLETED | OUTPATIENT
Start: 2020-04-30 | End: 2020-04-30

## 2020-04-30 RX ADMIN — DIPHENHYDRAMINE HYDROCHLORIDE 50 MG: 50 INJECTION INTRAMUSCULAR; INTRAVENOUS at 11:02:00

## 2020-04-30 RX ADMIN — HEPARIN SODIUM (PORCINE) LOCK FLUSH IV SOLN 100 UNIT/ML 500 UNITS: 100 SOLUTION INTRAVENOUS at 10:15:00

## 2020-04-30 RX ADMIN — FAMOTIDINE 20 MG: 10 INJECTION, SOLUTION INTRAVENOUS at 11:02:00

## 2020-04-30 NOTE — PROGRESS NOTES
Pt here for C4 D15  taxol Arrives Ambulating independently, accompanied by Self           Patient denies any fevers, nausea, diarrhea in the last week. COVID 19 precautions maintained. Reports feeling well, no GI issues.   Anticipating next cycle May 14t

## 2020-04-30 NOTE — PROGRESS NOTES
STUDY: [facility] B-59  PATIENT ID: [id number]  Cycle 4 Day 15 Taxol (Week 12)    Patient evaluated by Dr. Tang Bagley and research RN. Labs drawn per protocol. Vitals stable. Patient denies any fevers, chills, or any other s/s of infection.  MIld nausea-control

## 2020-04-30 NOTE — PROGRESS NOTES
Cancer Center Progress Note    Patient Name: Erwin Elder   YOB: 1970   Medical Record Number: [id number]   Attending Physician: Rosaura Tuttle M.D.      Chief Complaint:  Breast cancer    Oncology History:  12/2019: mammogram (last mammogram No diabetic retinopathy in both eyes 2006    OU   • Obesity (BMI 30-39. 9)    • Obesity, unspecified    • Other and unspecified hyperlipidemia    • Type II or unspecified type diabetes mellitus without mention of complication, not stated as uncontrolled apply route 3 (three) times daily. , Disp: 1 Device, Rfl: 0  •  Glucose Blood (ACCU-CHEK JOSE PLUS) In Vitro Strip, Use 3 times a day, Disp: 100 strip, Rfl: 3  •  insulin glargine (BASAGLAR KWIKPEN) 100 UNIT/ML Subcutaneous Solution Pen-injector, 70 units expansion,  Cardiovascular: palpable symmetric pulses, no cyanosis  Abdomen:  non tender. Obese  Breast: right breast, attn 1:00, no mass or tenderness.   No skin changes, nipple changes or axillary lymphadenopathy  Extremities: No edema  Neurological: [employer]) NRDR ([address]) which includes the Dose Index Registry. FINDINGS: LINES AND TUBES: None.   MEDIASTINUM/VASCULATURE: There are multiple mildly prominent mediastinal lymph nodes which are nonspecific and measure the distal descending colon and sigmoid colon without evidence of diverticulitis. There is no dilation or wall thickening. The appendix is normal. There are no inflammatory changes within the right lower quadrant.   Small hiatal hernia with wall thickening obtained. An ultrasound guided percutaneous biopsy of the right breast was performed in the following manner: The breast was scanned and the region of interest was localized. A site for needle puncture was selected and marked. A time out was taken.   The [facility], US BREAST RIGHT TARGETED ([id number]), 12/27/2019, 13:23. [facility] in [place], [place] ISIDRA 2D+3D SCREENING BILAT ([id number]), 12/07/2019, 8:10. INDICATIONS: Unspecified lump in the right breast, unspecified quadrant.   48-year coil.  Contrast enhancement analysis was assisted by computer-aided detection.    LMP:  Per patient 14 years ago  Of note: (#/#)= (series/image number) All measurements are listed in AP x trans x CC  AMOUNT OF FIBROGLANDULAR TISSUE:  There is heterogeneous centimeter enhancing irregular mass in the left breast lower outer quadrant (best seen at location-67.0) is low suspicion for malignancy. MRI-guided biopsy is recommended.    BI-RADS CATEGORY:   DIAGNOSTIC CATEGORY 4--SUSPICIOUS   RECOMMENDATIONS:  Asad Flanagan by (CST): Nay Cooper MD on 12/27/2019 at 13:41     Approved by (CST): Nay Cooper MD on 12/27/2019 at 13:52             Impression and Plan:  Cancer Staging  Malignant neoplasm of upper-outer quadrant of right breast in female, estrogen re

## 2020-05-01 ENCOUNTER — VIRTUAL PHONE E/M (OUTPATIENT)
Dept: INTERNAL MEDICINE CLINIC | Facility: CLINIC | Age: 50
End: 2020-05-01
Payer: COMMERCIAL

## 2020-05-01 ENCOUNTER — APPOINTMENT (OUTPATIENT)
Dept: HEMATOLOGY/ONCOLOGY | Facility: HOSPITAL | Age: 50
End: 2020-05-01
Attending: INTERNAL MEDICINE
Payer: COMMERCIAL

## 2020-05-01 DIAGNOSIS — Z79.4 TYPE 2 DIABETES MELLITUS WITH HYPERGLYCEMIA, WITH LONG-TERM CURRENT USE OF INSULIN (HCC): Primary | ICD-10-CM

## 2020-05-01 DIAGNOSIS — U07.1 COVID-19 VIRUS INFECTION: ICD-10-CM

## 2020-05-01 DIAGNOSIS — E11.65 TYPE 2 DIABETES MELLITUS WITH HYPERGLYCEMIA, WITH LONG-TERM CURRENT USE OF INSULIN (HCC): Primary | ICD-10-CM

## 2020-05-01 DIAGNOSIS — I10 ESSENTIAL HYPERTENSION: ICD-10-CM

## 2020-05-01 PROCEDURE — 99214 OFFICE O/P EST MOD 30 MIN: CPT | Performed by: INTERNAL MEDICINE

## 2020-05-01 NOTE — ASSESSMENT & PLAN NOTE
Current meds-Basaglar 70 units a day,novolog- 15 with sliding scale. Due to steroids and prune juice and fruit-the sugars are elevated. Has not been taking the novolog.     Sugars at home-  Before bf-201,157  Before dinner-215, 264

## 2020-05-01 NOTE — PROGRESS NOTES
HPI:    Patient ID: Bushra Ovalles is a 53year old female.   Telehealth outside of [facility] [address] Verbal Consent   I conducted a telehealth visit with Bushra Ovalles today, 05/01/20, which was completed using two-way, real-time interactive audio and week ago secondary to leukopenia), diabetes, hypertension presenting for evaluation of ongoing though improving cough over past week noting initial white sputum production now transitioned to clear sputum.   Of note, patient with positive COVID as diagnosed ductal carcinoma to the level of the skin. There is an axillary lymph node with focal cortical thickening measuring up to 0.9 centimeters is seen in the high right axilla.  No other abnormal lymph nodes are seen in the right axilla.  No abnormal right inter walking. She has tried rest and relaxation (steroids) for the symptoms. The treatment provided moderate relief. Tired   This is a chronic problem. The current episode started more than 1 month ago. The problem occurs constantly.  The problem has been waxi -300.  She has been on steroids    No specific reason for skipping.) An ACE inhibitor/angiotensin II receptor blocker is not being taken (d/cd due to low pressures/vomiting and chemo). She does not see a podiatrist.Eye exam is current.        Review of Syst • Fluticasone Propionate 50 MCG/ACT Nasal Suspension 2 sprays by Nasal route daily.  USE TWO SPRAY IN EACH NOSTRIL EVERY DAY 1 Bottle 5   • Insulin Pen Needle (BD PEN NEEDLE MINI U/F) 31G X 5 MM Does not apply Misc Use 4 times a day as directed 400 each 2 novolog.     Sugars at home-  Before bf-201,157  Before dinner-215, 264               Relevant Medications    Insulin Aspart Pen (NOVOLOG FLEXPEN) 100 UNIT/ML Subcutaneous Solution Pen-injector    Other Relevant Orders    CBC WITH DIFFERENTIAL WITH PLATELET

## 2020-05-02 ENCOUNTER — APPOINTMENT (OUTPATIENT)
Dept: HEMATOLOGY/ONCOLOGY | Facility: HOSPITAL | Age: 50
End: 2020-05-02
Attending: INTERNAL MEDICINE
Payer: COMMERCIAL

## 2020-05-03 ENCOUNTER — APPOINTMENT (OUTPATIENT)
Dept: HEMATOLOGY/ONCOLOGY | Facility: HOSPITAL | Age: 50
End: 2020-05-03
Attending: INTERNAL MEDICINE
Payer: COMMERCIAL

## 2020-05-06 ENCOUNTER — TELEPHONE (OUTPATIENT)
Dept: HEMATOLOGY/ONCOLOGY | Facility: HOSPITAL | Age: 50
End: 2020-05-06

## 2020-05-06 NOTE — TELEPHONE ENCOUNTER
Left message for Alexandro Mcelroy asking if she would be willing on the 14th to go to the [facility] for labs, then come over to the cancer center to see Dr Lexus Salazar and have treatment. Asked for a call back to confirm.

## 2020-05-07 ENCOUNTER — APPOINTMENT (OUTPATIENT)
Dept: HEMATOLOGY/ONCOLOGY | Facility: HOSPITAL | Age: 50
End: 2020-05-07
Attending: INTERNAL MEDICINE
Payer: COMMERCIAL

## 2020-05-08 ENCOUNTER — APPOINTMENT (OUTPATIENT)
Dept: HEMATOLOGY/ONCOLOGY | Facility: HOSPITAL | Age: 50
End: 2020-05-08
Attending: INTERNAL MEDICINE
Payer: COMMERCIAL

## 2020-05-13 ENCOUNTER — APPOINTMENT (OUTPATIENT)
Dept: HEMATOLOGY/ONCOLOGY | Facility: HOSPITAL | Age: 50
End: 2020-05-13
Attending: INTERNAL MEDICINE
Payer: COMMERCIAL

## 2020-05-14 ENCOUNTER — RESEARCH ENCOUNTER (OUTPATIENT)
Dept: HEMATOLOGY/ONCOLOGY | Facility: HOSPITAL | Age: 50
End: 2020-05-14

## 2020-05-14 ENCOUNTER — APPOINTMENT (OUTPATIENT)
Dept: HEMATOLOGY/ONCOLOGY | Facility: HOSPITAL | Age: 50
End: 2020-05-14
Attending: INTERNAL MEDICINE
Payer: COMMERCIAL

## 2020-05-14 ENCOUNTER — LAB ENCOUNTER (OUTPATIENT)
Dept: LAB | Facility: HOSPITAL | Age: 50
End: 2020-05-14
Attending: INTERNAL MEDICINE
Payer: COMMERCIAL

## 2020-05-14 VITALS
OXYGEN SATURATION: 99 % | HEIGHT: 63 IN | TEMPERATURE: 99 F | RESPIRATION RATE: 16 BRPM | BODY MASS INDEX: 38.8 KG/M2 | WEIGHT: 219 LBS | HEART RATE: 89 BPM | SYSTOLIC BLOOD PRESSURE: 133 MMHG | DIASTOLIC BLOOD PRESSURE: 69 MMHG

## 2020-05-14 VITALS
BODY MASS INDEX: 38.8 KG/M2 | HEART RATE: 89 BPM | RESPIRATION RATE: 16 BRPM | SYSTOLIC BLOOD PRESSURE: 133 MMHG | TEMPERATURE: 99 F | WEIGHT: 219 LBS | OXYGEN SATURATION: 99 % | HEIGHT: 63 IN | DIASTOLIC BLOOD PRESSURE: 69 MMHG

## 2020-05-14 DIAGNOSIS — C50.411 MALIGNANT NEOPLASM OF UPPER-OUTER QUADRANT OF RIGHT BREAST IN FEMALE, ESTROGEN RECEPTOR NEGATIVE (HCC): Primary | ICD-10-CM

## 2020-05-14 DIAGNOSIS — C50.411 MALIGNANT NEOPLASM OF UPPER-OUTER QUADRANT OF RIGHT BREAST IN FEMALE, ESTROGEN RECEPTOR NEGATIVE (HCC): ICD-10-CM

## 2020-05-14 DIAGNOSIS — Z17.1 MALIGNANT NEOPLASM OF UPPER-OUTER QUADRANT OF RIGHT BREAST IN FEMALE, ESTROGEN RECEPTOR NEGATIVE (HCC): ICD-10-CM

## 2020-05-14 DIAGNOSIS — Z17.1 MALIGNANT NEOPLASM OF UPPER-OUTER QUADRANT OF RIGHT BREAST IN FEMALE, ESTROGEN RECEPTOR NEGATIVE (HCC): Primary | ICD-10-CM

## 2020-05-14 DIAGNOSIS — Z51.11 CHEMOTHERAPY MANAGEMENT, ENCOUNTER FOR: ICD-10-CM

## 2020-05-14 PROCEDURE — 80053 COMPREHEN METABOLIC PANEL: CPT

## 2020-05-14 PROCEDURE — 99215 OFFICE O/P EST HI 40 MIN: CPT | Performed by: INTERNAL MEDICINE

## 2020-05-14 PROCEDURE — 85025 COMPLETE CBC W/AUTO DIFF WBC: CPT

## 2020-05-14 PROCEDURE — 96375 TX/PRO/DX INJ NEW DRUG ADDON: CPT

## 2020-05-14 PROCEDURE — 96413 CHEMO IV INFUSION 1 HR: CPT

## 2020-05-14 PROCEDURE — 96411 CHEMO IV PUSH ADDL DRUG: CPT

## 2020-05-14 PROCEDURE — 96417 CHEMO IV INFUS EACH ADDL SEQ: CPT

## 2020-05-14 PROCEDURE — 96367 TX/PROPH/DG ADDL SEQ IV INF: CPT

## 2020-05-14 PROCEDURE — 96377 APPLICATON ON-BODY INJECTOR: CPT

## 2020-05-14 PROCEDURE — 84443 ASSAY THYROID STIM HORMONE: CPT

## 2020-05-14 PROCEDURE — 82533 TOTAL CORTISOL: CPT

## 2020-05-14 PROCEDURE — 36415 COLL VENOUS BLD VENIPUNCTURE: CPT

## 2020-05-14 RX ORDER — HEPARIN SODIUM (PORCINE) LOCK FLUSH IV SOLN 100 UNIT/ML 100 UNIT/ML
SOLUTION INTRAVENOUS
Status: COMPLETED
Start: 2020-05-14 | End: 2020-05-14

## 2020-05-14 RX ORDER — LIDOCAINE AND PRILOCAINE 25; 25 MG/G; MG/G
CREAM TOPICAL
Qty: 1 TUBE | Refills: 0 | Status: SHIPPED | OUTPATIENT
Start: 2020-05-14 | End: 2020-06-25

## 2020-05-14 RX ORDER — 0.9 % SODIUM CHLORIDE 0.9 %
VIAL (ML) INJECTION
Status: DISCONTINUED
Start: 2020-05-14 | End: 2020-05-14

## 2020-05-14 RX ORDER — DOXORUBICIN HYDROCHLORIDE 2 MG/ML
60 INJECTION, SOLUTION INTRAVENOUS ONCE
Status: COMPLETED | OUTPATIENT
Start: 2020-05-14 | End: 2020-05-14

## 2020-05-14 RX ORDER — LORATADINE 10 MG/1
10 TABLET ORAL DAILY
Qty: 30 TABLET | Refills: 0 | Status: SHIPPED | OUTPATIENT
Start: 2020-05-14 | End: 2020-07-30

## 2020-05-14 RX ORDER — DOXORUBICIN HYDROCHLORIDE 2 MG/ML
60 INJECTION, SOLUTION INTRAVENOUS ONCE
Status: CANCELLED | OUTPATIENT
Start: 2020-05-14

## 2020-05-14 RX ADMIN — HEPARIN SODIUM (PORCINE) LOCK FLUSH IV SOLN 100 UNIT/ML 500 UNITS: 100 SOLUTION INTRAVENOUS at 12:04:00

## 2020-05-14 RX ADMIN — DOXORUBICIN HYDROCHLORIDE 120 MG: 2 INJECTION, SOLUTION INTRAVENOUS at 10:04:00

## 2020-05-14 NOTE — PATIENT INSTRUCTIONS
On-body Injector for Neulasta Patient Instructions       Your On-Body Injection device was applied on this day:  5/14/20 at this time 12 noon    Your dose of medication will start on this day: 5/15/20 at this angelic

## 2020-05-14 NOTE — PROGRESS NOTES
Pt here for C5 D1  AC & Atezolizumab/placebo. Pt on trial NSABP B-59. Arrives Ambulating independently from MD office visit, accompanied by Self           Patient denies any fevers, nausea, diarrhea in the last week.   Pt is wearing a mask, states she has b

## 2020-05-14 NOTE — PROGRESS NOTES
Cancer Center Progress Note    Patient Name: Jenn Lee   YOB: 1970   Medical Record Number: [id number]   Attending Physician: Richi Rome M.D.      Chief Complaint:  Breast cancer    Oncology History:  12/2019: mammogram (last mammogram II or unspecified type diabetes mellitus without mention of complication, not stated as uncontrolled    • Unspecified essential hypertension        Past Surgical History:  Past Surgical History:   Procedure Laterality Date   • ABLATION      abdominal   • C total) by mouth every 6 (six) hours as needed for Nausea., Disp: 60 tablet, Rfl: 3  •  Ondansetron HCl (ZOFRAN) 8 MG tablet, Take 1 tablet (8 mg total) by mouth every 8 (eight) hours as needed for Nausea., Disp: 30 tablet, Rfl: 3  •  Blood Glucose Monitori right breast, attn 1:00, no mass or tenderness.   No skin changes, nipple changes or axillary lymphadenopathy  Extremities: No edema  Neurological: motor strength grossly intact  Psych: appropriate mood and affect    Skin: hyperkeratotic rash on cheeks pres CATEGORY 4C--SUSPICIOUS  (moderate suspicion for malignancy)  RECOMMENDATIONS:  ULTRASOUND-GUIDED CORE BIOPSY: RIGHT BREAST             Dictated by (CST): Bebe Simon MD on 12/27/2019 at 13:41     Approved by (CST): Bebe Simon MD on 12/27/

## 2020-05-14 NOTE — PROGRESS NOTES
STUDY: [facility] B-59  PATIENT ID: [id number]  Cycle 5 Day 1 AC+Atezo/Placebo (Week 13)    Patient evaluated by Dr. Emmanuel Rowell and research RN. Patient denies any fevers, chills, or any other s/s of infection.  Patient reporting feeling much better with having

## 2020-05-21 ENCOUNTER — TELEPHONE (OUTPATIENT)
Dept: HEMATOLOGY/ONCOLOGY | Facility: HOSPITAL | Age: 50
End: 2020-05-21

## 2020-05-21 ENCOUNTER — APPOINTMENT (OUTPATIENT)
Dept: HEMATOLOGY/ONCOLOGY | Facility: HOSPITAL | Age: 50
End: 2020-05-21
Attending: INTERNAL MEDICINE
Payer: COMMERCIAL

## 2020-05-21 DIAGNOSIS — C50.411 MALIGNANT NEOPLASM OF UPPER-OUTER QUADRANT OF RIGHT BREAST IN FEMALE, ESTROGEN RECEPTOR NEGATIVE (HCC): Primary | ICD-10-CM

## 2020-05-21 DIAGNOSIS — Z17.1 MALIGNANT NEOPLASM OF UPPER-OUTER QUADRANT OF RIGHT BREAST IN FEMALE, ESTROGEN RECEPTOR NEGATIVE (HCC): Primary | ICD-10-CM

## 2020-05-21 NOTE — TELEPHONE ENCOUNTER
Estefania Coello returning call regarding office visit on July 9th? Did not see appt scheduled for July 9th.  Thanks Bambi Ruiz

## 2020-05-21 NOTE — TELEPHONE ENCOUNTER
Clarification given to [facility]. She will be able to leave on July 5th and return on July 13th. Reviewed neutropenic precautions and wearing a mask on her flight home. Verbalized understanding.

## 2020-05-21 NOTE — TELEPHONE ENCOUNTER
Spoke with Deb. After discussing with the research nurses it has been determined that Deb will not be able to leave for her vacation unitl after the 9th of July. The MRI needs to be 2 weeks post treatment.   She would also have to return the week

## 2020-05-22 ENCOUNTER — APPOINTMENT (OUTPATIENT)
Dept: HEMATOLOGY/ONCOLOGY | Facility: HOSPITAL | Age: 50
End: 2020-05-22
Attending: INTERNAL MEDICINE
Payer: COMMERCIAL

## 2020-05-26 ENCOUNTER — TELEPHONE (OUTPATIENT)
Dept: HEMATOLOGY/ONCOLOGY | Facility: HOSPITAL | Age: 50
End: 2020-05-26

## 2020-05-26 NOTE — TELEPHONE ENCOUNTER
Phoned patient for continuation of care coordination. Patient is due to complete chemotherapy on 6/25. Scheduled for breast MRI 7/14 at 12:30, as well as follow up with Dr. Alicia Amezquita 7/15 at 11:30. Provided all appointment information to patient.   She ackn

## 2020-05-28 ENCOUNTER — NURSE ONLY (OUTPATIENT)
Dept: HEMATOLOGY/ONCOLOGY | Facility: HOSPITAL | Age: 50
End: 2020-05-28
Attending: INTERNAL MEDICINE
Payer: COMMERCIAL

## 2020-05-28 ENCOUNTER — RESEARCH ENCOUNTER (OUTPATIENT)
Dept: HEMATOLOGY/ONCOLOGY | Facility: HOSPITAL | Age: 50
End: 2020-05-28

## 2020-05-28 VITALS
RESPIRATION RATE: 16 BRPM | HEIGHT: 63 IN | WEIGHT: 212 LBS | DIASTOLIC BLOOD PRESSURE: 64 MMHG | SYSTOLIC BLOOD PRESSURE: 128 MMHG | HEART RATE: 87 BPM | OXYGEN SATURATION: 98 % | TEMPERATURE: 99 F | BODY MASS INDEX: 37.56 KG/M2

## 2020-05-28 VITALS
TEMPERATURE: 99 F | SYSTOLIC BLOOD PRESSURE: 128 MMHG | RESPIRATION RATE: 16 BRPM | OXYGEN SATURATION: 98 % | DIASTOLIC BLOOD PRESSURE: 64 MMHG | WEIGHT: 212 LBS | HEART RATE: 87 BPM | BODY MASS INDEX: 38 KG/M2

## 2020-05-28 DIAGNOSIS — C50.411 MALIGNANT NEOPLASM OF UPPER-OUTER QUADRANT OF RIGHT BREAST IN FEMALE, ESTROGEN RECEPTOR NEGATIVE (HCC): Primary | ICD-10-CM

## 2020-05-28 DIAGNOSIS — C50.411 MALIGNANT NEOPLASM OF UPPER-OUTER QUADRANT OF RIGHT BREAST IN FEMALE, ESTROGEN RECEPTOR NEGATIVE (HCC): ICD-10-CM

## 2020-05-28 DIAGNOSIS — Z17.1 MALIGNANT NEOPLASM OF UPPER-OUTER QUADRANT OF RIGHT BREAST IN FEMALE, ESTROGEN RECEPTOR NEGATIVE (HCC): ICD-10-CM

## 2020-05-28 DIAGNOSIS — T45.1X5A CHEMOTHERAPY INDUCED NEUTROPENIA (HCC): Primary | ICD-10-CM

## 2020-05-28 DIAGNOSIS — Z51.11 CHEMOTHERAPY MANAGEMENT, ENCOUNTER FOR: ICD-10-CM

## 2020-05-28 DIAGNOSIS — K12.31 MUCOSITIS DUE TO CHEMOTHERAPY: ICD-10-CM

## 2020-05-28 DIAGNOSIS — D70.1 CHEMOTHERAPY INDUCED NEUTROPENIA (HCC): Primary | ICD-10-CM

## 2020-05-28 DIAGNOSIS — Z17.1 MALIGNANT NEOPLASM OF UPPER-OUTER QUADRANT OF RIGHT BREAST IN FEMALE, ESTROGEN RECEPTOR NEGATIVE (HCC): Primary | ICD-10-CM

## 2020-05-28 DIAGNOSIS — K59.03 DRUG INDUCED CONSTIPATION: ICD-10-CM

## 2020-05-28 PROCEDURE — 96377 APPLICATON ON-BODY INJECTOR: CPT

## 2020-05-28 PROCEDURE — 80053 COMPREHEN METABOLIC PANEL: CPT

## 2020-05-28 PROCEDURE — 99215 OFFICE O/P EST HI 40 MIN: CPT | Performed by: INTERNAL MEDICINE

## 2020-05-28 PROCEDURE — 96411 CHEMO IV PUSH ADDL DRUG: CPT

## 2020-05-28 PROCEDURE — 96413 CHEMO IV INFUSION 1 HR: CPT

## 2020-05-28 PROCEDURE — 96367 TX/PROPH/DG ADDL SEQ IV INF: CPT

## 2020-05-28 PROCEDURE — 96375 TX/PRO/DX INJ NEW DRUG ADDON: CPT

## 2020-05-28 PROCEDURE — 85025 COMPLETE CBC W/AUTO DIFF WBC: CPT

## 2020-05-28 RX ORDER — 0.9 % SODIUM CHLORIDE 0.9 %
VIAL (ML) INJECTION
Status: DISPENSED
Start: 2020-05-28 | End: 2020-05-28

## 2020-05-28 RX ORDER — DEXAMETHASONE 4 MG/1
8 TABLET ORAL
Qty: 3 TABLET | Refills: 0 | Status: SHIPPED | OUTPATIENT
Start: 2020-05-28 | End: 2020-06-11

## 2020-05-28 RX ORDER — HEPARIN SODIUM (PORCINE) LOCK FLUSH IV SOLN 100 UNIT/ML 100 UNIT/ML
SOLUTION INTRAVENOUS
Status: COMPLETED
Start: 2020-05-28 | End: 2020-05-28

## 2020-05-28 RX ORDER — 0.9 % SODIUM CHLORIDE 0.9 %
10 VIAL (ML) INJECTION ONCE
Status: CANCELLED | OUTPATIENT
Start: 2020-05-28

## 2020-05-28 RX ORDER — DOXORUBICIN HYDROCHLORIDE 2 MG/ML
60 INJECTION, SOLUTION INTRAVENOUS ONCE
Status: CANCELLED | OUTPATIENT
Start: 2020-05-28

## 2020-05-28 RX ORDER — DOXORUBICIN HYDROCHLORIDE 2 MG/ML
60 INJECTION, SOLUTION INTRAVENOUS ONCE
Status: COMPLETED | OUTPATIENT
Start: 2020-05-28 | End: 2020-05-28

## 2020-05-28 RX ORDER — HEPARIN SODIUM (PORCINE) LOCK FLUSH IV SOLN 100 UNIT/ML 100 UNIT/ML
5 SOLUTION INTRAVENOUS ONCE
Status: COMPLETED | OUTPATIENT
Start: 2020-05-28 | End: 2020-05-28

## 2020-05-28 RX ORDER — HEPARIN SODIUM (PORCINE) LOCK FLUSH IV SOLN 100 UNIT/ML 100 UNIT/ML
5 SOLUTION INTRAVENOUS ONCE
Status: CANCELLED | OUTPATIENT
Start: 2020-05-28

## 2020-05-28 RX ADMIN — HEPARIN SODIUM (PORCINE) LOCK FLUSH IV SOLN 100 UNIT/ML 500 UNITS: 100 SOLUTION INTRAVENOUS at 10:48:00

## 2020-05-28 RX ADMIN — DOXORUBICIN HYDROCHLORIDE 120 MG: 2 INJECTION, SOLUTION INTRAVENOUS at 09:21:00

## 2020-05-28 NOTE — PROGRESS NOTES
Pt here for C6 D1  AC . Pt on trial NSABP B-59. Arrives Ambulating independently from MD office visit, accompanied by Self           Patient c/o being very fatigued for a whole week post last chemo. Denies any problems with nausea.   She did have mouth sore

## 2020-05-28 NOTE — PATIENT INSTRUCTIONS
On-body Injector for Neulasta Patient Instructions       Your On-Body Injection device was applied on this day:  5/28/20 at this time 10:30AM    Your dose of medication will start on this day: 5/29/20 at this angelic

## 2020-05-28 NOTE — PROGRESS NOTES
STUDY: [facility] B-59  PATIENT ID: [id number]  Cycle 6 Day 1 AC only    Patient evaluated by Dr. Jolene Robins and research RN. Patient denies any fevers, chills, or any other s/s of infection.  Moderate fatigue last week, much improved today and is still able to

## 2020-05-28 NOTE — PROGRESS NOTES
Cancer Center Progress Note    Patient Name: Yared Crease   YOB: 1970   Medical Record Number: [id number]   Attending Physician: Tyler Mac M.D.      Chief Complaint:  Breast cancer    Oncology History:  12/2019: mammogram (last mammogram disc asymmetry 2006    OU   • No diabetic retinopathy in both eyes 2006    OU   • Obesity (BMI 30-39. 9)    • Obesity, unspecified    • Other and unspecified hyperlipidemia    • Type II or unspecified type diabetes mellitus without mention of complication, viscous Oral Suspension, Take 5 mL by mouth 4 (four) times daily before meals and nightly., Disp: 480 mL, Rfl: 0  •  Prochlorperazine Maleate (COMPAZINE) 10 mg tablet, Take 1 tablet (10 mg total) by mouth every 6 (six) hours as needed for Nausea., Disp: 60 in acute distress. HEENT: EOMs intact. Small exudate on the soft palate, 1mm size, no thrush, no open lesions, sores present today  Chest: Non-labored breathing.  Symmetric expansion,  Cardiovascular: palpable symmetric pulses, no cyanosis  Abdomen:  non t for the mammographic finding is moderately suspicious for malignancy. Ultrasound-guided biopsy is recommended.   Findings and recommendations were discussed with the patient immediately following exam.  BI-RADS CATEGORY:   DIAGNOSTIC CATEGORY 4C--SUSPICIOU

## 2020-06-04 ENCOUNTER — OFFICE VISIT (OUTPATIENT)
Dept: HEMATOLOGY/ONCOLOGY | Facility: HOSPITAL | Age: 50
End: 2020-06-04
Attending: INTERNAL MEDICINE
Payer: COMMERCIAL

## 2020-06-04 ENCOUNTER — RESEARCH ENCOUNTER (OUTPATIENT)
Dept: HEMATOLOGY/ONCOLOGY | Facility: HOSPITAL | Age: 50
End: 2020-06-04

## 2020-06-04 VITALS
SYSTOLIC BLOOD PRESSURE: 130 MMHG | RESPIRATION RATE: 16 BRPM | OXYGEN SATURATION: 100 % | TEMPERATURE: 99 F | DIASTOLIC BLOOD PRESSURE: 68 MMHG | HEART RATE: 90 BPM

## 2020-06-04 DIAGNOSIS — Z17.1 MALIGNANT NEOPLASM OF UPPER-OUTER QUADRANT OF RIGHT BREAST IN FEMALE, ESTROGEN RECEPTOR NEGATIVE (HCC): ICD-10-CM

## 2020-06-04 DIAGNOSIS — C50.411 MALIGNANT NEOPLASM OF UPPER-OUTER QUADRANT OF RIGHT BREAST IN FEMALE, ESTROGEN RECEPTOR NEGATIVE (HCC): ICD-10-CM

## 2020-06-04 DIAGNOSIS — T45.1X5A CHEMOTHERAPY INDUCED NEUTROPENIA (HCC): Primary | ICD-10-CM

## 2020-06-04 DIAGNOSIS — T45.1X5A CINV (CHEMOTHERAPY-INDUCED NAUSEA AND VOMITING): ICD-10-CM

## 2020-06-04 DIAGNOSIS — R11.2 CINV (CHEMOTHERAPY-INDUCED NAUSEA AND VOMITING): ICD-10-CM

## 2020-06-04 DIAGNOSIS — D70.1 CHEMOTHERAPY INDUCED NEUTROPENIA (HCC): Primary | ICD-10-CM

## 2020-06-04 PROCEDURE — 96361 HYDRATE IV INFUSION ADD-ON: CPT

## 2020-06-04 PROCEDURE — 96413 CHEMO IV INFUSION 1 HR: CPT

## 2020-06-04 RX ORDER — HEPARIN SODIUM (PORCINE) LOCK FLUSH IV SOLN 100 UNIT/ML 100 UNIT/ML
SOLUTION INTRAVENOUS
Status: COMPLETED
Start: 2020-06-04 | End: 2020-06-04

## 2020-06-04 RX ORDER — 0.9 % SODIUM CHLORIDE 0.9 %
VIAL (ML) INJECTION
Status: DISCONTINUED
Start: 2020-06-04 | End: 2020-06-04

## 2020-06-04 RX ORDER — 0.9 % SODIUM CHLORIDE 0.9 %
10 VIAL (ML) INJECTION ONCE
Status: DISCONTINUED | OUTPATIENT
Start: 2020-06-04 | End: 2020-06-04

## 2020-06-04 RX ORDER — HEPARIN SODIUM (PORCINE) LOCK FLUSH IV SOLN 100 UNIT/ML 100 UNIT/ML
5 SOLUTION INTRAVENOUS ONCE
Status: CANCELLED | OUTPATIENT
Start: 2020-06-04

## 2020-06-04 RX ORDER — HEPARIN SODIUM (PORCINE) LOCK FLUSH IV SOLN 100 UNIT/ML 100 UNIT/ML
5 SOLUTION INTRAVENOUS ONCE
Status: COMPLETED | OUTPATIENT
Start: 2020-06-04 | End: 2020-06-04

## 2020-06-04 RX ORDER — 0.9 % SODIUM CHLORIDE 0.9 %
10 VIAL (ML) INJECTION ONCE
Status: CANCELLED | OUTPATIENT
Start: 2020-06-04

## 2020-06-04 RX ADMIN — HEPARIN SODIUM (PORCINE) LOCK FLUSH IV SOLN 100 UNIT/ML 500 UNITS: 100 SOLUTION INTRAVENOUS at 10:10:00

## 2020-06-04 NOTE — PROGRESS NOTES
Pt here for C6 D8 Placebo/Tecentriq plus hydration . Pt on trial NSABP B-59.  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

## 2020-06-04 NOTE — PROGRESS NOTES
STUDY: NSABP- B59  ID #[id number]  ATEZO/PLACEBO DOSE #6     Patient is s/p C6D1 of AC last week. She is here for her dose #6 of study drug. Following treatment last week, she felt sluggish for 4 days.  Still has fatigue, but this is relieved by

## 2020-06-11 ENCOUNTER — RESEARCH ENCOUNTER (OUTPATIENT)
Dept: HEMATOLOGY/ONCOLOGY | Facility: HOSPITAL | Age: 50
End: 2020-06-11

## 2020-06-11 ENCOUNTER — NURSE ONLY (OUTPATIENT)
Dept: HEMATOLOGY/ONCOLOGY | Facility: HOSPITAL | Age: 50
End: 2020-06-11
Attending: INTERNAL MEDICINE
Payer: COMMERCIAL

## 2020-06-11 VITALS
TEMPERATURE: 98 F | BODY MASS INDEX: 37 KG/M2 | RESPIRATION RATE: 16 BRPM | HEART RATE: 77 BPM | WEIGHT: 210 LBS | OXYGEN SATURATION: 99 % | SYSTOLIC BLOOD PRESSURE: 116 MMHG | DIASTOLIC BLOOD PRESSURE: 65 MMHG

## 2020-06-11 VITALS
HEIGHT: 63 IN | RESPIRATION RATE: 16 BRPM | SYSTOLIC BLOOD PRESSURE: 116 MMHG | BODY MASS INDEX: 37.21 KG/M2 | HEART RATE: 77 BPM | WEIGHT: 210 LBS | OXYGEN SATURATION: 99 % | DIASTOLIC BLOOD PRESSURE: 65 MMHG | TEMPERATURE: 98 F

## 2020-06-11 DIAGNOSIS — T45.1X5A CHEMOTHERAPY INDUCED NEUTROPENIA (HCC): ICD-10-CM

## 2020-06-11 DIAGNOSIS — Z17.1 MALIGNANT NEOPLASM OF UPPER-OUTER QUADRANT OF RIGHT BREAST IN FEMALE, ESTROGEN RECEPTOR NEGATIVE (HCC): Primary | ICD-10-CM

## 2020-06-11 DIAGNOSIS — C50.411 MALIGNANT NEOPLASM OF UPPER-OUTER QUADRANT OF RIGHT BREAST IN FEMALE, ESTROGEN RECEPTOR NEGATIVE (HCC): Primary | ICD-10-CM

## 2020-06-11 DIAGNOSIS — Z51.11 CHEMOTHERAPY MANAGEMENT, ENCOUNTER FOR: ICD-10-CM

## 2020-06-11 DIAGNOSIS — K12.31 MUCOSITIS DUE TO CHEMOTHERAPY: ICD-10-CM

## 2020-06-11 DIAGNOSIS — R74.8 ALKALINE PHOSPHATASE ELEVATION: ICD-10-CM

## 2020-06-11 DIAGNOSIS — D70.1 CHEMOTHERAPY INDUCED NEUTROPENIA (HCC): ICD-10-CM

## 2020-06-11 PROCEDURE — 85025 COMPLETE CBC W/AUTO DIFF WBC: CPT

## 2020-06-11 PROCEDURE — 96377 APPLICATON ON-BODY INJECTOR: CPT

## 2020-06-11 PROCEDURE — 96367 TX/PROPH/DG ADDL SEQ IV INF: CPT

## 2020-06-11 PROCEDURE — 96411 CHEMO IV PUSH ADDL DRUG: CPT

## 2020-06-11 PROCEDURE — 85027 COMPLETE CBC AUTOMATED: CPT

## 2020-06-11 PROCEDURE — 85007 BL SMEAR W/DIFF WBC COUNT: CPT

## 2020-06-11 PROCEDURE — 96413 CHEMO IV INFUSION 1 HR: CPT

## 2020-06-11 PROCEDURE — 99215 OFFICE O/P EST HI 40 MIN: CPT | Performed by: INTERNAL MEDICINE

## 2020-06-11 PROCEDURE — 80053 COMPREHEN METABOLIC PANEL: CPT

## 2020-06-11 RX ORDER — HEPARIN SODIUM (PORCINE) LOCK FLUSH IV SOLN 100 UNIT/ML 100 UNIT/ML
5 SOLUTION INTRAVENOUS ONCE
Status: CANCELLED | OUTPATIENT
Start: 2020-06-11

## 2020-06-11 RX ORDER — 0.9 % SODIUM CHLORIDE 0.9 %
10 VIAL (ML) INJECTION ONCE
Status: CANCELLED | OUTPATIENT
Start: 2020-06-11

## 2020-06-11 RX ORDER — DOXORUBICIN HYDROCHLORIDE 2 MG/ML
60 INJECTION, SOLUTION INTRAVENOUS ONCE
Status: CANCELLED | OUTPATIENT
Start: 2020-06-11

## 2020-06-11 RX ORDER — 0.9 % SODIUM CHLORIDE 0.9 %
VIAL (ML) INJECTION
Status: DISCONTINUED
Start: 2020-06-11 | End: 2020-06-11

## 2020-06-11 RX ORDER — HEPARIN SODIUM (PORCINE) LOCK FLUSH IV SOLN 100 UNIT/ML 100 UNIT/ML
5 SOLUTION INTRAVENOUS ONCE
Status: COMPLETED | OUTPATIENT
Start: 2020-06-11 | End: 2020-06-11

## 2020-06-11 RX ORDER — DOXORUBICIN HYDROCHLORIDE 2 MG/ML
60 INJECTION, SOLUTION INTRAVENOUS ONCE
Status: COMPLETED | OUTPATIENT
Start: 2020-06-11 | End: 2020-06-11

## 2020-06-11 RX ORDER — HEPARIN SODIUM (PORCINE) LOCK FLUSH IV SOLN 100 UNIT/ML 100 UNIT/ML
SOLUTION INTRAVENOUS
Status: COMPLETED
Start: 2020-06-11 | End: 2020-06-11

## 2020-06-11 RX ADMIN — DOXORUBICIN HYDROCHLORIDE 120 MG: 2 INJECTION, SOLUTION INTRAVENOUS at 12:16:00

## 2020-06-11 RX ADMIN — HEPARIN SODIUM (PORCINE) LOCK FLUSH IV SOLN 100 UNIT/ML 500 UNITS: 100 SOLUTION INTRAVENOUS at 13:45:00

## 2020-06-11 NOTE — PROGRESS NOTES
Porsha to infusion for C7 D1  AC . Patient on trial NSABP B-59. Arrives Ambulating independently from MD office visit, accompanied by Self.  She states she is so fatigued for the first week, that she is bedridden for that first week and then okay after jenn well  safe in environment  symptoms relieved/minimized  understands plan of care  Progress towards outcome:  unchanged    Education Record    Learner:  Patient  Barriers / Limitations:  None  Method:  Brief focused and Discussion  Outcome:  Amelie Coley

## 2020-06-11 NOTE — PROGRESS NOTES
STUDY: [facility] B-59  PATIENT ID: [id number]  Cycle 7 Day 1 AC only    Patient evaluated by Dr. Rosemary Trujillo and research RN. Patient denies any fevers, chills, cough, or any other s/s of infection. Fatigue continued, but improved from last cycle.  Mouth sore t

## 2020-06-18 ENCOUNTER — APPOINTMENT (OUTPATIENT)
Dept: HEMATOLOGY/ONCOLOGY | Facility: HOSPITAL | Age: 50
End: 2020-06-18
Attending: INTERNAL MEDICINE
Payer: COMMERCIAL

## 2020-06-25 ENCOUNTER — OFFICE VISIT (OUTPATIENT)
Dept: HEMATOLOGY/ONCOLOGY | Facility: HOSPITAL | Age: 50
End: 2020-06-25
Attending: INTERNAL MEDICINE
Payer: COMMERCIAL

## 2020-06-25 ENCOUNTER — RESEARCH ENCOUNTER (OUTPATIENT)
Dept: HEMATOLOGY/ONCOLOGY | Facility: HOSPITAL | Age: 50
End: 2020-06-25

## 2020-06-25 VITALS
WEIGHT: 208 LBS | SYSTOLIC BLOOD PRESSURE: 140 MMHG | HEIGHT: 63 IN | DIASTOLIC BLOOD PRESSURE: 65 MMHG | BODY MASS INDEX: 36.86 KG/M2 | HEART RATE: 85 BPM | RESPIRATION RATE: 16 BRPM | OXYGEN SATURATION: 100 %

## 2020-06-25 VITALS
TEMPERATURE: 98 F | DIASTOLIC BLOOD PRESSURE: 65 MMHG | OXYGEN SATURATION: 100 % | HEART RATE: 85 BPM | RESPIRATION RATE: 16 BRPM | SYSTOLIC BLOOD PRESSURE: 140 MMHG

## 2020-06-25 DIAGNOSIS — C50.411 MALIGNANT NEOPLASM OF UPPER-OUTER QUADRANT OF RIGHT BREAST IN FEMALE, ESTROGEN RECEPTOR NEGATIVE (HCC): Primary | ICD-10-CM

## 2020-06-25 DIAGNOSIS — D70.1 CHEMOTHERAPY INDUCED NEUTROPENIA (HCC): ICD-10-CM

## 2020-06-25 DIAGNOSIS — Z17.1 MALIGNANT NEOPLASM OF UPPER-OUTER QUADRANT OF RIGHT BREAST IN FEMALE, ESTROGEN RECEPTOR NEGATIVE (HCC): Primary | ICD-10-CM

## 2020-06-25 DIAGNOSIS — T45.1X5A CHEMOTHERAPY INDUCED NEUTROPENIA (HCC): ICD-10-CM

## 2020-06-25 DIAGNOSIS — Z51.11 CHEMOTHERAPY MANAGEMENT, ENCOUNTER FOR: ICD-10-CM

## 2020-06-25 DIAGNOSIS — Z79.4 TYPE 2 DIABETES MELLITUS WITH HYPERGLYCEMIA, WITH LONG-TERM CURRENT USE OF INSULIN (HCC): ICD-10-CM

## 2020-06-25 DIAGNOSIS — K12.31 MUCOSITIS DUE TO CHEMOTHERAPY: ICD-10-CM

## 2020-06-25 DIAGNOSIS — E11.65 TYPE 2 DIABETES MELLITUS WITH HYPERGLYCEMIA, WITH LONG-TERM CURRENT USE OF INSULIN (HCC): ICD-10-CM

## 2020-06-25 PROCEDURE — 85025 COMPLETE CBC W/AUTO DIFF WBC: CPT

## 2020-06-25 PROCEDURE — 80053 COMPREHEN METABOLIC PANEL: CPT

## 2020-06-25 PROCEDURE — 96417 CHEMO IV INFUS EACH ADDL SEQ: CPT

## 2020-06-25 PROCEDURE — 96411 CHEMO IV PUSH ADDL DRUG: CPT

## 2020-06-25 PROCEDURE — 99215 OFFICE O/P EST HI 40 MIN: CPT | Performed by: INTERNAL MEDICINE

## 2020-06-25 PROCEDURE — 96375 TX/PRO/DX INJ NEW DRUG ADDON: CPT

## 2020-06-25 PROCEDURE — 96413 CHEMO IV INFUSION 1 HR: CPT

## 2020-06-25 PROCEDURE — 96367 TX/PROPH/DG ADDL SEQ IV INF: CPT

## 2020-06-25 PROCEDURE — 96377 APPLICATON ON-BODY INJECTOR: CPT

## 2020-06-25 RX ORDER — FLUCONAZOLE 100 MG/1
TABLET ORAL
Qty: 8 TABLET | Refills: 0 | Status: SHIPPED | OUTPATIENT
Start: 2020-06-25 | End: 2020-07-30

## 2020-06-25 RX ORDER — POT SOR/HE-CELLULOS/POV/HYALUR
GEL IN PACKET (ML) MUCOUS MEMBRANE
Qty: 90 PACKET | Refills: 2 | Status: SHIPPED | OUTPATIENT
Start: 2020-06-25 | End: 2020-07-30

## 2020-06-25 RX ORDER — 0.9 % SODIUM CHLORIDE 0.9 %
VIAL (ML) INJECTION
Status: DISCONTINUED
Start: 2020-06-25 | End: 2020-06-25

## 2020-06-25 RX ORDER — DOXORUBICIN HYDROCHLORIDE 2 MG/ML
60 INJECTION, SOLUTION INTRAVENOUS ONCE
Status: COMPLETED | OUTPATIENT
Start: 2020-06-25 | End: 2020-06-25

## 2020-06-25 RX ORDER — DOXORUBICIN HYDROCHLORIDE 2 MG/ML
60 INJECTION, SOLUTION INTRAVENOUS ONCE
Status: CANCELLED | OUTPATIENT
Start: 2020-06-25

## 2020-06-25 RX ORDER — 0.9 % SODIUM CHLORIDE 0.9 %
10 VIAL (ML) INJECTION ONCE
Status: CANCELLED | OUTPATIENT
Start: 2020-06-25

## 2020-06-25 RX ORDER — HEPARIN SODIUM (PORCINE) LOCK FLUSH IV SOLN 100 UNIT/ML 100 UNIT/ML
5 SOLUTION INTRAVENOUS ONCE
Status: CANCELLED | OUTPATIENT
Start: 2020-06-25

## 2020-06-25 RX ORDER — HEPARIN SODIUM (PORCINE) LOCK FLUSH IV SOLN 100 UNIT/ML 100 UNIT/ML
5 SOLUTION INTRAVENOUS ONCE
Status: COMPLETED | OUTPATIENT
Start: 2020-06-25 | End: 2020-06-25

## 2020-06-25 RX ORDER — LIDOCAINE AND PRILOCAINE 25; 25 MG/G; MG/G
CREAM TOPICAL
Qty: 1 TUBE | Refills: 0 | Status: SHIPPED | OUTPATIENT
Start: 2020-06-25 | End: 2020-07-30

## 2020-06-25 RX ORDER — HEPARIN SODIUM (PORCINE) LOCK FLUSH IV SOLN 100 UNIT/ML 100 UNIT/ML
SOLUTION INTRAVENOUS
Status: DISCONTINUED
Start: 2020-06-25 | End: 2020-06-25

## 2020-06-25 RX ORDER — 0.9 % SODIUM CHLORIDE 0.9 %
10 VIAL (ML) INJECTION ONCE
Status: DISCONTINUED | OUTPATIENT
Start: 2020-06-25 | End: 2020-06-25

## 2020-06-25 RX ADMIN — HEPARIN SODIUM (PORCINE) LOCK FLUSH IV SOLN 100 UNIT/ML 500 UNITS: 100 SOLUTION INTRAVENOUS at 13:00:00

## 2020-06-25 RX ADMIN — DOXORUBICIN HYDROCHLORIDE 120 MG: 2 INJECTION, SOLUTION INTRAVENOUS at 10:57:00

## 2020-06-25 NOTE — PATIENT INSTRUCTIONS
On-body Injector for Neulasta Patient Instructions       Your On-Body Injection device was applied on this day: thursday  at this time 1pm    Your dose of medication will start on this day: Friday at this time 4p

## 2020-06-25 NOTE — PROGRESS NOTES
Cancer Center Progress Note    Patient Name: Barbara Grewal   YOB: 1970   Medical Record Number: [id number]   Attending Physician: Marisol Santos M.D.      Chief Complaint:  Breast cancer    Oncology History:  12/2019: mammogram (last mammogram retinopathy in both eyes 2006    OU   • Obesity (BMI 30-39. 9)    • Obesity, unspecified    • Other and unspecified hyperlipidemia    • Type II or unspecified type diabetes mellitus without mention of complication, not stated as uncontrolled    • Unspecifie (four) times daily before meals and nightly., Disp: 480 mL, Rfl: 0  •  Prochlorperazine Maleate (COMPAZINE) 10 mg tablet, Take 1 tablet (10 mg total) by mouth every 6 (six) hours as needed for Nausea., Disp: 60 tablet, Rfl: 3  •  Ondansetron HCl (ZOFRAN) 8 sides. hyperemeia on the tongue. No active sores present but tongue appears rough. Chest: Non-labored breathing. Symmetric expansion,  Cardiovascular: palpable symmetric pulses, no cyanosis  Abdomen:  non tender.  Obese  Breast: right breast, attn 1:00, Ultrasound-guided biopsy is recommended.   Findings and recommendations were discussed with the patient immediately following exam.  BI-RADS CATEGORY:   DIAGNOSTIC CATEGORY 4C--SUSPICIOUS  (moderate suspicion for malignancy)  RECOMMENDATIONS:  ULTRASOUND-

## 2020-06-25 NOTE — PROGRESS NOTES
STUDY: [facility] B-59  PATIENT ID: [id number]  Cycle 8 Day 1 AC+Atezo/Placebo (Dose #7 study drug)    Patient evaluated by Dr. Lexus Salazar. Labs stable for treatment. Patient denies any fevers, chills, cough, or any other s/s of infection.  Mouth sores a little

## 2020-06-25 NOTE — PROGRESS NOTES
Pt here for C7D15 [facility] + B59 Study and onpro. Arrives Ambulating independently, accompanied by Self           Patient reports possible pregnancy since last therapy cycle: No    Modifications in dose or schedule: No, OK from Research.  Has another treatment i

## 2020-07-02 ENCOUNTER — APPOINTMENT (OUTPATIENT)
Dept: HEMATOLOGY/ONCOLOGY | Facility: HOSPITAL | Age: 50
End: 2020-07-02
Attending: INTERNAL MEDICINE
Payer: COMMERCIAL

## 2020-07-14 ENCOUNTER — RESEARCH ENCOUNTER (OUTPATIENT)
Dept: HEMATOLOGY/ONCOLOGY | Facility: HOSPITAL | Age: 50
End: 2020-07-14

## 2020-07-14 ENCOUNTER — HOSPITAL ENCOUNTER (OUTPATIENT)
Dept: MRI IMAGING | Facility: HOSPITAL | Age: 50
Discharge: HOME | End: 2020-07-14
Attending: INTERNAL MEDICINE
Payer: COMMERCIAL

## 2020-07-14 ENCOUNTER — NURSE ONLY (OUTPATIENT)
Dept: HEMATOLOGY/ONCOLOGY | Facility: HOSPITAL | Age: 50
End: 2020-07-14
Attending: INTERNAL MEDICINE
Payer: COMMERCIAL

## 2020-07-14 DIAGNOSIS — C50.411 MALIGNANT NEOPLASM OF UPPER-OUTER QUADRANT OF RIGHT BREAST IN FEMALE, ESTROGEN RECEPTOR NEGATIVE (HCC): ICD-10-CM

## 2020-07-14 DIAGNOSIS — Z17.1 MALIGNANT NEOPLASM OF UPPER-OUTER QUADRANT OF RIGHT BREAST IN FEMALE, ESTROGEN RECEPTOR NEGATIVE (HCC): ICD-10-CM

## 2020-07-14 DIAGNOSIS — Z17.1 MALIGNANT NEOPLASM OF UPPER-OUTER QUADRANT OF RIGHT BREAST IN FEMALE, ESTROGEN RECEPTOR NEGATIVE (HCC): Primary | ICD-10-CM

## 2020-07-14 DIAGNOSIS — T45.1X5A CHEMOTHERAPY INDUCED NEUTROPENIA (HCC): ICD-10-CM

## 2020-07-14 DIAGNOSIS — D70.1 CHEMOTHERAPY INDUCED NEUTROPENIA (HCC): ICD-10-CM

## 2020-07-14 DIAGNOSIS — C50.411 MALIGNANT NEOPLASM OF UPPER-OUTER QUADRANT OF RIGHT BREAST IN FEMALE, ESTROGEN RECEPTOR NEGATIVE (HCC): Primary | ICD-10-CM

## 2020-07-14 LAB
ALBUMIN SERPL-MCNC: 3 G/DL (ref 3.4–5)
ALBUMIN/GLOB SERPL: 0.8 {RATIO} (ref 1–2)
ALP LIVER SERPL-CCNC: 85 U/L (ref 39–100)
ALT SERPL-CCNC: 17 U/L (ref 13–56)
ANION GAP SERPL CALC-SCNC: 9 MMOL/L (ref 0–18)
AST SERPL-CCNC: 13 U/L (ref 15–37)
BASOPHILS # BLD AUTO: 0.02 X10(3) UL (ref 0–0.2)
BASOPHILS NFR BLD AUTO: 0.4 %
BILIRUB SERPL-MCNC: 0.2 MG/DL (ref 0.1–2)
BUN BLD-MCNC: 9 MG/DL (ref 7–18)
BUN/CREAT SERPL: 10 (ref 10–20)
CALCIUM BLD-MCNC: 8.7 MG/DL (ref 8.5–10.1)
CHLORIDE SERPL-SCNC: 105 MMOL/L (ref 98–112)
CO2 SERPL-SCNC: 23 MMOL/L (ref 21–32)
CORTIS SERPL-MCNC: 18.1 UG/DL
CREAT BLD-MCNC: 0.9 MG/DL (ref 0.55–1.02)
DEPRECATED RDW RBC AUTO: 63.9 FL (ref 35.1–46.3)
EOSINOPHIL # BLD AUTO: 0.02 X10(3) UL (ref 0–0.7)
EOSINOPHIL NFR BLD AUTO: 0.4 %
ERYTHROCYTE [DISTWIDTH] IN BLOOD BY AUTOMATED COUNT: 17.7 % (ref 11–15)
GLOBULIN PLAS-MCNC: 3.8 G/DL (ref 2.8–4.4)
GLUCOSE BLD-MCNC: 337 MG/DL (ref 70–99)
HCT VFR BLD AUTO: 31.7 % (ref 35–48)
HGB BLD-MCNC: 10.5 G/DL (ref 12–16)
IMM GRANULOCYTES # BLD AUTO: 0.03 X10(3) UL (ref 0–1)
IMM GRANULOCYTES NFR BLD: 0.7 %
LYMPHOCYTES # BLD AUTO: 1.09 X10(3) UL (ref 1–4)
LYMPHOCYTES NFR BLD AUTO: 23.7 %
M PROTEIN MFR SERPL ELPH: 6.8 G/DL (ref 6.4–8.2)
MCH RBC QN AUTO: 32.7 PG (ref 26–34)
MCHC RBC AUTO-ENTMCNC: 33.1 G/DL (ref 31–37)
MCV RBC AUTO: 98.8 FL (ref 80–100)
MONOCYTES # BLD AUTO: 0.65 X10(3) UL (ref 0.1–1)
MONOCYTES NFR BLD AUTO: 14.1 %
NEUTROPHILS # BLD AUTO: 2.79 X10 (3) UL (ref 1.5–7.7)
NEUTROPHILS # BLD AUTO: 2.79 X10(3) UL (ref 1.5–7.7)
NEUTROPHILS NFR BLD AUTO: 60.7 %
OSMOLALITY SERPL CALC.SUM OF ELEC: 296 MOSM/KG (ref 275–295)
PLATELET # BLD AUTO: 272 10(3)UL (ref 150–450)
POTASSIUM SERPL-SCNC: 3.6 MMOL/L (ref 3.5–5.1)
RBC # BLD AUTO: 3.21 X10(6)UL (ref 3.8–5.3)
SODIUM SERPL-SCNC: 137 MMOL/L (ref 136–145)
TSI SER-ACNC: 1.3 MIU/ML (ref 0.36–3.74)
WBC # BLD AUTO: 4.6 X10(3) UL (ref 4–11)

## 2020-07-14 PROCEDURE — 80053 COMPREHEN METABOLIC PANEL: CPT

## 2020-07-14 PROCEDURE — 36591 DRAW BLOOD OFF VENOUS DEVICE: CPT

## 2020-07-14 PROCEDURE — A9575 INJ GADOTERATE MEGLUMI 0.1ML: HCPCS | Performed by: INTERNAL MEDICINE

## 2020-07-14 PROCEDURE — 77049 MRI BREAST C-+ W/CAD BI: CPT | Performed by: INTERNAL MEDICINE

## 2020-07-14 PROCEDURE — 85025 COMPLETE CBC W/AUTO DIFF WBC: CPT

## 2020-07-14 PROCEDURE — 82533 TOTAL CORTISOL: CPT

## 2020-07-14 PROCEDURE — 84443 ASSAY THYROID STIM HORMONE: CPT

## 2020-07-14 RX ORDER — HEPARIN SODIUM (PORCINE) LOCK FLUSH IV SOLN 100 UNIT/ML 100 UNIT/ML
5 SOLUTION INTRAVENOUS ONCE
Status: COMPLETED | OUTPATIENT
Start: 2020-07-14 | End: 2020-07-14

## 2020-07-14 RX ORDER — HEPARIN SODIUM (PORCINE) LOCK FLUSH IV SOLN 100 UNIT/ML 100 UNIT/ML
5 SOLUTION INTRAVENOUS ONCE
Status: CANCELLED | OUTPATIENT
Start: 2020-07-14

## 2020-07-14 RX ORDER — 0.9 % SODIUM CHLORIDE 0.9 %
VIAL (ML) INJECTION
Status: DISCONTINUED
Start: 2020-07-14 | End: 2020-07-14

## 2020-07-14 RX ORDER — 0.9 % SODIUM CHLORIDE 0.9 %
10 VIAL (ML) INJECTION ONCE
Status: CANCELLED | OUTPATIENT
Start: 2020-07-14

## 2020-07-14 RX ADMIN — HEPARIN SODIUM (PORCINE) LOCK FLUSH IV SOLN 100 UNIT/ML 500 UNITS: 100 SOLUTION INTRAVENOUS at 08:28:00

## 2020-07-14 NOTE — PROGRESS NOTES
Study: B-59  Patient ID: [id number]  Timepoint: Prior to surgery     Labs drawn per protocol, including ctDNA. Specimen sent ambient via [employer]. Tracking number [id number].   Patient to have MRI this afternoon and return for visit with Dr. Frankie Metcalf

## 2020-07-15 ENCOUNTER — OFFICE VISIT (OUTPATIENT)
Dept: HEMATOLOGY/ONCOLOGY | Facility: HOSPITAL | Age: 50
End: 2020-07-15
Attending: INTERNAL MEDICINE
Payer: COMMERCIAL

## 2020-07-15 ENCOUNTER — OFFICE VISIT (OUTPATIENT)
Dept: SURGERY | Facility: CLINIC | Age: 50
End: 2020-07-15
Payer: COMMERCIAL

## 2020-07-15 VITALS
SYSTOLIC BLOOD PRESSURE: 129 MMHG | OXYGEN SATURATION: 100 % | DIASTOLIC BLOOD PRESSURE: 64 MMHG | RESPIRATION RATE: 16 BRPM | HEART RATE: 75 BPM

## 2020-07-15 DIAGNOSIS — Z17.1 MALIGNANT NEOPLASM OF UPPER-OUTER QUADRANT OF RIGHT BREAST IN FEMALE, ESTROGEN RECEPTOR NEGATIVE (HCC): Primary | ICD-10-CM

## 2020-07-15 DIAGNOSIS — Z00.6 CLINICAL TRIAL EXAM: ICD-10-CM

## 2020-07-15 DIAGNOSIS — E11.65 TYPE 2 DIABETES MELLITUS WITH HYPERGLYCEMIA, WITH LONG-TERM CURRENT USE OF INSULIN (HCC): ICD-10-CM

## 2020-07-15 DIAGNOSIS — C50.411 MALIGNANT NEOPLASM OF UPPER-OUTER QUADRANT OF RIGHT BREAST IN FEMALE, ESTROGEN RECEPTOR NEGATIVE (HCC): Primary | ICD-10-CM

## 2020-07-15 DIAGNOSIS — Z79.4 TYPE 2 DIABETES MELLITUS WITH HYPERGLYCEMIA, WITH LONG-TERM CURRENT USE OF INSULIN (HCC): ICD-10-CM

## 2020-07-15 DIAGNOSIS — Z51.11 CHEMOTHERAPY MANAGEMENT, ENCOUNTER FOR: ICD-10-CM

## 2020-07-15 PROCEDURE — 3078F DIAST BP <80 MM HG: CPT | Performed by: SURGERY

## 2020-07-15 PROCEDURE — 99215 OFFICE O/P EST HI 40 MIN: CPT | Performed by: SURGERY

## 2020-07-15 PROCEDURE — 3074F SYST BP LT 130 MM HG: CPT | Performed by: SURGERY

## 2020-07-15 PROCEDURE — 99214 OFFICE O/P EST MOD 30 MIN: CPT | Performed by: NURSE PRACTITIONER

## 2020-07-15 NOTE — PATIENT INSTRUCTIONS
Dr. Rojas Stovall MD    [facility]  Tel:  [phone]      [address], [place], [place]   Fax: [phone]  _____________________________________________________________________      Surgery/Procedure: Right Breast acceptable*    _____________________________________________________________________    PRE-OPERATIVE TESTING IF INDICATED BELOW      [] CBC   [] BMP   [] EKG     [] CMP            [] PT, PTT, INR     [] Cardiac Clearance      [] H & P Medical Clearance

## 2020-07-15 NOTE — PROGRESS NOTES
Pt educated on Pre-operative instructions. Given Procedure/Surgery handout. All questions answered. Encouraged to call with any further questions. Pt verbalized understanding.

## 2020-07-15 NOTE — PROGRESS NOTES
Breast Surgery Surveillance    History of Present Illness:   Ms. James Velásquez is a 53year old woman who presented with a self detected mass in the right breast.  She reports she had not had any breast imaging since 2017.   She was referred for a bilater mellitus without mention of complication, not stated as uncontrolled    • Unspecified essential hypertension        Past Surgical History:   Procedure Laterality Date   • ABLATION      abdominal   •        section   • NEEDLE BIOPSY LEF Disp: 30 tablet, Rfl: 3  Blood Glucose Monitoring Suppl (ACCU-CHEK JOSE) Does not apply Device, 1 Device by Does not apply route 3 (three) times daily. , Disp: 1 Device, Rfl: 0  Glucose Blood (ACCU-CHEK JOSE PLUS) In Vitro Strip, Use 3 times a day, Disp: chills, night sweats, fatigue, generalized weakness, change in appetite or weight loss. HEENT:     The patient denies eye irritation, cataracts, redness, glaucoma, yellowing of the eyes, change in vision or color blindness.  The patient denies hearing lo problems, trembling/tremors, fainting/black outs, dizziness, memory problems, loss of sensation/numbness, problems walking, weakness, tingling or burning in hands/feet.  There is no history of abusive relationship, bipolar disorder, sleep disturbance, +anxi tender. The liver is not enlarged. There are no palpable masses. Lymph Nodes: The supraclavicular, axillary and cervical regions are free of significant lymphadenopathy. Back: There is no vertebral column tenderness.     Skin: The skin appears normal wishes to proceed with a right breast wire localized lumpectomy with right sentinel lymph node biopsy and possible right axillary lymph node dissection.   The risks and possible complications of the procedure were explained to the patient and her family and

## 2020-07-15 NOTE — PROGRESS NOTES
Cancer Center Progress Note    Patient Name: Deepthi Loza   YOB: 1970   Medical Record Number: [id number]   Attending Physician: Firman Cockayne, M.D.      Chief Complaint:  Breast cancer    Oncology History:  12/2019: mammogram (last mammogram [facility])    • Cup to disc asymmetry 2006    OU   • No diabetic retinopathy in both eyes 2006    OU   • Obesity (BMI 30-39. 9)    • Obesity, unspecified    • Other and unspecified hyperlipidemia    • Type II or unspecified type diabetes mellitus without mention Subcutaneous Solution Pen-injector, Inject 20 Units into the skin 3 (three) times daily before meals.  PT STATES SLIDING SCALE  subcutaneous with breakfast lunch and dinner, Disp: 15 pen, Rfl: 3  •  Zolpidem Tartrate 10 MG Oral Tab, Take 1 tablet (10 mg tot 1145)  BP: 129/64 (07/15 1145)  Temp: --  Do Not Use - Resp Rate: --  SpO2: 100 % (07/15 1145)    Physical Examination:  Performance Status:1  General: Patient is alert and oriented x 3, not in acute distress. HEENT: EOMs intact.   No further active sores 2.0 x 3.1 centimeter right breast mass at 01:00 o'clock 8 centimeters from the nipple accounting for the mammographic finding is moderately suspicious for malignancy. Ultrasound-guided biopsy is recommended.   Findings and recommendations were discussed wi post-surgery    MDM: high due to chemo    MILLY Milton

## 2020-07-16 ENCOUNTER — TELEPHONE (OUTPATIENT)
Dept: SURGERY | Facility: CLINIC | Age: 50
End: 2020-07-16

## 2020-07-16 DIAGNOSIS — Z17.1 MALIGNANT NEOPLASM OF RIGHT BREAST IN FEMALE, ESTROGEN RECEPTOR NEGATIVE, UNSPECIFIED SITE OF BREAST (HCC): Primary | ICD-10-CM

## 2020-07-16 DIAGNOSIS — C50.411 MALIGNANT NEOPLASM OF UPPER-OUTER QUADRANT OF RIGHT BREAST IN FEMALE, ESTROGEN RECEPTOR NEGATIVE (HCC): Primary | ICD-10-CM

## 2020-07-16 DIAGNOSIS — Z17.1 MALIGNANT NEOPLASM OF UPPER-OUTER QUADRANT OF RIGHT BREAST IN FEMALE, ESTROGEN RECEPTOR NEGATIVE (HCC): Primary | ICD-10-CM

## 2020-07-16 DIAGNOSIS — C50.911 MALIGNANT NEOPLASM OF RIGHT BREAST IN FEMALE, ESTROGEN RECEPTOR NEGATIVE, UNSPECIFIED SITE OF BREAST (HCC): Primary | ICD-10-CM

## 2020-07-16 NOTE — TELEPHONE ENCOUNTER
I called the patient and scheduled her procedure with Dr Domo Roach on 08/03/2020 at [place]. Confirmed location

## 2020-07-30 ENCOUNTER — APPOINTMENT (OUTPATIENT)
Dept: PHYSICAL THERAPY | Facility: HOSPITAL | Age: 50
End: 2020-07-30
Attending: SURGERY
Payer: COMMERCIAL

## 2020-07-30 DIAGNOSIS — Z17.1 MALIGNANT NEOPLASM OF RIGHT BREAST IN FEMALE, ESTROGEN RECEPTOR NEGATIVE, UNSPECIFIED SITE OF BREAST (HCC): Primary | ICD-10-CM

## 2020-07-30 DIAGNOSIS — C50.911 MALIGNANT NEOPLASM OF RIGHT BREAST IN FEMALE, ESTROGEN RECEPTOR NEGATIVE, UNSPECIFIED SITE OF BREAST (HCC): Primary | ICD-10-CM

## 2020-07-31 ENCOUNTER — OFFICE VISIT (OUTPATIENT)
Dept: PHYSICAL THERAPY | Facility: HOSPITAL | Age: 50
End: 2020-07-31
Attending: SURGERY
Payer: COMMERCIAL

## 2020-07-31 ENCOUNTER — LAB ENCOUNTER (OUTPATIENT)
Dept: LAB | Facility: HOSPITAL | Age: 50
End: 2020-07-31
Attending: SURGERY
Payer: COMMERCIAL

## 2020-07-31 DIAGNOSIS — Z01.818 PRE-OP TESTING: ICD-10-CM

## 2020-07-31 NOTE — PROGRESS NOTES
BREAST CANCER SURGICAL SCREENINGS  [facility] REHABILITATION      PATIENT SUMMARY:     Involved Side:    RIGHT                                                 Dominant Hand:    RIGHT                              Occupation:     Haven't been working T9 T12  Functional IR     Functional IR                      Shoulder strength  Right Left  Shoulder strength  Right Left  Shoulder strength  Right Left    flex 5 4+   flex     flex      abd 5 4+   abd     abd      ext 5 4+   ext     ext      ER 5 4+   ER

## 2020-08-01 LAB — SARS-COV-2 RNA RESP QL NAA+PROBE: NOT DETECTED

## 2020-08-03 ENCOUNTER — ANESTHESIA EVENT (OUTPATIENT)
Dept: SURGERY | Facility: HOSPITAL | Age: 50
End: 2020-08-03
Payer: COMMERCIAL

## 2020-08-03 ENCOUNTER — HOSPITAL ENCOUNTER (OUTPATIENT)
Dept: NUCLEAR MEDICINE | Facility: HOSPITAL | Age: 50
Discharge: HOME | End: 2020-08-03
Attending: SURGERY
Payer: COMMERCIAL

## 2020-08-03 ENCOUNTER — ANESTHESIA (OUTPATIENT)
Dept: SURGERY | Facility: HOSPITAL | Age: 50
End: 2020-08-03
Payer: COMMERCIAL

## 2020-08-03 ENCOUNTER — APPOINTMENT (OUTPATIENT)
Dept: MAMMOGRAPHY | Facility: HOSPITAL | Age: 50
End: 2020-08-03
Attending: SURGERY
Payer: COMMERCIAL

## 2020-08-03 ENCOUNTER — HOSPITAL ENCOUNTER (OUTPATIENT)
Facility: HOSPITAL | Age: 50
Setting detail: HOSPITAL OUTPATIENT SURGERY
Discharge: HOME | End: 2020-08-03
Attending: SURGERY | Admitting: SURGERY
Payer: COMMERCIAL

## 2020-08-03 VITALS
BODY MASS INDEX: 34.73 KG/M2 | OXYGEN SATURATION: 99 % | RESPIRATION RATE: 17 BRPM | DIASTOLIC BLOOD PRESSURE: 61 MMHG | HEART RATE: 60 BPM | HEIGHT: 63 IN | TEMPERATURE: 98 F | SYSTOLIC BLOOD PRESSURE: 120 MMHG | WEIGHT: 196 LBS

## 2020-08-03 DIAGNOSIS — Z17.1 MALIGNANT NEOPLASM OF RIGHT BREAST IN FEMALE, ESTROGEN RECEPTOR NEGATIVE, UNSPECIFIED SITE OF BREAST (HCC): ICD-10-CM

## 2020-08-03 DIAGNOSIS — Z01.818 PRE-OP TESTING: Primary | ICD-10-CM

## 2020-08-03 DIAGNOSIS — I10 ESSENTIAL HYPERTENSION: ICD-10-CM

## 2020-08-03 DIAGNOSIS — C50.911 MALIGNANT NEOPLASM OF RIGHT BREAST IN FEMALE, ESTROGEN RECEPTOR NEGATIVE, UNSPECIFIED SITE OF BREAST (HCC): ICD-10-CM

## 2020-08-03 LAB
GLUCOSE BLDC GLUCOMTR-MCNC: 223 MG/DL (ref 70–99)
GLUCOSE BLDC GLUCOMTR-MCNC: 241 MG/DL (ref 70–99)
GLUCOSE BLDC GLUCOMTR-MCNC: 330 MG/DL (ref 70–99)

## 2020-08-03 PROCEDURE — 82962 GLUCOSE BLOOD TEST: CPT

## 2020-08-03 PROCEDURE — BH00ZZZ PLAIN RADIOGRAPHY OF RIGHT BREAST: ICD-10-PCS | Performed by: SURGERY

## 2020-08-03 PROCEDURE — 07B50ZX EXCISION OF RIGHT AXILLARY LYMPHATIC, OPEN APPROACH, DIAGNOSTIC: ICD-10-PCS | Performed by: SURGERY

## 2020-08-03 PROCEDURE — 3E0W3KZ INTRODUCTION OF OTHER DIAGNOSTIC SUBSTANCE INTO LYMPHATICS, PERCUTANEOUS APPROACH: ICD-10-PCS | Performed by: SURGERY

## 2020-08-03 PROCEDURE — 88333 PATH CONSLTJ SURG CYTO XM 1: CPT | Performed by: SURGERY

## 2020-08-03 PROCEDURE — 88331 PATH CONSLTJ SURG 1 BLK 1SPC: CPT | Performed by: SURGERY

## 2020-08-03 PROCEDURE — 88300 SURGICAL PATH GROSS: CPT | Performed by: SURGERY

## 2020-08-03 PROCEDURE — 88334 PATH CONSLTJ SURG CYTO XM EA: CPT | Performed by: SURGERY

## 2020-08-03 PROCEDURE — 88342 IMHCHEM/IMCYTCHM 1ST ANTB: CPT | Performed by: SURGERY

## 2020-08-03 PROCEDURE — 0HBT0ZZ EXCISION OF RIGHT BREAST, OPEN APPROACH: ICD-10-PCS | Performed by: SURGERY

## 2020-08-03 PROCEDURE — 88307 TISSUE EXAM BY PATHOLOGIST: CPT | Performed by: SURGERY

## 2020-08-03 PROCEDURE — 78195 LYMPH SYSTEM IMAGING: CPT | Performed by: SURGERY

## 2020-08-03 PROCEDURE — 19281 PERQ DEVICE BREAST 1ST IMAG: CPT | Performed by: SURGERY

## 2020-08-03 RX ORDER — MIDAZOLAM HYDROCHLORIDE 1 MG/ML
INJECTION INTRAMUSCULAR; INTRAVENOUS AS NEEDED
Status: DISCONTINUED | OUTPATIENT
Start: 2020-08-03 | End: 2020-08-03 | Stop reason: SURG

## 2020-08-03 RX ORDER — METOCLOPRAMIDE 10 MG/1
10 TABLET ORAL ONCE
Status: COMPLETED | OUTPATIENT
Start: 2020-08-03 | End: 2020-08-03

## 2020-08-03 RX ORDER — SODIUM CHLORIDE, SODIUM LACTATE, POTASSIUM CHLORIDE, CALCIUM CHLORIDE 600; 310; 30; 20 MG/100ML; MG/100ML; MG/100ML; MG/100ML
INJECTION, SOLUTION INTRAVENOUS CONTINUOUS
Status: DISCONTINUED | OUTPATIENT
Start: 2020-08-03 | End: 2020-08-03

## 2020-08-03 RX ORDER — HYDROCODONE BITARTRATE AND ACETAMINOPHEN 5; 325 MG/1; MG/1
2 TABLET ORAL AS NEEDED
Status: DISCONTINUED | OUTPATIENT
Start: 2020-08-03 | End: 2020-08-03

## 2020-08-03 RX ORDER — MORPHINE SULFATE 4 MG/ML
4 INJECTION, SOLUTION INTRAMUSCULAR; INTRAVENOUS EVERY 10 MIN PRN
Status: DISCONTINUED | OUTPATIENT
Start: 2020-08-03 | End: 2020-08-03

## 2020-08-03 RX ORDER — HYDROMORPHONE HYDROCHLORIDE 1 MG/ML
0.4 INJECTION, SOLUTION INTRAMUSCULAR; INTRAVENOUS; SUBCUTANEOUS EVERY 5 MIN PRN
Status: DISCONTINUED | OUTPATIENT
Start: 2020-08-03 | End: 2020-08-03

## 2020-08-03 RX ORDER — HYDROCODONE BITARTRATE AND ACETAMINOPHEN 5; 325 MG/1; MG/1
1-2 TABLET ORAL EVERY 6 HOURS PRN
Qty: 30 TABLET | Refills: 0 | Status: SHIPPED | OUTPATIENT
Start: 2020-08-03 | End: 2020-08-26 | Stop reason: ALTCHOICE

## 2020-08-03 RX ORDER — INSULIN ASPART 100 [IU]/ML
3 INJECTION, SOLUTION INTRAVENOUS; SUBCUTANEOUS ONCE
Status: COMPLETED | OUTPATIENT
Start: 2020-08-03 | End: 2020-08-03

## 2020-08-03 RX ORDER — BUPIVACAINE HYDROCHLORIDE 5 MG/ML
INJECTION, SOLUTION EPIDURAL; INTRACAUDAL AS NEEDED
Status: DISCONTINUED | OUTPATIENT
Start: 2020-08-03 | End: 2020-08-03 | Stop reason: HOSPADM

## 2020-08-03 RX ORDER — HALOPERIDOL 5 MG/ML
0.25 INJECTION INTRAMUSCULAR ONCE AS NEEDED
Status: DISCONTINUED | OUTPATIENT
Start: 2020-08-03 | End: 2020-08-03

## 2020-08-03 RX ORDER — NALOXONE HYDROCHLORIDE 0.4 MG/ML
80 INJECTION, SOLUTION INTRAMUSCULAR; INTRAVENOUS; SUBCUTANEOUS AS NEEDED
Status: DISCONTINUED | OUTPATIENT
Start: 2020-08-03 | End: 2020-08-03

## 2020-08-03 RX ORDER — LIDOCAINE HYDROCHLORIDE 10 MG/ML
INJECTION, SOLUTION EPIDURAL; INFILTRATION; INTRACAUDAL; PERINEURAL AS NEEDED
Status: DISCONTINUED | OUTPATIENT
Start: 2020-08-03 | End: 2020-08-03 | Stop reason: SURG

## 2020-08-03 RX ORDER — DEXAMETHASONE SODIUM PHOSPHATE 4 MG/ML
VIAL (ML) INJECTION AS NEEDED
Status: DISCONTINUED | OUTPATIENT
Start: 2020-08-03 | End: 2020-08-03 | Stop reason: SURG

## 2020-08-03 RX ORDER — DEXTROSE MONOHYDRATE 50 MG/ML
INJECTION, SOLUTION INTRAVENOUS CONTINUOUS
Status: DISCONTINUED | OUTPATIENT
Start: 2020-08-03 | End: 2020-08-03

## 2020-08-03 RX ORDER — ONDANSETRON 2 MG/ML
4 INJECTION INTRAMUSCULAR; INTRAVENOUS ONCE AS NEEDED
Status: DISCONTINUED | OUTPATIENT
Start: 2020-08-03 | End: 2020-08-03

## 2020-08-03 RX ORDER — ACETAMINOPHEN 500 MG
1000 TABLET ORAL ONCE
Status: COMPLETED | OUTPATIENT
Start: 2020-08-03 | End: 2020-08-03

## 2020-08-03 RX ORDER — MORPHINE SULFATE 4 MG/ML
2 INJECTION, SOLUTION INTRAMUSCULAR; INTRAVENOUS EVERY 10 MIN PRN
Status: DISCONTINUED | OUTPATIENT
Start: 2020-08-03 | End: 2020-08-03

## 2020-08-03 RX ORDER — HYDROCODONE BITARTRATE AND ACETAMINOPHEN 5; 325 MG/1; MG/1
1 TABLET ORAL AS NEEDED
Status: DISCONTINUED | OUTPATIENT
Start: 2020-08-03 | End: 2020-08-03

## 2020-08-03 RX ORDER — INSULIN ASPART 100 [IU]/ML
INJECTION, SOLUTION INTRAVENOUS; SUBCUTANEOUS ONCE
Status: COMPLETED | OUTPATIENT
Start: 2020-08-03 | End: 2020-08-03

## 2020-08-03 RX ORDER — CEFAZOLIN SODIUM/WATER 2 G/20 ML
2 SYRINGE (ML) INTRAVENOUS ONCE
Status: DISCONTINUED | OUTPATIENT
Start: 2020-08-03 | End: 2020-08-03 | Stop reason: HOSPADM

## 2020-08-03 RX ORDER — FAMOTIDINE 20 MG/1
20 TABLET ORAL ONCE
Status: COMPLETED | OUTPATIENT
Start: 2020-08-03 | End: 2020-08-03

## 2020-08-03 RX ORDER — DEXTROSE MONOHYDRATE 25 G/50ML
50 INJECTION, SOLUTION INTRAVENOUS
Status: DISCONTINUED | OUTPATIENT
Start: 2020-08-03 | End: 2020-08-03 | Stop reason: HOSPADM

## 2020-08-03 RX ORDER — ONDANSETRON 2 MG/ML
INJECTION INTRAMUSCULAR; INTRAVENOUS AS NEEDED
Status: DISCONTINUED | OUTPATIENT
Start: 2020-08-03 | End: 2020-08-03 | Stop reason: SURG

## 2020-08-03 RX ORDER — DEXTROSE MONOHYDRATE 25 G/50ML
50 INJECTION, SOLUTION INTRAVENOUS
Status: DISCONTINUED | OUTPATIENT
Start: 2020-08-03 | End: 2020-08-03

## 2020-08-03 RX ORDER — LIDOCAINE HYDROCHLORIDE AND EPINEPHRINE 10; 10 MG/ML; UG/ML
INJECTION, SOLUTION INFILTRATION; PERINEURAL AS NEEDED
Status: DISCONTINUED | OUTPATIENT
Start: 2020-08-03 | End: 2020-08-03 | Stop reason: HOSPADM

## 2020-08-03 RX ORDER — MORPHINE SULFATE 10 MG/ML
6 INJECTION, SOLUTION INTRAMUSCULAR; INTRAVENOUS EVERY 10 MIN PRN
Status: DISCONTINUED | OUTPATIENT
Start: 2020-08-03 | End: 2020-08-03

## 2020-08-03 RX ORDER — HYDROMORPHONE HYDROCHLORIDE 1 MG/ML
0.2 INJECTION, SOLUTION INTRAMUSCULAR; INTRAVENOUS; SUBCUTANEOUS EVERY 5 MIN PRN
Status: DISCONTINUED | OUTPATIENT
Start: 2020-08-03 | End: 2020-08-03

## 2020-08-03 RX ORDER — PROCHLORPERAZINE EDISYLATE 5 MG/ML
5 INJECTION INTRAMUSCULAR; INTRAVENOUS ONCE AS NEEDED
Status: DISCONTINUED | OUTPATIENT
Start: 2020-08-03 | End: 2020-08-03

## 2020-08-03 RX ORDER — HYDROMORPHONE HYDROCHLORIDE 1 MG/ML
0.6 INJECTION, SOLUTION INTRAMUSCULAR; INTRAVENOUS; SUBCUTANEOUS EVERY 5 MIN PRN
Status: DISCONTINUED | OUTPATIENT
Start: 2020-08-03 | End: 2020-08-03

## 2020-08-03 RX ADMIN — LIDOCAINE HYDROCHLORIDE 50 MG: 10 INJECTION, SOLUTION EPIDURAL; INFILTRATION; INTRACAUDAL; PERINEURAL at 12:31:00

## 2020-08-03 RX ADMIN — ONDANSETRON 4 MG: 2 INJECTION INTRAMUSCULAR; INTRAVENOUS at 12:45:00

## 2020-08-03 RX ADMIN — DEXAMETHASONE SODIUM PHOSPHATE 4 MG: 4 MG/ML VIAL (ML) INJECTION at 12:45:00

## 2020-08-03 RX ADMIN — MIDAZOLAM HYDROCHLORIDE 2 MG: 1 INJECTION INTRAMUSCULAR; INTRAVENOUS at 12:27:00

## 2020-08-03 RX ADMIN — SODIUM CHLORIDE, SODIUM LACTATE, POTASSIUM CHLORIDE, CALCIUM CHLORIDE: 600; 310; 30; 20 INJECTION, SOLUTION INTRAVENOUS at 13:37:00

## 2020-08-03 NOTE — PROCEDURES
[facility] HOSP - [facility]  Procedure Note    Elisabeth Abdi Patient Status:  Hospital Outpatient Surgery    3/4/1970 MRN [id number]   Location [address] RECOVERY Attending Carlos Spence MD   Hosp Day # 0 PCP Victor Manuel Almaraz MD     Pr

## 2020-08-03 NOTE — H&P
History of Present Illness:   Ms. Brissa Johnson is a 53year old woman who presented with a self detected mass in the right breast.  She reports she had not had any breast imaging since 2017.   She was referred for a bilateral diagnostic evaluation that t mention of complication, not stated as uncontrolled     • Unspecified essential hypertension                 Past Surgical History:   Procedure Laterality Date   • ABLATION         abdominal   •          section   • NEEDLE BIOPSY LEFT Disp: 30 tablet, Rfl: 3  Blood Glucose Monitoring Suppl (ACCU-CHEK JOSE) Does not apply Device, 1 Device by Does not apply route 3 (three) times daily. , Disp: 1 Device, Rfl: 0  Glucose Blood (ACCU-CHEK JOSE PLUS) In Vitro Strip, Use 3 times a day, Disp: Systems:  General:   The patient denies, fever, chills, night sweats, fatigue, generalized weakness, change in appetite or weight loss.     HEENT:     The patient denies eye irritation, cataracts, redness, glaucoma, yellowing of the eyes, change in vision headaches, seizure/epilepsy, speech problems, coordination problems, trembling/tremors, fainting/black outs, dizziness, memory problems, loss of sensation/numbness, problems walking, weakness, tingling or burning in hands/feet.  There is no history of abusi tail is normal.     Abdomen: The abdomen is soft, flat and non tender. The liver is not enlarged. There are no palpable masses.     Lymph Nodes:   The supraclavicular, axillary and cervical regions are free of significant lymphadenopathy.     Back: There i dissection, and the long-term sequelae of this procedure. The patient wishes to proceed with a right breast wire localized lumpectomy with right sentinel lymph node biopsy and possible right axillary lymph node dissection.   The risks and possible complica

## 2020-08-03 NOTE — ANESTHESIA PROCEDURE NOTES
Airway  Date/Time: 8/3/2020 12:31 PM  Urgency: Elective    Airway not difficult    General Information and Staff    Patient location during procedure: OR  Anesthesiologist: Aubree Lloyd MD  Resident/CRNA: Sorin Guerrier CRNA  Performed: CRNA     Indications

## 2020-08-03 NOTE — BRIEF OP NOTE
Pre-Operative Diagnosis: Malignant neoplasm of right breast in female, estrogen receptor negative, unspecified site of breast ([facility] [C50.911, Z17.1]     Post-Operative Diagnosis: Malignant neoplasm of right breast in female, estrogen receptor negative, uns

## 2020-08-03 NOTE — ANESTHESIA PREPROCEDURE EVALUATION
Anesthesia PreOp Note    HPI:     César Stein is a 53year old female who presents for preoperative consultation requested by: Jordyn Cain MD    Date of Surgery: 8/3/2020    Procedure(s):  BREAST LUMPECTOMY  BREAST SENTINEL LYMPH NODE Jeniffer Garcia Type 2 diabetes mellitus with hyperglycemia, with long-term current use of insulin ([facility]         Date Noted: 07/09/2011      Hypercholesteremia         Date Noted: 05/12/2010      Major depressive disorder, single episode, mild ([facility]         Date Noted: Glucose-Vitamin C (DEX-4) chewable tab 4 tablet, 4 tablet, Oral, Q15 Min PRN, Nicki Blue MD    Or  dextrose 50 % injection 50 mL, 50 mL, Intravenous, Q15 Min PRN, Nicki Blue MD    Or  glucose (DEX4) oral liquid 30 g, 30 g, Oral, Q15 Min PRN Social connections:        Talks on phone: Not on file        Gets together: Not on file        Attends [employer] service: Not on file        Active member of club or organization: Not on file        Attends meetings of clubs or organizations: Not on f Pulse:  73 [phone] 69   Resp:  16     Temp:  97.9 °F (36.6 °C)     TempSrc:  Oral     SpO2:  100% 98% 100%   Weight: 91.2 kg (201 lb) 88.9 kg (196 lb)     Height: 1.6 m (5' 3\")           Anesthesia Evaluation     Patient summary reviewed and Nursing notes revi

## 2020-08-03 NOTE — OPERATIVE REPORT
[facility]    PATIENT'S NAME: Natividad Bui   ATTENDING PHYSICIAN: Chiki Rodriguez. Jessica Marks MD   OPERATING PHYSICIAN: Chiki Rodriguez.  Jessica Marks MD   PATIENT ACCOUNT#:   [id number]    LOCATION:  [address]  MEDICAL RECORD #:   [id number] approach. Risks and possible complications including, but not limited to infection, bleeding, injury to surrounding structures, possible need for reoperation were discussed with the patient. She agreed to proceed.     OPERATIVE TECHNIQUE:  The patient was Mastisol and Steri-Strips were applied. Marcaine 0.5% was instilled in the cavity to assist with postoperative analgesia.   Attention was then taken toward the breast.  Lidocaine 1% with epinephrine was used to infiltrate the skin and subcutaneous tissues Mastisol and Steri-Strips were applied. Marcaine 0.5% was instilled into the cavity to assist with postoperative analgesia. A sterile dressing and compression bra were placed.   Blood loss was minimal.  All counts were correct at the conclusion of the pr

## 2020-08-03 NOTE — IMAGING NOTE
0845: Ms. Richi Chapman arrived in the mammography department in a wheelchair. Identified with spelling of name and date of birth. Medications and allergies reviewed.   NKDA reported    Hx taken: Right breast wire localized lumpectomy with right lymphoscintigra

## 2020-08-03 NOTE — ANESTHESIA POSTPROCEDURE EVALUATION
Patient: Yared Crease    Procedure Summary     Date:  08/03/20 Room / Location:  [address] [place] [address] OR 11 / [address] [place] [address] OR    Anesthesia Start:  9625 Anesthesia Stop:  1079    Procedures:       BREAST LUMPECTOMY (Right )      BREAST SENTINEL LYMPH NODE BIOPSY (Right

## 2020-08-12 ENCOUNTER — OFFICE VISIT (OUTPATIENT)
Dept: HEMATOLOGY/ONCOLOGY | Facility: HOSPITAL | Age: 50
End: 2020-08-12
Attending: INTERNAL MEDICINE
Payer: COMMERCIAL

## 2020-08-12 ENCOUNTER — OFFICE VISIT (OUTPATIENT)
Dept: SURGERY | Facility: CLINIC | Age: 50
End: 2020-08-12
Payer: COMMERCIAL

## 2020-08-12 VITALS
BODY MASS INDEX: 35.44 KG/M2 | TEMPERATURE: 99 F | RESPIRATION RATE: 16 BRPM | OXYGEN SATURATION: 100 % | WEIGHT: 200 LBS | HEIGHT: 63 IN | DIASTOLIC BLOOD PRESSURE: 75 MMHG | SYSTOLIC BLOOD PRESSURE: 123 MMHG | HEART RATE: 86 BPM

## 2020-08-12 VITALS
HEIGHT: 63 IN | WEIGHT: 196 LBS | RESPIRATION RATE: 16 BRPM | HEART RATE: 86 BPM | SYSTOLIC BLOOD PRESSURE: 123 MMHG | OXYGEN SATURATION: 100 % | BODY MASS INDEX: 34.73 KG/M2 | DIASTOLIC BLOOD PRESSURE: 75 MMHG

## 2020-08-12 DIAGNOSIS — Z17.1 MALIGNANT NEOPLASM OF UPPER-OUTER QUADRANT OF RIGHT BREAST IN FEMALE, ESTROGEN RECEPTOR NEGATIVE (HCC): Primary | ICD-10-CM

## 2020-08-12 DIAGNOSIS — Z51.81 ENCOUNTER FOR MONITORING CARDIOTOXIC DRUG THERAPY: Primary | ICD-10-CM

## 2020-08-12 DIAGNOSIS — C50.411 MALIGNANT NEOPLASM OF UPPER-OUTER QUADRANT OF RIGHT BREAST IN FEMALE, ESTROGEN RECEPTOR NEGATIVE (HCC): Primary | ICD-10-CM

## 2020-08-12 DIAGNOSIS — C50.411 MALIGNANT NEOPLASM OF UPPER-OUTER QUADRANT OF RIGHT BREAST IN FEMALE, ESTROGEN RECEPTOR NEGATIVE (HCC): ICD-10-CM

## 2020-08-12 DIAGNOSIS — Z79.899 ENCOUNTER FOR MONITORING CARDIOTOXIC DRUG THERAPY: Primary | ICD-10-CM

## 2020-08-12 DIAGNOSIS — Z17.1 MALIGNANT NEOPLASM OF UPPER-OUTER QUADRANT OF RIGHT BREAST IN FEMALE, ESTROGEN RECEPTOR NEGATIVE (HCC): ICD-10-CM

## 2020-08-12 PROCEDURE — 99024 POSTOP FOLLOW-UP VISIT: CPT | Performed by: SURGERY

## 2020-08-12 PROCEDURE — 3008F BODY MASS INDEX DOCD: CPT | Performed by: SURGERY

## 2020-08-12 PROCEDURE — 99215 OFFICE O/P EST HI 40 MIN: CPT | Performed by: INTERNAL MEDICINE

## 2020-08-12 PROCEDURE — 3074F SYST BP LT 130 MM HG: CPT | Performed by: SURGERY

## 2020-08-12 PROCEDURE — 3078F DIAST BP <80 MM HG: CPT | Performed by: SURGERY

## 2020-08-12 NOTE — PROGRESS NOTES
Breast Surgery Post-Operative Visit    Diagnosis: Right breast cancer status post neoadjuvant chemotherapy followed by right lumpectomy and sentinel lymph node biopsy performed on August 3, 2020.     Stage: Cancer Staging  Malignant neoplasm of upper-outer an MRI for surveillance and July 14, 2020 which demonstrates no appreciable residual disease with a complete neoadjuvant chemotherapy response radiologically. She elected for breast conserving therapy which took place last week without complication.   She HYDROcodone-acetaminophen 5-325 MG Oral Tab, Take 1-2 tablets by mouth every 6 (six) hours as needed for Pain., Disp: 30 tablet, Rfl: 0  Insulin Aspart Pen (NOVOLOG FLEXPEN) 100 UNIT/ML Subcutaneous Solution Pen-injector, Inject 20 Units into the skin 3 with exertion, emphysema, chronic bronchitis, shortness of breath or abnormal sound when breathing. Cardiovascular:   There is no history of chest pain, chest pressure/discomfort, palpitations, irregular heartbeat, fainting or near-fainting, difficulty Location: Left arm, Patient Position: Sitting, Cuff Size: adult)   Pulse 86   Resp 16   Ht 1.6 m (5' 3\")   Wt 88.9 kg (196 lb)   SpO2 100%   BMI 34.72 kg/m²     Physical Examination:   Examination shows that the surgical sites are clean, dry, and healing

## 2020-08-12 NOTE — PROGRESS NOTES
Cancer Center Progress Note    HPI    Tasha Brady is a 53year old female here for f/u of Malignant neoplasm of upper-outer quadrant of right breast in female, estrogen receptor negative (hcc)  (primary encounter diagnosis)     Oncology History:  12/20 States that on the R breast on the first couple of days had discomfort. Regular bra caused more discomfort. Some neuropathic pain at surgical site. She will start now with R shoulder ROM exercises. No swelling of the RUE.      She c/o having post nasal g • Heart Attack Mother 62        MI, Cause of death   • Heart Attack Cousin 47        Maternal cousin: MI, Cause of death   • Lipids Other         Family H/o Hyperlipidemia   • Cancer Maternal Aunt         told was paternal in inheritance   • Cancer Materna Psychiatric/Behavioral: Negative for sleep disturbance.        Vital Signs:  /75 (BP Location: Left arm, Patient Position: Sitting, Cuff Size: adult)   Pulse 86   Temp 98.9 °F (37.2 °C) (Temporal)   Resp 16   Ht 1.6 m (5' 3\")   Wt 90.7 kg (200 lb) -Per B59 trial protocol.      Genetics  - 84 gene panel ([employer] Multi-Cancer Panel.)  Testing did not yield any known mutations but did yield 2 VUS, one in DONTE (c.5319+6_5319+7del [intronic]) and the other in RAD50 (c.3292C>T)    Allergic rhinitis and post H) - Breast, right, 8 right breast inferior marging  clip true margin                      Final Diagnosis:      A. Right sentinel lymph node:  · One lymph node negative for metastatic carcinoma (0/1).   · Pankeratin (AE1/AE3) immunohistochemic A touch imprint performed on specimen A, right sentinel lymph node, demonstrate benign lymph node elements with no evidence of carcinoma, supporting the diagnosis.      The lumpectomy specimen, specimen B, was entirely submitted in 26 cassettes for histolog All antibodies are validated to document appropriate staining reactions. All immunohistochemical positive controls are examined and showed appropriate reactivity.      The immunohistochemical stains interpreted in this case demonstrated appropriate reactiv C50.911, Z17.1 Malignant Neoplasm Of Right Breast In Female, Estrogen Receptor Negative, Unspecified Site Of Breast (Hcc).

## 2020-08-18 ENCOUNTER — OFFICE VISIT (OUTPATIENT)
Dept: RADIATION ONCOLOGY | Facility: HOSPITAL | Age: 50
End: 2020-08-18
Attending: RADIOLOGY
Payer: COMMERCIAL

## 2020-08-18 VITALS
HEART RATE: 86 BPM | SYSTOLIC BLOOD PRESSURE: 122 MMHG | RESPIRATION RATE: 18 BRPM | TEMPERATURE: 98 F | BODY MASS INDEX: 35.44 KG/M2 | WEIGHT: 200 LBS | DIASTOLIC BLOOD PRESSURE: 77 MMHG | HEIGHT: 63 IN

## 2020-08-18 PROCEDURE — 99212 OFFICE O/P EST SF 10 MIN: CPT

## 2020-08-18 NOTE — CONSULTS
RADIATION ONCOLOGY NOTE    DATE OF VISIT: 8/18/2020    DIAGNOSIS: History of Stage IIIB (cT3, cN0, cM0, G3, ER-, GA-, HER2-) triple neg R breast ca, s/p neoadjuvant tx, per B59 with pCR, s/p lumpectomy for adjuvant breast XRT    Dear Mary Rainey,    As you Day 15, Cycle 2 Day 1, Cycle 3 Day 8, Cycle 3 Day 15, Cycle 3 Day 1, Cycle 4 Day 8, Cycle 4 Day 15, Cycle 4 Day 1, Cycle 5 Day 1, Cycle 6 Day 8, Cycle 6 Day 1, Cycle 7 Day 1, Cycle 8   CARBOplatin (PARAPLATIN) IV  660 mg   660 mg   470 mg          cyclopho nursing note. Review of Systems    Constitutional: Negative. HENT: Negative. Eyes: Negative. Respiratory: Negative. Cardiovascular: Negative. Gastrointestinal: Negative. Endocrine:        Type II diabetes   Genitourinary: Negative. bx); needle biopsy left (01/22/2020) (MRI bx); and lumpectomy right (08/03/2020). PAST SOCIAL HISTORY   reports that she has never smoked. She has never used smokeless tobacco. She reports that she does not drink alcohol or use drugs.     PAST FAMILY HIS with Dr. Valentín Mooney and Dr. Marcus Peterson      I have explained the diagnosis, stage, natural history of the disease and the goals of treatment. The rational, alternatives and all risk were discussed and all questions were answered.      I will plan a XRT mapping

## 2020-08-18 NOTE — PROGRESS NOTES
Nursing Consultation Note  Patient: Donal Lab  YOB: 1970  Age: 53year old  Radiation Oncologist: Dr. Yazmin Templeton  Referring Physician: Ryan Sin  Diagnosis:No diagnosis found.   Consult Date: 8/18/2020      Chemotherapy: Yes  Labs: daily at 6pm 15 pen 2       Preferred Pharmacy:    [facility] [facility], [phone], [phone]  [facility] [address]  Phone: [phone] Fax: [phone] strain: Not on file      Food insecurity:        Worry: Not on file        Inability: Not on file      Transportation needs:        Medical: Not on file        Non-medical: Not on file    Tobacco Use      Smoking status: Never Smoker      Smokeless tobacco Patient and/or surrogate IS involved in care decisions. Advanced directives:  Patient DOES NOT have advanced directives.   Transportation:  Adequate transportation available for expected visits    Pain:   ;Pain Score: 0   ;    ; Pt seen in initial consulta effects of radiation therapy and management  Comfortable with knowledge level    Progress Toward Outcome:  Making progress    Pamphlets/Handouts Given to Patient:  Understanding radiation therapy  Site specific side effects Breast  ACS information  Patient

## 2020-08-24 ENCOUNTER — HOSPITAL ENCOUNTER (OUTPATIENT)
Dept: CV DIAGNOSTICS | Facility: HOSPITAL | Age: 50
Discharge: HOME | End: 2020-08-24
Attending: INTERNAL MEDICINE
Payer: COMMERCIAL

## 2020-08-24 DIAGNOSIS — Z51.81 ENCOUNTER FOR MONITORING CARDIOTOXIC DRUG THERAPY: ICD-10-CM

## 2020-08-24 DIAGNOSIS — Z17.1 MALIGNANT NEOPLASM OF UPPER-OUTER QUADRANT OF RIGHT BREAST IN FEMALE, ESTROGEN RECEPTOR NEGATIVE (HCC): ICD-10-CM

## 2020-08-24 DIAGNOSIS — C50.411 MALIGNANT NEOPLASM OF UPPER-OUTER QUADRANT OF RIGHT BREAST IN FEMALE, ESTROGEN RECEPTOR NEGATIVE (HCC): ICD-10-CM

## 2020-08-24 DIAGNOSIS — Z79.899 ENCOUNTER FOR MONITORING CARDIOTOXIC DRUG THERAPY: ICD-10-CM

## 2020-08-25 ENCOUNTER — HOSPITAL ENCOUNTER (OUTPATIENT)
Dept: CV DIAGNOSTICS | Facility: HOSPITAL | Age: 50
Discharge: HOME | End: 2020-08-25
Attending: INTERNAL MEDICINE
Payer: COMMERCIAL

## 2020-08-25 ENCOUNTER — APPOINTMENT (OUTPATIENT)
Dept: HEMATOLOGY/ONCOLOGY | Facility: HOSPITAL | Age: 50
End: 2020-08-25
Attending: INTERNAL MEDICINE
Payer: COMMERCIAL

## 2020-08-25 PROCEDURE — 93306 TTE W/DOPPLER COMPLETE: CPT | Performed by: INTERNAL MEDICINE

## 2020-08-26 ENCOUNTER — TELEPHONE (OUTPATIENT)
Dept: INTERNAL MEDICINE CLINIC | Facility: CLINIC | Age: 50
End: 2020-08-26

## 2020-08-26 ENCOUNTER — RESEARCH ENCOUNTER (OUTPATIENT)
Dept: HEMATOLOGY/ONCOLOGY | Facility: HOSPITAL | Age: 50
End: 2020-08-26

## 2020-08-26 ENCOUNTER — OFFICE VISIT (OUTPATIENT)
Dept: HEMATOLOGY/ONCOLOGY | Facility: HOSPITAL | Age: 50
End: 2020-08-26
Attending: INTERNAL MEDICINE
Payer: COMMERCIAL

## 2020-08-26 ENCOUNTER — OFFICE VISIT (OUTPATIENT)
Dept: SURGERY | Facility: CLINIC | Age: 50
End: 2020-08-26
Payer: COMMERCIAL

## 2020-08-26 VITALS
OXYGEN SATURATION: 98 % | TEMPERATURE: 98 F | HEIGHT: 62.99 IN | DIASTOLIC BLOOD PRESSURE: 66 MMHG | BODY MASS INDEX: 35.08 KG/M2 | WEIGHT: 198 LBS | SYSTOLIC BLOOD PRESSURE: 123 MMHG | RESPIRATION RATE: 16 BRPM | HEART RATE: 82 BPM

## 2020-08-26 VITALS
DIASTOLIC BLOOD PRESSURE: 66 MMHG | WEIGHT: 198 LBS | OXYGEN SATURATION: 98 % | TEMPERATURE: 98 F | BODY MASS INDEX: 35 KG/M2 | RESPIRATION RATE: 16 BRPM | HEART RATE: 82 BPM | SYSTOLIC BLOOD PRESSURE: 123 MMHG

## 2020-08-26 VITALS
BODY MASS INDEX: 35.08 KG/M2 | HEIGHT: 63 IN | OXYGEN SATURATION: 98 % | RESPIRATION RATE: 16 BRPM | HEART RATE: 82 BPM | TEMPERATURE: 98 F | SYSTOLIC BLOOD PRESSURE: 123 MMHG | WEIGHT: 198 LBS | DIASTOLIC BLOOD PRESSURE: 66 MMHG

## 2020-08-26 DIAGNOSIS — Z17.1 MALIGNANT NEOPLASM OF UPPER-OUTER QUADRANT OF RIGHT BREAST IN FEMALE, ESTROGEN RECEPTOR NEGATIVE (HCC): Primary | ICD-10-CM

## 2020-08-26 DIAGNOSIS — C50.411 MALIGNANT NEOPLASM OF UPPER-OUTER QUADRANT OF RIGHT BREAST IN FEMALE, ESTROGEN RECEPTOR NEGATIVE (HCC): Primary | ICD-10-CM

## 2020-08-26 DIAGNOSIS — T45.1X5A CHEMOTHERAPY INDUCED NEUTROPENIA (HCC): ICD-10-CM

## 2020-08-26 DIAGNOSIS — Z00.6 CLINICAL TRIAL EXAM: ICD-10-CM

## 2020-08-26 DIAGNOSIS — Z51.11 CHEMOTHERAPY MANAGEMENT, ENCOUNTER FOR: ICD-10-CM

## 2020-08-26 DIAGNOSIS — D70.1 CHEMOTHERAPY INDUCED NEUTROPENIA (HCC): ICD-10-CM

## 2020-08-26 LAB
ALBUMIN SERPL-MCNC: 3 G/DL (ref 3.4–5)
ALBUMIN/GLOB SERPL: 0.7 {RATIO} (ref 1–2)
ALP LIVER SERPL-CCNC: 69 U/L (ref 39–100)
ALT SERPL-CCNC: 16 U/L (ref 13–56)
ANION GAP SERPL CALC-SCNC: 8 MMOL/L (ref 0–18)
AST SERPL-CCNC: 14 U/L (ref 15–37)
BASOPHILS # BLD AUTO: 0.03 X10(3) UL (ref 0–0.2)
BASOPHILS NFR BLD AUTO: 0.7 %
BILIRUB SERPL-MCNC: 0.3 MG/DL (ref 0.1–2)
BUN BLD-MCNC: 16 MG/DL (ref 7–18)
BUN/CREAT SERPL: 16.7 (ref 10–20)
CALCIUM BLD-MCNC: 9.3 MG/DL (ref 8.5–10.1)
CHLORIDE SERPL-SCNC: 104 MMOL/L (ref 98–112)
CO2 SERPL-SCNC: 24 MMOL/L (ref 21–32)
CORTIS SERPL-MCNC: 12.8 UG/DL
CREAT BLD-MCNC: 0.96 MG/DL (ref 0.55–1.02)
DEPRECATED RDW RBC AUTO: 50.4 FL (ref 35.1–46.3)
EOSINOPHIL # BLD AUTO: 0.5 X10(3) UL (ref 0–0.7)
EOSINOPHIL NFR BLD AUTO: 11.8 %
ERYTHROCYTE [DISTWIDTH] IN BLOOD BY AUTOMATED COUNT: 14.5 % (ref 11–15)
GLOBULIN PLAS-MCNC: 4.5 G/DL (ref 2.8–4.4)
GLUCOSE BLD-MCNC: 292 MG/DL (ref 70–99)
HCT VFR BLD AUTO: 36.1 % (ref 35–48)
HGB BLD-MCNC: 11.8 G/DL (ref 12–16)
IMM GRANULOCYTES # BLD AUTO: 0.01 X10(3) UL (ref 0–1)
IMM GRANULOCYTES NFR BLD: 0.2 %
LYMPHOCYTES # BLD AUTO: 1.25 X10(3) UL (ref 1–4)
LYMPHOCYTES NFR BLD AUTO: 29.6 %
M PROTEIN MFR SERPL ELPH: 7.5 G/DL (ref 6.4–8.2)
MCH RBC QN AUTO: 30.7 PG (ref 26–34)
MCHC RBC AUTO-ENTMCNC: 32.7 G/DL (ref 31–37)
MCV RBC AUTO: 94 FL (ref 80–100)
MONOCYTES # BLD AUTO: 0.28 X10(3) UL (ref 0.1–1)
MONOCYTES NFR BLD AUTO: 6.6 %
NEUTROPHILS # BLD AUTO: 2.16 X10 (3) UL (ref 1.5–7.7)
NEUTROPHILS # BLD AUTO: 2.16 X10(3) UL (ref 1.5–7.7)
NEUTROPHILS NFR BLD AUTO: 51.1 %
OSMOLALITY SERPL CALC.SUM OF ELEC: 294 MOSM/KG (ref 275–295)
PATIENT FASTING Y/N/NP: YES
PLATELET # BLD AUTO: 234 10(3)UL (ref 150–450)
POTASSIUM SERPL-SCNC: 3.7 MMOL/L (ref 3.5–5.1)
RBC # BLD AUTO: 3.84 X10(6)UL (ref 3.8–5.3)
SODIUM SERPL-SCNC: 136 MMOL/L (ref 136–145)
TSI SER-ACNC: 1.59 MIU/ML (ref 0.36–3.74)
WBC # BLD AUTO: 4.2 X10(3) UL (ref 4–11)

## 2020-08-26 PROCEDURE — 3008F BODY MASS INDEX DOCD: CPT | Performed by: INTERNAL MEDICINE

## 2020-08-26 PROCEDURE — 80053 COMPREHEN METABOLIC PANEL: CPT

## 2020-08-26 PROCEDURE — 3078F DIAST BP <80 MM HG: CPT | Performed by: SURGERY

## 2020-08-26 PROCEDURE — 82533 TOTAL CORTISOL: CPT

## 2020-08-26 PROCEDURE — 99215 OFFICE O/P EST HI 40 MIN: CPT | Performed by: INTERNAL MEDICINE

## 2020-08-26 PROCEDURE — 3074F SYST BP LT 130 MM HG: CPT | Performed by: SURGERY

## 2020-08-26 PROCEDURE — 84443 ASSAY THYROID STIM HORMONE: CPT

## 2020-08-26 PROCEDURE — 99024 POSTOP FOLLOW-UP VISIT: CPT | Performed by: SURGERY

## 2020-08-26 PROCEDURE — 85025 COMPLETE CBC W/AUTO DIFF WBC: CPT

## 2020-08-26 PROCEDURE — 3078F DIAST BP <80 MM HG: CPT | Performed by: INTERNAL MEDICINE

## 2020-08-26 PROCEDURE — 3008F BODY MASS INDEX DOCD: CPT | Performed by: SURGERY

## 2020-08-26 PROCEDURE — 3074F SYST BP LT 130 MM HG: CPT | Performed by: INTERNAL MEDICINE

## 2020-08-26 PROCEDURE — 96413 CHEMO IV INFUSION 1 HR: CPT

## 2020-08-26 RX ORDER — 0.9 % SODIUM CHLORIDE 0.9 %
10 VIAL (ML) INJECTION ONCE
Status: CANCELLED | OUTPATIENT
Start: 2020-08-26

## 2020-08-26 RX ORDER — HEPARIN SODIUM (PORCINE) LOCK FLUSH IV SOLN 100 UNIT/ML 100 UNIT/ML
SOLUTION INTRAVENOUS
Status: DISCONTINUED
Start: 2020-08-26 | End: 2020-08-26

## 2020-08-26 RX ORDER — HEPARIN SODIUM (PORCINE) LOCK FLUSH IV SOLN 100 UNIT/ML 100 UNIT/ML
5 SOLUTION INTRAVENOUS ONCE
Status: COMPLETED | OUTPATIENT
Start: 2020-08-26 | End: 2020-08-26

## 2020-08-26 RX ORDER — 0.9 % SODIUM CHLORIDE 0.9 %
VIAL (ML) INJECTION
Status: DISCONTINUED
Start: 2020-08-26 | End: 2020-08-26

## 2020-08-26 RX ORDER — HEPARIN SODIUM (PORCINE) LOCK FLUSH IV SOLN 100 UNIT/ML 100 UNIT/ML
5 SOLUTION INTRAVENOUS ONCE
Status: CANCELLED | OUTPATIENT
Start: 2020-08-26

## 2020-08-26 RX ADMIN — HEPARIN SODIUM (PORCINE) LOCK FLUSH IV SOLN 100 UNIT/ML 500 UNITS: 100 SOLUTION INTRAVENOUS at 10:20:00

## 2020-08-26 NOTE — TELEPHONE ENCOUNTER
Tried contacting patient, mailbox was full. Orders already in epic. Will try to contact patient later.

## 2020-08-26 NOTE — PROGRESS NOTES
Pt here for C9D1+ B59 Study. Arrives Ambulating independently, accompanied by Self           Patient reports possible pregnancy since last therapy cycle: No    Modifications in dose or schedule: Yes, this is the post surgical re initiation of treatment.

## 2020-08-26 NOTE — PROGRESS NOTES
Cancer Center Progress Note    HPI    Kate Prieto is a 53year old female here for f/u of Malignant neoplasm of upper-outer quadrant of right breast in female, estrogen receptor negative (hcc)  (primary encounter diagnosis)  Chemotherapy management, en performing SBE regularly. States surgical scar is harder to palpation. Some neuropathic pain at surgical site. R shoulder ROM exercises going well. No swelling of the RUE. States still having issues with allergic rhinitis.     She is working on [employer] Hyperlipidemia   • Cancer Maternal Aunt         told was paternal in inheritance   • Cancer Maternal Grandfather         prostate ca   • Glaucoma Neg    • Macular degeneration Neg        Social History:  Marital status: lives with daughter (2 kids live in Position: Sitting)   Pulse 82   Temp 97.8 °F (36.6 °C) (Temporal)   Resp 16   Ht 1.6 m (5' 3\")   Wt 89.8 kg (198 lb)   SpO2 98%   BMI 35.07 kg/m²    Wt Readings from Last 6 Encounters:  08/26/20 : 89.8 kg (198 lb)  08/18/20 : 90.7 kg (200 lb)  08/12/20 : [intronic]) and the other in RAD50 (c.3292C>T)    Allergic rhinitis and post-nasal gtt with cough:  -Recommend to resume antihistamines.       Data:  Recent Results (from the past 24 hour(s))   CBC W/ DIFFERENTIAL    Collection Time: 08/26/20  7:38 AM   Res

## 2020-08-26 NOTE — PROGRESS NOTES
Study: [facility] B-59  Patient ID: [id number]   Timepoint: C9D1 (Dose 8)    Reviewed and discussed Consent Form Addendum #3. Patient agreeable to continue on study and signed addendum. Provided patient with a copy of the 3 page, signed addendum.

## 2020-09-01 ENCOUNTER — APPOINTMENT (OUTPATIENT)
Dept: PHYSICAL THERAPY | Facility: HOSPITAL | Age: 50
End: 2020-09-01
Attending: SURGERY
Payer: COMMERCIAL

## 2020-09-01 ENCOUNTER — APPOINTMENT (OUTPATIENT)
Dept: RADIATION ONCOLOGY | Facility: HOSPITAL | Age: 50
End: 2020-09-01
Attending: RADIOLOGY
Payer: COMMERCIAL

## 2020-09-02 ENCOUNTER — OFFICE VISIT (OUTPATIENT)
Dept: PHYSICAL THERAPY | Facility: HOSPITAL | Age: 50
End: 2020-09-02
Attending: SURGERY
Payer: COMMERCIAL

## 2020-09-02 NOTE — PROGRESS NOTES
BREAST CANCER SURGICAL SCREENINGS  [facility] REHABILITATION      PATIENT SUMMARY:     Involved Side:    RIGHT                                                 Dominant Hand:    RIGHT                              Occupation:     Haven't been working 155 135   abd 150 140   abd      ext 60 45   ext 60 50   ext     Functional IR  T9 T12  Functional IR  T9 T12  Functional IR                      Shoulder strength  Right Left  Shoulder strength  Right Left  Shoulder strength  Right Left    flex 5 4+   fle 19. 6   MEASUREMENT D 23.8 23.7   MEASUREMENT E 26.2 25.5   MEASUREMENT F 25.8 25.8   MEASUREMENT G 29 28.5   MEASUREMENT H 32.2 33.5   MEASUREMENT I 37.8 39.6    TOTAL VOLUME  [id number]   DATE MEASURED 9/2/2020 7/31/2020   LOCATION/MEASUREMENTS

## 2020-09-10 NOTE — PROGRESS NOTES
Breast Surgery Surveillance Visit    Diagnosis: Right breast cancer status post neoadjuvant chemotherapy followed by right lumpectomy and sentinel lymph node biopsy performed on August 3, 2020.     Stage: Cancer Staging  Malignant neoplasm of upper-outer qu demonstrated benign fibroadenoma. She has undergone neoadjuvant chemotherapy with good clinical response.   She had an MRI for surveillance and July 14, 2020 which demonstrates no appreciable residual disease with a complete neoadjuvant chemotherapy respon hormone replacement therapy. She denies any history of oral contraceptive use. She denies infertility treatment to achieve pregnancy.     Medications:    Insulin Aspart Pen (NOVOLOG FLEXPEN) 100 UNIT/ML Subcutaneous Solution Pen-injector, Inject 20 Units difficulty in breathing with exertion, emphysema, chronic bronchitis, shortness of breath or abnormal sound when breathing. Cardiovascular:   There is no history of chest pain, chest pressure/discomfort, palpitations, irregular heartbeat, fainting or ne anaphylaxis.     /66 (BP Location: Left arm, Patient Position: Sitting)   Pulse 82   Temp 97.8 °F (36.6 °C) (Temporal)   Resp 16   Ht 1.6 m (5' 2.99\")   Wt 89.8 kg (198 lb)   SpO2 98%   BMI 35.08 kg/m²     Physical Examination:   Examination shows th

## 2020-09-15 ENCOUNTER — APPOINTMENT (OUTPATIENT)
Dept: PHYSICAL THERAPY | Facility: HOSPITAL | Age: 50
End: 2020-09-15
Attending: SURGERY
Payer: COMMERCIAL

## 2020-09-16 ENCOUNTER — OFFICE VISIT (OUTPATIENT)
Dept: HEMATOLOGY/ONCOLOGY | Facility: HOSPITAL | Age: 50
End: 2020-09-16
Attending: INTERNAL MEDICINE
Payer: COMMERCIAL

## 2020-09-16 ENCOUNTER — RESEARCH ENCOUNTER (OUTPATIENT)
Dept: HEMATOLOGY/ONCOLOGY | Facility: HOSPITAL | Age: 50
End: 2020-09-16

## 2020-09-16 VITALS
SYSTOLIC BLOOD PRESSURE: 129 MMHG | BODY MASS INDEX: 35 KG/M2 | HEART RATE: 62 BPM | DIASTOLIC BLOOD PRESSURE: 67 MMHG | RESPIRATION RATE: 16 BRPM | OXYGEN SATURATION: 97 % | TEMPERATURE: 98 F | WEIGHT: 198 LBS

## 2020-09-16 DIAGNOSIS — C50.411 MALIGNANT NEOPLASM OF UPPER-OUTER QUADRANT OF RIGHT BREAST IN FEMALE, ESTROGEN RECEPTOR NEGATIVE (HCC): ICD-10-CM

## 2020-09-16 DIAGNOSIS — Z17.1 MALIGNANT NEOPLASM OF UPPER-OUTER QUADRANT OF RIGHT BREAST IN FEMALE, ESTROGEN RECEPTOR NEGATIVE (HCC): ICD-10-CM

## 2020-09-16 DIAGNOSIS — Z00.6 CLINICAL TRIAL EXAM: ICD-10-CM

## 2020-09-16 DIAGNOSIS — Z17.1 MALIGNANT NEOPLASM OF UPPER-OUTER QUADRANT OF RIGHT BREAST IN FEMALE, ESTROGEN RECEPTOR NEGATIVE (HCC): Primary | ICD-10-CM

## 2020-09-16 DIAGNOSIS — T45.1X5A CHEMOTHERAPY INDUCED NEUTROPENIA (HCC): Primary | ICD-10-CM

## 2020-09-16 DIAGNOSIS — Z51.11 CHEMOTHERAPY MANAGEMENT, ENCOUNTER FOR: Primary | ICD-10-CM

## 2020-09-16 DIAGNOSIS — C50.411 MALIGNANT NEOPLASM OF UPPER-OUTER QUADRANT OF RIGHT BREAST IN FEMALE, ESTROGEN RECEPTOR NEGATIVE (HCC): Primary | ICD-10-CM

## 2020-09-16 DIAGNOSIS — D70.1 CHEMOTHERAPY INDUCED NEUTROPENIA (HCC): Primary | ICD-10-CM

## 2020-09-16 LAB
ALBUMIN SERPL-MCNC: 2.9 G/DL (ref 3.4–5)
ALBUMIN/GLOB SERPL: 0.7 {RATIO} (ref 1–2)
ALP LIVER SERPL-CCNC: 65 U/L (ref 39–100)
ALT SERPL-CCNC: 15 U/L (ref 13–56)
ANION GAP SERPL CALC-SCNC: 6 MMOL/L (ref 0–18)
AST SERPL-CCNC: 12 U/L (ref 15–37)
BASOPHILS # BLD AUTO: 0.03 X10(3) UL (ref 0–0.2)
BASOPHILS NFR BLD AUTO: 0.9 %
BILIRUB SERPL-MCNC: 0.2 MG/DL (ref 0.1–2)
BUN BLD-MCNC: 14 MG/DL (ref 7–18)
BUN/CREAT SERPL: 14.3 (ref 10–20)
CALCIUM BLD-MCNC: 8.9 MG/DL (ref 8.5–10.1)
CHLORIDE SERPL-SCNC: 104 MMOL/L (ref 98–112)
CO2 SERPL-SCNC: 26 MMOL/L (ref 21–32)
CREAT BLD-MCNC: 0.98 MG/DL (ref 0.55–1.02)
DEPRECATED RDW RBC AUTO: 46.5 FL (ref 35.1–46.3)
EOSINOPHIL # BLD AUTO: 0.36 X10(3) UL (ref 0–0.7)
EOSINOPHIL NFR BLD AUTO: 10.3 %
ERYTHROCYTE [DISTWIDTH] IN BLOOD BY AUTOMATED COUNT: 13.7 % (ref 11–15)
GLOBULIN PLAS-MCNC: 4.1 G/DL (ref 2.8–4.4)
GLUCOSE BLD-MCNC: 349 MG/DL (ref 70–99)
HCT VFR BLD AUTO: 33.7 % (ref 35–48)
HGB BLD-MCNC: 11.1 G/DL (ref 12–16)
IMM GRANULOCYTES # BLD AUTO: 0.01 X10(3) UL (ref 0–1)
IMM GRANULOCYTES NFR BLD: 0.3 %
LYMPHOCYTES # BLD AUTO: 1.15 X10(3) UL (ref 1–4)
LYMPHOCYTES NFR BLD AUTO: 33 %
M PROTEIN MFR SERPL ELPH: 7 G/DL (ref 6.4–8.2)
MCH RBC QN AUTO: 30.3 PG (ref 26–34)
MCHC RBC AUTO-ENTMCNC: 32.9 G/DL (ref 31–37)
MCV RBC AUTO: 92.1 FL (ref 80–100)
MONOCYTES # BLD AUTO: 0.32 X10(3) UL (ref 0.1–1)
MONOCYTES NFR BLD AUTO: 9.2 %
NEUTROPHILS # BLD AUTO: 1.61 X10 (3) UL (ref 1.5–7.7)
NEUTROPHILS # BLD AUTO: 1.61 X10(3) UL (ref 1.5–7.7)
NEUTROPHILS NFR BLD AUTO: 46.3 %
OSMOLALITY SERPL CALC.SUM OF ELEC: 296 MOSM/KG (ref 275–295)
PLATELET # BLD AUTO: 199 10(3)UL (ref 150–450)
POTASSIUM SERPL-SCNC: 3.8 MMOL/L (ref 3.5–5.1)
RBC # BLD AUTO: 3.66 X10(6)UL (ref 3.8–5.3)
SODIUM SERPL-SCNC: 136 MMOL/L (ref 136–145)
TSI SER-ACNC: 1.97 MIU/ML (ref 0.36–3.74)
WBC # BLD AUTO: 3.5 X10(3) UL (ref 4–11)

## 2020-09-16 PROCEDURE — 99215 OFFICE O/P EST HI 40 MIN: CPT | Performed by: NURSE PRACTITIONER

## 2020-09-16 PROCEDURE — 96413 CHEMO IV INFUSION 1 HR: CPT

## 2020-09-16 PROCEDURE — 85025 COMPLETE CBC W/AUTO DIFF WBC: CPT

## 2020-09-16 PROCEDURE — 80053 COMPREHEN METABOLIC PANEL: CPT

## 2020-09-16 PROCEDURE — 84443 ASSAY THYROID STIM HORMONE: CPT

## 2020-09-16 RX ORDER — 0.9 % SODIUM CHLORIDE 0.9 %
10 VIAL (ML) INJECTION ONCE
Status: CANCELLED | OUTPATIENT
Start: 2020-09-16

## 2020-09-16 RX ORDER — HEPARIN SODIUM (PORCINE) LOCK FLUSH IV SOLN 100 UNIT/ML 100 UNIT/ML
5 SOLUTION INTRAVENOUS ONCE
Status: COMPLETED | OUTPATIENT
Start: 2020-09-16 | End: 2020-09-16

## 2020-09-16 RX ORDER — HEPARIN SODIUM (PORCINE) LOCK FLUSH IV SOLN 100 UNIT/ML 100 UNIT/ML
5 SOLUTION INTRAVENOUS ONCE
Status: CANCELLED | OUTPATIENT
Start: 2020-09-16

## 2020-09-16 RX ORDER — 0.9 % SODIUM CHLORIDE 0.9 %
VIAL (ML) INJECTION
Status: DISCONTINUED
Start: 2020-09-16 | End: 2020-09-16

## 2020-09-16 RX ORDER — 0.9 % SODIUM CHLORIDE 0.9 %
10 VIAL (ML) INJECTION ONCE
Status: DISCONTINUED | OUTPATIENT
Start: 2020-09-16 | End: 2020-09-16

## 2020-09-16 RX ORDER — HEPARIN SODIUM (PORCINE) LOCK FLUSH IV SOLN 100 UNIT/ML 100 UNIT/ML
SOLUTION INTRAVENOUS
Status: COMPLETED
Start: 2020-09-16 | End: 2020-09-16

## 2020-09-16 RX ADMIN — HEPARIN SODIUM (PORCINE) LOCK FLUSH IV SOLN 100 UNIT/ML 500 UNITS: 100 SOLUTION INTRAVENOUS at 12:45:00

## 2020-09-16 NOTE — PROGRESS NOTES
Pt here for C10D1+ B59 Study. Arrives Ambulating independently, accompanied by Self           Patient reports possible pregnancy since last therapy cycle: No    Modifications in dose or schedule: Yes, this is the post surgical re initiation of treatment.

## 2020-09-16 NOTE — PROGRESS NOTES
Cancer Center Progress Note    JAIRO Greene is a 53year old female here for f/u of Chemotherapy management, encounter for  (primary encounter diagnosis)  Malignant neoplasm of upper-outer quadrant of right breast in female, estrogen receptor neg regularly. States surgical scar is harder to palpation. Some neuropathic pain at surgical site. R shoulder ROM exercises going well. No swelling of the RUE. No fevers, chills or infectious symptoms. Energy is good, no skin rash or pruritis.  No d Maternal cousin: MI, Cause of death   • Lipids Other         Family H/o Hyperlipidemia   • Cancer Maternal Aunt         told was paternal in inheritance   • Cancer Maternal Grandfather         prostate ca   • Glaucoma Neg    • Macular degeneration Neg lb)  08/12/20 : 88.9 kg (196 lb)    Physical Examination:  General: Patient is alert, not in acute distress. Obese  HEENT: EOMs intact. PERRL. Oropharynx mild cobble stoning. Neck: No JVD. No palpable lymphadenopathy. Neck is supple.   Chest: Clear to Domnick Omid

## 2020-09-16 NOTE — PROGRESS NOTES
Study: [facility] B-59  Patient ID: [id number]   Timepoint: C10D1 (Dose 9)    Patient presents to clinic for evaluation for C10 Atezo/placebo. Patient denies any fevers, chills, cough, or any other s/s of infection.  [facility]

## 2020-09-22 ENCOUNTER — APPOINTMENT (OUTPATIENT)
Dept: RADIATION ONCOLOGY | Facility: HOSPITAL | Age: 50
End: 2020-09-22
Attending: RADIOLOGY
Payer: COMMERCIAL

## 2020-09-22 PROCEDURE — 77290 THER RAD SIMULAJ FIELD CPLX: CPT | Performed by: RADIOLOGY

## 2020-09-22 PROCEDURE — 77333 RADIATION TREATMENT AID(S): CPT | Performed by: RADIOLOGY

## 2020-09-24 PROCEDURE — 77300 RADIATION THERAPY DOSE PLAN: CPT | Performed by: RADIOLOGY

## 2020-09-24 PROCEDURE — 77295 3-D RADIOTHERAPY PLAN: CPT | Performed by: RADIOLOGY

## 2020-09-24 PROCEDURE — 77334 RADIATION TREATMENT AID(S): CPT | Performed by: RADIOLOGY

## 2020-10-01 ENCOUNTER — APPOINTMENT (OUTPATIENT)
Dept: RADIATION ONCOLOGY | Facility: HOSPITAL | Age: 50
End: 2020-10-01
Attending: RADIOLOGY
Payer: MEDICAID

## 2020-10-07 ENCOUNTER — NURSE ONLY (OUTPATIENT)
Dept: HEMATOLOGY/ONCOLOGY | Facility: HOSPITAL | Age: 50
End: 2020-10-07
Attending: INTERNAL MEDICINE
Payer: MEDICAID

## 2020-10-07 ENCOUNTER — RESEARCH ENCOUNTER (OUTPATIENT)
Dept: HEMATOLOGY/ONCOLOGY | Facility: HOSPITAL | Age: 50
End: 2020-10-07

## 2020-10-07 VITALS
TEMPERATURE: 98 F | SYSTOLIC BLOOD PRESSURE: 128 MMHG | HEART RATE: 86 BPM | HEIGHT: 63 IN | WEIGHT: 195 LBS | BODY MASS INDEX: 34.55 KG/M2 | OXYGEN SATURATION: 99 % | DIASTOLIC BLOOD PRESSURE: 61 MMHG | RESPIRATION RATE: 16 BRPM

## 2020-10-07 VITALS
HEART RATE: 86 BPM | BODY MASS INDEX: 35 KG/M2 | DIASTOLIC BLOOD PRESSURE: 61 MMHG | RESPIRATION RATE: 16 BRPM | OXYGEN SATURATION: 99 % | WEIGHT: 195 LBS | TEMPERATURE: 98 F | SYSTOLIC BLOOD PRESSURE: 128 MMHG

## 2020-10-07 DIAGNOSIS — Z79.4 TYPE 2 DIABETES MELLITUS WITH HYPERGLYCEMIA, WITH LONG-TERM CURRENT USE OF INSULIN (HCC): ICD-10-CM

## 2020-10-07 DIAGNOSIS — Z17.1 MALIGNANT NEOPLASM OF UPPER-OUTER QUADRANT OF RIGHT BREAST IN FEMALE, ESTROGEN RECEPTOR NEGATIVE (HCC): Primary | ICD-10-CM

## 2020-10-07 DIAGNOSIS — E11.65 TYPE 2 DIABETES MELLITUS WITH HYPERGLYCEMIA, WITH LONG-TERM CURRENT USE OF INSULIN (HCC): ICD-10-CM

## 2020-10-07 DIAGNOSIS — C50.411 MALIGNANT NEOPLASM OF UPPER-OUTER QUADRANT OF RIGHT BREAST IN FEMALE, ESTROGEN RECEPTOR NEGATIVE (HCC): Primary | ICD-10-CM

## 2020-10-07 DIAGNOSIS — D70.1 CHEMOTHERAPY INDUCED NEUTROPENIA (HCC): ICD-10-CM

## 2020-10-07 DIAGNOSIS — G62.0 CHEMOTHERAPY-INDUCED NEUROPATHY (HCC): ICD-10-CM

## 2020-10-07 DIAGNOSIS — T45.1X5A CHEMOTHERAPY-INDUCED NEUROPATHY (HCC): ICD-10-CM

## 2020-10-07 DIAGNOSIS — T45.1X5A CHEMOTHERAPY INDUCED NEUTROPENIA (HCC): ICD-10-CM

## 2020-10-07 DIAGNOSIS — J30.0 VASOMOTOR RHINITIS: ICD-10-CM

## 2020-10-07 DIAGNOSIS — Z51.11 CHEMOTHERAPY MANAGEMENT, ENCOUNTER FOR: ICD-10-CM

## 2020-10-07 PROCEDURE — 85025 COMPLETE CBC W/AUTO DIFF WBC: CPT

## 2020-10-07 PROCEDURE — 82570 ASSAY OF URINE CREATININE: CPT

## 2020-10-07 PROCEDURE — 80053 COMPREHEN METABOLIC PANEL: CPT

## 2020-10-07 PROCEDURE — 96413 CHEMO IV INFUSION 1 HR: CPT

## 2020-10-07 PROCEDURE — 80061 LIPID PANEL: CPT

## 2020-10-07 PROCEDURE — 99215 OFFICE O/P EST HI 40 MIN: CPT | Performed by: INTERNAL MEDICINE

## 2020-10-07 PROCEDURE — 81001 URINALYSIS AUTO W/SCOPE: CPT

## 2020-10-07 PROCEDURE — 83036 HEMOGLOBIN GLYCOSYLATED A1C: CPT

## 2020-10-07 PROCEDURE — 82043 UR ALBUMIN QUANTITATIVE: CPT

## 2020-10-07 RX ORDER — 0.9 % SODIUM CHLORIDE 0.9 %
10 VIAL (ML) INJECTION ONCE
Status: CANCELLED | OUTPATIENT
Start: 2020-10-07

## 2020-10-07 RX ORDER — 0.9 % SODIUM CHLORIDE 0.9 %
VIAL (ML) INJECTION
Status: DISCONTINUED
Start: 2020-10-07 | End: 2020-10-07

## 2020-10-07 RX ORDER — HEPARIN SODIUM (PORCINE) LOCK FLUSH IV SOLN 100 UNIT/ML 100 UNIT/ML
5 SOLUTION INTRAVENOUS ONCE
Status: CANCELLED | OUTPATIENT
Start: 2020-10-07

## 2020-10-07 RX ORDER — HEPARIN SODIUM (PORCINE) LOCK FLUSH IV SOLN 100 UNIT/ML 100 UNIT/ML
SOLUTION INTRAVENOUS
Status: COMPLETED
Start: 2020-10-07 | End: 2020-10-07

## 2020-10-07 RX ORDER — HEPARIN SODIUM (PORCINE) LOCK FLUSH IV SOLN 100 UNIT/ML 100 UNIT/ML
5 SOLUTION INTRAVENOUS ONCE
Status: COMPLETED | OUTPATIENT
Start: 2020-10-07 | End: 2020-10-07

## 2020-10-07 RX ADMIN — HEPARIN SODIUM (PORCINE) LOCK FLUSH IV SOLN 100 UNIT/ML 500 UNITS: 100 SOLUTION INTRAVENOUS at 14:15:00

## 2020-10-07 NOTE — PROGRESS NOTES
Cancer Center Progress Note    Patient Name: James Velásquez   YOB: 1970   Medical Record Number: [id number]   Attending Physician: Markie Brenner M.D.      Chief Complaint:  Breast cancer    Oncology History:  12/2019: mammogram (last mammogram • [facility])     RIGHT    • Cup to disc asymmetry 2006    OU   • No diabetic retinopathy in both eyes 2006    OU   • Obesity (BMI 30-39. 9)    • Obesity, unspecified    • Other and unspecified hyperlipidemia    • Personal history of antineoplastic chem Inject 20 Units into the skin 3 (three) times daily before meals.  PT STATES SLIDING SCALE  subcutaneous with breakfast lunch and dinner, Disp: 15 pen, Rfl: 3  •  insulin glargine (BASAGLAR KWIKPEN) 100 UNIT/ML Subcutaneous Solution Pen-injector, 70 units s right upper inner breast.   For patients over the age of 36, the target due date for the patient's next mammogram has been entered into a reminder system.    Patient received a discharge summary from the technologist after completion of exam.  Breast marker [facility]    Genetics  - 84 gene panel ([employer] Multi-Cancer Panel.)  Testing did not yield any known mutations but did yield 2 VUS, one in DONTE (c.5319+6_5319+7del [intronic]) and the other in RAD50 (c.3292C>T)    MDM: high due to chemo

## 2020-10-07 NOTE — PROGRESS NOTES
Pt here for C11D1+ B59 Study.   Arrives Ambulating independently, accompanied by Self           Patient reports possible pregnancy since last therapy cycle: No    Modifications in dose or schedule: no.    Patient received after MD visit, denies any complain

## 2020-10-07 NOTE — PROGRESS NOTES
Study: [facility] B-59  Patient ID: [id number]                Timepoint: C11D1 (Dose 10)     Patient presents to clinic for evaluation for C11 Atezo/placebo. Patient denies any fevers, chills, cough, or any other s/s of infection.  Patient evaluated by

## 2020-10-08 PROBLEM — T45.1X5A CHEMOTHERAPY-INDUCED NEUROPATHY (HCC): Status: ACTIVE | Noted: 2020-10-08

## 2020-10-08 PROBLEM — G47.01 INSOMNIA DUE TO MEDICAL CONDITION: Status: RESOLVED | Noted: 2020-04-23 | Resolved: 2020-10-08

## 2020-10-08 PROBLEM — U07.1 COVID-19 VIRUS INFECTION: Status: RESOLVED | Noted: 2020-04-09 | Resolved: 2020-10-08

## 2020-10-08 PROBLEM — K59.03 DRUG INDUCED CONSTIPATION: Status: RESOLVED | Noted: 2020-02-27 | Resolved: 2020-10-08

## 2020-10-08 PROBLEM — R11.2 CINV (CHEMOTHERAPY-INDUCED NAUSEA AND VOMITING): Status: RESOLVED | Noted: 2020-01-30 | Resolved: 2020-10-08

## 2020-10-08 PROBLEM — L27.0 DRUG-INDUCED SKIN RASH: Status: RESOLVED | Noted: 2020-02-27 | Resolved: 2020-10-08

## 2020-10-08 PROBLEM — G62.0 CHEMOTHERAPY-INDUCED NEUROPATHY (HCC): Status: ACTIVE | Noted: 2020-10-08

## 2020-10-08 PROBLEM — T45.1X5A CINV (CHEMOTHERAPY-INDUCED NAUSEA AND VOMITING): Status: RESOLVED | Noted: 2020-01-30 | Resolved: 2020-10-08

## 2020-10-08 PROBLEM — K12.31 MUCOSITIS DUE TO CHEMOTHERAPY: Status: RESOLVED | Noted: 2020-03-12 | Resolved: 2020-10-08

## 2020-10-08 PROBLEM — G62.0 CHEMOTHERAPY-INDUCED NEUROPATHY: Status: ACTIVE | Noted: 2020-10-08

## 2020-10-08 PROBLEM — T45.1X5A CHEMOTHERAPY-INDUCED NEUROPATHY: Status: ACTIVE | Noted: 2020-10-08

## 2020-10-09 ENCOUNTER — APPOINTMENT (OUTPATIENT)
Dept: RADIATION ONCOLOGY | Facility: HOSPITAL | Age: 50
End: 2020-10-09
Attending: RADIOLOGY
Payer: MEDICAID

## 2020-10-09 PROCEDURE — 77280 THER RAD SIMULAJ FIELD SMPL: CPT | Performed by: RADIOLOGY

## 2020-10-12 ENCOUNTER — OFFICE VISIT (OUTPATIENT)
Dept: RADIATION ONCOLOGY | Facility: HOSPITAL | Age: 50
End: 2020-10-12
Attending: RADIOLOGY
Payer: MEDICAID

## 2020-10-12 VITALS
DIASTOLIC BLOOD PRESSURE: 64 MMHG | HEART RATE: 72 BPM | SYSTOLIC BLOOD PRESSURE: 136 MMHG | TEMPERATURE: 98 F | RESPIRATION RATE: 16 BRPM

## 2020-10-12 DIAGNOSIS — C50.411 MALIGNANT NEOPLASM OF UPPER-OUTER QUADRANT OF RIGHT BREAST IN FEMALE, ESTROGEN RECEPTOR NEGATIVE (HCC): Primary | ICD-10-CM

## 2020-10-12 DIAGNOSIS — Z17.1 MALIGNANT NEOPLASM OF UPPER-OUTER QUADRANT OF RIGHT BREAST IN FEMALE, ESTROGEN RECEPTOR NEGATIVE (HCC): Primary | ICD-10-CM

## 2020-10-12 PROCEDURE — 77412 RADIATION TX DELIVERY LVL 3: CPT | Performed by: RADIOLOGY

## 2020-10-12 PROCEDURE — 77387 GUIDANCE FOR RADJ TX DLVR: CPT | Performed by: RADIOLOGY

## 2020-10-12 NOTE — PROGRESS NOTES
[facility] Radiation Treatment Management Note 1-5    Patient:  Ricky Carlin  Age:  53year old  Visit Diagnosis:    1.  Malignant neoplasm of upper-outer quadrant of right breast in female, estrogen receptor negative ([facility] [address]      Pr

## 2020-10-13 PROCEDURE — 77412 RADIATION TX DELIVERY LVL 3: CPT | Performed by: RADIOLOGY

## 2020-10-13 PROCEDURE — 77290 THER RAD SIMULAJ FIELD CPLX: CPT | Performed by: RADIOLOGY

## 2020-10-14 PROCEDURE — 77412 RADIATION TX DELIVERY LVL 3: CPT | Performed by: RADIOLOGY

## 2020-10-14 PROCEDURE — 77387 GUIDANCE FOR RADJ TX DLVR: CPT | Performed by: RADIOLOGY

## 2020-10-14 PROCEDURE — 77331 SPECIAL RADIATION DOSIMETRY: CPT | Performed by: RADIOLOGY

## 2020-10-15 PROCEDURE — 77387 GUIDANCE FOR RADJ TX DLVR: CPT | Performed by: RADIOLOGY

## 2020-10-15 PROCEDURE — 77412 RADIATION TX DELIVERY LVL 3: CPT | Performed by: RADIOLOGY

## 2020-10-16 PROCEDURE — 77336 RADIATION PHYSICS CONSULT: CPT | Performed by: RADIOLOGY

## 2020-10-16 PROCEDURE — 77387 GUIDANCE FOR RADJ TX DLVR: CPT | Performed by: RADIOLOGY

## 2020-10-16 PROCEDURE — 77412 RADIATION TX DELIVERY LVL 3: CPT | Performed by: RADIOLOGY

## 2020-10-19 ENCOUNTER — OFFICE VISIT (OUTPATIENT)
Dept: RADIATION ONCOLOGY | Facility: HOSPITAL | Age: 50
End: 2020-10-19
Attending: RADIOLOGY
Payer: MEDICAID

## 2020-10-19 VITALS
RESPIRATION RATE: 16 BRPM | HEART RATE: 75 BPM | DIASTOLIC BLOOD PRESSURE: 64 MMHG | TEMPERATURE: 98 F | SYSTOLIC BLOOD PRESSURE: 135 MMHG

## 2020-10-19 DIAGNOSIS — Z17.1 MALIGNANT NEOPLASM OF UPPER-OUTER QUADRANT OF RIGHT BREAST IN FEMALE, ESTROGEN RECEPTOR NEGATIVE (HCC): Primary | ICD-10-CM

## 2020-10-19 DIAGNOSIS — C50.411 MALIGNANT NEOPLASM OF UPPER-OUTER QUADRANT OF RIGHT BREAST IN FEMALE, ESTROGEN RECEPTOR NEGATIVE (HCC): Primary | ICD-10-CM

## 2020-10-19 PROCEDURE — 77412 RADIATION TX DELIVERY LVL 3: CPT | Performed by: RADIOLOGY

## 2020-10-19 PROCEDURE — 77387 GUIDANCE FOR RADJ TX DLVR: CPT | Performed by: RADIOLOGY

## 2020-10-19 NOTE — PROGRESS NOTES
[facility] Radiation Treatment Management Note 6-10    Patient:  Kate Prieto  Age:  53year old  Visit Diagnosis:    1.  Malignant neoplasm of upper-outer quadrant of right breast in female, estrogen receptor negative ([facility] [address]      P

## 2020-10-20 PROCEDURE — 77387 GUIDANCE FOR RADJ TX DLVR: CPT | Performed by: RADIOLOGY

## 2020-10-20 PROCEDURE — 77412 RADIATION TX DELIVERY LVL 3: CPT | Performed by: RADIOLOGY

## 2020-10-21 PROCEDURE — 77387 GUIDANCE FOR RADJ TX DLVR: CPT | Performed by: RADIOLOGY

## 2020-10-21 PROCEDURE — 77412 RADIATION TX DELIVERY LVL 3: CPT | Performed by: RADIOLOGY

## 2020-10-22 PROCEDURE — 77412 RADIATION TX DELIVERY LVL 3: CPT | Performed by: RADIOLOGY

## 2020-10-22 PROCEDURE — 77387 GUIDANCE FOR RADJ TX DLVR: CPT | Performed by: RADIOLOGY

## 2020-10-23 PROCEDURE — 77336 RADIATION PHYSICS CONSULT: CPT | Performed by: RADIOLOGY

## 2020-10-23 PROCEDURE — 77387 GUIDANCE FOR RADJ TX DLVR: CPT | Performed by: RADIOLOGY

## 2020-10-23 PROCEDURE — 77412 RADIATION TX DELIVERY LVL 3: CPT | Performed by: RADIOLOGY

## 2020-10-26 ENCOUNTER — OFFICE VISIT (OUTPATIENT)
Dept: RADIATION ONCOLOGY | Facility: HOSPITAL | Age: 50
End: 2020-10-26
Attending: RADIOLOGY
Payer: MEDICAID

## 2020-10-26 VITALS
DIASTOLIC BLOOD PRESSURE: 59 MMHG | RESPIRATION RATE: 16 BRPM | HEART RATE: 79 BPM | TEMPERATURE: 96 F | SYSTOLIC BLOOD PRESSURE: 121 MMHG

## 2020-10-26 DIAGNOSIS — C50.411 MALIGNANT NEOPLASM OF UPPER-OUTER QUADRANT OF RIGHT BREAST IN FEMALE, ESTROGEN RECEPTOR NEGATIVE (HCC): Primary | ICD-10-CM

## 2020-10-26 DIAGNOSIS — Z17.1 MALIGNANT NEOPLASM OF UPPER-OUTER QUADRANT OF RIGHT BREAST IN FEMALE, ESTROGEN RECEPTOR NEGATIVE (HCC): Primary | ICD-10-CM

## 2020-10-26 PROCEDURE — 77387 GUIDANCE FOR RADJ TX DLVR: CPT | Performed by: RADIOLOGY

## 2020-10-26 PROCEDURE — 77412 RADIATION TX DELIVERY LVL 3: CPT | Performed by: RADIOLOGY

## 2020-10-26 NOTE — PROGRESS NOTES
[facility] Radiation Treatment Management Note 11-15    Patient:  Roz Alarcon  Age:  53year old  Visit Diagnosis:    1.  Malignant neoplasm of upper-outer quadrant of right breast in female, estrogen receptor negative ([facility] [address]

## 2020-10-27 ENCOUNTER — TELEPHONE (OUTPATIENT)
Dept: INTERNAL MEDICINE CLINIC | Facility: CLINIC | Age: 50
End: 2020-10-27

## 2020-10-27 PROCEDURE — 77412 RADIATION TX DELIVERY LVL 3: CPT | Performed by: RADIOLOGY

## 2020-10-27 PROCEDURE — 77387 GUIDANCE FOR RADJ TX DLVR: CPT | Performed by: RADIOLOGY

## 2020-10-27 NOTE — TELEPHONE ENCOUNTER
BASAGLAR 100 U/ML KWIKPEN INJ 3ML QTY:45 Inject 70 units subcutaneously daily at [facility]

## 2020-10-27 NOTE — TELEPHONE ENCOUNTER
NOVOLOG FLEXPEN INJ 3ML (ORANGE) Qty: 45  Administer 20 units under the skin three times daily before meals (pt states sliding scale)

## 2020-10-27 NOTE — PROGRESS NOTES
Cancer Center Progress Note    Patient Name: Shakeel uRiz   YOB: 1970   Medical Record Number: [id number]   Attending Physician: Gaston Marsh M.D.      Chief Complaint:  Breast cancer    Oncology History:  12/2019: mammogram (last mammogram History:  Past Medical History:   Diagnosis Date   • Anemia    • Breast cancer ([facility] [address]    • Cancer ([facility] [address]     RIGHT    • Cup to disc asymmetry 2006    OU   • No diabetic retinopathy in both eyes 2006    OU   • Obesity (BMI 30-39. 9)    • Obesity, unspecified Fluticasone Propionate 50 MCG/ACT Nasal Suspension, 2 sprays by Nasal route daily. , Disp: 16 g, Rfl: 3  •  Insulin Aspart Pen (NOVOLOG FLEXPEN) 100 UNIT/ML Subcutaneous Solution Pen-injector, Inject 20 Units into the skin 3 (three) times daily before meals 10/07/2020    K 3.9 10/07/2020     10/07/2020    CO2 25.0 10/07/2020        Impression and Plan:  Cancer Staging  Malignant neoplasm of upper-outer quadrant of right breast in female, estrogen receptor negative ([facility] [address]  Staging form: Breast, AJCC 8th E

## 2020-10-27 NOTE — TELEPHONE ENCOUNTER
PRIOR AUTHORIZATION SUBMITTED THROUGH [employer] WITH EXPECTED RESPONSE IN 3-5 BUSINESS DAYS.     Key: [id number]

## 2020-10-28 ENCOUNTER — OFFICE VISIT (OUTPATIENT)
Dept: HEMATOLOGY/ONCOLOGY | Facility: HOSPITAL | Age: 50
End: 2020-10-28
Attending: INTERNAL MEDICINE
Payer: MEDICAID

## 2020-10-28 ENCOUNTER — APPOINTMENT (OUTPATIENT)
Dept: RADIATION ONCOLOGY | Facility: HOSPITAL | Age: 50
End: 2020-10-28
Attending: RADIOLOGY
Payer: MEDICAID

## 2020-10-28 ENCOUNTER — RESEARCH ENCOUNTER (OUTPATIENT)
Dept: HEMATOLOGY/ONCOLOGY | Facility: HOSPITAL | Age: 50
End: 2020-10-28

## 2020-10-28 VITALS
HEIGHT: 63 IN | HEART RATE: 65 BPM | BODY MASS INDEX: 34.55 KG/M2 | RESPIRATION RATE: 16 BRPM | SYSTOLIC BLOOD PRESSURE: 130 MMHG | WEIGHT: 195 LBS | TEMPERATURE: 98 F | DIASTOLIC BLOOD PRESSURE: 63 MMHG | OXYGEN SATURATION: 100 %

## 2020-10-28 VITALS
BODY MASS INDEX: 35 KG/M2 | SYSTOLIC BLOOD PRESSURE: 130 MMHG | TEMPERATURE: 98 F | HEART RATE: 65 BPM | OXYGEN SATURATION: 100 % | DIASTOLIC BLOOD PRESSURE: 63 MMHG | RESPIRATION RATE: 16 BRPM | WEIGHT: 195 LBS

## 2020-10-28 DIAGNOSIS — D70.1 CHEMOTHERAPY INDUCED NEUTROPENIA (HCC): ICD-10-CM

## 2020-10-28 DIAGNOSIS — T45.1X5A CHEMOTHERAPY INDUCED NEUTROPENIA (HCC): ICD-10-CM

## 2020-10-28 DIAGNOSIS — G62.0 CHEMOTHERAPY-INDUCED NEUROPATHY (HCC): ICD-10-CM

## 2020-10-28 DIAGNOSIS — Z51.11 CHEMOTHERAPY MANAGEMENT, ENCOUNTER FOR: ICD-10-CM

## 2020-10-28 DIAGNOSIS — C50.411 MALIGNANT NEOPLASM OF UPPER-OUTER QUADRANT OF RIGHT BREAST IN FEMALE, ESTROGEN RECEPTOR NEGATIVE (HCC): Primary | ICD-10-CM

## 2020-10-28 DIAGNOSIS — Z17.1 MALIGNANT NEOPLASM OF UPPER-OUTER QUADRANT OF RIGHT BREAST IN FEMALE, ESTROGEN RECEPTOR NEGATIVE (HCC): Primary | ICD-10-CM

## 2020-10-28 DIAGNOSIS — T45.1X5A CHEMOTHERAPY-INDUCED NEUROPATHY (HCC): ICD-10-CM

## 2020-10-28 PROCEDURE — 77334 RADIATION TREATMENT AID(S): CPT | Performed by: RADIOLOGY

## 2020-10-28 PROCEDURE — 80053 COMPREHEN METABOLIC PANEL: CPT

## 2020-10-28 PROCEDURE — 85025 COMPLETE CBC W/AUTO DIFF WBC: CPT

## 2020-10-28 PROCEDURE — 99215 OFFICE O/P EST HI 40 MIN: CPT | Performed by: INTERNAL MEDICINE

## 2020-10-28 PROCEDURE — 84443 ASSAY THYROID STIM HORMONE: CPT

## 2020-10-28 PROCEDURE — 77412 RADIATION TX DELIVERY LVL 3: CPT | Performed by: RADIOLOGY

## 2020-10-28 PROCEDURE — 96413 CHEMO IV INFUSION 1 HR: CPT

## 2020-10-28 PROCEDURE — 77290 THER RAD SIMULAJ FIELD CPLX: CPT | Performed by: RADIOLOGY

## 2020-10-28 PROCEDURE — 77387 GUIDANCE FOR RADJ TX DLVR: CPT | Performed by: RADIOLOGY

## 2020-10-28 RX ORDER — SODIUM CHLORIDE 9 MG/ML
INJECTION INTRAVENOUS
Status: DISCONTINUED
Start: 2020-10-28 | End: 2020-10-28

## 2020-10-28 RX ORDER — FLUTICASONE PROPIONATE 50 MCG
2 SPRAY, SUSPENSION (ML) NASAL DAILY
Qty: 16 G | Refills: 3 | Status: SHIPPED | OUTPATIENT
Start: 2020-10-28 | End: 2020-12-02

## 2020-10-28 RX ORDER — HEPARIN SODIUM (PORCINE) LOCK FLUSH IV SOLN 100 UNIT/ML 100 UNIT/ML
SOLUTION INTRAVENOUS
Status: DISCONTINUED
Start: 2020-10-28 | End: 2020-10-28

## 2020-10-28 NOTE — PROGRESS NOTES
Pt here for C12D1+ B59 Study.   Arrives Ambulating independently, accompanied by Self           Patient reports possible pregnancy since last therapy cycle: No    Modifications in dose or schedule: no.    Patient received after MD visit, denies any complain

## 2020-10-28 NOTE — PROGRESS NOTES
Study: [facility] B-59  Patient ID: [id number]   Timepoint: C12D1 (Dose 11)    Patient presents to clinic for MD evaluation for C12 Atezo/placebo. Patient denies any fevers, chills, cough, or any other s/s of infection. No labs required for study today.

## 2020-10-29 ENCOUNTER — TELEPHONE (OUTPATIENT)
Dept: HEMATOLOGY/ONCOLOGY | Facility: HOSPITAL | Age: 50
End: 2020-10-29

## 2020-10-29 PROCEDURE — 77300 RADIATION THERAPY DOSE PLAN: CPT | Performed by: RADIOLOGY

## 2020-10-29 PROCEDURE — 77412 RADIATION TX DELIVERY LVL 3: CPT | Performed by: RADIOLOGY

## 2020-10-29 PROCEDURE — 77295 3-D RADIOTHERAPY PLAN: CPT | Performed by: RADIOLOGY

## 2020-10-29 PROCEDURE — 77334 RADIATION TREATMENT AID(S): CPT | Performed by: RADIOLOGY

## 2020-10-29 NOTE — TELEPHONE ENCOUNTER
I spoke with Farhan Tavarez. I let her know that she can get her flu shot through the PCP's office. She will call and get that scheduled. Farhan Tavarez also informed me that her Taoist has gotten together and raised a small donation to go to the cancer center.   I as

## 2020-10-30 PROCEDURE — 77336 RADIATION PHYSICS CONSULT: CPT | Performed by: RADIOLOGY

## 2020-10-30 PROCEDURE — 77387 GUIDANCE FOR RADJ TX DLVR: CPT | Performed by: RADIOLOGY

## 2020-10-30 PROCEDURE — 77412 RADIATION TX DELIVERY LVL 3: CPT | Performed by: RADIOLOGY

## 2020-11-01 ENCOUNTER — APPOINTMENT (OUTPATIENT)
Dept: RADIATION ONCOLOGY | Facility: HOSPITAL | Age: 50
End: 2020-11-01
Attending: RADIOLOGY
Payer: MEDICAID

## 2020-11-02 ENCOUNTER — OFFICE VISIT (OUTPATIENT)
Dept: RADIATION ONCOLOGY | Facility: HOSPITAL | Age: 50
End: 2020-11-02
Attending: RADIOLOGY
Payer: MEDICAID

## 2020-11-02 VITALS
HEART RATE: 82 BPM | RESPIRATION RATE: 16 BRPM | TEMPERATURE: 98 F | DIASTOLIC BLOOD PRESSURE: 72 MMHG | SYSTOLIC BLOOD PRESSURE: 112 MMHG

## 2020-11-02 DIAGNOSIS — Z17.1 MALIGNANT NEOPLASM OF UPPER-OUTER QUADRANT OF RIGHT BREAST IN FEMALE, ESTROGEN RECEPTOR NEGATIVE (HCC): Primary | ICD-10-CM

## 2020-11-02 DIAGNOSIS — C50.411 MALIGNANT NEOPLASM OF UPPER-OUTER QUADRANT OF RIGHT BREAST IN FEMALE, ESTROGEN RECEPTOR NEGATIVE (HCC): Primary | ICD-10-CM

## 2020-11-02 PROCEDURE — 77387 GUIDANCE FOR RADJ TX DLVR: CPT | Performed by: RADIOLOGY

## 2020-11-02 PROCEDURE — 77412 RADIATION TX DELIVERY LVL 3: CPT | Performed by: RADIOLOGY

## 2020-11-02 NOTE — PROGRESS NOTES
[facility] Radiation Treatment Management Note 16-20    Patient:  Le French  Age:  53year old  Visit Diagnosis:    1.  Malignant neoplasm of upper-outer quadrant of right breast in female, estrogen receptor negative ([facility] [address]

## 2020-11-02 NOTE — PATIENT INSTRUCTIONS
Continue to moisturize for the next 2-3 weeks. Please call with any questions or concerns to RN number [phone]    SPF 27 or greater when exposed to the sun, as your skin will remain sun sensitive for months, even years.      Follow up with Dr Fatuma Olson in

## 2020-11-03 ENCOUNTER — APPOINTMENT (OUTPATIENT)
Dept: RADIATION ONCOLOGY | Facility: HOSPITAL | Age: 50
End: 2020-11-03
Attending: RADIOLOGY
Payer: MEDICAID

## 2020-11-03 PROCEDURE — 77412 RADIATION TX DELIVERY LVL 3: CPT | Performed by: RADIOLOGY

## 2020-11-03 PROCEDURE — 77280 THER RAD SIMULAJ FIELD SMPL: CPT | Performed by: RADIOLOGY

## 2020-11-04 ENCOUNTER — OFFICE VISIT (OUTPATIENT)
Dept: INTERNAL MEDICINE CLINIC | Facility: CLINIC | Age: 50
End: 2020-11-04
Payer: MEDICAID

## 2020-11-04 VITALS
WEIGHT: 191.81 LBS | BODY MASS INDEX: 33.98 KG/M2 | HEART RATE: 86 BPM | HEIGHT: 63 IN | RESPIRATION RATE: 18 BRPM | SYSTOLIC BLOOD PRESSURE: 127 MMHG | TEMPERATURE: 98 F | DIASTOLIC BLOOD PRESSURE: 80 MMHG

## 2020-11-04 DIAGNOSIS — Z79.4 TYPE 2 DIABETES MELLITUS WITH HYPERGLYCEMIA, WITH LONG-TERM CURRENT USE OF INSULIN (HCC): Primary | ICD-10-CM

## 2020-11-04 DIAGNOSIS — Z23 NEED FOR VACCINATION: ICD-10-CM

## 2020-11-04 DIAGNOSIS — E78.00 HYPERCHOLESTEREMIA: ICD-10-CM

## 2020-11-04 DIAGNOSIS — E11.65 TYPE 2 DIABETES MELLITUS WITH HYPERGLYCEMIA, WITH LONG-TERM CURRENT USE OF INSULIN (HCC): Primary | ICD-10-CM

## 2020-11-04 DIAGNOSIS — E55.9 VITAMIN D DEFICIENCY: ICD-10-CM

## 2020-11-04 DIAGNOSIS — I10 ESSENTIAL HYPERTENSION: ICD-10-CM

## 2020-11-04 PROCEDURE — 90472 IMMUNIZATION ADMIN EACH ADD: CPT | Performed by: INTERNAL MEDICINE

## 2020-11-04 PROCEDURE — 3079F DIAST BP 80-89 MM HG: CPT | Performed by: INTERNAL MEDICINE

## 2020-11-04 PROCEDURE — 77387 GUIDANCE FOR RADJ TX DLVR: CPT | Performed by: RADIOLOGY

## 2020-11-04 PROCEDURE — 77412 RADIATION TX DELIVERY LVL 3: CPT | Performed by: RADIOLOGY

## 2020-11-04 PROCEDURE — 90686 IIV4 VACC NO PRSV 0.5 ML IM: CPT | Performed by: INTERNAL MEDICINE

## 2020-11-04 PROCEDURE — 90732 PPSV23 VACC 2 YRS+ SUBQ/IM: CPT | Performed by: INTERNAL MEDICINE

## 2020-11-04 PROCEDURE — 99214 OFFICE O/P EST MOD 30 MIN: CPT | Performed by: INTERNAL MEDICINE

## 2020-11-04 PROCEDURE — 3008F BODY MASS INDEX DOCD: CPT | Performed by: INTERNAL MEDICINE

## 2020-11-04 PROCEDURE — 90471 IMMUNIZATION ADMIN: CPT | Performed by: INTERNAL MEDICINE

## 2020-11-04 PROCEDURE — 3074F SYST BP LT 130 MM HG: CPT | Performed by: INTERNAL MEDICINE

## 2020-11-04 RX ORDER — INSULIN LISPRO 100 [IU]/ML
INJECTION, SOLUTION INTRAVENOUS; SUBCUTANEOUS
Qty: 20 PEN | Refills: 2 | Status: SHIPPED | OUTPATIENT
Start: 2020-11-04 | End: 2021-02-16

## 2020-11-04 RX ORDER — ONDANSETRON 4 MG/1
4 TABLET, FILM COATED ORAL EVERY 8 HOURS PRN
Qty: 20 TABLET | Refills: 2 | Status: SHIPPED | OUTPATIENT
Start: 2020-11-04 | End: 2021-03-05

## 2020-11-04 RX ORDER — OMEPRAZOLE 40 MG/1
40 CAPSULE, DELAYED RELEASE ORAL DAILY
Qty: 60 CAPSULE | Refills: 0 | Status: SHIPPED | OUTPATIENT
Start: 2020-11-04 | End: 2021-10-30

## 2020-11-04 RX ORDER — LEVOCETIRIZINE DIHYDROCHLORIDE 5 MG/1
5 TABLET, FILM COATED ORAL EVERY EVENING
Qty: 30 TABLET | Refills: 3 | Status: SHIPPED | OUTPATIENT
Start: 2020-11-04 | End: 2020-12-02

## 2020-11-04 RX ORDER — INSULIN GLARGINE 100 [IU]/ML
70 INJECTION, SOLUTION SUBCUTANEOUS NIGHTLY
Qty: 20 PEN | Refills: 2 | Status: SHIPPED | OUTPATIENT
Start: 2020-11-04 | End: 2021-02-16

## 2020-11-04 NOTE — ASSESSMENT & PLAN NOTE
History of vitamin D deficiency. Advised to continue on over-the-counter vitamin D 2000 units daily and recheck labs as directed.

## 2020-11-04 NOTE — PATIENT INSTRUCTIONS
Problem List Items Addressed This Visit        Unprioritized    Essential hypertension     Blood pressure 127/80, pulse 86, temperature 98 °F (36.7 °C), temperature source Oral, resp.  rate 18, height 5' 3\" (1.6 m), weight 191 lb 12.8 oz (87 kg), not curre 90–she is advised to drink some juice or eat a piece of candy, monitor the blood sugars for the next 2 to 3 hours. Give me a call after for adjustment in doses.   Due to oral ulcerations–he is advised to eat oatmeal if possible with some milk or egg whites

## 2020-11-04 NOTE — PROGRESS NOTES
HPI:    Patient ID: Fatimah Feldman is a 53year old female.     On basaglar 70 units daily,novolog sliding scale  But has not been taking the mealtime insulin when she has loss of appetite   Has had nausea and loss of appetite with XRT    Diabetes  She pre 1+, Ki-67 40%, bone scan negative. Initiated C1 neoadjuvant chemo with Taxol/carboplatin w/wo ATEZO or B 59 trial). Associated symptoms comments: Oncology History:  12/2019: mammogram (last mammogram was in 2017) showed 1.6x1. 9x1.8cm mass right upper medi 10/18/2016      Influenza             12/10/2005  11/10/2007  10/22/2011                            10/09/2013      Influenza Vaccine, Preserv Free                          11/10/2007      Pneumococcal (Prevnar 13)                          12/10/2005  12/ Allergies:No Known Allergies      11/04/20  1037   BP: 127/80   Pulse: 86   Resp: 18   Temp: 98 °F (36.7 °C)     Body mass index is 33.98 kg/m². PHYSICAL EXAM:   Physical Exam   Constitutional: She is oriented to person, place, and time.  She appears this has caused her to lose her appetite and get nauseous. Eating habits are erratic. She is currently on Basaglar and NovoLog but her insurance does not cover this any further.   She is advised to change to Lantus 70 units daily at 6 PM.  She eats her oz (87 kg), not currently breastfeeding. Blood pressures look stable, well controlled at this time–off all medications. We will continue to monitor and consider starting back on medications after chemo, radiation is completed.          Hypercholesteremia

## 2020-11-04 NOTE — ASSESSMENT & PLAN NOTE
Blood pressure 127/80, pulse 86, temperature 98 °F (36.7 °C), temperature source Oral, resp. rate 18, height 5' 3\" (1.6 m), weight 191 lb 12.8 oz (87 kg), not currently breastfeeding.   Blood pressures look stable, well controlled at this time–off all medi

## 2020-11-04 NOTE — ASSESSMENT & PLAN NOTE
History of diabetes, uncontrolled blood sugar as patient does not pay attention to her diabetes. She has been aggressively taking care of her breast cancer needs. Currently on radiation and this has caused her to lose her appetite and get nauseous.   Joslyn

## 2020-11-04 NOTE — ASSESSMENT & PLAN NOTE
Lipid panel shows elevation in her LDL cholesterol but patient has been off all medications as she is on chemo and radiation. Once her diet is more stable we will consider restarting on cholesterol-lowering medications.

## 2020-11-05 PROCEDURE — 77412 RADIATION TX DELIVERY LVL 3: CPT | Performed by: RADIOLOGY

## 2020-11-05 PROCEDURE — 77387 GUIDANCE FOR RADJ TX DLVR: CPT | Performed by: RADIOLOGY

## 2020-11-06 PROCEDURE — 77412 RADIATION TX DELIVERY LVL 3: CPT | Performed by: RADIOLOGY

## 2020-11-06 PROCEDURE — 77336 RADIATION PHYSICS CONSULT: CPT | Performed by: RADIOLOGY

## 2020-11-06 PROCEDURE — 77387 GUIDANCE FOR RADJ TX DLVR: CPT | Performed by: RADIOLOGY

## 2020-11-06 NOTE — PROGRESS NOTES
RADIATION ONCOLOGY COMPLETION SUMMARY NOTE    DIAGNOSIS: History of Stage IIIB (cT3, cN0, cM0, G3, ER-, AL-, HER2-) triple neg R breast ca, s/p neoadjuvant tx, per B59 with pCR, s/p lumpectomy s/p adjuvant breast XRT on 11/6/2020.   Dear Mary Rainey,  As you 10/12/2020 11/2/2020 21   R Breast Bst 18X/6X 250 4 / 4 0 1,000 11/3/2020 11/6/2020 3   Total:     5,256 10/12/2020 11/6/2020 25       Treatment Technique:  3D Conformal  Prone Tangents    Overall, pt tolerated treatment well and the dose was delivered as

## 2020-11-18 ENCOUNTER — APPOINTMENT (OUTPATIENT)
Dept: HEMATOLOGY/ONCOLOGY | Facility: HOSPITAL | Age: 50
End: 2020-11-18
Attending: INTERNAL MEDICINE
Payer: MEDICAID

## 2020-11-19 ENCOUNTER — OFFICE VISIT (OUTPATIENT)
Dept: HEMATOLOGY/ONCOLOGY | Facility: HOSPITAL | Age: 50
End: 2020-11-19
Attending: INTERNAL MEDICINE
Payer: MEDICAID

## 2020-11-19 ENCOUNTER — RESEARCH ENCOUNTER (OUTPATIENT)
Dept: HEMATOLOGY/ONCOLOGY | Facility: HOSPITAL | Age: 50
End: 2020-11-19

## 2020-11-19 VITALS
HEART RATE: 79 BPM | SYSTOLIC BLOOD PRESSURE: 139 MMHG | HEIGHT: 62.99 IN | OXYGEN SATURATION: 99 % | BODY MASS INDEX: 35.61 KG/M2 | TEMPERATURE: 97 F | WEIGHT: 201 LBS | DIASTOLIC BLOOD PRESSURE: 73 MMHG | RESPIRATION RATE: 16 BRPM

## 2020-11-19 VITALS
OXYGEN SATURATION: 99 % | BODY MASS INDEX: 36 KG/M2 | SYSTOLIC BLOOD PRESSURE: 139 MMHG | TEMPERATURE: 99 F | DIASTOLIC BLOOD PRESSURE: 73 MMHG | RESPIRATION RATE: 16 BRPM | HEART RATE: 79 BPM | WEIGHT: 201 LBS

## 2020-11-19 DIAGNOSIS — Z17.1 MALIGNANT NEOPLASM OF UPPER-OUTER QUADRANT OF RIGHT BREAST IN FEMALE, ESTROGEN RECEPTOR NEGATIVE (HCC): Primary | ICD-10-CM

## 2020-11-19 DIAGNOSIS — C50.411 MALIGNANT NEOPLASM OF UPPER-OUTER QUADRANT OF RIGHT BREAST IN FEMALE, ESTROGEN RECEPTOR NEGATIVE (HCC): Primary | ICD-10-CM

## 2020-11-19 DIAGNOSIS — G62.0 CHEMOTHERAPY-INDUCED NEUROPATHY (HCC): ICD-10-CM

## 2020-11-19 DIAGNOSIS — T45.1X5A CHEMOTHERAPY-INDUCED NEUROPATHY (HCC): ICD-10-CM

## 2020-11-19 DIAGNOSIS — Z51.11 CHEMOTHERAPY MANAGEMENT, ENCOUNTER FOR: ICD-10-CM

## 2020-11-19 PROCEDURE — 82533 TOTAL CORTISOL: CPT

## 2020-11-19 PROCEDURE — 99215 OFFICE O/P EST HI 40 MIN: CPT | Performed by: INTERNAL MEDICINE

## 2020-11-19 PROCEDURE — 80053 COMPREHEN METABOLIC PANEL: CPT

## 2020-11-19 PROCEDURE — 85025 COMPLETE CBC W/AUTO DIFF WBC: CPT

## 2020-11-19 PROCEDURE — 84443 ASSAY THYROID STIM HORMONE: CPT

## 2020-11-19 PROCEDURE — 96413 CHEMO IV INFUSION 1 HR: CPT

## 2020-11-19 RX ORDER — CETIRIZINE HYDROCHLORIDE 10 MG/1
10 TABLET ORAL EVERY 12 HOURS PRN
Qty: 60 TABLET | Refills: 0 | Status: SHIPPED | OUTPATIENT
Start: 2020-11-19

## 2020-11-19 RX ORDER — SODIUM CHLORIDE 9 MG/ML
INJECTION INTRAVENOUS
Status: DISCONTINUED
Start: 2020-11-19 | End: 2020-11-19

## 2020-11-19 RX ORDER — HEPARIN SODIUM (PORCINE) LOCK FLUSH IV SOLN 100 UNIT/ML 100 UNIT/ML
SOLUTION INTRAVENOUS
Status: DISCONTINUED
Start: 2020-11-19 | End: 2020-11-19

## 2020-11-19 NOTE — PROGRESS NOTES
Study: [facility] B-59  Patient ID: [id number]   Timepoint: J03X2 (Dose #12)    Patient presents to clinic for MD evaluation for C13 Atezo/placebo. Patient denies any fevers, chills, cough, or any other s/s of infection.  Glucose=299, reviewed with Dr. Hipolito Rahman

## 2020-11-19 NOTE — PROGRESS NOTES
Cancer Center Progress Note    Patient Name: Bushra Ovalles   YOB: 1970   Medical Record Number: [id number]   Attending Physician: Susan Briscoe M.D.      Chief Complaint:  Breast cancer    Oncology History:  12/2019: mammogram (last mammogram History:   Diagnosis Date   • Anemia    • Breast cancer ([facility] [address]    • Cancer ([facility] [address]     RIGHT    • Cup to disc asymmetry 2006    OU   • No diabetic retinopathy in both eyes 2006    OU   • Obesity (BMI 30-39. 9)    • Obesity, unspecified    • Other and unspecifi Tab, Take 1 tablet (10 mg total) by mouth every 12 (twelve) hours as needed for Allergies. , Disp: 60 tablet, Rfl: 0  •  insulin glargine (LANTUS SOLOSTAR) 100 UNIT/ML Subcutaneous Solution Pen-injector, Inject 70 Units into the skin nightly.  AS DIRECTED, D the surgical site.    Extremities: No edema  Neurological: motor strength grossly intact  Psych: appropriate mood and affect      Laboratory assessed and reviewed:  Lab Results   Component Value Date     (H) 10/28/2020    BUN 13 10/28/2020    BUNCREA

## 2020-11-19 NOTE — PROGRESS NOTES
Pt here for C13D1+ B59 Study.   Arrives Ambulating independently, accompanied by Self           Patient reports possible pregnancy since last therapy cycle: No    Modifications in dose or schedule: no.    Patient received after MD visit, denies any complain

## 2020-11-24 ENCOUNTER — TELEPHONE (OUTPATIENT)
Dept: PHYSICAL THERAPY | Facility: HOSPITAL | Age: 50
End: 2020-11-24

## 2020-11-24 NOTE — TELEPHONE ENCOUNTER
Called P[facility]ha to schedule 3rd PT screening post breast surgery. Zackery Vivas reports that she is doing fine. Completed Radiation therapy and has been doing just fine. She declined 3rd visit offered.   She understands that she can reach out to this clinician w

## 2020-11-25 DIAGNOSIS — E10.9 TYPE 1 DIABETES MELLITUS WITHOUT COMPLICATION, WITH LONG-TERM CURRENT USE OF INSULIN (HCC): ICD-10-CM

## 2020-11-25 RX ORDER — PEN NEEDLE, DIABETIC 31 GX5/16"
NEEDLE, DISPOSABLE MISCELLANEOUS
Qty: 400 EACH | Refills: 0 | Status: SHIPPED | OUTPATIENT
Start: 2020-11-25

## 2020-12-02 ENCOUNTER — OFFICE VISIT (OUTPATIENT)
Dept: INTERNAL MEDICINE CLINIC | Facility: CLINIC | Age: 50
End: 2020-12-02
Payer: MEDICAID

## 2020-12-02 VITALS
DIASTOLIC BLOOD PRESSURE: 71 MMHG | HEART RATE: 81 BPM | HEIGHT: 63 IN | BODY MASS INDEX: 35.73 KG/M2 | RESPIRATION RATE: 20 BRPM | WEIGHT: 201.69 LBS | TEMPERATURE: 98 F | SYSTOLIC BLOOD PRESSURE: 110 MMHG

## 2020-12-02 DIAGNOSIS — E55.9 VITAMIN D DEFICIENCY: ICD-10-CM

## 2020-12-02 DIAGNOSIS — I10 ESSENTIAL HYPERTENSION: ICD-10-CM

## 2020-12-02 DIAGNOSIS — Z17.1 MALIGNANT NEOPLASM OF UPPER-OUTER QUADRANT OF RIGHT BREAST IN FEMALE, ESTROGEN RECEPTOR NEGATIVE (HCC): ICD-10-CM

## 2020-12-02 DIAGNOSIS — C50.411 MALIGNANT NEOPLASM OF UPPER-OUTER QUADRANT OF RIGHT BREAST IN FEMALE, ESTROGEN RECEPTOR NEGATIVE (HCC): ICD-10-CM

## 2020-12-02 DIAGNOSIS — E11.65 TYPE 2 DIABETES MELLITUS WITH HYPERGLYCEMIA, WITH LONG-TERM CURRENT USE OF INSULIN (HCC): Primary | ICD-10-CM

## 2020-12-02 DIAGNOSIS — Z79.4 TYPE 2 DIABETES MELLITUS WITH HYPERGLYCEMIA, WITH LONG-TERM CURRENT USE OF INSULIN (HCC): Primary | ICD-10-CM

## 2020-12-02 PROBLEM — J32.9 RECURRENT SINUSITIS: Status: ACTIVE | Noted: 2020-12-02

## 2020-12-02 PROCEDURE — 99214 OFFICE O/P EST MOD 30 MIN: CPT | Performed by: INTERNAL MEDICINE

## 2020-12-02 PROCEDURE — 3074F SYST BP LT 130 MM HG: CPT | Performed by: INTERNAL MEDICINE

## 2020-12-02 PROCEDURE — 3008F BODY MASS INDEX DOCD: CPT | Performed by: INTERNAL MEDICINE

## 2020-12-02 PROCEDURE — 3078F DIAST BP <80 MM HG: CPT | Performed by: INTERNAL MEDICINE

## 2020-12-02 RX ORDER — FLUTICASONE PROPIONATE 50 MCG
SPRAY, SUSPENSION (ML) NASAL
Qty: 1 BOTTLE | Refills: 3 | Status: SHIPPED | OUTPATIENT
Start: 2020-12-02

## 2020-12-02 RX ORDER — CETIRIZINE HYDROCHLORIDE 10 MG/1
10 TABLET ORAL DAILY
Qty: 30 TABLET | Refills: 2 | Status: SHIPPED | OUTPATIENT
Start: 2020-12-02 | End: 2021-03-05

## 2020-12-02 RX ORDER — AMOXICILLIN AND CLAVULANATE POTASSIUM 875; 125 MG/1; MG/1
1 TABLET, FILM COATED ORAL 2 TIMES DAILY
Qty: 20 TABLET | Refills: 0 | Status: SHIPPED | OUTPATIENT
Start: 2020-12-02 | End: 2020-12-12

## 2020-12-02 NOTE — ASSESSMENT & PLAN NOTE
Recurrent episodes of severe sinusitis with congestion, postnasal drip and pressure. She has had a history of Covid around September and has recovered. Started on Augmentin 875 mg twice daily for the next 10 days.   Flonase 1 puff each nostril twice [place]

## 2020-12-02 NOTE — PATIENT INSTRUCTIONS
Problem List Items Addressed This Visit        Unprioritized    Essential hypertension     Blood pressure 110/71, pulse 81, temperature 98 °F (36.7 °C), temperature source Tympanic, resp.  rate 20, height 5' 3\" (1.6 m), weight 201 lb 11.2 oz (91.5 kg), not vitamin D 2000 units daily.            Relevant Orders    VITAMIN D, 25-HYDROXY

## 2020-12-02 NOTE — ASSESSMENT & PLAN NOTE
Patient has had a history of high blood sugars due to poorly controlled diet as well as inadequate insulin follow-ups. Since her last visit here she has been much more diligent in taking her insulin. She has been watching her diet.   Her home sugars have

## 2020-12-02 NOTE — PROGRESS NOTES
HPI:    Patient ID: Barbara Grewal is a 53year old female. Written by Jermaine Garcia MD on 11/19/2020 12:30 PM  Blood sugars continue to remain quite elevated at 299.    Kidney function seems slightly lower than in the past with a creatinine rising to of concentrated sweets and calorie counting. She has not had a previous visit with a dietitian. She never participates in exercise. Her home blood glucose trend is fluctuating dramatically (no sugars at the visit today).  Her breakfast blood glucose range i Propionate 50 MCG/ACT Nasal Suspension 1 PUFF EACH NOSTRIL 2 TIMES A  DAY 1 Bottle 3   • Insulin Pen Needle (BD PEN NEEDLE MINI U/F) 31G X 5 MM Does not apply Misc Use 4 times a day as directed 400 each 0   • cetirizine 10 MG Oral Tab Take 1 tablet (10 mg Abdominal:      General: Bowel sounds are normal. There is no distension. Palpations: There is no mass. Tenderness: There is no abdominal tenderness. There is no rebound. Musculoskeletal:         General: No tenderness.  Normal range of motion follow-up in January.          Relevant Orders    COMP METABOLIC PANEL (14)    CBC WITH DIFFERENTIAL WITH PLATELET    HEMOGLOBIN A1C    LIPID PANEL    MICROALB/CREAT RATIO, RANDOM URINE    URINALYSIS, ROUTINE    TSH W REFLEX TO FREE T4    Essential hyperten Imaging & Referrals:  None       #[phone]

## 2020-12-02 NOTE — ASSESSMENT & PLAN NOTE
This has been supplemented in the past, recheck labs have been requested. Advised to continue on over-the-counter vitamin D 2000 units daily.

## 2020-12-02 NOTE — ASSESSMENT & PLAN NOTE
Blood pressure 110/71, pulse 81, temperature 98 °F (36.7 °C), temperature source Tympanic, resp. rate 20, height 5' 3\" (1.6 m), weight 201 lb 11.2 oz (91.5 kg), not currently breastfeeding. Stable blood pressure, well controlled at this time.   Has been o

## 2020-12-08 ENCOUNTER — TELEPHONE (OUTPATIENT)
Dept: HEMATOLOGY/ONCOLOGY | Facility: HOSPITAL | Age: 50
End: 2020-12-08

## 2020-12-08 NOTE — TELEPHONE ENCOUNTER
Ethyl Schooling called. She requested a call back from ED01. She did not wish to include any additional information. Please call.

## 2020-12-08 NOTE — TELEPHONE ENCOUNTER
I returned call to Deb. She just returned from [place] with her sister and 3 other family members. She states that she was not feeling well on the car ride home. She had a cough and congestion that started on 11/29.   On 12/3 her sister tested posit

## 2020-12-08 NOTE — TELEPHONE ENCOUNTER
I spoke with Elsa Perea. Discussed with Dr Rob Thomas and the research nurse, Galina, it was decided that it is best that Elsa Perea get tested for Covid due to the fact that she is on study for her breast cancer.   This is all information that needs to be reported

## 2020-12-10 ENCOUNTER — APPOINTMENT (OUTPATIENT)
Dept: HEMATOLOGY/ONCOLOGY | Facility: HOSPITAL | Age: 50
End: 2020-12-10
Attending: INTERNAL MEDICINE
Payer: MEDICAID

## 2020-12-10 ENCOUNTER — LAB ENCOUNTER (OUTPATIENT)
Dept: LAB | Facility: HOSPITAL | Age: 50
End: 2020-12-10
Attending: INTERNAL MEDICINE
Payer: MEDICAID

## 2020-12-10 DIAGNOSIS — R05.8 COUGH WITH EXPOSURE TO COVID-19 VIRUS: ICD-10-CM

## 2020-12-10 DIAGNOSIS — Z20.822 COUGH WITH EXPOSURE TO COVID-19 VIRUS: ICD-10-CM

## 2020-12-11 ENCOUNTER — TELEPHONE (OUTPATIENT)
Dept: HEMATOLOGY/ONCOLOGY | Facility: HOSPITAL | Age: 50
End: 2020-12-11

## 2020-12-11 ENCOUNTER — TELEPHONE (OUTPATIENT)
Dept: INTERNAL MEDICINE CLINIC | Facility: CLINIC | Age: 50
End: 2020-12-11

## 2020-12-11 NOTE — TELEPHONE ENCOUNTER
Saintclair Ivy, MD  P Em Rn Triage             [facility]–your tests are positive again for Covid which is unusual.  This may have been because of immune suppression and inability to retain memory of the earlier infection.    Please quarantine for the next 14 d

## 2020-12-11 NOTE — TELEPHONE ENCOUNTER
Noted pt has read MyChart result note copied below from Dr Sofía Key. States already taking all vitamins/supplement as recommended in result note by Dr Sofía Key. Pt states is no longer having symptoms other than mild nasal congestion.  States started having sy

## 2020-12-11 NOTE — TELEPHONE ENCOUNTER
Spoke with Deb. We let her know that she is positive for Covid again. She reported a slight cough, some shortness of breath and congestion on 11/29 that has since resolved.   She has been symptom free since Tuesday 12/8, was tested for Covid on 12/8 a

## 2020-12-14 ENCOUNTER — TELEPHONE (OUTPATIENT)
Dept: INTERNAL MEDICINE CLINIC | Facility: CLINIC | Age: 50
End: 2020-12-14

## 2020-12-14 ENCOUNTER — PATIENT MESSAGE (OUTPATIENT)
Dept: INTERNAL MEDICINE CLINIC | Facility: CLINIC | Age: 50
End: 2020-12-14

## 2020-12-14 DIAGNOSIS — U07.1 COVID-19 VIRUS INFECTION: Primary | ICD-10-CM

## 2020-12-14 NOTE — TELEPHONE ENCOUNTER
Patient contacted. She has been advised to reduce the dose of the Lantus to 60 units. Continue to monitor the blood sugars.

## 2020-12-14 NOTE — TELEPHONE ENCOUNTER
----- Message from Jenn Lee sent at 12/14/2020  6:32 AM CST -----  Regarding: Non-Urgent Medical Question  Contact: [phone]  Good morning beautiful,  this morning makes 4 morning that I've been waken up to low  numbers.    Today was 80, the othe

## 2020-12-14 NOTE — TELEPHONE ENCOUNTER
From: Tasha Brady  To: Foreign Fernandes MD  Sent: 12/14/2020 6:32 AM CST  Subject: Non-Urgent Medical Question    Good morning beautiful, this morning makes 4 morning that I've been waken up to low numbers.    Today was 83, the other days have ranged from

## 2020-12-16 ENCOUNTER — TELEPHONE (OUTPATIENT)
Dept: HEMATOLOGY/ONCOLOGY | Facility: HOSPITAL | Age: 50
End: 2020-12-16

## 2020-12-16 ENCOUNTER — HOSPITAL ENCOUNTER (OUTPATIENT)
Dept: MAMMOGRAPHY | Facility: HOSPITAL | Age: 50
Discharge: HOME | End: 2020-12-16
Attending: INTERNAL MEDICINE
Payer: MEDICAID

## 2020-12-16 DIAGNOSIS — C50.411 MALIGNANT NEOPLASM OF UPPER-OUTER QUADRANT OF RIGHT BREAST IN FEMALE, ESTROGEN RECEPTOR NEGATIVE (HCC): ICD-10-CM

## 2020-12-16 DIAGNOSIS — Z17.1 MALIGNANT NEOPLASM OF UPPER-OUTER QUADRANT OF RIGHT BREAST IN FEMALE, ESTROGEN RECEPTOR NEGATIVE (HCC): ICD-10-CM

## 2020-12-16 PROCEDURE — 77061 BREAST TOMOSYNTHESIS UNI: CPT | Performed by: INTERNAL MEDICINE

## 2020-12-16 PROCEDURE — 77065 DX MAMMO INCL CAD UNI: CPT | Performed by: INTERNAL MEDICINE

## 2020-12-16 NOTE — TELEPHONE ENCOUNTER
Called Deb and let her know that Dr Tucker Miles reviewed her mammo and it's good. Repeat in 6 months. Deb asked why 6 months and I told her I was unsure, that we could ask Dr Tucker Miles next time she comes in. Deb verbalized understanding.

## 2020-12-17 ENCOUNTER — APPOINTMENT (OUTPATIENT)
Dept: HEMATOLOGY/ONCOLOGY | Facility: HOSPITAL | Age: 50
End: 2020-12-17
Attending: INTERNAL MEDICINE
Payer: MEDICAID

## 2020-12-18 ENCOUNTER — LAB ENCOUNTER (OUTPATIENT)
Dept: LAB | Facility: HOSPITAL | Age: 50
End: 2020-12-18
Attending: INTERNAL MEDICINE
Payer: MEDICAID

## 2020-12-18 DIAGNOSIS — U07.1 COVID-19 VIRUS INFECTION: ICD-10-CM

## 2020-12-20 NOTE — PROGRESS NOTES
Still positive, she is a carrier, ask the research team what to do, I dont think she iwll become begative.

## 2020-12-21 ENCOUNTER — TELEPHONE (OUTPATIENT)
Dept: HEMATOLOGY/ONCOLOGY | Facility: HOSPITAL | Age: 50
End: 2020-12-21

## 2020-12-21 ENCOUNTER — APPOINTMENT (OUTPATIENT)
Dept: HEMATOLOGY/ONCOLOGY | Facility: HOSPITAL | Age: 50
End: 2020-12-21
Attending: INTERNAL MEDICINE
Payer: MEDICAID

## 2020-12-21 NOTE — TELEPHONE ENCOUNTER
I spoke with Ethyl Schooling. I let her know that because she is still positive we need to push her off another week.   We are going to reach out to the study she is on and see if we can get an exception to continue with treatment since she has been asymptomatic s

## 2020-12-22 ENCOUNTER — APPOINTMENT (OUTPATIENT)
Dept: HEMATOLOGY/ONCOLOGY | Facility: HOSPITAL | Age: 50
End: 2020-12-22
Attending: INTERNAL MEDICINE
Payer: MEDICAID

## 2020-12-30 ENCOUNTER — OFFICE VISIT (OUTPATIENT)
Dept: HEMATOLOGY/ONCOLOGY | Facility: HOSPITAL | Age: 50
End: 2020-12-30
Attending: INTERNAL MEDICINE
Payer: MEDICAID

## 2020-12-30 ENCOUNTER — TELEPHONE (OUTPATIENT)
Dept: HEMATOLOGY/ONCOLOGY | Facility: HOSPITAL | Age: 50
End: 2020-12-30

## 2020-12-30 ENCOUNTER — RESEARCH ENCOUNTER (OUTPATIENT)
Dept: HEMATOLOGY/ONCOLOGY | Facility: HOSPITAL | Age: 50
End: 2020-12-30

## 2020-12-30 VITALS
SYSTOLIC BLOOD PRESSURE: 131 MMHG | RESPIRATION RATE: 16 BRPM | OXYGEN SATURATION: 100 % | BODY MASS INDEX: 38 KG/M2 | WEIGHT: 209 LBS | HEART RATE: 80 BPM | TEMPERATURE: 97 F | DIASTOLIC BLOOD PRESSURE: 64 MMHG

## 2020-12-30 VITALS
OXYGEN SATURATION: 100 % | HEIGHT: 62 IN | BODY MASS INDEX: 38.46 KG/M2 | WEIGHT: 209 LBS | HEART RATE: 80 BPM | RESPIRATION RATE: 16 BRPM | TEMPERATURE: 97 F | SYSTOLIC BLOOD PRESSURE: 131 MMHG | DIASTOLIC BLOOD PRESSURE: 64 MMHG

## 2020-12-30 DIAGNOSIS — Z51.11 CHEMOTHERAPY MANAGEMENT, ENCOUNTER FOR: ICD-10-CM

## 2020-12-30 DIAGNOSIS — T45.1X5A CHEMOTHERAPY INDUCED NEUTROPENIA (HCC): ICD-10-CM

## 2020-12-30 DIAGNOSIS — J32.9 RECURRENT SINUSITIS: ICD-10-CM

## 2020-12-30 DIAGNOSIS — C50.411 MALIGNANT NEOPLASM OF UPPER-OUTER QUADRANT OF RIGHT BREAST IN FEMALE, ESTROGEN RECEPTOR NEGATIVE (HCC): Primary | ICD-10-CM

## 2020-12-30 DIAGNOSIS — R21 SKIN RASH: ICD-10-CM

## 2020-12-30 DIAGNOSIS — D70.1 CHEMOTHERAPY INDUCED NEUTROPENIA (HCC): ICD-10-CM

## 2020-12-30 DIAGNOSIS — Z17.1 MALIGNANT NEOPLASM OF UPPER-OUTER QUADRANT OF RIGHT BREAST IN FEMALE, ESTROGEN RECEPTOR NEGATIVE (HCC): Primary | ICD-10-CM

## 2020-12-30 PROCEDURE — 85025 COMPLETE CBC W/AUTO DIFF WBC: CPT

## 2020-12-30 PROCEDURE — 80053 COMPREHEN METABOLIC PANEL: CPT

## 2020-12-30 PROCEDURE — A4216 STERILE WATER/SALINE, 10 ML: HCPCS

## 2020-12-30 PROCEDURE — 36415 COLL VENOUS BLD VENIPUNCTURE: CPT

## 2020-12-30 PROCEDURE — 99215 OFFICE O/P EST HI 40 MIN: CPT | Performed by: INTERNAL MEDICINE

## 2020-12-30 PROCEDURE — 84443 ASSAY THYROID STIM HORMONE: CPT

## 2020-12-30 PROCEDURE — 36593 DECLOT VASCULAR DEVICE: CPT

## 2020-12-30 PROCEDURE — 96413 CHEMO IV INFUSION 1 HR: CPT

## 2020-12-30 RX ORDER — NYSTATIN 100000 [USP'U]/G
1 POWDER TOPICAL 2 TIMES DAILY
Qty: 30 G | Refills: 0 | Status: SHIPPED | OUTPATIENT
Start: 2020-12-30 | End: 2021-03-05

## 2020-12-30 RX ORDER — 0.9 % SODIUM CHLORIDE 0.9 %
10 VIAL (ML) INJECTION ONCE
OUTPATIENT
Start: 2020-12-30

## 2020-12-30 RX ORDER — HEPARIN SODIUM (PORCINE) LOCK FLUSH IV SOLN 100 UNIT/ML 100 UNIT/ML
5 SOLUTION INTRAVENOUS ONCE
Status: CANCELLED | OUTPATIENT
Start: 2020-12-30

## 2020-12-30 RX ORDER — HEPARIN SODIUM (PORCINE) LOCK FLUSH IV SOLN 100 UNIT/ML 100 UNIT/ML
SOLUTION INTRAVENOUS
Status: COMPLETED
Start: 2020-12-30 | End: 2020-12-30

## 2020-12-30 RX ORDER — SODIUM CHLORIDE 9 MG/ML
INJECTION INTRAVENOUS
Status: DISCONTINUED
Start: 2020-12-30 | End: 2020-12-30

## 2020-12-30 RX ORDER — HEPARIN SODIUM (PORCINE) LOCK FLUSH IV SOLN 100 UNIT/ML 100 UNIT/ML
5 SOLUTION INTRAVENOUS ONCE
Status: COMPLETED | OUTPATIENT
Start: 2020-12-30 | End: 2020-12-30

## 2020-12-30 RX ORDER — 0.9 % SODIUM CHLORIDE 0.9 %
10 VIAL (ML) INJECTION ONCE
Status: CANCELLED | OUTPATIENT
Start: 2020-12-30

## 2020-12-30 RX ADMIN — HEPARIN SODIUM (PORCINE) LOCK FLUSH IV SOLN 100 UNIT/ML 500 UNITS: 100 SOLUTION INTRAVENOUS at 15:24:00

## 2020-12-30 NOTE — PROGRESS NOTES
Pt here for C14D1+ B59 Study. Arrives Ambulating independently, accompanied by Self            Modifications in dose or schedule: no.     Patient to infusion p MD visit, feeling well, offers no complaints. OK per MD and Research RN to proceed.

## 2020-12-30 NOTE — TELEPHONE ENCOUNTER
Left message for Bruce Reynolds letting her know that we are proceeding today with tx, study said that we may resume.

## 2020-12-30 NOTE — PROGRESS NOTES
Study: [facility] B-59  Patient ID: [id number]  Timepoint: Q06V0 (Dose #13)    Patient presents to clinic for MD evaluation for C14 Atezo/placebo. Glucose = 256, non fasting. Reviewed with Dr. Jorge Adhikari, not clinically significant.       Patient continues wi

## 2020-12-31 ENCOUNTER — APPOINTMENT (OUTPATIENT)
Dept: HEMATOLOGY/ONCOLOGY | Facility: HOSPITAL | Age: 50
End: 2020-12-31
Attending: INTERNAL MEDICINE
Payer: MEDICAID

## 2020-12-31 PROBLEM — E66.9 OBESITY (BMI 30-39.9): Status: RESOLVED | Noted: 2017-07-14 | Resolved: 2020-12-31

## 2020-12-31 NOTE — PROGRESS NOTES
Cancer Center Progress Note    Patient Name: Lc Howard   YOB: 1970   Medical Record Number: [id number]   Attending Physician: Derrick Vaz M.D.      Chief Complaint:  Breast cancer    Oncology History:  12/2019: mammogram (last mammogram 2006    OU   • Obesity (BMI 30-39. 9)    • Obesity, unspecified    • Other and unspecified hyperlipidemia    • Personal history of antineoplastic chemotherapy 06/1925   • Type II or unspecified type diabetes mellitus without mention of complication, not sta 1 tablet (10 mg total) by mouth daily. , Disp: 30 tablet, Rfl: 2  •  Fluticasone Propionate 50 MCG/ACT Nasal Suspension, 1 PUFF EACH NOSTRIL 2 TIMES A  DAY, Disp: 1 Bottle, Rfl: 3  •  Insulin Pen Needle (BD PEN NEEDLE MINI U/F) 31G X 5 MM Does not apply Mis CTAB  Cardiovascular: palpable symmetric pulses, no cyanosis  Abdomen:  non tender. Obese  Breast: Right breast lumpectomy radiation changes and edema mostly resolved, no obvious lesion or mass, no rash, more moist in the inferior fold.   Left breast normal (c.5319+6_5319+7del [intronic]) and the other in RAD50 (c.3292C>T)    MDM: high due to chemo

## 2021-01-07 ENCOUNTER — APPOINTMENT (OUTPATIENT)
Dept: HEMATOLOGY/ONCOLOGY | Facility: HOSPITAL | Age: 51
End: 2021-01-07
Attending: INTERNAL MEDICINE
Payer: MEDICAID

## 2021-01-11 ENCOUNTER — APPOINTMENT (OUTPATIENT)
Dept: HEMATOLOGY/ONCOLOGY | Facility: HOSPITAL | Age: 51
End: 2021-01-11
Attending: INTERNAL MEDICINE
Payer: MEDICAID

## 2021-01-20 ENCOUNTER — RESEARCH ENCOUNTER (OUTPATIENT)
Dept: HEMATOLOGY/ONCOLOGY | Facility: HOSPITAL | Age: 51
End: 2021-01-20

## 2021-01-20 ENCOUNTER — OFFICE VISIT (OUTPATIENT)
Dept: HEMATOLOGY/ONCOLOGY | Facility: HOSPITAL | Age: 51
End: 2021-01-20
Attending: INTERNAL MEDICINE
Payer: MEDICAID

## 2021-01-20 VITALS
DIASTOLIC BLOOD PRESSURE: 65 MMHG | WEIGHT: 210 LBS | RESPIRATION RATE: 16 BRPM | HEART RATE: 76 BPM | BODY MASS INDEX: 38 KG/M2 | OXYGEN SATURATION: 100 % | TEMPERATURE: 99 F | SYSTOLIC BLOOD PRESSURE: 129 MMHG

## 2021-01-20 VITALS
TEMPERATURE: 99 F | BODY MASS INDEX: 38.64 KG/M2 | DIASTOLIC BLOOD PRESSURE: 65 MMHG | HEIGHT: 62 IN | RESPIRATION RATE: 16 BRPM | HEART RATE: 76 BPM | WEIGHT: 210 LBS | SYSTOLIC BLOOD PRESSURE: 129 MMHG | OXYGEN SATURATION: 100 %

## 2021-01-20 DIAGNOSIS — C50.411 MALIGNANT NEOPLASM OF UPPER-OUTER QUADRANT OF RIGHT BREAST IN FEMALE, ESTROGEN RECEPTOR NEGATIVE (HCC): Primary | ICD-10-CM

## 2021-01-20 DIAGNOSIS — G62.0 CHEMOTHERAPY-INDUCED NEUROPATHY (HCC): ICD-10-CM

## 2021-01-20 DIAGNOSIS — D70.1 CHEMOTHERAPY INDUCED NEUTROPENIA (HCC): ICD-10-CM

## 2021-01-20 DIAGNOSIS — Z17.1 MALIGNANT NEOPLASM OF UPPER-OUTER QUADRANT OF RIGHT BREAST IN FEMALE, ESTROGEN RECEPTOR NEGATIVE (HCC): Primary | ICD-10-CM

## 2021-01-20 DIAGNOSIS — Z79.4 TYPE 2 DIABETES MELLITUS WITH HYPERGLYCEMIA, WITH LONG-TERM CURRENT USE OF INSULIN (HCC): ICD-10-CM

## 2021-01-20 DIAGNOSIS — T45.1X5A CHEMOTHERAPY INDUCED NEUTROPENIA (HCC): ICD-10-CM

## 2021-01-20 DIAGNOSIS — J32.9 RECURRENT SINUSITIS: ICD-10-CM

## 2021-01-20 DIAGNOSIS — E11.65 TYPE 2 DIABETES MELLITUS WITH HYPERGLYCEMIA, WITH LONG-TERM CURRENT USE OF INSULIN (HCC): ICD-10-CM

## 2021-01-20 DIAGNOSIS — Z51.11 CHEMOTHERAPY MANAGEMENT, ENCOUNTER FOR: ICD-10-CM

## 2021-01-20 DIAGNOSIS — T45.1X5A CHEMOTHERAPY-INDUCED NEUROPATHY (HCC): ICD-10-CM

## 2021-01-20 LAB
ALBUMIN SERPL-MCNC: 3.1 G/DL (ref 3.4–5)
ALBUMIN/GLOB SERPL: 0.6 {RATIO} (ref 1–2)
ALP LIVER SERPL-CCNC: 76 U/L
ALT SERPL-CCNC: 17 U/L
ANION GAP SERPL CALC-SCNC: 7 MMOL/L (ref 0–18)
AST SERPL-CCNC: 7 U/L (ref 15–37)
BASOPHILS # BLD AUTO: 0.04 X10(3) UL (ref 0–0.2)
BASOPHILS NFR BLD AUTO: 0.8 %
BILIRUB SERPL-MCNC: 0.4 MG/DL (ref 0.1–2)
BUN BLD-MCNC: 11 MG/DL (ref 7–18)
BUN/CREAT SERPL: 10.9 (ref 10–20)
CALCIUM BLD-MCNC: 9.6 MG/DL (ref 8.5–10.1)
CHLORIDE SERPL-SCNC: 105 MMOL/L (ref 98–112)
CO2 SERPL-SCNC: 27 MMOL/L (ref 21–32)
CREAT BLD-MCNC: 1.01 MG/DL
DEPRECATED RDW RBC AUTO: 47.8 FL (ref 35.1–46.3)
EOSINOPHIL # BLD AUTO: 0.38 X10(3) UL (ref 0–0.7)
EOSINOPHIL NFR BLD AUTO: 7.2 %
ERYTHROCYTE [DISTWIDTH] IN BLOOD BY AUTOMATED COUNT: 14.7 % (ref 11–15)
GLOBULIN PLAS-MCNC: 4.9 G/DL (ref 2.8–4.4)
GLUCOSE BLD-MCNC: 192 MG/DL (ref 70–99)
HCT VFR BLD AUTO: 36.2 %
HGB BLD-MCNC: 11.8 G/DL
IMM GRANULOCYTES # BLD AUTO: 0.02 X10(3) UL (ref 0–1)
IMM GRANULOCYTES NFR BLD: 0.4 %
LYMPHOCYTES # BLD AUTO: 1.54 X10(3) UL (ref 1–4)
LYMPHOCYTES NFR BLD AUTO: 29.1 %
M PROTEIN MFR SERPL ELPH: 8 G/DL (ref 6.4–8.2)
MCH RBC QN AUTO: 28.9 PG (ref 26–34)
MCHC RBC AUTO-ENTMCNC: 32.6 G/DL (ref 31–37)
MCV RBC AUTO: 88.7 FL
MONOCYTES # BLD AUTO: 0.31 X10(3) UL (ref 0.1–1)
MONOCYTES NFR BLD AUTO: 5.9 %
NEUTROPHILS # BLD AUTO: 3 X10 (3) UL (ref 1.5–7.7)
NEUTROPHILS # BLD AUTO: 3 X10(3) UL (ref 1.5–7.7)
NEUTROPHILS NFR BLD AUTO: 56.6 %
OSMOLALITY SERPL CALC.SUM OF ELEC: 293 MOSM/KG (ref 275–295)
PATIENT FASTING Y/N/NP: NO
PLATELET # BLD AUTO: 238 10(3)UL (ref 150–450)
POTASSIUM SERPL-SCNC: 3.9 MMOL/L (ref 3.5–5.1)
RBC # BLD AUTO: 4.08 X10(6)UL
SODIUM SERPL-SCNC: 139 MMOL/L (ref 136–145)
TSI SER-ACNC: 1.29 MIU/ML (ref 0.36–3.74)
WBC # BLD AUTO: 5.3 X10(3) UL (ref 4–11)

## 2021-01-20 PROCEDURE — 84443 ASSAY THYROID STIM HORMONE: CPT

## 2021-01-20 PROCEDURE — 85025 COMPLETE CBC W/AUTO DIFF WBC: CPT

## 2021-01-20 PROCEDURE — 80053 COMPREHEN METABOLIC PANEL: CPT

## 2021-01-20 PROCEDURE — 96413 CHEMO IV INFUSION 1 HR: CPT

## 2021-01-20 PROCEDURE — 99215 OFFICE O/P EST HI 40 MIN: CPT | Performed by: INTERNAL MEDICINE

## 2021-01-20 RX ORDER — HEPARIN SODIUM (PORCINE) LOCK FLUSH IV SOLN 100 UNIT/ML 100 UNIT/ML
SOLUTION INTRAVENOUS
Status: COMPLETED
Start: 2021-01-20 | End: 2021-01-20

## 2021-01-20 RX ORDER — HEPARIN SODIUM (PORCINE) LOCK FLUSH IV SOLN 100 UNIT/ML 100 UNIT/ML
5 SOLUTION INTRAVENOUS ONCE
Status: COMPLETED | OUTPATIENT
Start: 2021-01-20 | End: 2021-01-20

## 2021-01-20 RX ORDER — HEPARIN SODIUM (PORCINE) LOCK FLUSH IV SOLN 100 UNIT/ML 100 UNIT/ML
5 SOLUTION INTRAVENOUS ONCE
Status: CANCELLED | OUTPATIENT
Start: 2021-01-20

## 2021-01-20 RX ORDER — 0.9 % SODIUM CHLORIDE 0.9 %
10 VIAL (ML) INJECTION ONCE
Status: CANCELLED | OUTPATIENT
Start: 2021-01-20

## 2021-01-20 RX ORDER — SODIUM CHLORIDE 9 MG/ML
INJECTION INTRAVENOUS
Status: DISCONTINUED
Start: 2021-01-20 | End: 2021-01-20

## 2021-01-20 RX ADMIN — HEPARIN SODIUM (PORCINE) LOCK FLUSH IV SOLN 100 UNIT/ML 500 UNITS: 100 SOLUTION INTRAVENOUS at 15:50:00

## 2021-01-20 NOTE — PROGRESS NOTES
Cancer Center Progress Note    Patient Name: Fawad Culver   YOB: 1970   Medical Record Number: [id number]   Attending Physician: Dorie Tubbs M.D.      Chief Complaint:  Breast cancer    Oncology History:  12/2019: mammogram (last mammogram [facility]   • Obesity (BMI 30-39. 9)    • Obesity, unspecified    • Other and unspecified hyperlipidemia    • Personal history of antineoplastic chemotherapy 06/1925   • Type II or unspecified type diabetes mellitus without mention of complication, not stated as u (10 mg total) by mouth daily. , Disp: 30 tablet, Rfl: 2  •  Fluticasone Propionate 50 MCG/ACT Nasal Suspension, 1 PUFF EACH NOSTRIL 2 TIMES A  DAY, Disp: 1 Bottle, Rfl: 3  •  Insulin Pen Needle (BD PEN NEEDLE MINI U/F) 31G X 5 MM Does not apply Misc, Use 4 breast lumpectomy radiation changes and edema mostly resolved, fimrness at the surgical site about 1:00 location,  Unchanged.  Normal axilla  Extremities: No edema  Neurological: motor strength grossly intact  Psych: appropriate mood and affect      Laborat

## 2021-01-20 NOTE — PROGRESS NOTES
[facility]  Patient ID: [id number]  Timepoint: C15D1 (Dose #14) Last dose    Patient presents to clinic for MD evaluation for C15 Atezo/placebo. Glucose =192, non fasting. Reviewed with Dr. Gaby Sheppard, not clinically significant.       Patient cont

## 2021-01-20 NOTE — PROGRESS NOTES
Pt here for C15D1+ B59 Study.   Arrives Ambulating independently, accompanied by Self           Patient reports possible pregnancy since last therapy cycle: No    Modifications in dose or schedule: no. Today is patients last scheduled treatment, she will fo

## 2021-01-26 ENCOUNTER — APPOINTMENT (OUTPATIENT)
Dept: HEMATOLOGY/ONCOLOGY | Facility: HOSPITAL | Age: 51
End: 2021-01-26
Attending: INTERNAL MEDICINE
Payer: MEDICAID

## 2021-01-28 ENCOUNTER — OFFICE VISIT (OUTPATIENT)
Dept: OPHTHALMOLOGY | Facility: CLINIC | Age: 51
End: 2021-01-28
Payer: MEDICAID

## 2021-01-28 ENCOUNTER — LAB ENCOUNTER (OUTPATIENT)
Dept: LAB | Facility: HOSPITAL | Age: 51
End: 2021-01-28
Attending: INTERNAL MEDICINE
Payer: MEDICAID

## 2021-01-28 DIAGNOSIS — Z79.4 TYPE 2 DIABETES MELLITUS WITH HYPERGLYCEMIA, WITH LONG-TERM CURRENT USE OF INSULIN (HCC): ICD-10-CM

## 2021-01-28 DIAGNOSIS — I10 ESSENTIAL HYPERTENSION: ICD-10-CM

## 2021-01-28 DIAGNOSIS — E11.9 TYPE 2 DIABETES MELLITUS WITHOUT RETINOPATHY (HCC): Primary | ICD-10-CM

## 2021-01-28 DIAGNOSIS — E55.9 VITAMIN D DEFICIENCY: ICD-10-CM

## 2021-01-28 DIAGNOSIS — E11.65 TYPE 2 DIABETES MELLITUS WITH HYPERGLYCEMIA, WITH LONG-TERM CURRENT USE OF INSULIN (HCC): ICD-10-CM

## 2021-01-28 DIAGNOSIS — H25.13 AGE-RELATED NUCLEAR CATARACT OF BOTH EYES: ICD-10-CM

## 2021-01-28 LAB
ALBUMIN SERPL-MCNC: 3.1 G/DL (ref 3.4–5)
ALBUMIN/GLOB SERPL: 0.6 {RATIO} (ref 1–2)
ALP LIVER SERPL-CCNC: 83 U/L
ALT SERPL-CCNC: 17 U/L
ANION GAP SERPL CALC-SCNC: 1 MMOL/L (ref 0–18)
AST SERPL-CCNC: 8 U/L (ref 15–37)
BASOPHILS # BLD AUTO: 0.04 X10(3) UL (ref 0–0.2)
BASOPHILS NFR BLD AUTO: 0.7 %
BILIRUB SERPL-MCNC: 0.3 MG/DL (ref 0.1–2)
BILIRUB UR QL: NEGATIVE
BUN BLD-MCNC: 13 MG/DL (ref 7–18)
BUN/CREAT SERPL: 13.1 (ref 10–20)
CALCIUM BLD-MCNC: 9.5 MG/DL (ref 8.5–10.1)
CHLORIDE SERPL-SCNC: 107 MMOL/L (ref 98–112)
CHOLEST SMN-MCNC: 252 MG/DL (ref ?–200)
CLARITY UR: CLEAR
CO2 SERPL-SCNC: 32 MMOL/L (ref 21–32)
COLOR UR: YELLOW
CREAT BLD-MCNC: 0.99 MG/DL
CREAT UR-SCNC: 216 MG/DL
DEPRECATED RDW RBC AUTO: 49.1 FL (ref 35.1–46.3)
EOSINOPHIL # BLD AUTO: 0.36 X10(3) UL (ref 0–0.7)
EOSINOPHIL NFR BLD AUTO: 6.7 %
ERYTHROCYTE [DISTWIDTH] IN BLOOD BY AUTOMATED COUNT: 14.7 % (ref 11–15)
EST. AVERAGE GLUCOSE BLD GHB EST-MCNC: 226 MG/DL (ref 68–126)
GLOBULIN PLAS-MCNC: 5.1 G/DL (ref 2.8–4.4)
GLUCOSE BLD-MCNC: 185 MG/DL (ref 70–99)
GLUCOSE UR-MCNC: NEGATIVE MG/DL
HBA1C MFR BLD HPLC: 9.5 % (ref ?–5.7)
HCT VFR BLD AUTO: 37.4 %
HDLC SERPL-MCNC: 60 MG/DL (ref 40–59)
HGB BLD-MCNC: 12.2 G/DL
HGB UR QL STRIP.AUTO: NEGATIVE
HYALINE CASTS #/AREA URNS AUTO: 3 /LPF
IMM GRANULOCYTES # BLD AUTO: 0.01 X10(3) UL (ref 0–1)
IMM GRANULOCYTES NFR BLD: 0.2 %
KETONES UR-MCNC: NEGATIVE MG/DL
LDLC SERPL CALC-MCNC: 177 MG/DL (ref ?–100)
LYMPHOCYTES # BLD AUTO: 1.38 X10(3) UL (ref 1–4)
LYMPHOCYTES NFR BLD AUTO: 25.5 %
M PROTEIN MFR SERPL ELPH: 8.2 G/DL (ref 6.4–8.2)
MCH RBC QN AUTO: 29.5 PG (ref 26–34)
MCHC RBC AUTO-ENTMCNC: 32.6 G/DL (ref 31–37)
MCV RBC AUTO: 90.3 FL
MICROALBUMIN UR-MCNC: 30.5 MG/DL
MICROALBUMIN/CREAT 24H UR-RTO: 141.2 UG/MG (ref ?–30)
MONOCYTES # BLD AUTO: 0.25 X10(3) UL (ref 0.1–1)
MONOCYTES NFR BLD AUTO: 4.6 %
NEUTROPHILS # BLD AUTO: 3.37 X10 (3) UL (ref 1.5–7.7)
NEUTROPHILS # BLD AUTO: 3.37 X10(3) UL (ref 1.5–7.7)
NEUTROPHILS NFR BLD AUTO: 62.3 %
NITRITE UR QL STRIP.AUTO: NEGATIVE
NONHDLC SERPL-MCNC: 192 MG/DL (ref ?–130)
OSMOLALITY SERPL CALC.SUM OF ELEC: 295 MOSM/KG (ref 275–295)
PATIENT FASTING Y/N/NP: YES
PATIENT FASTING Y/N/NP: YES
PH UR: 6 [PH] (ref 5–8)
PLATELET # BLD AUTO: 226 10(3)UL (ref 150–450)
POTASSIUM SERPL-SCNC: 4.4 MMOL/L (ref 3.5–5.1)
PROT UR-MCNC: 100 MG/DL
RBC # BLD AUTO: 4.14 X10(6)UL
RBC #/AREA URNS AUTO: <1 /HPF
SODIUM SERPL-SCNC: 140 MMOL/L (ref 136–145)
SP GR UR STRIP: 1.02 (ref 1–1.03)
TRIGL SERPL-MCNC: 76 MG/DL (ref 30–149)
TSI SER-ACNC: 1.38 MIU/ML (ref 0.36–3.74)
UROBILINOGEN UR STRIP-ACNC: <2
VLDLC SERPL CALC-MCNC: 15 MG/DL (ref 0–30)
WBC # BLD AUTO: 5.4 X10(3) UL (ref 4–11)
WBC #/AREA URNS AUTO: 3 /HPF

## 2021-01-28 PROCEDURE — 80061 LIPID PANEL: CPT

## 2021-01-28 PROCEDURE — 3060F POS MICROALBUMINURIA REV: CPT | Performed by: INTERNAL MEDICINE

## 2021-01-28 PROCEDURE — 85025 COMPLETE CBC W/AUTO DIFF WBC: CPT

## 2021-01-28 PROCEDURE — 80053 COMPREHEN METABOLIC PANEL: CPT

## 2021-01-28 PROCEDURE — 81001 URINALYSIS AUTO W/SCOPE: CPT

## 2021-01-28 PROCEDURE — 82306 VITAMIN D 25 HYDROXY: CPT

## 2021-01-28 PROCEDURE — 36415 COLL VENOUS BLD VENIPUNCTURE: CPT

## 2021-01-28 PROCEDURE — 82043 UR ALBUMIN QUANTITATIVE: CPT

## 2021-01-28 PROCEDURE — 82570 ASSAY OF URINE CREATININE: CPT

## 2021-01-28 PROCEDURE — 92015 DETERMINE REFRACTIVE STATE: CPT | Performed by: OPHTHALMOLOGY

## 2021-01-28 PROCEDURE — 84443 ASSAY THYROID STIM HORMONE: CPT

## 2021-01-28 PROCEDURE — 83036 HEMOGLOBIN GLYCOSYLATED A1C: CPT

## 2021-01-28 PROCEDURE — 92014 COMPRE OPH EXAM EST PT 1/>: CPT | Performed by: OPHTHALMOLOGY

## 2021-01-28 NOTE — ASSESSMENT & PLAN NOTE
Discussed mild cataracts in both eyes that are not affecting vision and are not surgical at this time. New distance only glasses to update as needed.

## 2021-01-28 NOTE — PROGRESS NOTES
Roz Alarcon is a 53year old female.     HPI:     HPI     Diabetic Eye Exam      Additional comments: Pt has been a diabetic for 21 years       Pt's diabetes is currently controlled by insulin  Pt checks BS 3x times a day  Pt's last blood sugar was 123 Grandfather         prostate ca   • Breast Cancer Self 50   • Glaucoma Neg    • Macular degeneration Neg        Social History: Social History    Tobacco Use      Smoking status: Never Smoker      Smokeless tobacco: Never Used    Alcohol use: Never      Al 20/25 +1 20/25 +1    Near sc 20/25 20/20    Correction: Glasses          Tonometry ([facility], 10:28 AM)       Right Left    Pressure 16 15          Pupils       Pupils    Right PERRL    Left PERRL          Visual Fields       Left Right     Full Full importance of yearly eye exams due to diabetes. Age-related nuclear cataract of both eyes  Discussed mild cataracts in both eyes that are not affecting vision and are not surgical at this time. New distance only glasses to update as needed.         Criss Morton

## 2021-01-28 NOTE — PATIENT INSTRUCTIONS
Type 2 diabetes mellitus without retinopathy ([facility] [address]  Diabetes type II: no background of retinopathy, no signs of neovascularization noted. Discussed ocular and systemic benefits of blood sugar control.   Diagnosis and treatment discussed in detail with tigre

## 2021-01-28 NOTE — ASSESSMENT & PLAN NOTE
Diabetes type II: no background of retinopathy, no signs of neovascularization noted. Discussed ocular and systemic benefits of blood sugar control. Diagnosis and treatment discussed in detail with patient.   Discussed with patient that hemoglobin A1C of

## 2021-01-29 LAB — 25(OH)D3 SERPL-MCNC: 27.7 NG/ML (ref 30–100)

## 2021-02-10 ENCOUNTER — APPOINTMENT (OUTPATIENT)
Dept: HEMATOLOGY/ONCOLOGY | Facility: HOSPITAL | Age: 51
End: 2021-02-10
Attending: INTERNAL MEDICINE
Payer: MEDICAID

## 2021-02-16 ENCOUNTER — APPOINTMENT (OUTPATIENT)
Dept: HEMATOLOGY/ONCOLOGY | Facility: HOSPITAL | Age: 51
End: 2021-02-16
Attending: INTERNAL MEDICINE
Payer: MEDICAID

## 2021-02-16 ENCOUNTER — OFFICE VISIT (OUTPATIENT)
Dept: INTERNAL MEDICINE CLINIC | Facility: CLINIC | Age: 51
End: 2021-02-16
Payer: MEDICAID

## 2021-02-16 VITALS
BODY MASS INDEX: 39.75 KG/M2 | RESPIRATION RATE: 20 BRPM | SYSTOLIC BLOOD PRESSURE: 122 MMHG | TEMPERATURE: 98 F | WEIGHT: 216 LBS | HEIGHT: 62 IN | DIASTOLIC BLOOD PRESSURE: 72 MMHG | HEART RATE: 86 BPM

## 2021-02-16 DIAGNOSIS — E11.65 TYPE 2 DIABETES MELLITUS WITH HYPERGLYCEMIA, WITH LONG-TERM CURRENT USE OF INSULIN (HCC): Primary | ICD-10-CM

## 2021-02-16 DIAGNOSIS — Z79.4 TYPE 2 DIABETES MELLITUS WITH HYPERGLYCEMIA, WITH LONG-TERM CURRENT USE OF INSULIN (HCC): Primary | ICD-10-CM

## 2021-02-16 DIAGNOSIS — E55.9 VITAMIN D DEFICIENCY: ICD-10-CM

## 2021-02-16 DIAGNOSIS — E78.00 HYPERCHOLESTEREMIA: ICD-10-CM

## 2021-02-16 DIAGNOSIS — Z17.1 MALIGNANT NEOPLASM OF UPPER-OUTER QUADRANT OF RIGHT BREAST IN FEMALE, ESTROGEN RECEPTOR NEGATIVE (HCC): ICD-10-CM

## 2021-02-16 DIAGNOSIS — I10 ESSENTIAL HYPERTENSION: ICD-10-CM

## 2021-02-16 DIAGNOSIS — C50.411 MALIGNANT NEOPLASM OF UPPER-OUTER QUADRANT OF RIGHT BREAST IN FEMALE, ESTROGEN RECEPTOR NEGATIVE (HCC): ICD-10-CM

## 2021-02-16 PROCEDURE — 3008F BODY MASS INDEX DOCD: CPT | Performed by: INTERNAL MEDICINE

## 2021-02-16 PROCEDURE — 3074F SYST BP LT 130 MM HG: CPT | Performed by: INTERNAL MEDICINE

## 2021-02-16 PROCEDURE — 3078F DIAST BP <80 MM HG: CPT | Performed by: INTERNAL MEDICINE

## 2021-02-16 PROCEDURE — 99214 OFFICE O/P EST MOD 30 MIN: CPT | Performed by: INTERNAL MEDICINE

## 2021-02-16 RX ORDER — INSULIN LISPRO 100 [IU]/ML
INJECTION, SOLUTION INTRAVENOUS; SUBCUTANEOUS
Qty: 20 PEN | Refills: 2 | Status: SHIPPED | OUTPATIENT
Start: 2021-02-16 | End: 2021-09-08

## 2021-02-16 RX ORDER — ROSUVASTATIN CALCIUM 10 MG/1
10 TABLET, COATED ORAL NIGHTLY
Qty: 90 TABLET | Refills: 1 | Status: SHIPPED | OUTPATIENT
Start: 2021-02-16

## 2021-02-16 RX ORDER — INSULIN GLARGINE 100 [IU]/ML
70 INJECTION, SOLUTION SUBCUTANEOUS NIGHTLY
Qty: 20 PEN | Refills: 2 | Status: SHIPPED | OUTPATIENT
Start: 2021-02-16 | End: 2021-09-08

## 2021-02-16 RX ORDER — ERGOCALCIFEROL 1.25 MG/1
50000 CAPSULE ORAL WEEKLY
Qty: 12 CAPSULE | Refills: 1 | Status: SHIPPED | OUTPATIENT
Start: 2021-02-16 | End: 2021-03-18

## 2021-02-17 ENCOUNTER — APPOINTMENT (OUTPATIENT)
Dept: HEMATOLOGY/ONCOLOGY | Facility: HOSPITAL | Age: 51
End: 2021-02-17
Attending: INTERNAL MEDICINE
Payer: MEDICAID

## 2021-02-17 NOTE — ASSESSMENT & PLAN NOTE
Blood pressure 122/72, pulse 86, temperature 98.2 °F (36.8 °C), temperature source Oral, resp. rate 20, height 5' 2\" (1.575 m), weight 216 lb (98 kg), not currently breastfeeding. Blood pressures look stable, well controlled at this time.   She has not

## 2021-02-17 NOTE — ASSESSMENT & PLAN NOTE
Diabetes still not well controlled. Hemoglobin A1c has improved from 12.5-9.5. She is currently on Lantus at 60 units in p.m. and then a sliding scale of Humalog with breakfast lunch and dinner.   She is advised to increase her Lantus to 64 units and cont visit.

## 2021-02-17 NOTE — PROGRESS NOTES
HPI:    Patient ID: Nellie Soulier is a 49year old female.     Hx of breast cancer  Per Dr Arlen Lord  Malignant neoplasm of upper-outer quadrant of right breast in female, estrogen receptor negative ([facility] [address]  Staging form: Breast, AJCC 8th Edition  - Clinical: St has not had a previous visit with a dietitian. She never participates in exercise. Her home blood glucose trend is fluctuating dramatically (no sugars at the visit today).  Her breakfast blood glucose is taken between 7-8 am. Her breakfast blood glucose ran exercise. Risk factors for coronary artery disease include dyslipidemia, diabetes mellitus, hypertension, obesity and a sedentary lifestyle. Review of Systems   Constitutional: Negative. Negative for malaise/fatigue. HENT: Negative.     Eyes: Nega Allergies:No Known Allergies      02/16/21  1747   BP: 122/72   Pulse: 86   Resp: 20   Temp: 98.2 °F (36.8 °C)     Body mass index is 39.51 kg/m². PHYSICAL EXAM:   Physical Exam  Vitals and nursing note reviewed.    Constitutional:       Appearance: long-term current use of insulin ([address] - Primary     Diabetes still not well controlled. Hemoglobin A1c has improved from 12.5-9.5. She is currently on Lantus at 60 units in p.m. and then a sliding scale of Humalog with breakfast lunch and dinner.   She i continues with this type of behavior by her next visit.            Relevant Medications    Insulin Lispro, 1 Unit Dial, (HUMALOG KWIKPEN) 100 UNIT/ML Subcutaneous Solution Pen-injector    insulin glargine (LANTUS SOLOSTAR) 100 UNIT/ML Subcutaneous Solution in the DONTE and the other advised 50. Return in about 3 months (around 5/16/2021).     PT UNDERSTANDS AND AGREES TO FOLLOW DIRECTIONS AND ADVICE    Orders Placed This Encounter      Comp Metabolic Panel (14)      CBC With Differential Wit

## 2021-02-17 NOTE — ASSESSMENT & PLAN NOTE
Lipid panel shows significant elevation in the LDL cholesterol. Patient has been off her statins. She will be restarted on rosuvastatin at 10 mg daily, recheck labs have been requested.

## 2021-02-17 NOTE — PROGRESS NOTES
Cancer Center Progress Note    Patient Name: Donal Henderson   YOB: 1970   Medical Record Number: [id number]   Attending Physician: Nic Ramey M.D.      Chief Complaint:  Breast cancer    Oncology History:  12/2019: mammogram (last mammogram 01/09/2020    Lumpectomy and chemo    • Cancer ([facility] [address] 01/09/2020    RIGHT    • Cup to disc asymmetry 2006    OU   • No diabetic retinopathy in both eyes 2006    OU   • Obesity (BMI 30-39. 9)    • Obesity, unspecified    • Other and unspecified hyperlipidemia Lispro, 1 Unit Dial, (HUMALOG KWIKPEN) 100 UNIT/ML Subcutaneous Solution Pen-injector, 25 units s/c with every meal, Disp: 20 pen, Rfl: 2  •  insulin glargine (LANTUS SOLOSTAR) 100 UNIT/ML Subcutaneous Solution Pen-injector, Inject 70 Units into the skin n : 95.3 kg (210 lb)  12/30/20 : 94.8 kg (209 lb)  12/30/20 : 94.8 kg (209 lb)    Physical Examination:  Performance Status:1  General: Patient is alert and oriented x 3, not in acute distress. HEENT: EOMs intact. moist mucosal membranes.   + mask   chest: N Recommend weight loss and referral to weight loss clinic provided to assist with losing weight as part of oncology    Left breast mass  -bx benigin, repeat mammo 6 mo, diagnostic b/l    Genetics  - 84 gene panel ([facility] Multi-Cancer Panel.)  Testing did n

## 2021-02-17 NOTE — PATIENT INSTRUCTIONS
Problem List Items Addressed This Visit        Unprioritized    Essential hypertension     Blood pressure 122/72, pulse 86, temperature 98.2 °F (36.8 °C), temperature source Oral, resp.  rate 20, height 5' 2\" (1.575 m), weight 216 lb (98 kg), not currently height 5' 2\" (1.575 m), weight 216 lb (98 kg), not currently breastfeeding. Blood pressures are well controlled. Patient does not want to take any unnecessary medications and hence will hold off ACE inhibitor's and ACE receptor blockers.   She does have

## 2021-02-18 ENCOUNTER — RESEARCH ENCOUNTER (OUTPATIENT)
Dept: HEMATOLOGY/ONCOLOGY | Facility: HOSPITAL | Age: 51
End: 2021-02-18

## 2021-02-18 ENCOUNTER — APPOINTMENT (OUTPATIENT)
Dept: HEMATOLOGY/ONCOLOGY | Facility: HOSPITAL | Age: 51
End: 2021-02-18
Attending: INTERNAL MEDICINE
Payer: MEDICAID

## 2021-02-18 VITALS
WEIGHT: 214 LBS | HEIGHT: 62 IN | TEMPERATURE: 97 F | BODY MASS INDEX: 39.38 KG/M2 | DIASTOLIC BLOOD PRESSURE: 79 MMHG | HEART RATE: 80 BPM | OXYGEN SATURATION: 100 % | SYSTOLIC BLOOD PRESSURE: 129 MMHG | RESPIRATION RATE: 16 BRPM

## 2021-02-18 DIAGNOSIS — E11.9 TYPE 2 DIABETES MELLITUS WITHOUT RETINOPATHY (HCC): ICD-10-CM

## 2021-02-18 DIAGNOSIS — T45.1X5A CHEMOTHERAPY-INDUCED NEUROPATHY (HCC): ICD-10-CM

## 2021-02-18 DIAGNOSIS — C50.411 MALIGNANT NEOPLASM OF UPPER-OUTER QUADRANT OF RIGHT BREAST IN FEMALE, ESTROGEN RECEPTOR NEGATIVE (HCC): Primary | ICD-10-CM

## 2021-02-18 DIAGNOSIS — Z79.4 TYPE 2 DIABETES MELLITUS WITH HYPERGLYCEMIA, WITH LONG-TERM CURRENT USE OF INSULIN (HCC): ICD-10-CM

## 2021-02-18 DIAGNOSIS — E11.65 TYPE 2 DIABETES MELLITUS WITH HYPERGLYCEMIA, WITH LONG-TERM CURRENT USE OF INSULIN (HCC): ICD-10-CM

## 2021-02-18 DIAGNOSIS — G62.0 CHEMOTHERAPY-INDUCED NEUROPATHY (HCC): ICD-10-CM

## 2021-02-18 DIAGNOSIS — Z17.1 MALIGNANT NEOPLASM OF UPPER-OUTER QUADRANT OF RIGHT BREAST IN FEMALE, ESTROGEN RECEPTOR NEGATIVE (HCC): Primary | ICD-10-CM

## 2021-02-18 DIAGNOSIS — Z51.11 CHEMOTHERAPY MANAGEMENT, ENCOUNTER FOR: ICD-10-CM

## 2021-02-18 LAB
ALBUMIN SERPL-MCNC: 3.2 G/DL (ref 3.4–5)
ALBUMIN/GLOB SERPL: 0.7 {RATIO} (ref 1–2)
ALP LIVER SERPL-CCNC: 91 U/L
ALT SERPL-CCNC: 17 U/L
ANION GAP SERPL CALC-SCNC: 0 MMOL/L (ref 0–18)
AST SERPL-CCNC: 8 U/L (ref 15–37)
BASOPHILS # BLD AUTO: 0.05 X10(3) UL (ref 0–0.2)
BASOPHILS NFR BLD AUTO: 1 %
BILIRUB SERPL-MCNC: 0.3 MG/DL (ref 0.1–2)
BUN BLD-MCNC: 15 MG/DL (ref 7–18)
BUN/CREAT SERPL: 13 (ref 10–20)
CALCIUM BLD-MCNC: 9.6 MG/DL (ref 8.5–10.1)
CHLORIDE SERPL-SCNC: 105 MMOL/L (ref 98–112)
CO2 SERPL-SCNC: 31 MMOL/L (ref 21–32)
CREAT BLD-MCNC: 1.15 MG/DL
DEPRECATED RDW RBC AUTO: 49.1 FL (ref 35.1–46.3)
EOSINOPHIL # BLD AUTO: 0.23 X10(3) UL (ref 0–0.7)
EOSINOPHIL NFR BLD AUTO: 4.8 %
ERYTHROCYTE [DISTWIDTH] IN BLOOD BY AUTOMATED COUNT: 14.7 % (ref 11–15)
GLOBULIN PLAS-MCNC: 4.7 G/DL (ref 2.8–4.4)
GLUCOSE BLD-MCNC: 341 MG/DL (ref 70–99)
HCT VFR BLD AUTO: 37.6 %
HGB BLD-MCNC: 12 G/DL
IMM GRANULOCYTES # BLD AUTO: 0.01 X10(3) UL (ref 0–1)
IMM GRANULOCYTES NFR BLD: 0.2 %
LYMPHOCYTES # BLD AUTO: 1.18 X10(3) UL (ref 1–4)
LYMPHOCYTES NFR BLD AUTO: 24.5 %
M PROTEIN MFR SERPL ELPH: 7.9 G/DL (ref 6.4–8.2)
MCH RBC QN AUTO: 29 PG (ref 26–34)
MCHC RBC AUTO-ENTMCNC: 31.9 G/DL (ref 31–37)
MCV RBC AUTO: 90.8 FL
MONOCYTES # BLD AUTO: 0.24 X10(3) UL (ref 0.1–1)
MONOCYTES NFR BLD AUTO: 5 %
NEUTROPHILS # BLD AUTO: 3.11 X10 (3) UL (ref 1.5–7.7)
NEUTROPHILS # BLD AUTO: 3.11 X10(3) UL (ref 1.5–7.7)
NEUTROPHILS NFR BLD AUTO: 64.5 %
OSMOLALITY SERPL CALC.SUM OF ELEC: 296 MOSM/KG (ref 275–295)
PLATELET # BLD AUTO: 251 10(3)UL (ref 150–450)
POTASSIUM SERPL-SCNC: 3.9 MMOL/L (ref 3.5–5.1)
RBC # BLD AUTO: 4.14 X10(6)UL
SODIUM SERPL-SCNC: 136 MMOL/L (ref 136–145)
WBC # BLD AUTO: 4.8 X10(3) UL (ref 4–11)

## 2021-02-18 PROCEDURE — 85025 COMPLETE CBC W/AUTO DIFF WBC: CPT

## 2021-02-18 PROCEDURE — 99214 OFFICE O/P EST MOD 30 MIN: CPT | Performed by: INTERNAL MEDICINE

## 2021-02-18 PROCEDURE — 80053 COMPREHEN METABOLIC PANEL: CPT

## 2021-02-18 PROCEDURE — 36415 COLL VENOUS BLD VENIPUNCTURE: CPT

## 2021-02-18 NOTE — PROGRESS NOTES
STUDY: NSABP- B59   ID # [id number]  30 DAY POST TREATMENT FU    12 month ctDNA specimen collected and shipped out today via [employer]. Patient also had CBC, CMP. Seen and examined by Daisy See. Patient states she recently saw her PCP.  Per pt, her A1C

## 2021-02-18 NOTE — PROGRESS NOTES
Patient preferred peripheral draw today. Did not apply lidocaine cream to [place] site.   Port was used on 1/20/21

## 2021-02-19 DIAGNOSIS — Z51.81 ENCOUNTER FOR MONITORING CARDIOTOXIC DRUG THERAPY: Primary | ICD-10-CM

## 2021-02-19 DIAGNOSIS — Z79.899 ENCOUNTER FOR MONITORING CARDIOTOXIC DRUG THERAPY: Primary | ICD-10-CM

## 2021-03-01 ENCOUNTER — HOSPITAL ENCOUNTER (OUTPATIENT)
Dept: MAMMOGRAPHY | Facility: HOSPITAL | Age: 51
Discharge: HOME | End: 2021-03-01
Attending: INTERNAL MEDICINE
Payer: MEDICAID

## 2021-03-01 DIAGNOSIS — Z17.1 MALIGNANT NEOPLASM OF UPPER-OUTER QUADRANT OF RIGHT BREAST IN FEMALE, ESTROGEN RECEPTOR NEGATIVE (HCC): ICD-10-CM

## 2021-03-01 DIAGNOSIS — C50.411 MALIGNANT NEOPLASM OF UPPER-OUTER QUADRANT OF RIGHT BREAST IN FEMALE, ESTROGEN RECEPTOR NEGATIVE (HCC): ICD-10-CM

## 2021-03-01 PROCEDURE — 77061 BREAST TOMOSYNTHESIS UNI: CPT | Performed by: INTERNAL MEDICINE

## 2021-03-01 PROCEDURE — 77065 DX MAMMO INCL CAD UNI: CPT | Performed by: INTERNAL MEDICINE

## 2021-03-02 ENCOUNTER — TELEPHONE (OUTPATIENT)
Dept: HEMATOLOGY/ONCOLOGY | Facility: HOSPITAL | Age: 51
End: 2021-03-02

## 2021-03-02 DIAGNOSIS — C50.411 MALIGNANT NEOPLASM OF UPPER-OUTER QUADRANT OF RIGHT BREAST IN FEMALE, ESTROGEN RECEPTOR NEGATIVE (HCC): Primary | ICD-10-CM

## 2021-03-02 DIAGNOSIS — Z17.1 MALIGNANT NEOPLASM OF UPPER-OUTER QUADRANT OF RIGHT BREAST IN FEMALE, ESTROGEN RECEPTOR NEGATIVE (HCC): Primary | ICD-10-CM

## 2021-03-02 NOTE — TELEPHONE ENCOUNTER
I spoke with anastasia and let her know that her mammo looked good. She would like for me to ask Dr Hannah Menezes if it is okay for her to be 3 months late on her left breast mammo so that she can do both breasts on the same day every 6 months.   I will reach out to

## 2021-03-02 NOTE — TELEPHONE ENCOUNTER
I returned the call to [facility]. I let her know that Dr Gaby Sheppard is okay with her doing the bilateral breasts mammos instead of one at a time. Okay to wait on left breast 3 months to line up with the right breast annual mammo in September.   She verbalized und

## 2021-03-03 ENCOUNTER — APPOINTMENT (OUTPATIENT)
Dept: HEMATOLOGY/ONCOLOGY | Facility: HOSPITAL | Age: 51
End: 2021-03-03
Attending: INTERNAL MEDICINE
Payer: MEDICAID

## 2021-03-05 ENCOUNTER — LAB ENCOUNTER (OUTPATIENT)
Dept: LAB | Facility: HOSPITAL | Age: 51
End: 2021-03-05
Attending: RADIOLOGY
Payer: MEDICAID

## 2021-03-05 DIAGNOSIS — Z01.818 PRE-OP TESTING: ICD-10-CM

## 2021-03-05 LAB — SARS-COV-2 RNA RESP QL NAA+PROBE: NOT DETECTED

## 2021-03-08 ENCOUNTER — HOSPITAL ENCOUNTER (OUTPATIENT)
Dept: INTERVENTIONAL RADIOLOGY/VASCULAR | Facility: HOSPITAL | Age: 51
Discharge: HOME | End: 2021-03-08
Attending: INTERNAL MEDICINE | Admitting: RADIOLOGY
Payer: MEDICAID

## 2021-03-08 VITALS
HEART RATE: 63 BPM | DIASTOLIC BLOOD PRESSURE: 62 MMHG | TEMPERATURE: 98 F | WEIGHT: 214 LBS | RESPIRATION RATE: 19 BRPM | OXYGEN SATURATION: 100 % | SYSTOLIC BLOOD PRESSURE: 117 MMHG | BODY MASS INDEX: 39 KG/M2

## 2021-03-08 DIAGNOSIS — C50.411 MALIGNANT NEOPLASM OF UPPER-OUTER QUADRANT OF RIGHT BREAST IN FEMALE, ESTROGEN RECEPTOR NEGATIVE (HCC): ICD-10-CM

## 2021-03-08 DIAGNOSIS — Z17.1 MALIGNANT NEOPLASM OF UPPER-OUTER QUADRANT OF RIGHT BREAST IN FEMALE, ESTROGEN RECEPTOR NEGATIVE (HCC): ICD-10-CM

## 2021-03-08 DIAGNOSIS — Z01.818 PRE-OP TESTING: Primary | ICD-10-CM

## 2021-03-08 LAB — GLUCOSE BLDC GLUCOMTR-MCNC: 383 MG/DL (ref 70–99)

## 2021-03-08 PROCEDURE — 02PA33Z REMOVAL OF INFUSION DEVICE FROM HEART, PERCUTANEOUS APPROACH: ICD-10-PCS | Performed by: RADIOLOGY

## 2021-03-08 PROCEDURE — 0JPT0WZ REMOVAL OF TOTALLY IMPLANTABLE VASCULAR ACCESS DEVICE FROM TRUNK SUBCUTANEOUS TISSUE AND FASCIA, OPEN APPROACH: ICD-10-PCS | Performed by: RADIOLOGY

## 2021-03-08 PROCEDURE — 36415 COLL VENOUS BLD VENIPUNCTURE: CPT

## 2021-03-08 PROCEDURE — 82962 GLUCOSE BLOOD TEST: CPT

## 2021-03-08 PROCEDURE — 36590 REMOVAL TUNNELED CV CATH: CPT

## 2021-03-08 RX ORDER — SODIUM CHLORIDE 9 MG/ML
INJECTION, SOLUTION INTRAVENOUS CONTINUOUS
Status: DISCONTINUED | OUTPATIENT
Start: 2021-03-08 | End: 2021-03-08

## 2021-03-08 RX ORDER — LIDOCAINE HYDROCHLORIDE 20 MG/ML
INJECTION, SOLUTION EPIDURAL; INFILTRATION; INTRACAUDAL; PERINEURAL
Status: COMPLETED
Start: 2021-03-08 | End: 2021-03-08

## 2021-03-08 NOTE — PROCEDURES
[facility]  Procedure Note    Ashwin Cordova Patient Status:  Outpatient in a Bed    3/4/1970 MRN [id number]   Location [facility] Attending Severo Matas, [facility] Day # 0 PCP Ambar Choudhary MD     Procedure:

## 2021-03-08 NOTE — IVS NOTE
Patient tolerated procedure well, vital signs stable, left upper chest incision dry, soft, closed with skin glue, no drainage.   Instructions given to patient and family they stated understanding

## 2021-03-08 NOTE — INTERVAL H&P NOTE
The above referenced H&P was reviewed by Manjula Bartholomew MD on 3/8/2021, the patient was examined and no significant changes have occurred in the patient's condition since the H&P was performed.   Risks, benefits, alternative treatments and consequences of no

## 2021-03-08 NOTE — PROGRESS NOTES
Pt underwent elective port removal this morning by Dr Mariama Ariza and IR staff. Pt received 9ml's of lidocaine to left chest and 50mcg of fentanyl IVP. Pt tolerated procedure well and without issues. Port removed, site was sutured and closed with skin glue.

## 2021-03-17 ENCOUNTER — TELEPHONE (OUTPATIENT)
Dept: INTERNAL MEDICINE CLINIC | Facility: CLINIC | Age: 51
End: 2021-03-17

## 2021-03-17 ENCOUNTER — TELEPHONE (OUTPATIENT)
Dept: HEMATOLOGY/ONCOLOGY | Facility: HOSPITAL | Age: 51
End: 2021-03-17

## 2021-03-17 DIAGNOSIS — Z23 NEED FOR VACCINATION: ICD-10-CM

## 2021-03-17 NOTE — TELEPHONE ENCOUNTER
Patient states she scheduled her Covid vaccination for Monday, 3/22 in [place] (with [place]). States she was informed by Dr. Tarik Rivera that one of her labs in February \"was elevated and told to wait before receiving vaccine. \"  Patient asking if ok

## 2021-03-17 NOTE — TELEPHONE ENCOUNTER
Federico Vazquez called asking to speak with Cassandra Christensen. She says she was contacted about the vaccine, and she wanted to make sure it was okay. She says she was originally told to hold off.

## 2021-03-17 NOTE — TELEPHONE ENCOUNTER
I returned call to Deb. I let her know it is okay to get the covid vaccine. She verbalized understanding.

## 2021-03-22 ENCOUNTER — IMMUNIZATION (OUTPATIENT)
Dept: LAB | Facility: HOSPITAL | Age: 51
End: 2021-03-22
Attending: HOSPITALIST
Payer: MEDICAID

## 2021-03-22 DIAGNOSIS — Z23 NEED FOR VACCINATION: Primary | ICD-10-CM

## 2021-03-22 PROCEDURE — 0011A SARSCOV2 VAC 100MCG/0.5ML IM: CPT

## 2021-04-19 ENCOUNTER — IMMUNIZATION (OUTPATIENT)
Dept: LAB | Facility: HOSPITAL | Age: 51
End: 2021-04-19
Attending: EMERGENCY MEDICINE
Payer: MEDICAID

## 2021-04-19 DIAGNOSIS — Z23 NEED FOR VACCINATION: Primary | ICD-10-CM

## 2021-04-19 PROCEDURE — 0012A SARSCOV2 VAC 100MCG/0.5ML IM: CPT

## 2021-04-26 NOTE — PROGRESS NOTES
Breast Surgery Surveillance Visit    Diagnosis: Right breast cancer status post neoadjuvant chemotherapy followed by right lumpectomy and sentinel lymph node biopsy performed on August 3, 2020.     Stage: Cancer Staging  Malignant neoplasm of upper-outer qu demonstrated benign fibroadenoma. She has undergone neoadjuvant chemotherapy with good clinical response.   She had an MRI for surveillance and July 14, 2020 which demonstrates no appreciable residual disease with a complete neoadjuvant chemotherapy respon Age of Menopause: Unknown, uterine ablation  She denies any history of hormone replacement therapy. She denies any history of oral contraceptive use. She denies infertility treatment to achieve pregnancy.     Medications:    No outpatient medications ha history of chest pain, chest pressure/discomfort, palpitations, irregular heartbeat, fainting or near-fainting, difficulty breathing when lying flat, SOB/Coughing at night, swelling of the legs or chest pain while walking.     Breasts:  See history of prese (LMP Unknown)   SpO2 100%   BMI 38.96 kg/m²     Physical Examination: This is a well-nourished, alert and oriented woman. There is not palpable cervical, supraclavicular or axillary adenopathy.   The neck is supple with a midline trachea and no thyromegal indications, risks and benefits of MRI evaluation were discussed, including the greater sensitivity of MRI at the cost of a high false positive rate, leading to additional imaging procedures and possible unnecessary biopsy.  The specific of her imaging info

## 2021-04-28 ENCOUNTER — OFFICE VISIT (OUTPATIENT)
Dept: SURGERY | Facility: CLINIC | Age: 51
End: 2021-04-28
Payer: MEDICAID

## 2021-04-28 VITALS
HEART RATE: 71 BPM | SYSTOLIC BLOOD PRESSURE: 138 MMHG | HEIGHT: 62 IN | BODY MASS INDEX: 39.2 KG/M2 | WEIGHT: 213 LBS | OXYGEN SATURATION: 100 % | DIASTOLIC BLOOD PRESSURE: 64 MMHG | RESPIRATION RATE: 18 BRPM

## 2021-04-28 DIAGNOSIS — Z17.1 MALIGNANT NEOPLASM OF UPPER-OUTER QUADRANT OF RIGHT BREAST IN FEMALE, ESTROGEN RECEPTOR NEGATIVE (HCC): Primary | ICD-10-CM

## 2021-04-28 DIAGNOSIS — C50.411 MALIGNANT NEOPLASM OF UPPER-OUTER QUADRANT OF RIGHT BREAST IN FEMALE, ESTROGEN RECEPTOR NEGATIVE (HCC): Primary | ICD-10-CM

## 2021-04-28 PROCEDURE — 99214 OFFICE O/P EST MOD 30 MIN: CPT | Performed by: SURGERY

## 2021-04-28 PROCEDURE — 3075F SYST BP GE 130 - 139MM HG: CPT | Performed by: SURGERY

## 2021-04-28 PROCEDURE — 3008F BODY MASS INDEX DOCD: CPT | Performed by: SURGERY

## 2021-04-28 PROCEDURE — 3078F DIAST BP <80 MM HG: CPT | Performed by: SURGERY

## 2021-05-10 ENCOUNTER — HOSPITAL ENCOUNTER (OUTPATIENT)
Dept: MRI IMAGING | Facility: HOSPITAL | Age: 51
Discharge: HOME | End: 2021-05-10
Attending: SURGERY
Payer: MEDICAID

## 2021-05-10 DIAGNOSIS — Z17.1 MALIGNANT NEOPLASM OF UPPER-OUTER QUADRANT OF RIGHT BREAST IN FEMALE, ESTROGEN RECEPTOR NEGATIVE (HCC): ICD-10-CM

## 2021-05-10 DIAGNOSIS — C50.411 MALIGNANT NEOPLASM OF UPPER-OUTER QUADRANT OF RIGHT BREAST IN FEMALE, ESTROGEN RECEPTOR NEGATIVE (HCC): ICD-10-CM

## 2021-05-10 PROCEDURE — 82565 ASSAY OF CREATININE: CPT

## 2021-05-10 PROCEDURE — A9575 INJ GADOTERATE MEGLUMI 0.1ML: HCPCS | Performed by: SURGERY

## 2021-05-10 PROCEDURE — 77049 MRI BREAST C-+ W/CAD BI: CPT | Performed by: SURGERY

## 2021-05-10 NOTE — ASSESSMENT & PLAN NOTE
Managed per Dr. Dustin Colbert. Diagnosed with breast cancer upper outer quadrant of the right breast, estrogen receptor negative. Staging–clinical as follows. Stage IIIb–cT3, cN0, cN0, G3, ER negative, MD negative, HER-2/jair negative–on January 16, 2020.   Pathol

## 2021-05-17 ENCOUNTER — APPOINTMENT (OUTPATIENT)
Dept: HEMATOLOGY/ONCOLOGY | Facility: HOSPITAL | Age: 51
End: 2021-05-17
Attending: INTERNAL MEDICINE
Payer: MEDICAID

## 2021-05-17 NOTE — PROGRESS NOTES
Cancer Center Progress Note    Patient Name: Kate Prieto   YOB: 1970   Medical Record Number: [id number]   Attending Physician: Pietro Campa M.D.      Chief Complaint:  Breast cancer    Oncology History:  12/2019: mammogram (last mammogram 01/09/2020    Lumpectomy and chemo    • Cancer ([facility] [address] 01/09/2020    RIGHT    • Cup to disc asymmetry 2006    OU   • No diabetic retinopathy in both eyes 2006    OU   • Obesity (BMI 30-39. 9)    • Obesity, unspecified    • Other and unspecified hyperlipidemia AS DIRECTED ), Disp: 20 pen, Rfl: 2  •  Rosuvastatin Calcium 10 MG Oral Tab, Take 1 tablet (10 mg total) by mouth nightly., Disp: 90 tablet, Rfl: 1  •  Fluticasone Propionate 50 MCG/ACT Nasal Suspension, 1 PUFF EACH NOSTRIL 2 TIMES A  DAY, Disp: 1 Bottle, 02/18/2021    ALT 17 02/18/2021    ALKPHOS 51 08/13/2016    BILT 0.3 02/18/2021    TP 7.9 02/18/2021    ALB 3.2 (L) 02/18/2021    GLOBULIN 4.7 (H) 02/18/2021    AGRATIO 0.9 (L) 08/13/2016     02/18/2021    K 3.9 02/18/2021     02/18/2021    CO2

## 2021-05-18 ENCOUNTER — LAB ENCOUNTER (OUTPATIENT)
Dept: LAB | Facility: HOSPITAL | Age: 51
End: 2021-05-18
Attending: INTERNAL MEDICINE
Payer: MEDICAID

## 2021-05-18 ENCOUNTER — OFFICE VISIT (OUTPATIENT)
Dept: HEMATOLOGY/ONCOLOGY | Facility: HOSPITAL | Age: 51
End: 2021-05-18
Attending: INTERNAL MEDICINE
Payer: MEDICAID

## 2021-05-18 VITALS
DIASTOLIC BLOOD PRESSURE: 51 MMHG | SYSTOLIC BLOOD PRESSURE: 120 MMHG | HEART RATE: 88 BPM | WEIGHT: 214.19 LBS | BODY MASS INDEX: 39.41 KG/M2 | RESPIRATION RATE: 16 BRPM | OXYGEN SATURATION: 100 % | HEIGHT: 62 IN | TEMPERATURE: 98 F

## 2021-05-18 DIAGNOSIS — C50.411 MALIGNANT NEOPLASM OF UPPER-OUTER QUADRANT OF RIGHT BREAST IN FEMALE, ESTROGEN RECEPTOR NEGATIVE (HCC): ICD-10-CM

## 2021-05-18 DIAGNOSIS — Z17.1 MALIGNANT NEOPLASM OF UPPER-OUTER QUADRANT OF RIGHT BREAST IN FEMALE, ESTROGEN RECEPTOR NEGATIVE (HCC): ICD-10-CM

## 2021-05-18 DIAGNOSIS — C50.411 MALIGNANT NEOPLASM OF UPPER-OUTER QUADRANT OF RIGHT BREAST IN FEMALE, ESTROGEN RECEPTOR NEGATIVE (HCC): Primary | ICD-10-CM

## 2021-05-18 DIAGNOSIS — Z17.1 MALIGNANT NEOPLASM OF UPPER-OUTER QUADRANT OF RIGHT BREAST IN FEMALE, ESTROGEN RECEPTOR NEGATIVE (HCC): Primary | ICD-10-CM

## 2021-05-18 PROCEDURE — 36415 COLL VENOUS BLD VENIPUNCTURE: CPT

## 2021-05-18 PROCEDURE — 80053 COMPREHEN METABOLIC PANEL: CPT

## 2021-05-18 PROCEDURE — 85025 COMPLETE CBC W/AUTO DIFF WBC: CPT

## 2021-05-18 PROCEDURE — 99214 OFFICE O/P EST MOD 30 MIN: CPT | Performed by: INTERNAL MEDICINE

## 2021-05-18 PROCEDURE — 86769 SARS-COV-2 COVID-19 ANTIBODY: CPT

## 2021-05-18 NOTE — PROGRESS NOTES
Cancer Center Progress Note    Patient Name: Le French   YOB: 1970   Medical Record Number: [id number]   Attending Physician: Claribel Sheffield M.D.      Chief Complaint:  Breast cancer    Oncology History:  12/2019: mammogram (last mammogram diabetic retinopathy in both eyes 2006    OU   • Obesity (BMI 30-39. 9)    • Obesity, unspecified    • Other and unspecified hyperlipidemia    • Personal history of antineoplastic chemotherapy 06/1925   • Type II or unspecified type diabetes mellitus withou nightly., Disp: 90 tablet, Rfl: 1  •  Fluticasone Propionate 50 MCG/ACT Nasal Suspension, 1 PUFF EACH NOSTRIL 2 TIMES A  DAY, Disp: 1 Bottle, Rfl: 3  •  Insulin Pen Needle (BD PEN NEEDLE MINI U/F) 31G X 5 MM Does not apply Misc, Use 4 times a day as direct 05/10/2021    GFRAA 86 05/10/2021    CA 9.6 02/18/2021    OSMOCALC 296 (H) 02/18/2021    ALKPHO 91 02/18/2021    AST 8 (L) 02/18/2021    ALT 17 02/18/2021    ALKPHOS 51 08/13/2016    BILT 0.3 02/18/2021    TP 7.9 02/18/2021    ALB 3.2 (L) 02/18/2021    MILE

## 2021-05-19 ENCOUNTER — TELEPHONE (OUTPATIENT)
Dept: HEMATOLOGY/ONCOLOGY | Facility: HOSPITAL | Age: 51
End: 2021-05-19

## 2021-05-19 NOTE — TELEPHONE ENCOUNTER
I spoke with Wendi Simmons and let her know that her labs look good. She also has antibodies for Covid which is also good. Wendi Simmons verbalized understanding.

## 2021-05-21 ENCOUNTER — TELEPHONE (OUTPATIENT)
Dept: HEMATOLOGY/ONCOLOGY | Facility: HOSPITAL | Age: 51
End: 2021-05-21

## 2021-05-21 NOTE — TELEPHONE ENCOUNTER
Research patient due for echo prior to her next visit in 3 months. Called patient to set up follow up appointments for mid August for echo and Dr Jorge Adhikari. Appointment with Dr Jorge Adhikari scheduled for 8/18.   Patient prefers to have the echo done on Thursday of t

## 2021-06-07 ENCOUNTER — OFFICE VISIT (OUTPATIENT)
Dept: ENDOCRINOLOGY CLINIC | Facility: CLINIC | Age: 51
End: 2021-06-07
Payer: MEDICAID

## 2021-06-07 VITALS
SYSTOLIC BLOOD PRESSURE: 119 MMHG | HEART RATE: 87 BPM | BODY MASS INDEX: 39 KG/M2 | DIASTOLIC BLOOD PRESSURE: 73 MMHG | WEIGHT: 211 LBS

## 2021-06-07 DIAGNOSIS — E11.65 TYPE 2 DIABETES MELLITUS WITH HYPERGLYCEMIA, WITH LONG-TERM CURRENT USE OF INSULIN (HCC): Primary | ICD-10-CM

## 2021-06-07 DIAGNOSIS — Z79.4 TYPE 2 DIABETES MELLITUS WITH HYPERGLYCEMIA, WITH LONG-TERM CURRENT USE OF INSULIN (HCC): Primary | ICD-10-CM

## 2021-06-07 DIAGNOSIS — E78.5 DYSLIPIDEMIA: ICD-10-CM

## 2021-06-07 PROCEDURE — 3078F DIAST BP <80 MM HG: CPT | Performed by: INTERNAL MEDICINE

## 2021-06-07 PROCEDURE — 36416 COLLJ CAPILLARY BLOOD SPEC: CPT | Performed by: INTERNAL MEDICINE

## 2021-06-07 PROCEDURE — 82947 ASSAY GLUCOSE BLOOD QUANT: CPT | Performed by: INTERNAL MEDICINE

## 2021-06-07 PROCEDURE — 3074F SYST BP LT 130 MM HG: CPT | Performed by: INTERNAL MEDICINE

## 2021-06-07 PROCEDURE — 3046F HEMOGLOBIN A1C LEVEL >9.0%: CPT | Performed by: INTERNAL MEDICINE

## 2021-06-07 PROCEDURE — 83036 HEMOGLOBIN GLYCOSYLATED A1C: CPT | Performed by: INTERNAL MEDICINE

## 2021-06-07 PROCEDURE — 99204 OFFICE O/P NEW MOD 45 MIN: CPT | Performed by: INTERNAL MEDICINE

## 2021-06-07 NOTE — H&P
New Patient Visit - Diabetes    CONSULT - Reason for Visit:  Diabetes management.     Requesting Physician: Dr. Geovanny Brumfield:  Patient presents with:  Diabetes: Pt is present to consult with the doctor regarding her diabetes        HISTORY OF every 12 (twelve) hours as needed for Allergies. 60 tablet 0   • Omeprazole 40 MG Oral Capsule Delayed Release Take 1 capsule (40 mg total) by mouth daily.  60 capsule 0       PAST MEDICAL HISTORY:   Past Medical History:   Diagnosis Date   • Anemia    • Br of death   • Hypertension Mother    • Diabetes Mother    • Heart Attack Mother 62        MI, Cause of death   • Heart Attack Cousin 47        Maternal cousin: MI, Cause of death   • Lipids Other         Family H/o Hyperlipidemia   • Cancer Maternal Aunt time, self, and place  Extremities: no obvious extremity swelling, no lesions        DATA:     Pertinent data reviewed    a1c is 12.3 %     ASSESSMENT AND PLAN:    1. DM: uncontrolled     Plan:  Discussed the pathogenesis, natural course of diabetes.  Lexi injury.     Orders Placed This Encounter      POC HemoCue Glucose 201 (Finger stick glucose)      POC Glycohemoglobin [21994]        6/7/2021  Caleb Kang MD

## 2021-06-07 NOTE — TELEPHONE ENCOUNTER
Pt states script for new medication prescribed at [facility] is not at the pharmacy. Pt unable to provide name of medication.  Please call [phone]

## 2021-06-08 RX ORDER — LIRAGLUTIDE 6 MG/ML
INJECTION SUBCUTANEOUS
Qty: 27 ML | Refills: 0 | Status: SHIPPED | OUTPATIENT
Start: 2021-06-08 | End: 2021-07-22

## 2021-06-08 RX ORDER — PEN NEEDLE, DIABETIC 30 GX3/16"
1 NEEDLE, DISPOSABLE MISCELLANEOUS DAILY
Qty: 90 EACH | Refills: 0 | Status: SHIPPED | OUTPATIENT
Start: 2021-06-08 | End: 2021-09-08

## 2021-06-08 NOTE — TELEPHONE ENCOUNTER
Pt. Calling to f/shaheen on getting her med sent to [facility] in [facility]. Pt. States that she is supposed to start taking new insulin med today.

## 2021-07-01 ENCOUNTER — NURSE ONLY (OUTPATIENT)
Dept: ENDOCRINOLOGY CLINIC | Facility: CLINIC | Age: 51
End: 2021-07-01
Payer: MEDICAID

## 2021-07-01 DIAGNOSIS — E11.65 UNCONTROLLED TYPE 2 DIABETES MELLITUS WITH HYPERGLYCEMIA (HCC): Primary | ICD-10-CM

## 2021-07-01 PROCEDURE — 95251 CONT GLUC MNTR ANALYSIS I&R: CPT | Performed by: INTERNAL MEDICINE

## 2021-07-01 NOTE — PROGRESS NOTES
James Velásquez  OHT0/0/2412 was seen for Continuous Glucose Sensor Follow-up Visit: Freestyle germania pro diagnostic.      Date: 7/1/2021    Referring Provider: Dr. Peter Nicholas   Start time: 6:20 pm  End time: 6:45 pm    Sensor Type: Freestyle germania pro diagno day. Encouraged her to continue to increase exercise frequency and duration as tolerated. - congratulated patient on improved glycemic control. Reviewed rule of 15 for symptoms and treatment of hypoglycemia.  She is having symptoms of hypoglycemia in th

## 2021-07-22 ENCOUNTER — NURSE ONLY (OUTPATIENT)
Dept: ENDOCRINOLOGY CLINIC | Facility: CLINIC | Age: 51
End: 2021-07-22
Payer: MEDICAID

## 2021-07-22 DIAGNOSIS — E11.65 UNCONTROLLED TYPE 2 DIABETES MELLITUS WITH HYPERGLYCEMIA (HCC): Primary | ICD-10-CM

## 2021-07-22 PROCEDURE — 36416 COLLJ CAPILLARY BLOOD SPEC: CPT | Performed by: INTERNAL MEDICINE

## 2021-07-22 PROCEDURE — 83036 HEMOGLOBIN GLYCOSYLATED A1C: CPT | Performed by: INTERNAL MEDICINE

## 2021-07-22 PROCEDURE — 3052F HG A1C>EQUAL 8.0%<EQUAL 9.0%: CPT | Performed by: INTERNAL MEDICINE

## 2021-07-22 NOTE — PROGRESS NOTES
Brissa Cos 3/4/1970 attended for Intensive Individual Management: Recheck HgA1c, review of medications and glucose logs.      Date: 7/23/2021   Start Time: 6:13pm  End Time: 6:35 pm    Wt: Wt Readings from Last 6 Encounters:  06/07/21 : 211 lb  05/18/2 and also helping with weight loss. Interested in trying alternative GLP-1 medication given persistent side effects with Victoza. Will follow up with MD for further recommendations. Pt verbalized understanding and has no further questions.     Follow up

## 2021-07-23 ENCOUNTER — TELEPHONE (OUTPATIENT)
Dept: ENDOCRINOLOGY CLINIC | Facility: CLINIC | Age: 51
End: 2021-07-23

## 2021-07-23 LAB
CARTRIDGE LOT#: ABNORMAL NUMERIC
HEMOGLOBIN A1C: 8.5 % (ref 4.3–5.6)

## 2021-07-23 RX ORDER — EXENATIDE 250 UG/ML
5 INJECTION SUBCUTANEOUS
Qty: 1.2 ML | Refills: 0 | Status: SHIPPED | OUTPATIENT
Start: 2021-07-23 | End: 2021-09-08

## 2021-07-23 NOTE — PROGRESS NOTES
Will switch to byetta ( based on insurance coverage), if not tolerated or efficacy is not goo, will try a weekly GLP agonist

## 2021-07-23 NOTE — TELEPHONE ENCOUNTER
See CDE note from visit yesterday. Discussed with Dr. Kevin Willingham that patient doing well on Victoza and would like to continue with GLP-1 therapy but having persistent GI side effects with Victoza. Would like to try weekly GLP-1.      With insurance will elmo

## 2021-08-09 ENCOUNTER — TELEPHONE (OUTPATIENT)
Dept: HEMATOLOGY/ONCOLOGY | Facility: HOSPITAL | Age: 51
End: 2021-08-09

## 2021-08-09 NOTE — TELEPHONE ENCOUNTER
Patient has a 3 month follow up appointment on 8/18 at 1:15, patient recently started back to work and needs a later appointment, first appointment at 5:00 pm is 9/1 but patient doesn't know if Dr Gely Valles wants her to wait that long to be seen, also patient is looking for an appointment between 6:00-6:30 pm (Dr Davis Daniella late day per patient) I explained last appointment would be at 5:00pm but patient insisted I ask doctor for this time slot.

## 2021-08-12 ENCOUNTER — HOSPITAL ENCOUNTER (OUTPATIENT)
Dept: CV DIAGNOSTICS | Facility: HOSPITAL | Age: 51
Discharge: HOME | End: 2021-08-12
Attending: INTERNAL MEDICINE
Payer: MEDICAID

## 2021-08-12 ENCOUNTER — LAB ENCOUNTER (OUTPATIENT)
Dept: LAB | Facility: HOSPITAL | Age: 51
End: 2021-08-12
Attending: INTERNAL MEDICINE
Payer: MEDICAID

## 2021-08-12 DIAGNOSIS — Z51.81 ENCOUNTER FOR MONITORING CARDIOTOXIC DRUG THERAPY: ICD-10-CM

## 2021-08-12 DIAGNOSIS — Z79.899 ENCOUNTER FOR MONITORING CARDIOTOXIC DRUG THERAPY: ICD-10-CM

## 2021-08-12 LAB
ALBUMIN SERPL-MCNC: 3.4 G/DL (ref 3.4–5)
ALBUMIN/GLOB SERPL: 0.8 {RATIO} (ref 1–2)
ALP LIVER SERPL-CCNC: 85 U/L
ALT SERPL-CCNC: 17 U/L
ANION GAP SERPL CALC-SCNC: 5 MMOL/L (ref 0–18)
AST SERPL-CCNC: 9 U/L (ref 15–37)
BASOPHILS # BLD AUTO: 0.06 X10(3) UL (ref 0–0.2)
BASOPHILS NFR BLD AUTO: 1.2 %
BILIRUB SERPL-MCNC: 0.4 MG/DL (ref 0.1–2)
BUN BLD-MCNC: 16 MG/DL (ref 7–18)
BUN/CREAT SERPL: 13.7 (ref 10–20)
CALCIUM BLD-MCNC: 9.7 MG/DL (ref 8.5–10.1)
CHLORIDE SERPL-SCNC: 103 MMOL/L (ref 98–112)
CO2 SERPL-SCNC: 28 MMOL/L (ref 21–32)
CREAT BLD-MCNC: 1.17 MG/DL
DEPRECATED RDW RBC AUTO: 45.8 FL (ref 35.1–46.3)
EOSINOPHIL # BLD AUTO: 0.15 X10(3) UL (ref 0–0.7)
EOSINOPHIL NFR BLD AUTO: 2.9 %
ERYTHROCYTE [DISTWIDTH] IN BLOOD BY AUTOMATED COUNT: 14.4 % (ref 11–15)
GLOBULIN PLAS-MCNC: 4.3 G/DL (ref 2.8–4.4)
GLUCOSE BLD-MCNC: 321 MG/DL (ref 70–99)
HCT VFR BLD AUTO: 36.8 %
HGB BLD-MCNC: 12.1 G/DL
IMM GRANULOCYTES # BLD AUTO: 0.01 X10(3) UL (ref 0–1)
IMM GRANULOCYTES NFR BLD: 0.2 %
LYMPHOCYTES # BLD AUTO: 1.74 X10(3) UL (ref 1–4)
LYMPHOCYTES NFR BLD AUTO: 33.5 %
M PROTEIN MFR SERPL ELPH: 7.7 G/DL (ref 6.4–8.2)
MCH RBC QN AUTO: 28.7 PG (ref 26–34)
MCHC RBC AUTO-ENTMCNC: 32.9 G/DL (ref 31–37)
MCV RBC AUTO: 87.4 FL
MONOCYTES # BLD AUTO: 0.35 X10(3) UL (ref 0.1–1)
MONOCYTES NFR BLD AUTO: 6.7 %
NEUTROPHILS # BLD AUTO: 2.88 X10 (3) UL (ref 1.5–7.7)
NEUTROPHILS # BLD AUTO: 2.88 X10(3) UL (ref 1.5–7.7)
NEUTROPHILS NFR BLD AUTO: 55.5 %
OSMOLALITY SERPL CALC.SUM OF ELEC: 296 MOSM/KG (ref 275–295)
PATIENT FASTING Y/N/NP: NO
PLATELET # BLD AUTO: 217 10(3)UL (ref 150–450)
POTASSIUM SERPL-SCNC: 3.8 MMOL/L (ref 3.5–5.1)
RBC # BLD AUTO: 4.21 X10(6)UL
SODIUM SERPL-SCNC: 136 MMOL/L (ref 136–145)
WBC # BLD AUTO: 5.2 X10(3) UL (ref 4–11)

## 2021-08-12 PROCEDURE — 93306 TTE W/DOPPLER COMPLETE: CPT | Performed by: INTERNAL MEDICINE

## 2021-08-12 PROCEDURE — 36415 COLL VENOUS BLD VENIPUNCTURE: CPT

## 2021-08-12 PROCEDURE — 80053 COMPREHEN METABOLIC PANEL: CPT

## 2021-08-12 PROCEDURE — 85025 COMPLETE CBC W/AUTO DIFF WBC: CPT

## 2021-08-25 ENCOUNTER — OFFICE VISIT (OUTPATIENT)
Dept: HEMATOLOGY/ONCOLOGY | Facility: HOSPITAL | Age: 51
End: 2021-08-25
Attending: INTERNAL MEDICINE
Payer: MEDICAID

## 2021-08-25 ENCOUNTER — RESEARCH ENCOUNTER (OUTPATIENT)
Dept: HEMATOLOGY/ONCOLOGY | Facility: HOSPITAL | Age: 51
End: 2021-08-25

## 2021-08-25 VITALS
OXYGEN SATURATION: 100 % | RESPIRATION RATE: 18 BRPM | HEIGHT: 62.01 IN | HEART RATE: 81 BPM | BODY MASS INDEX: 39.06 KG/M2 | WEIGHT: 215 LBS | TEMPERATURE: 98 F | DIASTOLIC BLOOD PRESSURE: 64 MMHG | SYSTOLIC BLOOD PRESSURE: 139 MMHG

## 2021-08-25 DIAGNOSIS — Z17.1 MALIGNANT NEOPLASM OF UPPER-OUTER QUADRANT OF RIGHT BREAST IN FEMALE, ESTROGEN RECEPTOR NEGATIVE (HCC): Primary | ICD-10-CM

## 2021-08-25 DIAGNOSIS — C50.411 MALIGNANT NEOPLASM OF UPPER-OUTER QUADRANT OF RIGHT BREAST IN FEMALE, ESTROGEN RECEPTOR NEGATIVE (HCC): Primary | ICD-10-CM

## 2021-08-25 PROCEDURE — 99214 OFFICE O/P EST MOD 30 MIN: CPT | Performed by: INTERNAL MEDICINE

## 2021-08-25 NOTE — PROGRESS NOTES
Cancer Center Progress Note    Patient Name: Pam España   YOB: 1970   Medical Record Number: [id number]   Attending Physician: Nidia Marshall M.D.      Chief Complaint:  Breast cancer    Oncology History:  12/2019: mammogram (last mammogram History:   Diagnosis Date   • Anemia    • Breast cancer ([facility] 01/09/2020    Lumpectomy and chemo    • Cancer ([facility] 01/09/2020    RIGHT    • Cup to disc asymmetry 2006    OU   • No diabetic retinopathy in both eyes 2006    OU   • Obesity (BMI 30-39. 9)    • route daily. , Disp: 90 each, Rfl: 0  •  Insulin Lispro, 1 Unit Dial, (HUMALOG KWIKPEN) 100 UNIT/ML Subcutaneous Solution Pen-injector, 25 units s/c with every meal, Disp: 20 pen, Rfl: 2  •  insulin glargine (LANTUS SOLOSTAR) 100 UNIT/ML Subcutaneous Soluti intact  Psych: appropriate mood and affect      Laboratory assessed and reviewed:  Lab Results   Component Value Date     (H) 08/12/2021    BUN 16 08/12/2021    BUNCREA 13.7 08/12/2021    CREATSERUM 1.17 (H) 08/12/2021    ANIONGAP 5 08/12/2021    GF

## 2021-08-26 NOTE — PROGRESS NOTES
STUDY: NSABP- B59   ID # [id number]  TIME POINT- 18 month visit      Seen and examined by Bridgette Weber. TTE strain with doppler done on 8/12/21. Last breast MRI done 5/10/21. AE's: Still has grade 1 neuropathy.   Her dry skin has resolved, and she st

## 2021-09-08 ENCOUNTER — OFFICE VISIT (OUTPATIENT)
Dept: INTERNAL MEDICINE CLINIC | Facility: CLINIC | Age: 51
End: 2021-09-08
Payer: MEDICAID

## 2021-09-08 VITALS
BODY MASS INDEX: 39.75 KG/M2 | RESPIRATION RATE: 16 BRPM | TEMPERATURE: 98 F | HEIGHT: 62 IN | SYSTOLIC BLOOD PRESSURE: 104 MMHG | WEIGHT: 216 LBS | DIASTOLIC BLOOD PRESSURE: 76 MMHG | HEART RATE: 83 BPM

## 2021-09-08 DIAGNOSIS — E11.65 TYPE 2 DIABETES MELLITUS WITH HYPERGLYCEMIA, WITH LONG-TERM CURRENT USE OF INSULIN (HCC): Primary | ICD-10-CM

## 2021-09-08 DIAGNOSIS — Z79.4 TYPE 2 DIABETES MELLITUS WITH HYPERGLYCEMIA, WITH LONG-TERM CURRENT USE OF INSULIN (HCC): Primary | ICD-10-CM

## 2021-09-08 DIAGNOSIS — I10 ESSENTIAL HYPERTENSION: ICD-10-CM

## 2021-09-08 DIAGNOSIS — M75.02 ADHESIVE CAPSULITIS OF LEFT SHOULDER: ICD-10-CM

## 2021-09-08 PROBLEM — M75.00 ADHESIVE CAPSULITIS: Status: ACTIVE | Noted: 2021-09-08

## 2021-09-08 PROCEDURE — 3074F SYST BP LT 130 MM HG: CPT | Performed by: INTERNAL MEDICINE

## 2021-09-08 PROCEDURE — 3078F DIAST BP <80 MM HG: CPT | Performed by: INTERNAL MEDICINE

## 2021-09-08 PROCEDURE — 99214 OFFICE O/P EST MOD 30 MIN: CPT | Performed by: INTERNAL MEDICINE

## 2021-09-08 PROCEDURE — 3008F BODY MASS INDEX DOCD: CPT | Performed by: INTERNAL MEDICINE

## 2021-09-08 RX ORDER — INSULIN GLARGINE 100 [IU]/ML
70 INJECTION, SOLUTION SUBCUTANEOUS NIGHTLY
Qty: 20 EACH | Refills: 3 | Status: SHIPPED | OUTPATIENT
Start: 2021-09-08

## 2021-09-08 RX ORDER — PEN NEEDLE, DIABETIC 30 GX3/16"
1 NEEDLE, DISPOSABLE MISCELLANEOUS
Qty: 30 EACH | Refills: 0 | Status: SHIPPED | OUTPATIENT
Start: 2021-09-08

## 2021-09-08 RX ORDER — SEMAGLUTIDE 1.34 MG/ML
0.25 INJECTION, SOLUTION SUBCUTANEOUS
Qty: 1 EACH | Refills: 3 | Status: SHIPPED | OUTPATIENT
Start: 2021-09-08 | End: 2021-10-06

## 2021-09-08 RX ORDER — INSULIN LISPRO 100 [IU]/ML
INJECTION, SOLUTION INTRAVENOUS; SUBCUTANEOUS
Qty: 20 EACH | Refills: 2 | Status: SHIPPED | OUTPATIENT
Start: 2021-09-08

## 2021-09-09 ENCOUNTER — TELEPHONE (OUTPATIENT)
Dept: INTERNAL MEDICINE CLINIC | Facility: CLINIC | Age: 51
End: 2021-09-09

## 2021-09-09 DIAGNOSIS — E11.65 TYPE 2 DIABETES MELLITUS WITH HYPERGLYCEMIA, WITH LONG-TERM CURRENT USE OF INSULIN (HCC): Primary | ICD-10-CM

## 2021-09-09 DIAGNOSIS — Z79.4 TYPE 2 DIABETES MELLITUS WITH HYPERGLYCEMIA, WITH LONG-TERM CURRENT USE OF INSULIN (HCC): Primary | ICD-10-CM

## 2021-09-09 NOTE — ASSESSMENT & PLAN NOTE
Patient was being followed up per endocrinology. Her medications were changed to Lantus and Victoza which he initially tolerated well but then developed severe abdominal discomfort and nausea.   This was then changed to Byetta which again caused the same s

## 2021-09-09 NOTE — TELEPHONE ENCOUNTER
Prior authorization for ozempic has been approved from 6/9/21 through 9/9/22 pharmacy has been notified.

## 2021-09-09 NOTE — ASSESSMENT & PLAN NOTE
Pain, stiffness, limited range of movement of the left shoulder. No specific injuries. Abduction quite limited and painful.   Patient is being advised to follow-up with orthopedics–Dr. Isela Wright for an appointment

## 2021-09-09 NOTE — PROGRESS NOTES
HPI:    Patient ID: Baltazar Barrios is a 49year old female. www    Diabetes  She presents for her follow-up diabetic visit. She has type 2 diabetes mellitus. Her disease course has been fluctuating. There are no hypoglycemic associated symptoms.  There ACE inhibitors. The current treatment provides moderate improvement. Compliance problems include diet and exercise. There is no history of angina, kidney disease, CVA, heart failure or PVD.  There is no history of chronic renal disease, sleep apnea or a th UNIT/ML Subcutaneous Solution Pen-injector 25 units s/c with every meal 20 each 2   • Rosuvastatin Calcium 10 MG Oral Tab Take 1 tablet (10 mg total) by mouth nightly.  90 tablet 1   • Fluticasone Propionate 50 MCG/ACT Nasal Suspension 1 PUFF EACH NOSTRIL 2 leg: No edema. Left lower leg: No edema. Skin:     General: Skin is warm and dry. Coloration: Skin is not jaundiced. Findings: No erythema or rash. Neurological:      General: No focal deficit present.       Mental Status: She is alert an to give me a call. We will follow-up with prior authorization after completion.          Relevant Medications    insulin glargine (LANTUS SOLOSTAR) 100 UNIT/ML Subcutaneous Solution Pen-injector    Insulin Lispro, 1 Unit Dial, (HUMALOG KWIKPEN) 100 UNIT/ML

## 2021-09-09 NOTE — TELEPHONE ENCOUNTER
Prior authorization for Ozempic completed w/ [employer] on CoverMyMeds Key: [id number], turn around time 1-5 days.

## 2021-09-09 NOTE — PATIENT INSTRUCTIONS
Problem List Items Addressed This Visit        Unprioritized    Adhesive capsulitis     Pain, stiffness, limited range of movement of the left shoulder. No specific injuries. Abduction quite limited and painful.   Patient is being advised to follow-up wit will follow-up with prior authorization after completion.          Relevant Medications    insulin glargine (LANTUS SOLOSTAR) 100 UNIT/ML Subcutaneous Solution Pen-injector    Insulin Lispro, 1 Unit Dial, (HUMALOG KWIKPEN) 100 UNIT/ML Subcutaneous Solution

## 2021-09-09 NOTE — ASSESSMENT & PLAN NOTE
Blood pressure 104/76, pulse 83, temperature 97.7 °F (36.5 °C), temperature source Temporal, resp. rate 16, height 5' 2\" (1.575 m), weight 216 lb (98 kg), not currently breastfeeding. Blood pressures look stable at this time.   She has not been on any

## 2021-09-09 NOTE — TELEPHONE ENCOUNTER
Prior Authorization      Key:  [id number]    Patient Last name:  Lamont Lama    :  1970       Current Outpatient Medications   Medication Sig Dispense Refill   • Semaglutide,0.25 or 0.5MG/DOS, (OZEMPIC, 0.25 OR 0.5 MG/DOSE,) 2 MG/1.5ML Subcutaneous Solu

## 2021-10-04 ENCOUNTER — HOSPITAL ENCOUNTER (OUTPATIENT)
Dept: MAMMOGRAPHY | Facility: HOSPITAL | Age: 51
Discharge: HOME | End: 2021-10-04
Attending: INTERNAL MEDICINE
Payer: MEDICAID

## 2021-10-04 DIAGNOSIS — C50.411 MALIGNANT NEOPLASM OF UPPER-OUTER QUADRANT OF RIGHT BREAST IN FEMALE, ESTROGEN RECEPTOR NEGATIVE (HCC): ICD-10-CM

## 2021-10-04 DIAGNOSIS — Z17.1 MALIGNANT NEOPLASM OF UPPER-OUTER QUADRANT OF RIGHT BREAST IN FEMALE, ESTROGEN RECEPTOR NEGATIVE (HCC): ICD-10-CM

## 2021-10-04 PROCEDURE — 77066 DX MAMMO INCL CAD BI: CPT | Performed by: INTERNAL MEDICINE

## 2021-10-04 PROCEDURE — 77062 BREAST TOMOSYNTHESIS BI: CPT | Performed by: INTERNAL MEDICINE

## 2021-10-05 ENCOUNTER — TELEPHONE (OUTPATIENT)
Dept: HEMATOLOGY/ONCOLOGY | Facility: HOSPITAL | Age: 51
End: 2021-10-05

## 2021-10-05 NOTE — TELEPHONE ENCOUNTER
Left message for Maricel Trivedi letting her know that the mammo was okay. Bilateral diagnostic in 6 months. Asked for a call back to confirm the message.

## 2021-10-21 ENCOUNTER — OFFICE VISIT (OUTPATIENT)
Dept: OBGYN CLINIC | Facility: CLINIC | Age: 51
End: 2021-10-21
Payer: MEDICAID

## 2021-10-21 VITALS
HEART RATE: 82 BPM | SYSTOLIC BLOOD PRESSURE: 128 MMHG | DIASTOLIC BLOOD PRESSURE: 76 MMHG | BODY MASS INDEX: 39 KG/M2 | WEIGHT: 211.81 LBS

## 2021-10-21 DIAGNOSIS — Z01.419 ENCOUNTER FOR GYNECOLOGICAL EXAMINATION WITHOUT ABNORMAL FINDING: Primary | ICD-10-CM

## 2021-10-21 DIAGNOSIS — Z12.4 SCREENING FOR MALIGNANT NEOPLASM OF CERVIX: ICD-10-CM

## 2021-10-21 DIAGNOSIS — Z85.3 HISTORY OF RIGHT BREAST CANCER: ICD-10-CM

## 2021-10-21 PROCEDURE — 3078F DIAST BP <80 MM HG: CPT | Performed by: OBSTETRICS & GYNECOLOGY

## 2021-10-21 PROCEDURE — 3074F SYST BP LT 130 MM HG: CPT | Performed by: OBSTETRICS & GYNECOLOGY

## 2021-10-21 PROCEDURE — 99396 PREV VISIT EST AGE 40-64: CPT | Performed by: OBSTETRICS & GYNECOLOGY

## 2021-10-27 ENCOUNTER — TELEPHONE (OUTPATIENT)
Dept: INTERNAL MEDICINE CLINIC | Facility: CLINIC | Age: 51
End: 2021-10-27

## 2021-10-28 NOTE — TELEPHONE ENCOUNTER
Message # [phone]         10/27/2021 05:02p   [HERMINIO]  To:  From:  RICHY Becerra MD:  Phone#:  ----------------------------------------------------------------------  Regine Gilman [phone]  72 YOUR PATIENT, RECTAL BLEEDING Paged at number :

## 2021-10-30 ENCOUNTER — IMMUNIZATION (OUTPATIENT)
Dept: LAB | Facility: HOSPITAL | Age: 51
End: 2021-10-30
Attending: EMERGENCY MEDICINE
Payer: MEDICAID

## 2021-10-30 DIAGNOSIS — Z23 NEED FOR VACCINATION: Primary | ICD-10-CM

## 2021-10-30 PROCEDURE — 0064A SARSCOV2 VAC 50MCG/0.25ML IM: CPT

## 2021-11-09 NOTE — PROGRESS NOTES
Subjective:   Patient ID: Orion Greene is a 49year old female. HPI   and amenorrheic post ablation in . TL for [facility]. No personal hx with right breast CA treatment. It was triple negative. She sees Dr Peterson Barbosa in follow up.  She asks about elect Needle (BD PEN NEEDLE MINI U/F) 31G X 5 MM Does not apply Misc Use 4 times a day as directed 400 each 0   • cetirizine 10 MG Oral Tab Take 1 tablet (10 mg total) by mouth every 12 (twelve) hours as needed for Allergies.  60 tablet 0     Allergies:No Known A Porsha notified over the phone.    Orders Placed This Encounter      Hpv Dna  High Risk , Thin Prep Collect      ThinPrep PAP Smear      THINPREP PAP SMEAR ONLY      Meds This Visit:  Requested Prescriptions      No prescriptions requested or ordered in th

## 2021-12-01 ENCOUNTER — APPOINTMENT (OUTPATIENT)
Dept: HEMATOLOGY/ONCOLOGY | Facility: HOSPITAL | Age: 51
End: 2021-12-01
Attending: INTERNAL MEDICINE

## 2021-12-06 ENCOUNTER — LAB ENCOUNTER (OUTPATIENT)
Dept: LAB | Facility: HOSPITAL | Age: 51
End: 2021-12-06
Attending: INTERNAL MEDICINE
Payer: COMMERCIAL

## 2021-12-06 DIAGNOSIS — C50.411 MALIGNANT NEOPLASM OF UPPER-OUTER QUADRANT OF RIGHT BREAST IN FEMALE, ESTROGEN RECEPTOR NEGATIVE (HCC): ICD-10-CM

## 2021-12-06 DIAGNOSIS — Z17.1 MALIGNANT NEOPLASM OF UPPER-OUTER QUADRANT OF RIGHT BREAST IN FEMALE, ESTROGEN RECEPTOR NEGATIVE (HCC): ICD-10-CM

## 2021-12-06 PROCEDURE — 80053 COMPREHEN METABOLIC PANEL: CPT

## 2021-12-06 PROCEDURE — 36415 COLL VENOUS BLD VENIPUNCTURE: CPT

## 2021-12-06 PROCEDURE — 85025 COMPLETE CBC W/AUTO DIFF WBC: CPT

## 2021-12-07 ENCOUNTER — OFFICE VISIT (OUTPATIENT)
Dept: HEMATOLOGY/ONCOLOGY | Facility: HOSPITAL | Age: 51
End: 2021-12-07
Attending: INTERNAL MEDICINE
Payer: COMMERCIAL

## 2021-12-07 VITALS
DIASTOLIC BLOOD PRESSURE: 68 MMHG | TEMPERATURE: 99 F | BODY MASS INDEX: 39.06 KG/M2 | HEART RATE: 71 BPM | HEIGHT: 62.01 IN | RESPIRATION RATE: 16 BRPM | WEIGHT: 215 LBS | SYSTOLIC BLOOD PRESSURE: 130 MMHG | OXYGEN SATURATION: 99 %

## 2021-12-07 DIAGNOSIS — Z17.1 MALIGNANT NEOPLASM OF UPPER-OUTER QUADRANT OF RIGHT BREAST IN FEMALE, ESTROGEN RECEPTOR NEGATIVE (HCC): Primary | ICD-10-CM

## 2021-12-07 DIAGNOSIS — R63.5 WEIGHT GAIN: ICD-10-CM

## 2021-12-07 DIAGNOSIS — C50.411 MALIGNANT NEOPLASM OF UPPER-OUTER QUADRANT OF RIGHT BREAST IN FEMALE, ESTROGEN RECEPTOR NEGATIVE (HCC): Primary | ICD-10-CM

## 2021-12-07 PROCEDURE — 99214 OFFICE O/P EST MOD 30 MIN: CPT | Performed by: INTERNAL MEDICINE

## 2021-12-07 RX ORDER — NYSTATIN 100000 [USP'U]/G
1 POWDER TOPICAL 4 TIMES DAILY
Qty: 60 G | Refills: 3 | Status: SHIPPED | OUTPATIENT
Start: 2021-12-07

## 2021-12-07 NOTE — PROGRESS NOTES
Cancer Center Progress Note    Patient Name: Elisabeth Abdi   YOB: 1970   Medical Record Number: [id number]   Attending Physician: Yelitza Flores M.D. Chief Complaint:  Breast cancer    Oncology History:  12/2019: mammogram 1.6x1. 9x1.8cm ma antineoplastic chemotherapy 06/1925   • Type II or unspecified type diabetes mellitus without mention of complication, not stated as uncontrolled    • Unspecified essential hypertension    • Visual impairment     GLASSES       Past Surgical History:  Past PUFF EACH NOSTRIL 2 TIMES A  DAY, Disp: 1 Bottle, Rfl: 3  •  Insulin Pen Needle (BD PEN NEEDLE MINI U/F) 31G X 5 MM Does not apply Misc, Use 4 times a day as directed, Disp: 400 each, Rfl: 0  •  cetirizine 10 MG Oral Tab, Take 1 tablet (10 mg total) by kaden 12/06/2021    GLOBULIN 4.8 (H) 12/06/2021    AGRATIO 0.9 (L) 08/13/2016     12/06/2021    K 3.8 12/06/2021     12/06/2021    CO2 31.0 12/06/2021        Impression and Plan:  Cancer Staging  Malignant neoplasm of upper-outer quadrant of right br

## 2021-12-14 ENCOUNTER — TELEPHONE (OUTPATIENT)
Dept: HEMATOLOGY/ONCOLOGY | Facility: HOSPITAL | Age: 51
End: 2021-12-14

## 2021-12-14 DIAGNOSIS — Z17.1 MALIGNANT NEOPLASM OF UPPER-OUTER QUADRANT OF RIGHT BREAST IN FEMALE, ESTROGEN RECEPTOR NEGATIVE (HCC): Primary | ICD-10-CM

## 2021-12-14 DIAGNOSIS — Z00.6 CLINICAL TRIAL EXAM: ICD-10-CM

## 2021-12-14 DIAGNOSIS — C50.411 MALIGNANT NEOPLASM OF UPPER-OUTER QUADRANT OF RIGHT BREAST IN FEMALE, ESTROGEN RECEPTOR NEGATIVE (HCC): Primary | ICD-10-CM

## 2021-12-14 NOTE — TELEPHONE ENCOUNTER
[facility], she is due for labs and study blood at her next treatment follow up. Explained that cortisol level needs to be drawn in the morning. Scheduled a lab appointment for 3/1, she will see Dr Francoise Reyna for follow up on 3/3.   Encouraged her to call i

## 2021-12-17 ENCOUNTER — HOSPITAL ENCOUNTER (OUTPATIENT)
Dept: MRI IMAGING | Facility: HOSPITAL | Age: 51
Discharge: HOME | End: 2021-12-17
Attending: INTERNAL MEDICINE
Payer: COMMERCIAL

## 2021-12-17 DIAGNOSIS — Z17.1 MALIGNANT NEOPLASM OF UPPER-OUTER QUADRANT OF RIGHT BREAST IN FEMALE, ESTROGEN RECEPTOR NEGATIVE (HCC): ICD-10-CM

## 2021-12-17 DIAGNOSIS — C50.411 MALIGNANT NEOPLASM OF UPPER-OUTER QUADRANT OF RIGHT BREAST IN FEMALE, ESTROGEN RECEPTOR NEGATIVE (HCC): ICD-10-CM

## 2021-12-17 PROCEDURE — A9575 INJ GADOTERATE MEGLUMI 0.1ML: HCPCS | Performed by: INTERNAL MEDICINE

## 2021-12-17 PROCEDURE — 77049 MRI BREAST C-+ W/CAD BI: CPT | Performed by: INTERNAL MEDICINE

## 2021-12-20 ENCOUNTER — TELEPHONE (OUTPATIENT)
Dept: HEMATOLOGY/ONCOLOGY | Facility: HOSPITAL | Age: 51
End: 2021-12-20

## 2021-12-20 NOTE — TELEPHONE ENCOUNTER
Spoke with Deb. Breast MRI was good. Repeat diagnostic mammo in 4 months. Pt verbalized understanding.

## 2021-12-30 ENCOUNTER — OFFICE VISIT (OUTPATIENT)
Dept: SURGERY | Facility: CLINIC | Age: 51
End: 2021-12-30
Payer: MEDICAID

## 2021-12-30 VITALS
WEIGHT: 210.69 LBS | HEIGHT: 64 IN | HEART RATE: 80 BPM | DIASTOLIC BLOOD PRESSURE: 71 MMHG | SYSTOLIC BLOOD PRESSURE: 128 MMHG | OXYGEN SATURATION: 100 % | BODY MASS INDEX: 35.97 KG/M2

## 2021-12-30 DIAGNOSIS — E11.65 TYPE 2 DIABETES MELLITUS WITH HYPERGLYCEMIA, WITH LONG-TERM CURRENT USE OF INSULIN (HCC): Primary | ICD-10-CM

## 2021-12-30 DIAGNOSIS — E78.00 HYPERCHOLESTEREMIA: ICD-10-CM

## 2021-12-30 DIAGNOSIS — E66.9 OBESITY (BMI 30-39.9): ICD-10-CM

## 2021-12-30 DIAGNOSIS — Z79.4 TYPE 2 DIABETES MELLITUS WITH HYPERGLYCEMIA, WITH LONG-TERM CURRENT USE OF INSULIN (HCC): Primary | ICD-10-CM

## 2021-12-30 PROCEDURE — 3078F DIAST BP <80 MM HG: CPT | Performed by: INTERNAL MEDICINE

## 2021-12-30 PROCEDURE — 3008F BODY MASS INDEX DOCD: CPT | Performed by: INTERNAL MEDICINE

## 2021-12-30 PROCEDURE — 99204 OFFICE O/P NEW MOD 45 MIN: CPT | Performed by: INTERNAL MEDICINE

## 2021-12-30 PROCEDURE — 3074F SYST BP LT 130 MM HG: CPT | Performed by: INTERNAL MEDICINE

## 2021-12-30 NOTE — PROGRESS NOTES
[address]  Dept: [phone]         Patient:  Baltazar Barrios  :      3/4/1970  MRN:      [id number]    Chief Complaint:  Patient presents w glargine (LANTUS SOLOSTAR) 100 UNIT/ML Subcutaneous Solution Pen-injector Inject 70 Units into the skin nightly.  AS DIRECTED 20 each 3   • Insulin Lispro, 1 Unit Dial, (HUMALOG KWIKPEN) 100 UNIT/ML Subcutaneous Solution Pen-injector 25 units s/c with every on file    Social Determinants of Health  Financial Resource Strain: Not on file  Food Insecurity: Not on file  Transportation Needs: Not on file  Physical Activity: Not on file  Stress: Not on file  Social Connections: Not on file  Intimate Partner Aayush Maldonado Modifications Reviewed and Discussed  Eat breakfast, Eat 3 meals per day, Plan meals in advance, Read nutrition labels, Drink 64 oz of water per day, Maintain a daily food journal, No drinking 30 minutes before or after meals, Utlize portion control strate 01/28/2021 11:25 AM    VLDL 15 01/28/2021 11:25 AM       Encounter Diagnosis(ses):   Type 2 diabetes mellitus with hyperglycemia, with long-term current use of insulin (hcc)  (primary encounter diagnosis)  Hypercholesteremia  Obesity (bmi 30-39. 9)    PLAN

## 2022-01-04 ENCOUNTER — APPOINTMENT (OUTPATIENT)
Dept: HEMATOLOGY/ONCOLOGY | Facility: HOSPITAL | Age: 52
End: 2022-01-04
Attending: NURSE PRACTITIONER
Payer: MEDICAID

## 2022-01-14 ENCOUNTER — TELEPHONE (OUTPATIENT)
Dept: INTERNAL MEDICINE CLINIC | Facility: CLINIC | Age: 52
End: 2022-01-14

## 2022-01-14 RX ORDER — INSULIN ASPART 100 [IU]/ML
INJECTION, SOLUTION INTRAVENOUS; SUBCUTANEOUS
Qty: 30 ML | Refills: 5 | Status: SHIPPED | OUTPATIENT
Start: 2022-01-14

## 2022-01-14 NOTE — TELEPHONE ENCOUNTER
Current Outpatient Medications   Medication Sig Dispense Refill   • Insulin Lispro, 1 Unit Dial, (HUMALOG KWIKPEN) 100 UNIT/ML Subcutaneous Solution Pen-injector 25 units s/c with every meal 20 each 2         Plan does not cover medication.  Per rx benefit

## 2022-01-14 NOTE — TELEPHONE ENCOUNTER
Routed to Dr Gabriella Reyes for advise, thanks.       Future Appointments   Date Time Provider Sharee Sanches   2/1/2022  8:30 AM Shaun Velez MD [facility]   2/1/2022  3:00 PM MILLY Hidalgo [address] [place] [address] HEM ONC EMO   2/14/2022  5:30 PM Rubi Kay,

## 2022-01-26 ENCOUNTER — TELEPHONE (OUTPATIENT)
Dept: INTERNAL MEDICINE CLINIC | Facility: CLINIC | Age: 52
End: 2022-01-26

## 2022-02-01 ENCOUNTER — TELEPHONE (OUTPATIENT)
Dept: OPHTHALMOLOGY | Facility: CLINIC | Age: 52
End: 2022-02-01

## 2022-02-01 ENCOUNTER — APPOINTMENT (OUTPATIENT)
Dept: HEMATOLOGY/ONCOLOGY | Facility: HOSPITAL | Age: 52
End: 2022-02-01
Attending: INTERNAL MEDICINE
Payer: MEDICAID

## 2022-02-01 NOTE — TELEPHONE ENCOUNTER
Patient totally forgot about today's appointment but she declines to RS at this time. She will call back tomorrow to RS.

## 2022-02-03 ENCOUNTER — TELEPHONE (OUTPATIENT)
Dept: HEMATOLOGY/ONCOLOGY | Facility: HOSPITAL | Age: 52
End: 2022-02-03

## 2022-02-04 ENCOUNTER — TELEPHONE (OUTPATIENT)
Dept: HEMATOLOGY/ONCOLOGY | Facility: HOSPITAL | Age: 52
End: 2022-02-04

## 2022-02-04 NOTE — TELEPHONE ENCOUNTER
Returned call to Deb. She is requesting a letter for her building. Will draft and send. Pt also reported a fall, very sore shoulder. Advised she go to immediate care to be checked out. She stated she will.

## 2022-02-14 ENCOUNTER — TELEPHONE (OUTPATIENT)
Dept: SURGERY | Facility: CLINIC | Age: 52
End: 2022-02-14

## 2022-02-16 ENCOUNTER — TELEMEDICINE (OUTPATIENT)
Dept: HEMATOLOGY/ONCOLOGY | Facility: HOSPITAL | Age: 52
End: 2022-02-16
Attending: NURSE PRACTITIONER
Payer: COMMERCIAL

## 2022-02-16 ENCOUNTER — TELEPHONE (OUTPATIENT)
Dept: HEMATOLOGY/ONCOLOGY | Age: 52
End: 2022-02-16

## 2022-02-16 DIAGNOSIS — Z71.9 COUNSELING, UNSPECIFIED: ICD-10-CM

## 2022-02-16 DIAGNOSIS — Z85.3 PERSONAL HISTORY OF BREAST CANCER: Primary | ICD-10-CM

## 2022-02-16 DIAGNOSIS — Z08 ENCOUNTER FOR FOLLOW-UP EXAMINATION AFTER COMPLETED TREATMENT FOR MALIGNANT NEOPLASM: ICD-10-CM

## 2022-02-16 PROCEDURE — 99215 OFFICE O/P EST HI 40 MIN: CPT | Performed by: NURSE PRACTITIONER

## 2022-02-16 RX ORDER — SEMAGLUTIDE 1.34 MG/ML
0.25 INJECTION, SOLUTION SUBCUTANEOUS
COMMUNITY
Start: 2021-12-28

## 2022-02-16 NOTE — PROGRESS NOTES
Virtual/Telephone Check-In  Jordan Cota verbally consents to a [employer] on 2/16/2022. She understands and accepts financial responsibility for any deductible, co-insurance and/or co-pays associated with this service. Duration of the service: 75 minutes: 10 minutes review of chart, 55 minutes of video face to face counseling, 10 minutes of documentation. Summary of topics discussed:   A video/phone Survivorship Clinic visit was done in lieu of an in-person visit due to the COVID-19 pandemic. The patient was sent materials to her home via certified mail in advance of our conversation today. Materials included a copy of the Survivorship Care Plan and educational and supportive materials. Jordan Cota has a diagnosis of clinical Stage IIIB triple negative right breast cancer diagnosed in 1/7/2020. She had neoadjuvant chemotherapy with Paclitaxel and Carboplatin x 4 cycles, followed by Doxorubicin with Cyclophosphamide x 4 cycles that completed on 6/25/2020. She was also on a clinical trial with Placebo/Atezolizumab immunotherapy, given for one year with the chemotherapy and completing on 1/20/2021. She had a post-treatment right lumpectomy with sentinel node biopsy on 8/3/2020 with a complete pathologic response. She had radiation to the right breast that completed on 11/6/2020. (See Oncology Treatment Summary SCP for details). Current condition/issues: She appears to be doing well post-treatment. She notes intermittent mild right breast discomfort. She has good ROM and no noted swelling. She has no residual effects from chemotherapy, but got teary when discussing this part of her treatment. She has no other issues to report. She had seen Dr. Dominick Horvath in the past and has had weight loss. She will be seeing the Bariatric Surgeon tomorrow for a consultation.   She is interested in considering a surgical option for weight loss to better improve her quality of life and get better control of her diabetes. She is currently on a plant based diet and eats very healthy. Current BMI is 36. She had been on Ozempic from Dr. Gladys Katz, but was unable to tolerate the second level dosing. She discontinued, but plans to resume at the first level dosing. She currently tracks her steps and gets 0120-9634 steps/day. She admits that she is concerned with fear of cancer recurrence. We reviewed the elements of the survivorship care plan and follow-up plans. Reviewed Cancer Surveillance (office visits, imaging) and that Dr. Juwan Carvajal will oversee this care. She will see her next on 3/3 and will have study labs on 3/1. She had recent breast imaging in December and is due for bilateral mammogram in 4/2022. She will schedule this, then a follow-up with Dr. Moustapha Jeong. She will check with Dr. Angie Schulte regarding future MRI imaging. Reviewed Schedule of other clinic visits with Primary Care and specialists: Dr. Ivana Giles will continue to manage all general health care recommended for age and gender including cancer screening tests. She could consider colorectal screening at some point. She otherwise is current with cancer screening. She was encouraged to continue to see specialists at usual intervals. Reviewed concerning symptoms that she should report to any Provider. Reviewed possible late and long-term effects related to the treatment that was received. We reviewed care of the surgical arm. Reviewed common cancer survivor issues and resources available. She is most concerned about fear of cancer recurrence. She was provided a variety of resources to consider at the [facility] that could assist her with this. She is concerned with her weight and is motivated to improve this with the consideration of bariatric surgery.      Reviewed Lifestyle/behaviors that can affect ongoing health, including the risk for the cancer coming back or developing another cancer. We reviewed importance of exercise and good nutrition with a plant based diet and sun safety. She exercises regularly with walking and eats a healthy diet. I encouraged her to consider the addition of resistance training. Maribeth Diaz and I reviewed the following items that she had received in the mail:   -SCP and patient letter  -Breast Survivorship Guide  -[facility] [place] in [facility] groups, special programs, nutrition and exercise classes, movement classes, individual counseling, weight loss programs, stress management, survivorship programs (available through virtual programming)  -[place] [facility]-already seeing  -[employer] handout-nutrition, physical activity  -ACS Cancer Screening Guidelines  -[facility]W Breast Support Group  -Websites: [place], [address] [place] [address] for [employer], Living Beyond Breast Cancer  -Facebook: Yelitza Flores was given the opportunity to ask questions. She had a few questions and verbalized understanding of the information we discussed today. Emotional support was provided. She was encouraged to call with any further questions. I spent a total of 55 minutes on the video phone visit with the patient reviewing the SCP and the materials.         MILLY Tanner

## 2022-02-16 NOTE — TELEPHONE ENCOUNTER
Lft msg for pt just reconfirming her virtual video survivorship appt at 9am today w/Renata and left my direct number if she needed any assistance or had problems with initiating this virtual video appt.

## 2022-02-17 ENCOUNTER — DOCUMENTATION ONLY (OUTPATIENT)
Dept: SURGERY | Facility: CLINIC | Age: 52
End: 2022-02-17

## 2022-02-17 ENCOUNTER — OFFICE VISIT (OUTPATIENT)
Dept: SURGERY | Facility: CLINIC | Age: 52
End: 2022-02-17
Payer: COMMERCIAL

## 2022-02-17 VITALS
DIASTOLIC BLOOD PRESSURE: 80 MMHG | HEIGHT: 63.8 IN | BODY MASS INDEX: 35.6 KG/M2 | SYSTOLIC BLOOD PRESSURE: 120 MMHG | HEART RATE: 79 BPM | OXYGEN SATURATION: 98 % | WEIGHT: 206 LBS

## 2022-02-17 DIAGNOSIS — E66.01 MORBID OBESITY WITH BMI OF 40.0-44.9, ADULT (HCC): ICD-10-CM

## 2022-02-17 DIAGNOSIS — M16.0 OSTEOARTHRITIS OF BOTH HIPS, UNSPECIFIED OSTEOARTHRITIS TYPE: ICD-10-CM

## 2022-02-17 DIAGNOSIS — E11.65 TYPE 2 DIABETES MELLITUS WITH HYPERGLYCEMIA, WITH LONG-TERM CURRENT USE OF INSULIN (HCC): Primary | ICD-10-CM

## 2022-02-17 DIAGNOSIS — Z79.4 TYPE 2 DIABETES MELLITUS WITH HYPERGLYCEMIA, WITH LONG-TERM CURRENT USE OF INSULIN (HCC): Primary | ICD-10-CM

## 2022-02-17 NOTE — PROGRESS NOTES
Oriented pt to the bariatric program; provided/reviewed bariatric packet of info, time line, referrals, etc; pt agreed and verbalized understanding; attended seminar on 1/10/22.

## 2022-02-26 ENCOUNTER — LAB ENCOUNTER (OUTPATIENT)
Dept: LAB | Facility: HOSPITAL | Age: 52
End: 2022-02-26
Attending: SURGERY
Payer: COMMERCIAL

## 2022-02-26 DIAGNOSIS — M16.0 OSTEOARTHRITIS OF BOTH HIPS, UNSPECIFIED OSTEOARTHRITIS TYPE: ICD-10-CM

## 2022-02-26 DIAGNOSIS — Z79.4 TYPE 2 DIABETES MELLITUS WITH HYPERGLYCEMIA, WITH LONG-TERM CURRENT USE OF INSULIN (HCC): ICD-10-CM

## 2022-02-26 DIAGNOSIS — Z17.1 MALIGNANT NEOPLASM OF UPPER-OUTER QUADRANT OF RIGHT BREAST IN FEMALE, ESTROGEN RECEPTOR NEGATIVE (HCC): ICD-10-CM

## 2022-02-26 DIAGNOSIS — C50.411 MALIGNANT NEOPLASM OF UPPER-OUTER QUADRANT OF RIGHT BREAST IN FEMALE, ESTROGEN RECEPTOR NEGATIVE (HCC): ICD-10-CM

## 2022-02-26 DIAGNOSIS — E11.65 TYPE 2 DIABETES MELLITUS WITH HYPERGLYCEMIA, WITH LONG-TERM CURRENT USE OF INSULIN (HCC): ICD-10-CM

## 2022-02-26 DIAGNOSIS — E66.01 MORBID OBESITY WITH BMI OF 40.0-44.9, ADULT (HCC): ICD-10-CM

## 2022-02-26 LAB
ALBUMIN SERPL-MCNC: 3.7 G/DL (ref 3.4–5)
ALBUMIN/GLOB SERPL: 0.9 {RATIO} (ref 1–2)
ALP LIVER SERPL-CCNC: 95 U/L
ANION GAP SERPL CALC-SCNC: 5 MMOL/L (ref 0–18)
AST SERPL-CCNC: 11 U/L (ref 15–37)
BASOPHILS # BLD AUTO: 0.05 X10(3) UL (ref 0–0.2)
BASOPHILS NFR BLD AUTO: 1 %
BILIRUB SERPL-MCNC: 0.5 MG/DL (ref 0.1–2)
BILIRUB UR QL: NEGATIVE
BUN BLD-MCNC: 14 MG/DL (ref 7–18)
BUN/CREAT SERPL: 12 (ref 10–20)
CALCIUM BLD-MCNC: 9.5 MG/DL (ref 8.5–10.1)
CHLORIDE SERPL-SCNC: 102 MMOL/L (ref 98–112)
CHOLEST SERPL-MCNC: 262 MG/DL (ref ?–200)
CLARITY UR: CLEAR
CO2 SERPL-SCNC: 29 MMOL/L (ref 21–32)
COLOR UR: YELLOW
CORTIS SERPL-MCNC: 10.8 UG/DL
CREAT BLD-MCNC: 1.17 MG/DL
CREAT UR-SCNC: 128 MG/DL
DEPRECATED HBV CORE AB SER IA-ACNC: 258.7 NG/ML
DEPRECATED RDW RBC AUTO: 46.2 FL (ref 35.1–46.3)
EOSINOPHIL # BLD AUTO: 0.24 X10(3) UL (ref 0–0.7)
EOSINOPHIL NFR BLD AUTO: 4.7 %
ERYTHROCYTE [DISTWIDTH] IN BLOOD BY AUTOMATED COUNT: 14.3 % (ref 11–15)
EST. AVERAGE GLUCOSE BLD GHB EST-MCNC: 326 MG/DL (ref 68–126)
FASTING PATIENT LIPID ANSWER: YES
FASTING STATUS PATIENT QL REPORTED: YES
FOLATE SERPL-MCNC: 16.3 NG/ML (ref 8.7–?)
GLOBULIN PLAS-MCNC: 4 G/DL (ref 2.8–4.4)
GLUCOSE BLD-MCNC: 378 MG/DL (ref 70–99)
GLUCOSE UR-MCNC: >=500 MG/DL
HBA1C MFR BLD: 13 % (ref ?–5.7)
HCT VFR BLD AUTO: 42.4 %
HDLC SERPL-MCNC: 47 MG/DL (ref 40–59)
HGB BLD-MCNC: 13.9 G/DL
HGB UR QL STRIP.AUTO: NEGATIVE
IMM GRANULOCYTES # BLD AUTO: 0.01 X10(3) UL (ref 0–1)
IMM GRANULOCYTES NFR BLD: 0.2 %
IRON SATN MFR SERPL: 23 %
IRON SERPL-MCNC: 69 UG/DL
KETONES UR-MCNC: 20 MG/DL
LDLC SERPL CALC-MCNC: 194 MG/DL (ref ?–100)
LEUKOCYTE ESTERASE UR QL STRIP.AUTO: NEGATIVE
LYMPHOCYTES # BLD AUTO: 2.15 X10(3) UL (ref 1–4)
LYMPHOCYTES NFR BLD AUTO: 42.2 %
MCH RBC QN AUTO: 29.1 PG (ref 26–34)
MCHC RBC AUTO-ENTMCNC: 32.8 G/DL (ref 31–37)
MCV RBC AUTO: 88.9 FL
MICROALBUMIN UR-MCNC: 9.2 MG/DL
MICROALBUMIN/CREAT 24H UR-RTO: 71.9 UG/MG (ref ?–30)
MONOCYTES # BLD AUTO: 0.26 X10(3) UL (ref 0.1–1)
MONOCYTES NFR BLD AUTO: 5.1 %
NEUTROPHILS # BLD AUTO: 2.39 X10 (3) UL (ref 1.5–7.7)
NEUTROPHILS # BLD AUTO: 2.39 X10(3) UL (ref 1.5–7.7)
NEUTROPHILS NFR BLD AUTO: 46.8 %
NITRITE UR QL STRIP.AUTO: NEGATIVE
NONHDLC SERPL-MCNC: 215 MG/DL (ref ?–130)
OSMOLALITY SERPL CALC.SUM OF ELEC: 298 MOSM/KG (ref 275–295)
PH UR: 5 [PH] (ref 5–8)
PLATELET # BLD AUTO: 246 10(3)UL (ref 150–450)
POTASSIUM SERPL-SCNC: 4.4 MMOL/L (ref 3.5–5.1)
PROT SERPL-MCNC: 7.7 G/DL (ref 6.4–8.2)
PROT UR-MCNC: NEGATIVE MG/DL
RBC # BLD AUTO: 4.77 X10(6)UL
SODIUM SERPL-SCNC: 136 MMOL/L (ref 136–145)
SP GR UR STRIP: >1.03 (ref 1–1.03)
TIBC SERPL-MCNC: 301 UG/DL (ref 240–450)
TRANSFERRIN SERPL-MCNC: 202 MG/DL (ref 200–360)
TRIGL SERPL-MCNC: 115 MG/DL (ref 30–149)
TSI SER-ACNC: 0.77 MIU/ML (ref 0.36–3.74)
UROBILINOGEN UR STRIP-ACNC: <2
VIT B12 SERPL-MCNC: 1247 PG/ML (ref 193–986)
VIT D+METAB SERPL-MCNC: 18.5 NG/ML (ref 30–100)
VLDLC SERPL CALC-MCNC: 24 MG/DL (ref 0–30)
WBC # BLD AUTO: 5.1 X10(3) UL (ref 4–11)

## 2022-02-26 PROCEDURE — 84466 ASSAY OF TRANSFERRIN: CPT

## 2022-02-26 PROCEDURE — 85025 COMPLETE CBC W/AUTO DIFF WBC: CPT

## 2022-02-26 PROCEDURE — 84425 ASSAY OF VITAMIN B-1: CPT

## 2022-02-26 PROCEDURE — 82533 TOTAL CORTISOL: CPT

## 2022-02-26 PROCEDURE — 3061F NEG MICROALBUMINURIA REV: CPT | Performed by: INTERNAL MEDICINE

## 2022-02-26 PROCEDURE — 84443 ASSAY THYROID STIM HORMONE: CPT

## 2022-02-26 PROCEDURE — 82306 VITAMIN D 25 HYDROXY: CPT

## 2022-02-26 PROCEDURE — 83036 HEMOGLOBIN GLYCOSYLATED A1C: CPT

## 2022-02-26 PROCEDURE — 36415 COLL VENOUS BLD VENIPUNCTURE: CPT

## 2022-02-26 PROCEDURE — 3060F POS MICROALBUMINURIA REV: CPT | Performed by: INTERNAL MEDICINE

## 2022-02-26 PROCEDURE — 3046F HEMOGLOBIN A1C LEVEL >9.0%: CPT | Performed by: INTERNAL MEDICINE

## 2022-02-26 PROCEDURE — 80053 COMPREHEN METABOLIC PANEL: CPT

## 2022-02-26 PROCEDURE — 82728 ASSAY OF FERRITIN: CPT

## 2022-02-26 PROCEDURE — 80061 LIPID PANEL: CPT

## 2022-02-26 PROCEDURE — 81003 URINALYSIS AUTO W/O SCOPE: CPT

## 2022-02-26 PROCEDURE — 82746 ASSAY OF FOLIC ACID SERUM: CPT

## 2022-02-26 PROCEDURE — 82607 VITAMIN B-12: CPT

## 2022-02-26 PROCEDURE — 83540 ASSAY OF IRON: CPT

## 2022-02-26 PROCEDURE — 82570 ASSAY OF URINE CREATININE: CPT

## 2022-02-26 PROCEDURE — 82043 UR ALBUMIN QUANTITATIVE: CPT

## 2022-02-28 ENCOUNTER — NURSE TRIAGE (OUTPATIENT)
Dept: INTERNAL MEDICINE CLINIC | Facility: CLINIC | Age: 52
End: 2022-02-28

## 2022-03-01 ENCOUNTER — APPOINTMENT (OUTPATIENT)
Dept: HEMATOLOGY/ONCOLOGY | Facility: HOSPITAL | Age: 52
End: 2022-03-01
Attending: INTERNAL MEDICINE
Payer: COMMERCIAL

## 2022-03-01 ENCOUNTER — OFFICE VISIT (OUTPATIENT)
Dept: INTERNAL MEDICINE CLINIC | Facility: CLINIC | Age: 52
End: 2022-03-01
Payer: COMMERCIAL

## 2022-03-01 VITALS
HEIGHT: 63.8 IN | TEMPERATURE: 98 F | HEART RATE: 82 BPM | BODY MASS INDEX: 35.78 KG/M2 | WEIGHT: 207 LBS | DIASTOLIC BLOOD PRESSURE: 78 MMHG | SYSTOLIC BLOOD PRESSURE: 136 MMHG | RESPIRATION RATE: 20 BRPM

## 2022-03-01 DIAGNOSIS — E11.65 TYPE 2 DIABETES MELLITUS WITH HYPERGLYCEMIA, WITH LONG-TERM CURRENT USE OF INSULIN (HCC): ICD-10-CM

## 2022-03-01 DIAGNOSIS — Z79.4 TYPE 2 DIABETES MELLITUS WITH HYPERGLYCEMIA, WITH LONG-TERM CURRENT USE OF INSULIN (HCC): ICD-10-CM

## 2022-03-01 DIAGNOSIS — I10 ESSENTIAL HYPERTENSION: Primary | ICD-10-CM

## 2022-03-01 PROCEDURE — 3008F BODY MASS INDEX DOCD: CPT | Performed by: INTERNAL MEDICINE

## 2022-03-01 PROCEDURE — 3078F DIAST BP <80 MM HG: CPT | Performed by: INTERNAL MEDICINE

## 2022-03-01 PROCEDURE — 99214 OFFICE O/P EST MOD 30 MIN: CPT | Performed by: INTERNAL MEDICINE

## 2022-03-01 PROCEDURE — 3075F SYST BP GE 130 - 139MM HG: CPT | Performed by: INTERNAL MEDICINE

## 2022-03-01 RX ORDER — INSULIN GLARGINE 100 [IU]/ML
INJECTION, SOLUTION SUBCUTANEOUS
Qty: 15 EACH | Refills: 3 | Status: SHIPPED | OUTPATIENT
Start: 2022-03-01

## 2022-03-03 ENCOUNTER — OFFICE VISIT (OUTPATIENT)
Dept: HEMATOLOGY/ONCOLOGY | Facility: HOSPITAL | Age: 52
End: 2022-03-03
Attending: INTERNAL MEDICINE
Payer: COMMERCIAL

## 2022-03-03 ENCOUNTER — RESEARCH ENCOUNTER (OUTPATIENT)
Dept: HEMATOLOGY/ONCOLOGY | Facility: HOSPITAL | Age: 52
End: 2022-03-03

## 2022-03-03 VITALS
TEMPERATURE: 98 F | OXYGEN SATURATION: 100 % | BODY MASS INDEX: 37.73 KG/M2 | HEIGHT: 62 IN | HEART RATE: 77 BPM | DIASTOLIC BLOOD PRESSURE: 66 MMHG | RESPIRATION RATE: 16 BRPM | SYSTOLIC BLOOD PRESSURE: 144 MMHG | WEIGHT: 205 LBS

## 2022-03-03 DIAGNOSIS — Z17.1 MALIGNANT NEOPLASM OF UPPER-OUTER QUADRANT OF RIGHT BREAST IN FEMALE, ESTROGEN RECEPTOR NEGATIVE (HCC): Primary | ICD-10-CM

## 2022-03-03 DIAGNOSIS — C50.411 MALIGNANT NEOPLASM OF UPPER-OUTER QUADRANT OF RIGHT BREAST IN FEMALE, ESTROGEN RECEPTOR NEGATIVE (HCC): Primary | ICD-10-CM

## 2022-03-03 PROCEDURE — 99214 OFFICE O/P EST MOD 30 MIN: CPT | Performed by: INTERNAL MEDICINE

## 2022-03-04 ENCOUNTER — NURSE ONLY (OUTPATIENT)
Dept: HEMATOLOGY/ONCOLOGY | Facility: HOSPITAL | Age: 52
End: 2022-03-04
Attending: INTERNAL MEDICINE
Payer: COMMERCIAL

## 2022-03-04 DIAGNOSIS — Z17.1 MALIGNANT NEOPLASM OF UPPER-OUTER QUADRANT OF RIGHT BREAST IN FEMALE, ESTROGEN RECEPTOR NEGATIVE (HCC): ICD-10-CM

## 2022-03-04 DIAGNOSIS — C50.411 MALIGNANT NEOPLASM OF UPPER-OUTER QUADRANT OF RIGHT BREAST IN FEMALE, ESTROGEN RECEPTOR NEGATIVE (HCC): ICD-10-CM

## 2022-03-04 DIAGNOSIS — Z08 ENCOUNTER FOR FOLLOW-UP EXAMINATION AFTER COMPLETED TREATMENT FOR MALIGNANT NEOPLASM: ICD-10-CM

## 2022-03-04 LAB
CORTIS SERPL-MCNC: 11 UG/DL
VITAMIN B1 (THIAMINE), WHOLE B: 83 NMOL/L

## 2022-03-04 PROCEDURE — 36415 COLL VENOUS BLD VENIPUNCTURE: CPT

## 2022-03-04 PROCEDURE — 82533 TOTAL CORTISOL: CPT

## 2022-03-17 ENCOUNTER — DOCUMENTATION ONLY (OUTPATIENT)
Dept: SURGERY | Facility: CLINIC | Age: 52
End: 2022-03-17

## 2022-03-17 NOTE — PROGRESS NOTES
Please review the information regarding my recent evaluation of our mutual patient, Jasvir Herrera, 3/4/1970. Surgical Clearance from [facility]  Evaluation Completed on: 03/17/22  Clearance information for: Bariatric  Referring Provider: Anirudh Covarrubais MD  Clearance for Bariatric Surgery: Approved  Evaluation Concerns: No concerns  Recommendations:  Additional session/appointment to gauge readiness for surgery (I cleared Daniel Gilman, but want to meet with her after she meets with the dietician in order to check-in on her compliance and ability to sustain changes.)      Thank you for allowing me to partake in the care of this patient,     Sincerely,     Garry Denver, PsyD  3/17/2022 @ 9:49 AM

## 2022-03-22 ENCOUNTER — TELEMEDICINE (OUTPATIENT)
Dept: SURGERY | Facility: CLINIC | Age: 52
End: 2022-03-22

## 2022-03-22 VITALS — BODY MASS INDEX: 38.09 KG/M2 | WEIGHT: 207 LBS | HEIGHT: 62 IN

## 2022-03-22 DIAGNOSIS — E66.9 OBESITY (BMI 30-39.9): ICD-10-CM

## 2022-03-22 DIAGNOSIS — E78.00 HYPERCHOLESTEREMIA: ICD-10-CM

## 2022-03-22 DIAGNOSIS — M16.0 OSTEOARTHRITIS OF BOTH HIPS, UNSPECIFIED OSTEOARTHRITIS TYPE: ICD-10-CM

## 2022-03-22 DIAGNOSIS — E11.65 TYPE 2 DIABETES MELLITUS WITH HYPERGLYCEMIA, WITH LONG-TERM CURRENT USE OF INSULIN (HCC): Primary | ICD-10-CM

## 2022-03-22 DIAGNOSIS — Z79.4 TYPE 2 DIABETES MELLITUS WITH HYPERGLYCEMIA, WITH LONG-TERM CURRENT USE OF INSULIN (HCC): Primary | ICD-10-CM

## 2022-03-22 PROCEDURE — 99214 OFFICE O/P EST MOD 30 MIN: CPT | Performed by: INTERNAL MEDICINE

## 2022-03-22 PROCEDURE — 3008F BODY MASS INDEX DOCD: CPT | Performed by: INTERNAL MEDICINE

## 2022-03-29 RX ORDER — PEN NEEDLE, DIABETIC 32GX 5/32"
NEEDLE, DISPOSABLE MISCELLANEOUS
Qty: 100 EACH | Refills: 0 | Status: SHIPPED | OUTPATIENT
Start: 2022-03-29 | End: 2023-11-03 | Stop reason: ALTCHOICE

## 2022-03-29 NOTE — TELEPHONE ENCOUNTER
Refill passed per [place] [employer] [facility] protocol. Requested Prescriptions   Pending Prescriptions Disp Refills    TRUEPLUS PEN NEEDLES 32G X 4 MM Does not apply Misc [Pharmacy Med Name: TRUE PLUS PEN NEEDLES [id number]] 100 each 0     Sig: USE AS DIRECTED        Diabetic Supplies Protocol Passed - 3/29/2022 10:54 AM        Passed - Appointment in past 12 or next 3 months            Recent Outpatient Visits              1 week ago Type 2 diabetes mellitus with hyperglycemia, with long-term current use of insulin [facility])    [facility], Dominic Olvera MD    Telemedicine    3 weeks ago Encounter for follow-up examination after completed treatment for malignant neoplasm    [address] - Infusion    Nurse Only    3 weeks ago Malignant neoplasm of upper-outer quadrant of right breast in female, estrogen receptor negative [facility])    [facility] Hematology Oncology Brenda Pina MD    Office Visit    4 weeks ago Essential hypertension    [facility], Logan Piña MD    Office Visit    1 month ago Type 2 diabetes mellitus with hyperglycemia, with long-term current use of insulin [facility])    [address] Margarette Spare, MD    Office Visit          Future Appointments         Provider Department Appt Notes    In 2 weeks Jacqueline Atkins, [address] Initial Consult Pre Op  (Logan Bonds sent via [facility] on 2/11/22. Waiver given at 2/24/22 appt)    In 4 weeks Daniela Grewal MD [facility] [facility], Vel[place], [place] surgery clearance/colonoscopy    In 2 months [facility] Hematology Oncology FOLLOW UP VISIT. CL  3m    In 3 months Alicia Cornejo MD [facility] for Health Ophthalmology dm ee ep

## 2022-04-01 NOTE — TELEPHONE ENCOUNTER
Left message letting the patient know that the bone scan was negative. Asked for a call back with any questions. I will START or STAY ON the medications listed below when I get home from the hospital:    acetaminophen 325 mg oral tablet  -- 3 tab(s) by mouth every 6 hours  -- Indication: For pain    ibuprofen 600 mg oral tablet  -- 1 tab(s) by mouth every 6 hours  -- Indication: For pain

## 2022-04-06 NOTE — TELEPHONE ENCOUNTER
Appointment made for 6/5/2017
Please book a medicare slot
Pt calling and stts that she was supposed to come back in June  She only wants to see Dr. Enma Walter  Dr has no open appts  Please advise
[[phone]]

## 2022-04-12 ENCOUNTER — OFFICE VISIT (OUTPATIENT)
Dept: SURGERY | Facility: CLINIC | Age: 52
End: 2022-04-12
Payer: COMMERCIAL

## 2022-04-12 VITALS — WEIGHT: 217 LBS | HEIGHT: 62 IN | BODY MASS INDEX: 39.93 KG/M2

## 2022-04-12 DIAGNOSIS — E11.65 TYPE 2 DIABETES MELLITUS WITH HYPERGLYCEMIA, WITH LONG-TERM CURRENT USE OF INSULIN (HCC): ICD-10-CM

## 2022-04-12 DIAGNOSIS — E78.00 HYPERCHOLESTEREMIA: ICD-10-CM

## 2022-04-12 DIAGNOSIS — Z79.4 TYPE 2 DIABETES MELLITUS WITH HYPERGLYCEMIA, WITH LONG-TERM CURRENT USE OF INSULIN (HCC): ICD-10-CM

## 2022-04-12 DIAGNOSIS — I10 ESSENTIAL HYPERTENSION: ICD-10-CM

## 2022-04-12 DIAGNOSIS — M16.0 OSTEOARTHRITIS OF BOTH HIPS, UNSPECIFIED OSTEOARTHRITIS TYPE: ICD-10-CM

## 2022-04-12 DIAGNOSIS — E11.9 TYPE 2 DIABETES MELLITUS WITHOUT RETINOPATHY (HCC): ICD-10-CM

## 2022-04-12 DIAGNOSIS — E66.9 OBESITY (BMI 30-39.9): ICD-10-CM

## 2022-04-12 PROCEDURE — 97802 MEDICAL NUTRITION INDIV IN: CPT

## 2022-04-12 PROCEDURE — 3008F BODY MASS INDEX DOCD: CPT

## 2022-04-12 NOTE — PATIENT INSTRUCTIONS
Goals: 1. Keep a food record, My [employer] or My Net Diary. 2.  Continue eating 4-6 meals/snacks per day. Include and produce at each time. Aim for 40-50 gms of protein per day. Keep the carbohydrates at 120 grams per day or less. 3.  Aim for 64 oz of water per day. 4.  Walk 15 minutes per day. 5.  Add strength/resistance training 10 minutes 3 days per week. 6.  Buy some protein shakes to try. 7.  Practice eating more slowly by chewing food more before swallowing. Practice drinking separately from eating.

## 2022-04-15 ENCOUNTER — HOSPITAL ENCOUNTER (OUTPATIENT)
Dept: MAMMOGRAPHY | Facility: HOSPITAL | Age: 52
Discharge: HOME | End: 2022-04-15
Attending: INTERNAL MEDICINE
Payer: COMMERCIAL

## 2022-04-15 ENCOUNTER — TELEPHONE (OUTPATIENT)
Dept: HEMATOLOGY/ONCOLOGY | Facility: HOSPITAL | Age: 52
End: 2022-04-15

## 2022-04-15 DIAGNOSIS — C50.411 MALIGNANT NEOPLASM OF UPPER-OUTER QUADRANT OF RIGHT BREAST IN FEMALE, ESTROGEN RECEPTOR NEGATIVE (HCC): ICD-10-CM

## 2022-04-15 DIAGNOSIS — Z17.1 MALIGNANT NEOPLASM OF UPPER-OUTER QUADRANT OF RIGHT BREAST IN FEMALE, ESTROGEN RECEPTOR NEGATIVE (HCC): ICD-10-CM

## 2022-04-15 PROCEDURE — 77062 BREAST TOMOSYNTHESIS BI: CPT | Performed by: INTERNAL MEDICINE

## 2022-04-15 PROCEDURE — 77066 DX MAMMO INCL CAD BI: CPT | Performed by: INTERNAL MEDICINE

## 2022-04-15 NOTE — TELEPHONE ENCOUNTER
Spoke with patient and informed her that her Mammogram findings are stable and shows nothing concerning at this time per Dr. Jayna Leyva. Patient informed that Dr. Jayna Leyva would like patient to repeat Mammogram in 6 months with the MRI Breast in December 2022. Patient stated understanding and thanked me for calling. She was reminded of her follow up appointment in June.

## 2022-04-26 ENCOUNTER — OFFICE VISIT (OUTPATIENT)
Dept: GASTROENTEROLOGY | Facility: CLINIC | Age: 52
End: 2022-04-26
Payer: COMMERCIAL

## 2022-04-26 ENCOUNTER — TELEPHONE (OUTPATIENT)
Dept: GASTROENTEROLOGY | Facility: CLINIC | Age: 52
End: 2022-04-26

## 2022-04-26 VITALS
HEIGHT: 62 IN | SYSTOLIC BLOOD PRESSURE: 127 MMHG | HEART RATE: 71 BPM | WEIGHT: 216.19 LBS | BODY MASS INDEX: 39.78 KG/M2 | DIASTOLIC BLOOD PRESSURE: 70 MMHG

## 2022-04-26 DIAGNOSIS — E66.9 OBESITY, UNSPECIFIED CLASSIFICATION, UNSPECIFIED OBESITY TYPE, UNSPECIFIED WHETHER SERIOUS COMORBIDITY PRESENT: ICD-10-CM

## 2022-04-26 DIAGNOSIS — Z12.11 COLON CANCER SCREENING: Primary | ICD-10-CM

## 2022-04-26 PROCEDURE — 3078F DIAST BP <80 MM HG: CPT | Performed by: INTERNAL MEDICINE

## 2022-04-26 PROCEDURE — 3008F BODY MASS INDEX DOCD: CPT | Performed by: INTERNAL MEDICINE

## 2022-04-26 PROCEDURE — 99243 OFF/OP CNSLTJ NEW/EST LOW 30: CPT | Performed by: INTERNAL MEDICINE

## 2022-04-26 PROCEDURE — 3074F SYST BP LT 130 MM HG: CPT | Performed by: INTERNAL MEDICINE

## 2022-04-26 RX ORDER — INSULIN GLARGINE-YFGN 100 [IU]/ML
30 INJECTION, SOLUTION SUBCUTANEOUS DAILY
COMMUNITY
Start: 2022-03-01

## 2022-04-26 RX ORDER — BLOOD SUGAR DIAGNOSTIC
1 STRIP MISCELLANEOUS 3 TIMES DAILY
COMMUNITY
Start: 2022-03-01

## 2022-04-26 RX ORDER — BLOOD-GLUCOSE METER
1 EACH MISCELLANEOUS 3 TIMES DAILY
COMMUNITY
Start: 2022-03-01

## 2022-04-26 NOTE — TELEPHONE ENCOUNTER
DOMITILA; GI Schedulers:  Offered patient some options on  schedules in October ,she declined the late date ,she would like any Saturday if anything open up with cancellation on a Saturday in May or June ,I informed patient that it's not guaranteed ,but we will call her to schedule this procedure as soon as we have the opening .   Diagnosis: Colon cancer screening , Bariatric Clearance      Colonoscopy and EGD   - Golytely bowel prep and MAC sedation  - contact primary about adjustment of diabetic meds

## 2022-04-26 NOTE — PATIENT INSTRUCTIONS
Colonoscopy and EGD   - Golytely bowel prep and MAC sedation  - contact primary about adjustment of diabetic meds

## 2022-05-05 ENCOUNTER — OFFICE VISIT (OUTPATIENT)
Dept: PULMONOLOGY | Facility: CLINIC | Age: 52
End: 2022-05-05
Payer: COMMERCIAL

## 2022-05-05 VITALS
WEIGHT: 217 LBS | RESPIRATION RATE: 16 BRPM | HEART RATE: 81 BPM | OXYGEN SATURATION: 98 % | SYSTOLIC BLOOD PRESSURE: 123 MMHG | HEIGHT: 62 IN | BODY MASS INDEX: 39.93 KG/M2 | DIASTOLIC BLOOD PRESSURE: 74 MMHG

## 2022-05-05 DIAGNOSIS — Z01.811 ENCOUNTER FOR PREOPERATIVE PULMONARY EXAMINATION: Primary | ICD-10-CM

## 2022-05-05 DIAGNOSIS — E66.01 CLASS 2 SEVERE OBESITY DUE TO EXCESS CALORIES WITH SERIOUS COMORBIDITY AND BODY MASS INDEX (BMI) OF 39.0 TO 39.9 IN ADULT (HCC): ICD-10-CM

## 2022-05-05 DIAGNOSIS — R29.818 SUSPECTED SLEEP APNEA: ICD-10-CM

## 2022-05-05 NOTE — TELEPHONE ENCOUNTER
GI RN    Please contact Dr Fidel Cardoso for Insulin orders     Colon/EGD scheduled on 7/11/2022 at [facility]

## 2022-05-05 NOTE — TELEPHONE ENCOUNTER
Scheduled for:  Colonoscopy 96772 EGD [id number]  Provider Name:  Dr Robert Velasco  Date:  7/11/2022  Location:  [address] [place] [address] 1  Sedation:  MAC  Time:  1230pm (pt is aware to arrive at 11:30am)  Prep:  TRilyte  Meds/Allergies Reconciled?:  Physician reviewed   Diagnosis with codes:  Colon screening Z12.11 Obesity - Bariatric surgery clearance E66.9  Was patient informed to call insurance with codes (Y/N):  Yes  Referral sent?:  Referral was sent at the time of electronic surgical scheduling. [facility] or [address]  notified?:  I sent an electronic request to Endo Scheduling and received a confirmation today. Medication Orders:   This patient verbally confirmed that she is not taking:  Iron, blood thinners, BP meds, and is  diabetic  Not latex allergy, Not PCN allergy and does not have a pacemaker  Patient is aware that a RN will call with Insulin orders from Dr Nilsa Mcdonald before the procedure   Pt is aware to NOT take iron pills, herbal meds and diet supplements for 7 days before exam. Also to NOT take any form of alcohol, recreational drugs and any forms of ED meds 24 hours before exam.       Misc Orders:  Patient is aware that the ENDO dept will call to schedule a COVID 19 test before the procedure      Further instructions given by staff:   I discussed the prep instructions with the patient at the time of the appointment which she verbally understood

## 2022-05-05 NOTE — TELEPHONE ENCOUNTER
Dr. James Mclean    Patient is scheduled for a colonoscopy and EGD on 7/11/2022.     Please advise on all diabetic (oral & insulin) adjustment orders based on the following diet modifications prior to procedure(s):    Day before the procedure(s), patient will be on clear liquid diet only after breakfast.    Thank you  [facility] Clinical Staff

## 2022-05-06 ENCOUNTER — DOCUMENTATION ONLY (OUTPATIENT)
Dept: SURGERY | Facility: CLINIC | Age: 52
End: 2022-05-06

## 2022-05-06 NOTE — TELEPHONE ENCOUNTER
Apologies. Dr. James Mclean now with [facility]. I faxed diabetic adjustment request to Dr. James Mclean at fax # [phone].

## 2022-05-06 NOTE — PROGRESS NOTES
Please review the information regarding my recent evaluation of our mutual patient, Mani Banks, 3/4/1970.       Surgical Clearance from [facility]  Evaluation Completed on: 05/06/22  Clearance information for: Bariatric  Referring Provider: Hanh Aldana MD  Clearance for Bariatric Surgery: Approved  Evaluation Concerns: No concerns  Recommendations: Patient to continue to meet with dietician to refine diet and monitor progress      Thank you for allowing me to partake in the care of this patient,     Sincerely,     Dayna Byrd PsyD  5/6/2022 @ 1:30 PM

## 2022-05-17 ENCOUNTER — OFFICE VISIT (OUTPATIENT)
Dept: OTOLARYNGOLOGY | Facility: CLINIC | Age: 52
End: 2022-05-17
Payer: COMMERCIAL

## 2022-05-17 VITALS — HEIGHT: 62 IN | WEIGHT: 217 LBS | BODY MASS INDEX: 39.93 KG/M2 | TEMPERATURE: 97 F

## 2022-05-17 DIAGNOSIS — J34.2 DEVIATED NASAL SEPTUM: Primary | ICD-10-CM

## 2022-05-17 DIAGNOSIS — J32.9 CHRONIC SINUSITIS, UNSPECIFIED LOCATION: ICD-10-CM

## 2022-05-17 PROCEDURE — 99213 OFFICE O/P EST LOW 20 MIN: CPT | Performed by: OTOLARYNGOLOGY

## 2022-05-17 PROCEDURE — 3008F BODY MASS INDEX DOCD: CPT | Performed by: OTOLARYNGOLOGY

## 2022-05-17 RX ORDER — AZELASTINE 1 MG/ML
2 SPRAY, METERED NASAL 2 TIMES DAILY
Qty: 30 ML | Refills: 0 | Status: SHIPPED | OUTPATIENT
Start: 2022-05-17

## 2022-05-17 RX ORDER — PREDNISONE 10 MG/1
TABLET ORAL
Qty: 44 TABLET | Refills: 0 | Status: SHIPPED | OUTPATIENT
Start: 2022-05-17

## 2022-05-17 RX ORDER — MONTELUKAST SODIUM 10 MG/1
10 TABLET ORAL NIGHTLY
Qty: 30 TABLET | Refills: 3 | Status: SHIPPED | OUTPATIENT
Start: 2022-05-17

## 2022-05-17 RX ORDER — AMOXICILLIN AND CLAVULANATE POTASSIUM 875; 125 MG/1; MG/1
1 TABLET, FILM COATED ORAL EVERY 12 HOURS
Qty: 20 TABLET | Refills: 0 | Status: SHIPPED | OUTPATIENT
Start: 2022-05-17

## 2022-06-05 ENCOUNTER — LAB ENCOUNTER (OUTPATIENT)
Dept: LAB | Facility: HOSPITAL | Age: 52
End: 2022-06-05
Attending: INTERNAL MEDICINE
Payer: COMMERCIAL

## 2022-06-05 ENCOUNTER — MOBILE ENCOUNTER (OUTPATIENT)
Dept: HEMATOLOGY/ONCOLOGY | Facility: HOSPITAL | Age: 52
End: 2022-06-05

## 2022-06-05 DIAGNOSIS — C50.411 MALIGNANT NEOPLASM OF UPPER-OUTER QUADRANT OF RIGHT BREAST IN FEMALE, ESTROGEN RECEPTOR NEGATIVE (HCC): ICD-10-CM

## 2022-06-05 DIAGNOSIS — Z17.1 MALIGNANT NEOPLASM OF UPPER-OUTER QUADRANT OF RIGHT BREAST IN FEMALE, ESTROGEN RECEPTOR NEGATIVE (HCC): ICD-10-CM

## 2022-06-05 LAB
ALBUMIN SERPL-MCNC: 3.4 G/DL (ref 3.4–5)
ALBUMIN/GLOB SERPL: 0.8 {RATIO} (ref 1–2)
ALP LIVER SERPL-CCNC: 89 U/L
ALT SERPL-CCNC: 21 U/L
ANION GAP SERPL CALC-SCNC: 5 MMOL/L (ref 0–18)
AST SERPL-CCNC: 12 U/L (ref 15–37)
BASOPHILS # BLD AUTO: 0.02 X10(3) UL (ref 0–0.2)
BASOPHILS NFR BLD AUTO: 0.4 %
BILIRUB SERPL-MCNC: 0.4 MG/DL (ref 0.1–2)
BUN BLD-MCNC: 15 MG/DL (ref 7–18)
BUN/CREAT SERPL: 12.8 (ref 10–20)
CALCIUM BLD-MCNC: 9.3 MG/DL (ref 8.5–10.1)
CHLORIDE SERPL-SCNC: 102 MMOL/L (ref 98–112)
CO2 SERPL-SCNC: 27 MMOL/L (ref 21–32)
CREAT BLD-MCNC: 1.17 MG/DL
DEPRECATED RDW RBC AUTO: 46.1 FL (ref 35.1–46.3)
EOSINOPHIL # BLD AUTO: 0.09 X10(3) UL (ref 0–0.7)
EOSINOPHIL NFR BLD AUTO: 1.6 %
ERYTHROCYTE [DISTWIDTH] IN BLOOD BY AUTOMATED COUNT: 14 % (ref 11–15)
FASTING STATUS PATIENT QL REPORTED: NO
GLOBULIN PLAS-MCNC: 4.4 G/DL (ref 2.8–4.4)
GLUCOSE BLD-MCNC: 467 MG/DL (ref 70–99)
HCT VFR BLD AUTO: 38.9 %
HGB BLD-MCNC: 12.8 G/DL
IMM GRANULOCYTES # BLD AUTO: 0.02 X10(3) UL (ref 0–1)
IMM GRANULOCYTES NFR BLD: 0.4 %
LYMPHOCYTES # BLD AUTO: 2.11 X10(3) UL (ref 1–4)
LYMPHOCYTES NFR BLD AUTO: 37.7 %
MCH RBC QN AUTO: 29.5 PG (ref 26–34)
MCHC RBC AUTO-ENTMCNC: 32.9 G/DL (ref 31–37)
MCV RBC AUTO: 89.6 FL
MONOCYTES # BLD AUTO: 0.28 X10(3) UL (ref 0.1–1)
MONOCYTES NFR BLD AUTO: 5 %
NEUTROPHILS # BLD AUTO: 3.07 X10 (3) UL (ref 1.5–7.7)
NEUTROPHILS # BLD AUTO: 3.07 X10(3) UL (ref 1.5–7.7)
NEUTROPHILS NFR BLD AUTO: 54.9 %
OSMOLALITY SERPL CALC.SUM OF ELEC: 299 MOSM/KG (ref 275–295)
PLATELET # BLD AUTO: 239 10(3)UL (ref 150–450)
POTASSIUM SERPL-SCNC: 4.1 MMOL/L (ref 3.5–5.1)
PROT SERPL-MCNC: 7.8 G/DL (ref 6.4–8.2)
RBC # BLD AUTO: 4.34 X10(6)UL
SODIUM SERPL-SCNC: 134 MMOL/L (ref 136–145)
WBC # BLD AUTO: 5.6 X10(3) UL (ref 4–11)

## 2022-06-05 PROCEDURE — 85025 COMPLETE CBC W/AUTO DIFF WBC: CPT

## 2022-06-05 PROCEDURE — 36415 COLL VENOUS BLD VENIPUNCTURE: CPT

## 2022-06-05 PROCEDURE — 80053 COMPREHEN METABOLIC PANEL: CPT

## 2022-06-05 NOTE — PROGRESS NOTES
Call patient has received a call from lab for critical results of a glucose of 467. Patient is a known diabetic. Per review of notes, last visit with Dr. Lucille Reynolds, the patient had stopped her insulin. The patient states that she did not fast this morning for her labs. But she has been taking her diabetes medicines. I advised the patient to keep hydrated, check her glucose at home and take her insulin as recommended per sliding scale fr om her primary care doctor. I have requested that the patient contact her primary care doctor for further management of her elevated glucose.

## 2022-06-09 ENCOUNTER — OFFICE VISIT (OUTPATIENT)
Dept: HEMATOLOGY/ONCOLOGY | Facility: HOSPITAL | Age: 52
End: 2022-06-09
Attending: INTERNAL MEDICINE
Payer: COMMERCIAL

## 2022-06-09 VITALS
WEIGHT: 221 LBS | TEMPERATURE: 98 F | BODY MASS INDEX: 40.67 KG/M2 | RESPIRATION RATE: 16 BRPM | DIASTOLIC BLOOD PRESSURE: 93 MMHG | HEART RATE: 86 BPM | SYSTOLIC BLOOD PRESSURE: 151 MMHG | OXYGEN SATURATION: 98 % | HEIGHT: 62 IN

## 2022-06-09 DIAGNOSIS — Z17.1 MALIGNANT NEOPLASM OF UPPER-OUTER QUADRANT OF RIGHT BREAST IN FEMALE, ESTROGEN RECEPTOR NEGATIVE (HCC): ICD-10-CM

## 2022-06-09 DIAGNOSIS — R92.2 BREAST DENSITY: Primary | ICD-10-CM

## 2022-06-09 DIAGNOSIS — C50.411 MALIGNANT NEOPLASM OF UPPER-OUTER QUADRANT OF RIGHT BREAST IN FEMALE, ESTROGEN RECEPTOR NEGATIVE (HCC): ICD-10-CM

## 2022-06-09 PROCEDURE — 99214 OFFICE O/P EST MOD 30 MIN: CPT | Performed by: INTERNAL MEDICINE

## 2022-06-22 ENCOUNTER — TELEPHONE (OUTPATIENT)
Dept: GASTROENTEROLOGY | Facility: CLINIC | Age: 52
End: 2022-06-22

## 2022-06-22 DIAGNOSIS — Z01.818 PRE-OP EXAMINATION: ICD-10-CM

## 2022-06-22 DIAGNOSIS — Z12.11 SCREENING FOR COLON CANCER: Primary | ICD-10-CM

## 2022-06-22 NOTE — TELEPHONE ENCOUNTER
GI RN- Patient was moved to 7/25 due to Dr Ishmael Bonilla being on vacation. Patient would like office to contact to Dr Jalen Hill about her procedure moving pushed down further. Please advise.

## 2022-06-30 ENCOUNTER — OFFICE VISIT (OUTPATIENT)
Dept: SURGERY | Facility: CLINIC | Age: 52
End: 2022-06-30
Payer: COMMERCIAL

## 2022-06-30 VITALS
HEIGHT: 63.8 IN | OXYGEN SATURATION: 98 % | DIASTOLIC BLOOD PRESSURE: 80 MMHG | BODY MASS INDEX: 37.85 KG/M2 | SYSTOLIC BLOOD PRESSURE: 132 MMHG | WEIGHT: 219 LBS | HEART RATE: 78 BPM

## 2022-06-30 DIAGNOSIS — E66.01 MORBID OBESITY WITH BMI OF 40.0-44.9, ADULT (HCC): ICD-10-CM

## 2022-06-30 DIAGNOSIS — Z79.4 TYPE 2 DIABETES MELLITUS WITH HYPERGLYCEMIA, WITH LONG-TERM CURRENT USE OF INSULIN (HCC): Primary | ICD-10-CM

## 2022-06-30 DIAGNOSIS — E11.65 TYPE 2 DIABETES MELLITUS WITH HYPERGLYCEMIA, WITH LONG-TERM CURRENT USE OF INSULIN (HCC): Primary | ICD-10-CM

## 2022-07-07 ENCOUNTER — OFFICE VISIT (OUTPATIENT)
Dept: SLEEP CENTER | Age: 52
End: 2022-07-07
Attending: PHYSICIAN ASSISTANT
Payer: COMMERCIAL

## 2022-07-07 DIAGNOSIS — R29.818 SUSPECTED SLEEP APNEA: ICD-10-CM

## 2022-07-07 PROCEDURE — 95806 SLEEP STUDY UNATT&RESP EFFT: CPT

## 2022-07-09 NOTE — PROCEDURES
[facility]  Accredited by the [facility] ([facility])    PATIENT'S NAME: Neelam Mendez   ATTENDING PHYSICIAN: Barb Epley, PA-C   REFERRING PHYSICIAN: Barb Epley, PA-C   PATIENT ACCOUNT #: [de-identified] LOCATION: [address] #: [id number] YOB: 1970   DATE OF STUDY: 07/07/2022       SLEEP STUDY REPORT    STUDY TYPE:  Home sleep test.    INDICATION:  Suspected obstructive sleep apnea (ICD-10 code G47.33) in patient with crowded oropharynx, diabetes, and little in the way of obstructive sleep apnea symptomatology, but the patient is considering bariatric surgery. The [place] Sleepiness Scale score is 3/24 and the body mass index is 38.8. RESULTS:  The patient underwent home sleep test with measurement of her nasal air flow, nasal air pressure, snoring, chest and abdominal wall motion, oximetry, and body position. I have reviewed the entirety of the raw data of this study. During this study, the total recording time is 480 minutes. The lights-out clock time is 9:33 p.m., lights-on clock time is 5:34 a.m. There was 1 apneic event which was obstructive in character for an apnea index of 0.1 events per hour. There were 180 hypopneic events for a hypopnea index of 22.5 events per hour. The combined apnea plus hypopnea index is 22.6 events per hour. There was no significant hypoventilation, Cheyne-Alan breathing, or periodic breathing. The average oxygen saturation is 96%, the lowest oxygen saturation is 81%, and the average desaturation is 5%. The oxygen desaturation index is 24.1 events per hour. Snoring was appreciated. The patient spent 318 minutes in the supine position equivalent to 66% of the testing and 161 minutes in the non-supine position equivalent to 34% of the testing. The average heart rate is 74 beats per minute.     INTERPRETATION:  The data generated from this study is consistent with moderate obstructive sleep apnea (ICD-10 code G47.33). RECOMMENDATIONS:    1. CPAP titration. 2.   Weight loss. 3.   Avoid alcohol. 4.   Avoid sedating drug. 5.   Patient should not drive if at all sleepy. Please do not hesitate to contact me if there is any question whatsoever regarding interpretation of this study.     Dictated By Ashely Mae MD  d: 07/08/2022 19:44:47  t: 07/08/2022 19:48:56  [facility] [id number]  [facility]/    cc: Karli Davis PA-C

## 2022-07-11 DIAGNOSIS — G47.33 OSA (OBSTRUCTIVE SLEEP APNEA): Primary | ICD-10-CM

## 2022-07-15 ENCOUNTER — TELEPHONE (OUTPATIENT)
Dept: ENDOCRINOLOGY CLINIC | Facility: CLINIC | Age: 52
End: 2022-07-15

## 2022-07-18 ENCOUNTER — TELEPHONE (OUTPATIENT)
Dept: GASTROENTEROLOGY | Facility: CLINIC | Age: 52
End: 2022-07-18

## 2022-07-18 NOTE — PAT NURSING NOTE
Contacted pt for PAT for procedure on 7/25. Per pt, she will be out of town from Thursday-Sunday evening and unable to get pre-procedure covid test. Unable to do rapid covid test AM of procedure as [facility] does not do rapid covid testing prior to 1000. Pt arriving 1.5 hours early for rapid covid test day of at 0645. Rapid covid test order placed. Pt verbalized understanding.

## 2022-07-18 NOTE — TELEPHONE ENCOUNTER
I sent prep instructions via [employer]. I called [facility], they still have the prep order-they will fill it for her now. Patient contacted and notified of above-she was able to pull up the prep instructions when I was on the line with her.

## 2022-07-18 NOTE — TELEPHONE ENCOUNTER
Costa Galicia from [facility] called and told me that patient needs to have prep sent to pharmacy again because it  and she needs prep instructions sent to [facility].

## 2022-07-25 ENCOUNTER — ANESTHESIA EVENT (OUTPATIENT)
Dept: ENDOSCOPY | Facility: HOSPITAL | Age: 52
End: 2022-07-25
Payer: COMMERCIAL

## 2022-07-25 ENCOUNTER — HOSPITAL ENCOUNTER (OUTPATIENT)
Facility: HOSPITAL | Age: 52
Setting detail: HOSPITAL OUTPATIENT SURGERY
Discharge: HOME | End: 2022-07-25
Attending: INTERNAL MEDICINE | Admitting: INTERNAL MEDICINE
Payer: COMMERCIAL

## 2022-07-25 ENCOUNTER — ANESTHESIA (OUTPATIENT)
Dept: ENDOSCOPY | Facility: HOSPITAL | Age: 52
End: 2022-07-25
Payer: COMMERCIAL

## 2022-07-25 ENCOUNTER — TELEPHONE (OUTPATIENT)
Dept: GASTROENTEROLOGY | Facility: CLINIC | Age: 52
End: 2022-07-25

## 2022-07-25 VITALS
WEIGHT: 220 LBS | RESPIRATION RATE: 18 BRPM | OXYGEN SATURATION: 97 % | TEMPERATURE: 98 F | HEART RATE: 78 BPM | DIASTOLIC BLOOD PRESSURE: 74 MMHG | BODY MASS INDEX: 37.56 KG/M2 | HEIGHT: 64 IN | SYSTOLIC BLOOD PRESSURE: 108 MMHG

## 2022-07-25 DIAGNOSIS — Z12.11 SCREENING FOR COLON CANCER: ICD-10-CM

## 2022-07-25 DIAGNOSIS — Z01.818 PRE-OP TESTING: Primary | ICD-10-CM

## 2022-07-25 DIAGNOSIS — Z01.818 PRE-OP EXAMINATION: ICD-10-CM

## 2022-07-25 LAB
GLUCOSE BLDC GLUCOMTR-MCNC: 216 MG/DL (ref 70–99)
SARS-COV-2 RNA RESP QL NAA+PROBE: NOT DETECTED

## 2022-07-25 PROCEDURE — 0DJ08ZZ INSPECTION OF UPPER INTESTINAL TRACT, VIA NATURAL OR ARTIFICIAL OPENING ENDOSCOPIC: ICD-10-PCS | Performed by: INTERNAL MEDICINE

## 2022-07-25 PROCEDURE — 0DBM8ZX EXCISION OF DESCENDING COLON, VIA NATURAL OR ARTIFICIAL OPENING ENDOSCOPIC, DIAGNOSTIC: ICD-10-PCS | Performed by: INTERNAL MEDICINE

## 2022-07-25 PROCEDURE — 0DBH8ZX EXCISION OF CECUM, VIA NATURAL OR ARTIFICIAL OPENING ENDOSCOPIC, DIAGNOSTIC: ICD-10-PCS | Performed by: INTERNAL MEDICINE

## 2022-07-25 PROCEDURE — 45380 COLONOSCOPY AND BIOPSY: CPT | Performed by: INTERNAL MEDICINE

## 2022-07-25 PROCEDURE — 43235 EGD DIAGNOSTIC BRUSH WASH: CPT | Performed by: INTERNAL MEDICINE

## 2022-07-25 PROCEDURE — 45385 COLONOSCOPY W/LESION REMOVAL: CPT | Performed by: INTERNAL MEDICINE

## 2022-07-25 RX ORDER — LIDOCAINE HYDROCHLORIDE 10 MG/ML
INJECTION, SOLUTION EPIDURAL; INFILTRATION; INTRACAUDAL; PERINEURAL AS NEEDED
Status: DISCONTINUED | OUTPATIENT
Start: 2022-07-25 | End: 2022-07-25 | Stop reason: SURG

## 2022-07-25 RX ORDER — SODIUM CHLORIDE, SODIUM LACTATE, POTASSIUM CHLORIDE, CALCIUM CHLORIDE 600; 310; 30; 20 MG/100ML; MG/100ML; MG/100ML; MG/100ML
INJECTION, SOLUTION INTRAVENOUS CONTINUOUS
Status: DISCONTINUED | OUTPATIENT
Start: 2022-07-25 | End: 2022-07-25

## 2022-07-25 RX ORDER — DEXAMETHASONE SODIUM PHOSPHATE 4 MG/ML
VIAL (ML) INJECTION AS NEEDED
Status: DISCONTINUED | OUTPATIENT
Start: 2022-07-25 | End: 2022-07-25 | Stop reason: SURG

## 2022-07-25 RX ORDER — METOCLOPRAMIDE HYDROCHLORIDE 5 MG/ML
INJECTION INTRAMUSCULAR; INTRAVENOUS AS NEEDED
Status: DISCONTINUED | OUTPATIENT
Start: 2022-07-25 | End: 2022-07-25 | Stop reason: SURG

## 2022-07-25 RX ORDER — ONDANSETRON 2 MG/ML
INJECTION INTRAMUSCULAR; INTRAVENOUS AS NEEDED
Status: DISCONTINUED | OUTPATIENT
Start: 2022-07-25 | End: 2022-07-25 | Stop reason: SURG

## 2022-07-25 RX ADMIN — ONDANSETRON 4 MG: 2 INJECTION INTRAMUSCULAR; INTRAVENOUS at 08:20:00

## 2022-07-25 RX ADMIN — METOCLOPRAMIDE HYDROCHLORIDE 10 MG: 5 INJECTION INTRAMUSCULAR; INTRAVENOUS at 08:27:00

## 2022-07-25 RX ADMIN — SODIUM CHLORIDE, SODIUM LACTATE, POTASSIUM CHLORIDE, CALCIUM CHLORIDE: 600; 310; 30; 20 INJECTION, SOLUTION INTRAVENOUS at 08:20:00

## 2022-07-25 RX ADMIN — DEXAMETHASONE SODIUM PHOSPHATE 4 MG: 4 MG/ML VIAL (ML) INJECTION at 08:26:00

## 2022-07-25 RX ADMIN — LIDOCAINE HYDROCHLORIDE 50 MG: 10 INJECTION, SOLUTION EPIDURAL; INFILTRATION; INTRACAUDAL; PERINEURAL at 08:22:00

## 2022-07-25 NOTE — OPERATIVE REPORT
[facility] Endoscopy Report      Preoperative Diagnosis:  - colon cancer screening  - pre-bariatric surgery       Postoperative Diagnosis:  - colon polyps x 3  - small internal hemorrhoids  - normal EGD       Procedure:    Colonoscopy   Esophagogastroduodenoscopy       Surgeon:  Cortes Holland M.D. Anesthesia:  MAC sedation    Technique:  After informed consent, the patient was placed in the left lateral recumbent position. Digital rectal examination revealed no palpable intraluminal abnormalities. An Olympus variable stiffness 190 series HD colonoscope was inserted into the rectum and advanced under direct vision by following the lumen to the cecum. The colon was examined upon withdrawal in the left lateral position. Following colonoscopy, an Olympus adult HD gastroscope was inserted into the hypopharynx and advanced under direct vision into the esophagus, stomach and duodenum. The endoscope was withdrawn to the stomach where retroflexion of the annulus, body, cardia and fundus was performed. The instrument was straightened, insufflated air and fluid were suctioned and the endoscope was withdrawn. The procedures were well tolerated without immediate complication. Findings:  The preparation of the colon was good. The terminal ileum was examined for 4 cm and visually normal.  The ileocecal valve was well preserved. The visualized colonic mucosa from the cecum to the anal verge was normal with an intact vascular pattern. Colon polyps x3 removed as follows;  -Descending x2, first polyp was sessile approximately 4 mm in size and cold snare removed. Second polyp was diminutive removed by cold biopsy technique. -Cecum x1, diminutive removed by cold biopsy technique. All polypectomy sites inspected and found to be free of bleeding and specimens retrieved and sent for analysis. Small internal hemorrhoids noted on retroflexed view.     The esophagus was normal.  The GE junction and diaphragmatic impression were at 37 cm. The stomach distended appropriately with insufflated air. The mucosa of the stomach including cardia, fundus, gastric body and antrum were normal.  The duodenal bulb and post bulbar regions were normal.    Estimated blood loss-insignificant  Specimens-colon polyps    Impression:  - colon polyps x 3  - small internal hemorrhoids  - normal EGD     Recommendations:  - Post polypectomy instructions given  - Repeat colonoscopy in 3- 10 years  - Symptomatic treatment of hemorrhoids          Carmie Handler.  Neymar Heebrt MD  7/25/2022  8:51 AM

## 2022-07-25 NOTE — ANESTHESIA POSTPROCEDURE EVALUATION
Patient: Juan Santo    Procedure Summary     Date: 07/25/22 Room / Location: [address] [place] [address] ENDOSCOPY 03 / [address] [place] [address] ENDOSCOPY    Anesthesia Start: 0820 Anesthesia Stop: 9697    Procedures:       COLONOSCOPY-SCREENING/ESOPHAGOGASTRODUODENOSCOPY (EGD) (N/A )      ESOPHAGOGASTRODUODENOSCOPY (EGD) (N/A ) Diagnosis:       Screening for colon cancer      Pre-op examination      (COLON POLYPS, HEMORRHOIDS, NORMAL EGD)    Surgeons: Liza Dover MD Anesthesiologist: Wilma Parker CRNA    Anesthesia Type: MAC ASA Status: 3          Anesthesia Type: MAC    Vitals Value Taken Time   /70 07/25/22 0851   Temp 36.9 07/25/22 0856   Pulse 78 07/25/22 0855   Resp 25 07/25/22 0855   SpO2 97 % 07/25/22 0855   Vitals shown include unvalidated device data.     [address] [place] [address] AN Post Evaluation:   Patient Evaluated in PACU  Patient Participation: complete - patient cannot participate  Level of Consciousness: awake and alert  Pain Score: 0  Pain Management: adequate  Airway Patency:patent  Yes    Cardiovascular Status: acceptable  Respiratory Status: acceptable  Postoperative Hydration acceptable      Trent Ortega CRNA  7/25/2022 8:56 AM

## 2022-07-25 NOTE — TELEPHONE ENCOUNTER
----- Message from Gómez Flowers MD sent at 7/25/2022  4:11 PM CDT -----  I wanted to get back to you with your colonoscopy and EGD results. You had 3 colon polyps removed which were benign. I would advise a repeat colonoscopy in 3 years to make sure no new polyps are forming. You also have internal hemorrhoids and diverticulosis.       The upper endoscopy was normal.

## 2022-07-25 NOTE — TELEPHONE ENCOUNTER
Patient contacted. Reviewed below result note and voiced understanding. Health maintenance updated. 3 year colonoscopy recall entered into patient outreach in [address] [place] [address] Rd. Next due 7/25/2025.

## 2022-08-27 ENCOUNTER — APPOINTMENT (OUTPATIENT)
Dept: CT IMAGING | Facility: HOSPITAL | Age: 52
End: 2022-08-27
Attending: EMERGENCY MEDICINE
Payer: COMMERCIAL

## 2022-08-27 ENCOUNTER — HOSPITAL ENCOUNTER (EMERGENCY)
Facility: HOSPITAL | Age: 52
Discharge: HOME | End: 2022-08-27
Attending: EMERGENCY MEDICINE
Payer: COMMERCIAL

## 2022-08-27 ENCOUNTER — APPOINTMENT (OUTPATIENT)
Dept: GENERAL RADIOLOGY | Facility: HOSPITAL | Age: 52
End: 2022-08-27
Attending: EMERGENCY MEDICINE
Payer: COMMERCIAL

## 2022-08-27 VITALS
HEART RATE: 68 BPM | WEIGHT: 217 LBS | HEIGHT: 63 IN | OXYGEN SATURATION: 98 % | SYSTOLIC BLOOD PRESSURE: 109 MMHG | RESPIRATION RATE: 19 BRPM | TEMPERATURE: 97 F | DIASTOLIC BLOOD PRESSURE: 74 MMHG | BODY MASS INDEX: 38.45 KG/M2

## 2022-08-27 DIAGNOSIS — R42 DIZZINESS: Primary | ICD-10-CM

## 2022-08-27 LAB
ANION GAP SERPL CALC-SCNC: 5 MMOL/L (ref 0–18)
BASOPHILS # BLD AUTO: 0.06 X10(3) UL (ref 0–0.2)
BASOPHILS NFR BLD AUTO: 1 %
BUN BLD-MCNC: 11 MG/DL (ref 7–18)
BUN/CREAT SERPL: 12.1 (ref 10–20)
CALCIUM BLD-MCNC: 9.3 MG/DL (ref 8.5–10.1)
CHLORIDE SERPL-SCNC: 105 MMOL/L (ref 98–112)
CO2 SERPL-SCNC: 30 MMOL/L (ref 21–32)
CREAT BLD-MCNC: 0.91 MG/DL
DEPRECATED RDW RBC AUTO: 47.9 FL (ref 35.1–46.3)
EOSINOPHIL # BLD AUTO: 0.75 X10(3) UL (ref 0–0.7)
EOSINOPHIL NFR BLD AUTO: 12.8 %
ERYTHROCYTE [DISTWIDTH] IN BLOOD BY AUTOMATED COUNT: 14.3 % (ref 11–15)
GFR SERPLBLD BASED ON 1.73 SQ M-ARVRAT: 76 ML/MIN/1.73M2 (ref 60–?)
GLUCOSE BLD-MCNC: 179 MG/DL (ref 70–99)
HCT VFR BLD AUTO: 40.8 %
HGB BLD-MCNC: 12.8 G/DL
IMM GRANULOCYTES # BLD AUTO: 0.01 X10(3) UL (ref 0–1)
IMM GRANULOCYTES NFR BLD: 0.2 %
LYMPHOCYTES # BLD AUTO: 2.31 X10(3) UL (ref 1–4)
LYMPHOCYTES NFR BLD AUTO: 39.4 %
MCH RBC QN AUTO: 28.6 PG (ref 26–34)
MCHC RBC AUTO-ENTMCNC: 31.4 G/DL (ref 31–37)
MCV RBC AUTO: 91.1 FL
MONOCYTES # BLD AUTO: 0.31 X10(3) UL (ref 0.1–1)
MONOCYTES NFR BLD AUTO: 5.3 %
NEUTROPHILS # BLD AUTO: 2.43 X10 (3) UL (ref 1.5–7.7)
NEUTROPHILS # BLD AUTO: 2.43 X10(3) UL (ref 1.5–7.7)
NEUTROPHILS NFR BLD AUTO: 41.3 %
OSMOLALITY SERPL CALC.SUM OF ELEC: 294 MOSM/KG (ref 275–295)
PLATELET # BLD AUTO: 235 10(3)UL (ref 150–450)
POTASSIUM SERPL-SCNC: 3.7 MMOL/L (ref 3.5–5.1)
RBC # BLD AUTO: 4.48 X10(6)UL
SODIUM SERPL-SCNC: 140 MMOL/L (ref 136–145)
WBC # BLD AUTO: 5.9 X10(3) UL (ref 4–11)

## 2022-08-27 PROCEDURE — 96360 HYDRATION IV INFUSION INIT: CPT

## 2022-08-27 PROCEDURE — 70450 CT HEAD/BRAIN W/O DYE: CPT | Performed by: EMERGENCY MEDICINE

## 2022-08-27 PROCEDURE — 99285 EMERGENCY DEPT VISIT HI MDM: CPT

## 2022-08-27 PROCEDURE — 99284 EMERGENCY DEPT VISIT MOD MDM: CPT

## 2022-08-27 PROCEDURE — 85025 COMPLETE CBC W/AUTO DIFF WBC: CPT | Performed by: EMERGENCY MEDICINE

## 2022-08-27 PROCEDURE — 93010 ELECTROCARDIOGRAM REPORT: CPT | Performed by: EMERGENCY MEDICINE

## 2022-08-27 PROCEDURE — 71045 X-RAY EXAM CHEST 1 VIEW: CPT | Performed by: EMERGENCY MEDICINE

## 2022-08-27 PROCEDURE — 93005 ELECTROCARDIOGRAM TRACING: CPT

## 2022-08-27 PROCEDURE — 80048 BASIC METABOLIC PNL TOTAL CA: CPT | Performed by: EMERGENCY MEDICINE

## 2022-08-27 RX ORDER — FLUTICASONE PROPIONATE 50 MCG
SPRAY, SUSPENSION (ML) NASAL
Qty: 15.8 ML | Refills: 0 | Status: SHIPPED | OUTPATIENT
Start: 2022-08-27

## 2022-08-27 RX ORDER — MECLIZINE HYDROCHLORIDE 25 MG/1
25 TABLET ORAL 3 TIMES DAILY PRN
Qty: 20 TABLET | Refills: 0 | Status: SHIPPED | OUTPATIENT
Start: 2022-08-27

## 2022-08-27 RX ORDER — MONTELUKAST SODIUM 10 MG/1
10 TABLET ORAL NIGHTLY
Qty: 30 TABLET | Refills: 3 | Status: SHIPPED | OUTPATIENT
Start: 2022-08-27

## 2022-08-27 RX ORDER — MECLIZINE HYDROCHLORIDE 25 MG/1
25 TABLET ORAL ONCE
Status: COMPLETED | OUTPATIENT
Start: 2022-08-27 | End: 2022-08-27

## 2022-08-27 NOTE — ED INITIAL ASSESSMENT (HPI)
Pt states that she has cough, congestion and dizziness that started a week. Pt report no N/V. Pt reports sinus pressure and a headache.

## 2022-08-27 NOTE — ED QUICK NOTES
Patient presents with:  Dizziness and URI/cough    Patient is A&Ox4 , arrived via private car with c/o dizziness that has been going on for the last few days. Patient stated it is constant and affects her walking. Also stated she will get short of breath when walking and eating. Hx of sinus issues. Patient changed into gown. Side rails up x2, call light within reach, blanket provided.

## 2022-08-27 NOTE — ED QUICK NOTES
Patient stated her dizziness has decreased after meclazine was given. Vital signs stable. Denies any other complaints at this time. Aware of wait for dispo.  Provider made aware

## 2022-08-30 ENCOUNTER — OFFICE VISIT (OUTPATIENT)
Dept: SURGERY | Facility: CLINIC | Age: 52
End: 2022-08-30
Payer: COMMERCIAL

## 2022-08-30 VITALS — BODY MASS INDEX: 38.63 KG/M2 | HEIGHT: 63.8 IN | WEIGHT: 223.5 LBS

## 2022-08-30 DIAGNOSIS — Z79.4 TYPE 2 DIABETES MELLITUS WITH HYPERGLYCEMIA, WITH LONG-TERM CURRENT USE OF INSULIN (HCC): ICD-10-CM

## 2022-08-30 DIAGNOSIS — E11.65 TYPE 2 DIABETES MELLITUS WITH HYPERGLYCEMIA, WITH LONG-TERM CURRENT USE OF INSULIN (HCC): ICD-10-CM

## 2022-08-30 DIAGNOSIS — I10 ESSENTIAL HYPERTENSION: ICD-10-CM

## 2022-08-30 DIAGNOSIS — E66.9 OBESITY (BMI 30-39.9): ICD-10-CM

## 2022-08-30 DIAGNOSIS — E11.9 TYPE 2 DIABETES MELLITUS WITHOUT RETINOPATHY (HCC): ICD-10-CM

## 2022-08-30 DIAGNOSIS — E78.00 HYPERCHOLESTEREMIA: ICD-10-CM

## 2022-08-30 DIAGNOSIS — M16.0 OSTEOARTHRITIS OF BOTH HIPS, UNSPECIFIED OSTEOARTHRITIS TYPE: ICD-10-CM

## 2022-08-30 PROCEDURE — 97803 MED NUTRITION INDIV SUBSEQ: CPT

## 2022-08-30 PROCEDURE — 3008F BODY MASS INDEX DOCD: CPT

## 2022-08-30 NOTE — PATIENT INSTRUCTIONS
Recommendations/goals:   Goals:   1. Continue to keep a food record, My [employer]over or My Net Diary. 2.  Continue eating 4-6 meals/snacks per day. Include and produce at each time. Aim for 40-50 gms of protein per day. Keep the carbohydrates at 120 grams per day or less. 3.  Continue to drink 64 oz of water per day. 4.  Increase walking 30 minutes per day at least 5 days per week. 5.  Add strength/resistance training 10 minutes 3 days per week, focus on legs. 6.  Buy some protein shakes to try. 7.  Continue to practice eating more slowly by chewing food more before swallowing. Practice drinking separately from eating. 8.  Eliminate caffeine and carbonation.

## 2022-09-08 ENCOUNTER — OFFICE VISIT (OUTPATIENT)
Dept: SURGERY | Facility: CLINIC | Age: 52
End: 2022-09-08
Payer: COMMERCIAL

## 2022-09-08 ENCOUNTER — LAB ENCOUNTER (OUTPATIENT)
Dept: LAB | Facility: HOSPITAL | Age: 52
End: 2022-09-08
Attending: SURGERY
Payer: COMMERCIAL

## 2022-09-08 VITALS
WEIGHT: 224 LBS | OXYGEN SATURATION: 99 % | HEIGHT: 63.8 IN | SYSTOLIC BLOOD PRESSURE: 126 MMHG | BODY MASS INDEX: 38.71 KG/M2 | HEART RATE: 69 BPM | DIASTOLIC BLOOD PRESSURE: 80 MMHG

## 2022-09-08 DIAGNOSIS — E11.65 TYPE 2 DIABETES MELLITUS WITH HYPERGLYCEMIA, WITH LONG-TERM CURRENT USE OF INSULIN (HCC): ICD-10-CM

## 2022-09-08 DIAGNOSIS — E66.01 MORBID OBESITY WITH BMI OF 40.0-44.9, ADULT (HCC): Primary | ICD-10-CM

## 2022-09-08 DIAGNOSIS — Z79.4 TYPE 2 DIABETES MELLITUS WITH HYPERGLYCEMIA, WITH LONG-TERM CURRENT USE OF INSULIN (HCC): ICD-10-CM

## 2022-09-08 DIAGNOSIS — Z17.1 MALIGNANT NEOPLASM OF UPPER-OUTER QUADRANT OF RIGHT BREAST IN FEMALE, ESTROGEN RECEPTOR NEGATIVE (HCC): ICD-10-CM

## 2022-09-08 DIAGNOSIS — C50.411 MALIGNANT NEOPLASM OF UPPER-OUTER QUADRANT OF RIGHT BREAST IN FEMALE, ESTROGEN RECEPTOR NEGATIVE (HCC): ICD-10-CM

## 2022-09-08 DIAGNOSIS — E66.01 MORBID OBESITY WITH BMI OF 40.0-44.9, ADULT (HCC): ICD-10-CM

## 2022-09-08 LAB
ALBUMIN SERPL-MCNC: 3.1 G/DL (ref 3.4–5)
ALBUMIN/GLOB SERPL: 0.8 {RATIO} (ref 1–2)
ALP LIVER SERPL-CCNC: 76 U/L
ALT SERPL-CCNC: 18 U/L
ANION GAP SERPL CALC-SCNC: 4 MMOL/L (ref 0–18)
AST SERPL-CCNC: 12 U/L (ref 15–37)
BASOPHILS # BLD AUTO: 0.07 X10(3) UL (ref 0–0.2)
BASOPHILS NFR BLD AUTO: 1 %
BILIRUB SERPL-MCNC: 0.4 MG/DL (ref 0.1–2)
BUN BLD-MCNC: 16 MG/DL (ref 7–18)
BUN/CREAT SERPL: 16 (ref 10–20)
CALCIUM BLD-MCNC: 9 MG/DL (ref 8.5–10.1)
CHLORIDE SERPL-SCNC: 107 MMOL/L (ref 98–112)
CHOLEST SERPL-MCNC: 211 MG/DL (ref ?–200)
CO2 SERPL-SCNC: 28 MMOL/L (ref 21–32)
CORTIS SERPL-MCNC: 10.1 UG/DL
CREAT BLD-MCNC: 1 MG/DL
DEPRECATED HBV CORE AB SER IA-ACNC: 168.7 NG/ML
DEPRECATED RDW RBC AUTO: 48 FL (ref 35.1–46.3)
EOSINOPHIL # BLD AUTO: 0.89 X10(3) UL (ref 0–0.7)
EOSINOPHIL NFR BLD AUTO: 12.8 %
ERYTHROCYTE [DISTWIDTH] IN BLOOD BY AUTOMATED COUNT: 14.2 % (ref 11–15)
EST. AVERAGE GLUCOSE BLD GHB EST-MCNC: 275 MG/DL (ref 68–126)
FASTING PATIENT LIPID ANSWER: YES
FASTING STATUS PATIENT QL REPORTED: YES
FOLATE SERPL-MCNC: 18 NG/ML (ref 8.7–?)
GFR SERPLBLD BASED ON 1.73 SQ M-ARVRAT: 68 ML/MIN/1.73M2 (ref 60–?)
GLOBULIN PLAS-MCNC: 4 G/DL (ref 2.8–4.4)
GLUCOSE BLD-MCNC: 198 MG/DL (ref 70–99)
HBA1C MFR BLD: 11.2 % (ref ?–5.7)
HCT VFR BLD AUTO: 40.7 %
HDLC SERPL-MCNC: 43 MG/DL (ref 40–59)
HGB BLD-MCNC: 12.8 G/DL
IMM GRANULOCYTES # BLD AUTO: 0.01 X10(3) UL (ref 0–1)
IMM GRANULOCYTES NFR BLD: 0.1 %
IRON SATN MFR SERPL: 21 %
IRON SERPL-MCNC: 58 UG/DL
LDLC SERPL CALC-MCNC: 145 MG/DL (ref ?–100)
LYMPHOCYTES # BLD AUTO: 2.52 X10(3) UL (ref 1–4)
LYMPHOCYTES NFR BLD AUTO: 36.1 %
MCH RBC QN AUTO: 28.8 PG (ref 26–34)
MCHC RBC AUTO-ENTMCNC: 31.4 G/DL (ref 31–37)
MCV RBC AUTO: 91.7 FL
MONOCYTES # BLD AUTO: 0.4 X10(3) UL (ref 0.1–1)
MONOCYTES NFR BLD AUTO: 5.7 %
NEUTROPHILS # BLD AUTO: 3.09 X10 (3) UL (ref 1.5–7.7)
NEUTROPHILS # BLD AUTO: 3.09 X10(3) UL (ref 1.5–7.7)
NEUTROPHILS NFR BLD AUTO: 44.3 %
NONHDLC SERPL-MCNC: 168 MG/DL (ref ?–130)
OSMOLALITY SERPL CALC.SUM OF ELEC: 295 MOSM/KG (ref 275–295)
PLATELET # BLD AUTO: 255 10(3)UL (ref 150–450)
POTASSIUM SERPL-SCNC: 4.2 MMOL/L (ref 3.5–5.1)
PROT SERPL-MCNC: 7.1 G/DL (ref 6.4–8.2)
RBC # BLD AUTO: 4.44 X10(6)UL
SODIUM SERPL-SCNC: 139 MMOL/L (ref 136–145)
TIBC SERPL-MCNC: 277 UG/DL (ref 240–450)
TRANSFERRIN SERPL-MCNC: 186 MG/DL (ref 200–360)
TRIGL SERPL-MCNC: 128 MG/DL (ref 30–149)
TSI SER-ACNC: 1.53 MIU/ML (ref 0.36–3.74)
VIT B12 SERPL-MCNC: 992 PG/ML (ref 193–986)
VIT D+METAB SERPL-MCNC: 21.9 NG/ML (ref 30–100)
VLDLC SERPL CALC-MCNC: 24 MG/DL (ref 0–30)
WBC # BLD AUTO: 7 X10(3) UL (ref 4–11)

## 2022-09-08 PROCEDURE — 80053 COMPREHEN METABOLIC PANEL: CPT

## 2022-09-08 PROCEDURE — 82306 VITAMIN D 25 HYDROXY: CPT

## 2022-09-08 PROCEDURE — 84466 ASSAY OF TRANSFERRIN: CPT

## 2022-09-08 PROCEDURE — 84425 ASSAY OF VITAMIN B-1: CPT

## 2022-09-08 PROCEDURE — 84443 ASSAY THYROID STIM HORMONE: CPT

## 2022-09-08 PROCEDURE — 85025 COMPLETE CBC W/AUTO DIFF WBC: CPT

## 2022-09-08 PROCEDURE — 82533 TOTAL CORTISOL: CPT

## 2022-09-08 PROCEDURE — 83540 ASSAY OF IRON: CPT

## 2022-09-08 PROCEDURE — 83036 HEMOGLOBIN GLYCOSYLATED A1C: CPT

## 2022-09-08 PROCEDURE — 82728 ASSAY OF FERRITIN: CPT

## 2022-09-08 PROCEDURE — 82607 VITAMIN B-12: CPT

## 2022-09-08 PROCEDURE — 82746 ASSAY OF FOLIC ACID SERUM: CPT

## 2022-09-08 PROCEDURE — 80061 LIPID PANEL: CPT

## 2022-09-08 PROCEDURE — 36415 COLL VENOUS BLD VENIPUNCTURE: CPT

## 2022-09-12 LAB — VITAMIN B1 (THIAMINE), WHOLE B: 134 NMOL/L

## 2022-09-13 ENCOUNTER — OFFICE VISIT (OUTPATIENT)
Dept: OTOLARYNGOLOGY | Facility: CLINIC | Age: 52
End: 2022-09-13
Payer: COMMERCIAL

## 2022-09-13 ENCOUNTER — OFFICE VISIT (OUTPATIENT)
Dept: HEMATOLOGY/ONCOLOGY | Facility: HOSPITAL | Age: 52
End: 2022-09-13
Attending: INTERNAL MEDICINE
Payer: COMMERCIAL

## 2022-09-13 VITALS
WEIGHT: 222 LBS | HEART RATE: 73 BPM | SYSTOLIC BLOOD PRESSURE: 136 MMHG | HEIGHT: 62 IN | RESPIRATION RATE: 16 BRPM | TEMPERATURE: 98 F | DIASTOLIC BLOOD PRESSURE: 67 MMHG | BODY MASS INDEX: 40.85 KG/M2 | OXYGEN SATURATION: 98 %

## 2022-09-13 DIAGNOSIS — J32.9 RECURRENT SINUSITIS: ICD-10-CM

## 2022-09-13 DIAGNOSIS — C50.411 MALIGNANT NEOPLASM OF UPPER-OUTER QUADRANT OF RIGHT BREAST IN FEMALE, ESTROGEN RECEPTOR NEGATIVE (HCC): Primary | ICD-10-CM

## 2022-09-13 DIAGNOSIS — M85.80 DRUG-INDUCED OSTEOPENIA: ICD-10-CM

## 2022-09-13 DIAGNOSIS — R05.2 SUBACUTE COUGH: ICD-10-CM

## 2022-09-13 DIAGNOSIS — T50.905A DRUG-INDUCED OSTEOPENIA: ICD-10-CM

## 2022-09-13 DIAGNOSIS — Z17.1 MALIGNANT NEOPLASM OF UPPER-OUTER QUADRANT OF RIGHT BREAST IN FEMALE, ESTROGEN RECEPTOR NEGATIVE (HCC): Primary | ICD-10-CM

## 2022-09-13 DIAGNOSIS — J33.9 NASAL POLYPOSIS: ICD-10-CM

## 2022-09-13 DIAGNOSIS — J32.9 CHRONIC SINUSITIS, UNSPECIFIED LOCATION: ICD-10-CM

## 2022-09-13 DIAGNOSIS — J34.2 DEVIATED NASAL SEPTUM: Primary | ICD-10-CM

## 2022-09-13 PROCEDURE — 99214 OFFICE O/P EST MOD 30 MIN: CPT | Performed by: INTERNAL MEDICINE

## 2022-09-13 PROCEDURE — 99213 OFFICE O/P EST LOW 20 MIN: CPT | Performed by: OTOLARYNGOLOGY

## 2022-09-13 RX ORDER — MULTIVIT-MIN/IRON/FOLIC ACID/K 18-600-40
1 CAPSULE ORAL DAILY
Qty: 90 CAPSULE | Refills: 3 | Status: SHIPPED | OUTPATIENT
Start: 2022-09-13 | End: 2022-12-12

## 2022-09-13 RX ORDER — POLYETHYLENE GLYCOL 3350, SODIUM SULFATE ANHYDROUS, SODIUM BICARBONATE, SODIUM CHLORIDE, POTASSIUM CHLORIDE 236; 22.74; 6.74; 5.86; 2.97 G/4L; G/4L; G/4L; G/4L; G/4L
4000 POWDER, FOR SOLUTION ORAL ONCE
COMMUNITY
Start: 2022-07-18

## 2022-09-13 RX ORDER — AMOXICILLIN AND CLAVULANATE POTASSIUM 875; 125 MG/1; MG/1
1 TABLET, FILM COATED ORAL EVERY 12 HOURS
Qty: 20 TABLET | Refills: 0 | Status: SHIPPED | OUTPATIENT
Start: 2022-09-13

## 2022-09-13 RX ORDER — PREDNISONE 10 MG/1
TABLET ORAL
Qty: 44 TABLET | Refills: 0 | Status: SHIPPED | OUTPATIENT
Start: 2022-09-13

## 2022-09-26 ENCOUNTER — HOSPITAL ENCOUNTER (OUTPATIENT)
Dept: MRI IMAGING | Facility: HOSPITAL | Age: 52
Discharge: HOME | End: 2022-09-26
Attending: INTERNAL MEDICINE

## 2022-09-26 ENCOUNTER — TELEPHONE (OUTPATIENT)
Dept: PULMONOLOGY | Facility: CLINIC | Age: 52
End: 2022-09-26

## 2022-09-26 ENCOUNTER — HOSPITAL ENCOUNTER (OUTPATIENT)
Dept: CT IMAGING | Facility: HOSPITAL | Age: 52
Discharge: HOME | End: 2022-09-26
Attending: INTERNAL MEDICINE

## 2022-09-26 DIAGNOSIS — C50.411 MALIGNANT NEOPLASM OF UPPER-OUTER QUADRANT OF RIGHT BREAST IN FEMALE, ESTROGEN RECEPTOR NEGATIVE (HCC): ICD-10-CM

## 2022-09-26 DIAGNOSIS — R92.2 BREAST DENSITY: ICD-10-CM

## 2022-09-26 DIAGNOSIS — R05.2 SUBACUTE COUGH: ICD-10-CM

## 2022-09-26 DIAGNOSIS — Z17.1 MALIGNANT NEOPLASM OF UPPER-OUTER QUADRANT OF RIGHT BREAST IN FEMALE, ESTROGEN RECEPTOR NEGATIVE (HCC): ICD-10-CM

## 2022-09-26 PROCEDURE — A9575 INJ GADOTERATE MEGLUMI 0.1ML: HCPCS | Performed by: INTERNAL MEDICINE

## 2022-09-26 PROCEDURE — 77049 MRI BREAST C-+ W/CAD BI: CPT | Performed by: INTERNAL MEDICINE

## 2022-09-26 PROCEDURE — 71250 CT THORAX DX C-: CPT | Performed by: INTERNAL MEDICINE

## 2022-09-26 NOTE — TELEPHONE ENCOUNTER
Patient indicates her order for cpap is on back order. Patient asking what steps need to be taken. Please call at [phone].

## 2022-09-27 ENCOUNTER — PATIENT MESSAGE (OUTPATIENT)
Dept: HEMATOLOGY/ONCOLOGY | Facility: HOSPITAL | Age: 52
End: 2022-09-27

## 2022-09-27 NOTE — TELEPHONE ENCOUNTER
Spoke with Deb. She is very worried about the results of her MRI. Dr Rigo Crum will call her later to go over it.

## 2022-09-28 ENCOUNTER — TELEPHONE (OUTPATIENT)
Dept: GENERAL RADIOLOGY | Facility: HOSPITAL | Age: 52
End: 2022-09-28

## 2022-09-28 ENCOUNTER — TELEPHONE (OUTPATIENT)
Dept: HEMATOLOGY/ONCOLOGY | Facility: HOSPITAL | Age: 52
End: 2022-09-28

## 2022-09-28 DIAGNOSIS — Z17.1 MALIGNANT NEOPLASM OF UPPER-OUTER QUADRANT OF RIGHT BREAST IN FEMALE, ESTROGEN RECEPTOR NEGATIVE (HCC): Primary | ICD-10-CM

## 2022-09-28 DIAGNOSIS — C50.411 MALIGNANT NEOPLASM OF UPPER-OUTER QUADRANT OF RIGHT BREAST IN FEMALE, ESTROGEN RECEPTOR NEGATIVE (HCC): Primary | ICD-10-CM

## 2022-09-28 DIAGNOSIS — R92.8 ABNORMAL FINDINGS ON DIAGNOSTIC IMAGING OF BREAST: ICD-10-CM

## 2022-09-28 NOTE — TELEPHONE ENCOUNTER
Spoke with Deb. Reassured her that we will get her in for biopsy right away. Will arrange for her to see Latrice Early and Lisbeth Canales on the same day. She verbalized understanding.

## 2022-10-04 ENCOUNTER — HOSPITAL ENCOUNTER (OUTPATIENT)
Dept: MRI IMAGING | Facility: HOSPITAL | Age: 52
Discharge: HOME | End: 2022-10-04
Attending: INTERNAL MEDICINE
Payer: COMMERCIAL

## 2022-10-04 ENCOUNTER — HOSPITAL ENCOUNTER (OUTPATIENT)
Dept: MAMMOGRAPHY | Facility: HOSPITAL | Age: 52
Discharge: HOME | End: 2022-10-04
Attending: INTERNAL MEDICINE
Payer: COMMERCIAL

## 2022-10-04 DIAGNOSIS — C50.411 MALIGNANT NEOPLASM OF UPPER-OUTER QUADRANT OF RIGHT BREAST IN FEMALE, ESTROGEN RECEPTOR NEGATIVE (HCC): ICD-10-CM

## 2022-10-04 DIAGNOSIS — R92.8 ABNORMAL FINDINGS ON DIAGNOSTIC IMAGING OF BREAST: ICD-10-CM

## 2022-10-04 DIAGNOSIS — Z17.1 MALIGNANT NEOPLASM OF UPPER-OUTER QUADRANT OF RIGHT BREAST IN FEMALE, ESTROGEN RECEPTOR NEGATIVE (HCC): ICD-10-CM

## 2022-10-04 PROCEDURE — 88341 IMHCHEM/IMCYTCHM EA ADD ANTB: CPT | Performed by: INTERNAL MEDICINE

## 2022-10-04 PROCEDURE — A9575 INJ GADOTERATE MEGLUMI 0.1ML: HCPCS | Performed by: INTERNAL MEDICINE

## 2022-10-04 PROCEDURE — 88342 IMHCHEM/IMCYTCHM 1ST ANTB: CPT | Performed by: INTERNAL MEDICINE

## 2022-10-04 PROCEDURE — 77065 DX MAMMO INCL CAD UNI: CPT | Performed by: INTERNAL MEDICINE

## 2022-10-04 PROCEDURE — 88305 TISSUE EXAM BY PATHOLOGIST: CPT | Performed by: INTERNAL MEDICINE

## 2022-10-04 PROCEDURE — 19085 BX BREAST 1ST LESION MR IMAG: CPT | Performed by: INTERNAL MEDICINE

## 2022-10-04 NOTE — IMAGING NOTE
To Radiology holding area hx as follow: 1110    1120 IV 22 gauge started by [facility]    1135 Post instructions given verbal et written. Pt verbalizes understanidng and agreement. 1137 Consent verified and obtained  Site marked RIGHT  Breast    1140 Dr Amira Barrera  here procedure explained questions answered . 1142  Time out taken    1153 Patient to mri table   scans taken. 1211 Betadine as skin prep. 1212 Lidocaine 1% 10 milligrams per ml 5 ml given. 1212 Lidocaine 1% with epinephrine 1:100,000 units 200 milligrams per 20 ml  Total amount given 15 ml.    1213  9 Gauge needle placed     1217 Sampling begins     1218  Sampling completed. 1219  Formalin added to samples    1252 Cores taken to pathology by [facility] RN.    STOPLIGHT Breast clip placed at 1220. Procedure complete pressure to site 10 minutes . Area  Cleaned steri strips to site. Ice pack to site. Iv removed by MRI staff. 1243  To mammography department for post clip images. Report to [facility]  mammography technologist.    WHEN Mammogram completed. Bb nipple marker  removed from nipple by MAMMO STAFF  Bra to be applied by patient. A 6 \" ace wrap secured over Bra.  By 2026 [facility] staff    PT TO BE Discharged BY MAMMO STAFF AT [facility] THE [facility]

## 2022-10-04 NOTE — PROCEDURES
[facility]  Procedure Note    Chele Olivares Patient Status:  Outpatient    3/4/1970 MRN [id number]   Location [facility] MRI Attending Caitie Maldonado MD   Hosp Day # 0 PCP Jasbir Pablo MD     Procedure: MRI guided biopsy of the right breast    Pre-Procedure Diagnosis:  Right central NME focus in breast    Post-Procedure Diagnosis: Right central NME focus in breast    Anesthesia:  Local    Findings:  Right central NME focus in breast    Specimens: 6    Blood Loss:  minimal    Tourniquet Time: none  Complications:  None  Drains:  None    Amanuel Wheeler,   10/4/2022

## 2022-10-06 ENCOUNTER — TELEPHONE (OUTPATIENT)
Dept: HEMATOLOGY/ONCOLOGY | Facility: HOSPITAL | Age: 52
End: 2022-10-06

## 2022-10-06 NOTE — TELEPHONE ENCOUNTER
Spoke with Deb. Biopsy negative. Encouraged her to keep appointment with Dr Trevin Tilley. She agreed, wanted to discuss her options at this point. Was very stressed out this last time.

## 2022-10-10 ENCOUNTER — IMMUNIZATION (OUTPATIENT)
Dept: LAB | Age: 52
End: 2022-10-10
Attending: EMERGENCY MEDICINE
Payer: COMMERCIAL

## 2022-10-10 DIAGNOSIS — Z23 NEED FOR VACCINATION: Primary | ICD-10-CM

## 2022-10-10 PROCEDURE — 0134A SARSCOV2 VAC BVL 50MCG/0.5ML: CPT

## 2022-10-12 ENCOUNTER — OFFICE VISIT (OUTPATIENT)
Dept: SURGERY | Facility: CLINIC | Age: 52
End: 2022-10-12
Payer: COMMERCIAL

## 2022-10-12 VITALS
TEMPERATURE: 98 F | BODY MASS INDEX: 41.15 KG/M2 | DIASTOLIC BLOOD PRESSURE: 65 MMHG | OXYGEN SATURATION: 98 % | HEART RATE: 82 BPM | WEIGHT: 223.63 LBS | SYSTOLIC BLOOD PRESSURE: 137 MMHG | RESPIRATION RATE: 16 BRPM | HEIGHT: 62 IN

## 2022-10-12 DIAGNOSIS — C50.411 MALIGNANT NEOPLASM OF UPPER-OUTER QUADRANT OF RIGHT FEMALE BREAST, UNSPECIFIED ESTROGEN RECEPTOR STATUS (HCC): Primary | ICD-10-CM

## 2022-10-12 PROCEDURE — 3078F DIAST BP <80 MM HG: CPT | Performed by: SURGERY

## 2022-10-12 PROCEDURE — 3008F BODY MASS INDEX DOCD: CPT | Performed by: SURGERY

## 2022-10-12 PROCEDURE — 99213 OFFICE O/P EST LOW 20 MIN: CPT | Performed by: SURGERY

## 2022-10-12 PROCEDURE — 3075F SYST BP GE 130 - 139MM HG: CPT | Performed by: SURGERY

## 2022-10-13 ENCOUNTER — OFFICE VISIT (OUTPATIENT)
Dept: SURGERY | Facility: CLINIC | Age: 52
End: 2022-10-13
Payer: COMMERCIAL

## 2022-10-13 VITALS
HEART RATE: 89 BPM | SYSTOLIC BLOOD PRESSURE: 120 MMHG | WEIGHT: 222 LBS | DIASTOLIC BLOOD PRESSURE: 80 MMHG | OXYGEN SATURATION: 100 % | HEIGHT: 63.8 IN | BODY MASS INDEX: 38.37 KG/M2

## 2022-10-13 DIAGNOSIS — E66.01 MORBID OBESITY WITH BMI OF 40.0-44.9, ADULT (HCC): Primary | ICD-10-CM

## 2022-10-21 NOTE — TELEPHONE ENCOUNTER
Spoke with Jose Fall at [employer] states patient's order is good to go, she will need to call-in to schedule delivery. Spoke with patient informed her of above, requesting E's phone number sent to her via [address]   [employer]unch message sent to patient, per her request.

## 2022-11-07 ENCOUNTER — PATIENT MESSAGE (OUTPATIENT)
Dept: PULMONOLOGY | Facility: CLINIC | Age: 52
End: 2022-11-07

## 2022-12-13 ENCOUNTER — TELEPHONE (OUTPATIENT)
Dept: HEMATOLOGY/ONCOLOGY | Facility: HOSPITAL | Age: 52
End: 2022-12-13

## 2022-12-13 ENCOUNTER — LAB ENCOUNTER (OUTPATIENT)
Dept: LAB | Facility: HOSPITAL | Age: 52
End: 2022-12-13
Attending: INTERNAL MEDICINE
Payer: MEDICAID

## 2022-12-13 ENCOUNTER — OFFICE VISIT (OUTPATIENT)
Dept: SURGERY | Facility: CLINIC | Age: 52
End: 2022-12-13
Payer: COMMERCIAL

## 2022-12-13 ENCOUNTER — OFFICE VISIT (OUTPATIENT)
Dept: HEMATOLOGY/ONCOLOGY | Facility: HOSPITAL | Age: 52
End: 2022-12-13
Attending: INTERNAL MEDICINE
Payer: MEDICAID

## 2022-12-13 VITALS
BODY MASS INDEX: 41.04 KG/M2 | SYSTOLIC BLOOD PRESSURE: 133 MMHG | DIASTOLIC BLOOD PRESSURE: 69 MMHG | RESPIRATION RATE: 16 BRPM | HEART RATE: 74 BPM | HEIGHT: 62 IN | WEIGHT: 223 LBS | OXYGEN SATURATION: 100 % | TEMPERATURE: 99 F

## 2022-12-13 DIAGNOSIS — E11.65 TYPE 2 DIABETES MELLITUS WITH HYPERGLYCEMIA, WITH LONG-TERM CURRENT USE OF INSULIN (HCC): ICD-10-CM

## 2022-12-13 DIAGNOSIS — Z17.1 MALIGNANT NEOPLASM OF UPPER-OUTER QUADRANT OF RIGHT BREAST IN FEMALE, ESTROGEN RECEPTOR NEGATIVE (HCC): ICD-10-CM

## 2022-12-13 DIAGNOSIS — E66.01 MORBID OBESITY WITH BMI OF 40.0-44.9, ADULT (HCC): ICD-10-CM

## 2022-12-13 DIAGNOSIS — Z17.1 MALIGNANT NEOPLASM OF UPPER-OUTER QUADRANT OF RIGHT BREAST IN FEMALE, ESTROGEN RECEPTOR NEGATIVE (HCC): Primary | ICD-10-CM

## 2022-12-13 DIAGNOSIS — C50.411 MALIGNANT NEOPLASM OF UPPER-OUTER QUADRANT OF RIGHT BREAST IN FEMALE, ESTROGEN RECEPTOR NEGATIVE (HCC): ICD-10-CM

## 2022-12-13 DIAGNOSIS — E78.00 HYPERCHOLESTEREMIA: ICD-10-CM

## 2022-12-13 DIAGNOSIS — C50.411 MALIGNANT NEOPLASM OF UPPER-OUTER QUADRANT OF RIGHT BREAST IN FEMALE, ESTROGEN RECEPTOR NEGATIVE (HCC): Primary | ICD-10-CM

## 2022-12-13 DIAGNOSIS — Z79.4 TYPE 2 DIABETES MELLITUS WITH HYPERGLYCEMIA, WITH LONG-TERM CURRENT USE OF INSULIN (HCC): ICD-10-CM

## 2022-12-13 DIAGNOSIS — E55.9 VITAMIN D DEFICIENCY: ICD-10-CM

## 2022-12-13 DIAGNOSIS — I10 ESSENTIAL HYPERTENSION: ICD-10-CM

## 2022-12-13 LAB
ALBUMIN SERPL-MCNC: 3.1 G/DL (ref 3.4–5)
ALBUMIN/GLOB SERPL: 0.7 {RATIO} (ref 1–2)
ALP LIVER SERPL-CCNC: 69 U/L
ALT SERPL-CCNC: 16 U/L
ANION GAP SERPL CALC-SCNC: 4 MMOL/L (ref 0–18)
AST SERPL-CCNC: 8 U/L (ref 15–37)
BASOPHILS # BLD AUTO: 0.06 X10(3) UL (ref 0–0.2)
BASOPHILS NFR BLD AUTO: 1 %
BILIRUB SERPL-MCNC: 0.4 MG/DL (ref 0.1–2)
BUN BLD-MCNC: 14 MG/DL (ref 7–18)
BUN/CREAT SERPL: 14.6 (ref 10–20)
CALCIUM BLD-MCNC: 9.1 MG/DL (ref 8.5–10.1)
CHLORIDE SERPL-SCNC: 107 MMOL/L (ref 98–112)
CO2 SERPL-SCNC: 29 MMOL/L (ref 21–32)
CREAT BLD-MCNC: 0.96 MG/DL
DEPRECATED HBV CORE AB SER IA-ACNC: 165.3 NG/ML
DEPRECATED RDW RBC AUTO: 47.5 FL (ref 35.1–46.3)
EOSINOPHIL # BLD AUTO: 0.54 X10(3) UL (ref 0–0.7)
EOSINOPHIL NFR BLD AUTO: 8.8 %
ERYTHROCYTE [DISTWIDTH] IN BLOOD BY AUTOMATED COUNT: 14 % (ref 11–15)
EST. AVERAGE GLUCOSE BLD GHB EST-MCNC: 258 MG/DL (ref 68–126)
FASTING STATUS PATIENT QL REPORTED: YES
FOLATE SERPL-MCNC: 10.3 NG/ML (ref 8.7–?)
GFR SERPLBLD BASED ON 1.73 SQ M-ARVRAT: 72 ML/MIN/1.73M2 (ref 60–?)
GLOBULIN PLAS-MCNC: 4.4 G/DL (ref 2.8–4.4)
GLUCOSE BLD-MCNC: 214 MG/DL (ref 70–99)
HBA1C MFR BLD: 10.6 % (ref ?–5.7)
HCT VFR BLD AUTO: 41.4 %
HGB BLD-MCNC: 13.1 G/DL
IMM GRANULOCYTES # BLD AUTO: 0.01 X10(3) UL (ref 0–1)
IMM GRANULOCYTES NFR BLD: 0.2 %
IRON SATN MFR SERPL: 22 %
IRON SERPL-MCNC: 65 UG/DL
LYMPHOCYTES # BLD AUTO: 2.54 X10(3) UL (ref 1–4)
LYMPHOCYTES NFR BLD AUTO: 41.5 %
MCH RBC QN AUTO: 28.9 PG (ref 26–34)
MCHC RBC AUTO-ENTMCNC: 31.6 G/DL (ref 31–37)
MCV RBC AUTO: 91.2 FL
MONOCYTES # BLD AUTO: 0.38 X10(3) UL (ref 0.1–1)
MONOCYTES NFR BLD AUTO: 6.2 %
NEUTROPHILS # BLD AUTO: 2.59 X10 (3) UL (ref 1.5–7.7)
NEUTROPHILS # BLD AUTO: 2.59 X10(3) UL (ref 1.5–7.7)
NEUTROPHILS NFR BLD AUTO: 42.3 %
OSMOLALITY SERPL CALC.SUM OF ELEC: 297 MOSM/KG (ref 275–295)
PLATELET # BLD AUTO: 257 10(3)UL (ref 150–450)
POTASSIUM SERPL-SCNC: 4.1 MMOL/L (ref 3.5–5.1)
PROT SERPL-MCNC: 7.5 G/DL (ref 6.4–8.2)
RBC # BLD AUTO: 4.54 X10(6)UL
SODIUM SERPL-SCNC: 140 MMOL/L (ref 136–145)
TIBC SERPL-MCNC: 291 UG/DL (ref 240–450)
TRANSFERRIN SERPL-MCNC: 195 MG/DL (ref 200–360)
VIT B12 SERPL-MCNC: 867 PG/ML (ref 193–986)
WBC # BLD AUTO: 6.1 X10(3) UL (ref 4–11)

## 2022-12-13 PROCEDURE — 3008F BODY MASS INDEX DOCD: CPT | Performed by: INTERNAL MEDICINE

## 2022-12-13 PROCEDURE — 83540 ASSAY OF IRON: CPT

## 2022-12-13 PROCEDURE — 99214 OFFICE O/P EST MOD 30 MIN: CPT | Performed by: INTERNAL MEDICINE

## 2022-12-13 PROCEDURE — 84466 ASSAY OF TRANSFERRIN: CPT

## 2022-12-13 PROCEDURE — 3078F DIAST BP <80 MM HG: CPT | Performed by: INTERNAL MEDICINE

## 2022-12-13 PROCEDURE — 82607 VITAMIN B-12: CPT

## 2022-12-13 PROCEDURE — 36415 COLL VENOUS BLD VENIPUNCTURE: CPT

## 2022-12-13 PROCEDURE — 3075F SYST BP GE 130 - 139MM HG: CPT | Performed by: INTERNAL MEDICINE

## 2022-12-13 PROCEDURE — 80053 COMPREHEN METABOLIC PANEL: CPT

## 2022-12-13 PROCEDURE — 97803 MED NUTRITION INDIV SUBSEQ: CPT

## 2022-12-13 PROCEDURE — 82746 ASSAY OF FOLIC ACID SERUM: CPT

## 2022-12-13 PROCEDURE — 85025 COMPLETE CBC W/AUTO DIFF WBC: CPT

## 2022-12-13 PROCEDURE — 82728 ASSAY OF FERRITIN: CPT

## 2022-12-13 PROCEDURE — 83036 HEMOGLOBIN GLYCOSYLATED A1C: CPT

## 2022-12-13 RX ORDER — INSULIN ASPART 100 [IU]/ML
45 INJECTION, SUSPENSION SUBCUTANEOUS
COMMUNITY
Start: 2022-10-20

## 2022-12-13 NOTE — TELEPHONE ENCOUNTER
Patient went to the [facility] to have her labs done before her 8am appointment with Angus Dominguez, but there are no order in the system. She is here now.

## 2022-12-13 NOTE — PATIENT INSTRUCTIONS
Recommendations/goals:  Keep it going goals:   1. Continue to keep a food record, My Net Diary. 2.  Continue eating 4-6 meals/snacks per day. Include and produce at each time. Aim for 40-50 gms of protein per day. Keep the carbohydrates at 120 grams per day or less. 3.  Continue to drink 64 oz of water per day. 4.  Increase walking 30 minutes per day at least 5 days per week. 5.  Add strength/resistance training 10 minutes 3 days per week, focus on legs. (Sit and Be Fit or 7 minute workout)  6. Continue to practice eating more slowly by chewing food more before swallowing. Practice drinking separately from eating. 8.  Eliminate caffeine,switch to decaf. Work in progress goals:   1. Buy the bariatric chewable vitamin, Bariatric Choice. 2.  Line up your protein supplements for liquid protein. 3.  Buy 1 bottle of CHG soap. 4.  Buy the Ensure -Pre Surgery drink, 2 bottles. 5.  Buy the liquid or dissolvable Tylenol. [email or web address]

## 2022-12-20 ENCOUNTER — TELEPHONE (OUTPATIENT)
Dept: HEMATOLOGY/ONCOLOGY | Facility: HOSPITAL | Age: 52
End: 2022-12-20

## 2022-12-20 NOTE — TELEPHONE ENCOUNTER
Spoke with Deb. Dr Anmol Rivera would like for her to see Dr Alisa Schmitt. Gave her the number. She will call to schedule an appointment.

## 2022-12-24 ENCOUNTER — APPOINTMENT (OUTPATIENT)
Dept: ULTRASOUND IMAGING | Facility: HOSPITAL | Age: 52
End: 2022-12-24
Attending: STUDENT IN AN ORGANIZED HEALTH CARE EDUCATION/TRAINING PROGRAM
Payer: MEDICAID

## 2022-12-24 ENCOUNTER — HOSPITAL ENCOUNTER (EMERGENCY)
Facility: HOSPITAL | Age: 52
Discharge: HOME | End: 2022-12-24
Attending: STUDENT IN AN ORGANIZED HEALTH CARE EDUCATION/TRAINING PROGRAM
Payer: MEDICAID

## 2022-12-24 ENCOUNTER — APPOINTMENT (OUTPATIENT)
Dept: GENERAL RADIOLOGY | Facility: HOSPITAL | Age: 52
End: 2022-12-24
Attending: STUDENT IN AN ORGANIZED HEALTH CARE EDUCATION/TRAINING PROGRAM
Payer: MEDICAID

## 2022-12-24 VITALS
DIASTOLIC BLOOD PRESSURE: 57 MMHG | OXYGEN SATURATION: 98 % | HEIGHT: 63 IN | HEART RATE: 75 BPM | RESPIRATION RATE: 18 BRPM | BODY MASS INDEX: 38.62 KG/M2 | TEMPERATURE: 98 F | SYSTOLIC BLOOD PRESSURE: 101 MMHG | WEIGHT: 218 LBS

## 2022-12-24 DIAGNOSIS — M79.602 LEFT ARM PAIN: Primary | ICD-10-CM

## 2022-12-24 PROCEDURE — 99284 EMERGENCY DEPT VISIT MOD MDM: CPT

## 2022-12-24 PROCEDURE — 73110 X-RAY EXAM OF WRIST: CPT | Performed by: STUDENT IN AN ORGANIZED HEALTH CARE EDUCATION/TRAINING PROGRAM

## 2022-12-24 PROCEDURE — 93971 EXTREMITY STUDY: CPT | Performed by: STUDENT IN AN ORGANIZED HEALTH CARE EDUCATION/TRAINING PROGRAM

## 2022-12-24 RX ORDER — KETOROLAC TROMETHAMINE 10 MG/1
10 TABLET, FILM COATED ORAL EVERY 6 HOURS PRN
Qty: 30 TABLET | Refills: 0 | Status: SHIPPED | OUTPATIENT
Start: 2022-12-24 | End: 2022-12-31

## 2022-12-25 RX ORDER — HYDROMORPHONE HYDROCHLORIDE 1 MG/ML
0.5 INJECTION, SOLUTION INTRAMUSCULAR; INTRAVENOUS; SUBCUTANEOUS ONCE
Status: DISCONTINUED | OUTPATIENT
Start: 2022-12-25 | End: 2022-12-25

## 2022-12-25 NOTE — ED QUICK NOTES
Team discharge complete. Pt ambulatory in room with steady gait. Discharge instructions, follow up care, medication information and s/s to return to ED discussed with pt, pt with no new questions or complaints at this time. Pt remains A&O x 4, respirations even and unlabored, skin warm and dry.

## 2022-12-25 NOTE — DISCHARGE INSTRUCTIONS
Your x-ray was largely reassuring, an ultrasound did not show any evidence of a blood clot. Please take the anti-inflammatory medication as prescribed. You may also use Tylenol as needed. If you develop fevers chills or night sweats worsening pain, or you lose sensation in the hand return immediately to the nearest emergency department. Otherwise follow-up with a hand surgeon as indicated. Thanks and we hope you feel better soon.

## 2022-12-25 NOTE — ED INITIAL ASSESSMENT (HPI)
Pt ambulatory to ed c/o left arm pain x 5 days. Per pt denies injury. Took ibuprofen 1 pta. Cms intact.

## 2023-01-01 ENCOUNTER — HOSPITAL ENCOUNTER (EMERGENCY)
Facility: HOSPITAL | Age: 53
Discharge: HOME | End: 2023-01-01
Attending: EMERGENCY MEDICINE
Payer: MEDICAID

## 2023-01-01 ENCOUNTER — APPOINTMENT (OUTPATIENT)
Dept: GENERAL RADIOLOGY | Facility: HOSPITAL | Age: 53
End: 2023-01-01
Payer: MEDICAID

## 2023-01-01 VITALS
OXYGEN SATURATION: 94 % | SYSTOLIC BLOOD PRESSURE: 113 MMHG | TEMPERATURE: 98 F | HEART RATE: 75 BPM | DIASTOLIC BLOOD PRESSURE: 67 MMHG | RESPIRATION RATE: 18 BRPM

## 2023-01-01 DIAGNOSIS — U07.1 COVID-19: Primary | ICD-10-CM

## 2023-01-01 LAB
FLUAV + FLUBV RNA SPEC NAA+PROBE: NEGATIVE
FLUAV + FLUBV RNA SPEC NAA+PROBE: NEGATIVE
RSV RNA SPEC NAA+PROBE: NEGATIVE
SARS-COV-2 RNA RESP QL NAA+PROBE: DETECTED

## 2023-01-01 PROCEDURE — 0241U SARS-COV-2/FLU A AND B/RSV BY PCR (GENEXPERT): CPT | Performed by: EMERGENCY MEDICINE

## 2023-01-01 PROCEDURE — 71045 X-RAY EXAM CHEST 1 VIEW: CPT

## 2023-01-01 PROCEDURE — 99283 EMERGENCY DEPT VISIT LOW MDM: CPT

## 2023-01-01 PROCEDURE — 99284 EMERGENCY DEPT VISIT MOD MDM: CPT

## 2023-01-01 PROCEDURE — 0241U SARS-COV-2/FLU A AND B/RSV BY PCR (GENEXPERT): CPT

## 2023-01-01 RX ORDER — ALBUTEROL SULFATE 90 UG/1
2 AEROSOL, METERED RESPIRATORY (INHALATION) EVERY 4 HOURS PRN
Qty: 1 EACH | Refills: 0 | Status: SHIPPED | OUTPATIENT
Start: 2023-01-01 | End: 2023-01-31

## 2023-01-01 NOTE — ED INITIAL ASSESSMENT (HPI)
Patient presents to ED with cough and congestion that started last night. Per patient she feels as if she is working to breath.

## 2023-01-01 NOTE — DISCHARGE INSTRUCTIONS
Follow CDC guidelines regarding isolation. Please see their website for further guidance. Take Tylenol or ibuprofen as needed for fever and/or comfort. Stay hydrated and get rest.    Call primary care with any follow-up concerns. Return to the ER if you develop worsening symptoms, inability to tolerate fluids, pulse ox less than 93%, or any emergent concerns.

## 2023-01-12 DIAGNOSIS — Z79.4 TYPE 2 DIABETES MELLITUS WITH HYPERGLYCEMIA, WITH LONG-TERM CURRENT USE OF INSULIN (HCC): ICD-10-CM

## 2023-01-12 DIAGNOSIS — E66.01 MORBID OBESITY WITH BMI OF 40.0-44.9, ADULT (HCC): ICD-10-CM

## 2023-01-12 DIAGNOSIS — E11.65 TYPE 2 DIABETES MELLITUS WITH HYPERGLYCEMIA, WITH LONG-TERM CURRENT USE OF INSULIN (HCC): ICD-10-CM

## 2023-01-12 DIAGNOSIS — I10 ESSENTIAL HYPERTENSION: Primary | ICD-10-CM

## 2023-02-13 ENCOUNTER — TELEPHONE (OUTPATIENT)
Dept: PULMONOLOGY | Facility: CLINIC | Age: 53
End: 2023-02-13

## 2023-02-13 NOTE — TELEPHONE ENCOUNTER
Received notice from [facility] that patient was set up with APAP machine 2/11/23 and appointment needed between dates of 3/14/23-5/12/23. Contacted patient and scheduled appointment 4/13/23. Patient requested virtual visit as she just started new job and unable to take time off work.

## 2023-03-16 ENCOUNTER — OFFICE VISIT (OUTPATIENT)
Dept: SURGERY | Facility: CLINIC | Age: 53
End: 2023-03-16
Payer: MEDICAID

## 2023-03-16 VITALS
OXYGEN SATURATION: 98 % | DIASTOLIC BLOOD PRESSURE: 73 MMHG | WEIGHT: 225.69 LBS | SYSTOLIC BLOOD PRESSURE: 140 MMHG | BODY MASS INDEX: 41.53 KG/M2 | HEIGHT: 62 IN | HEART RATE: 78 BPM

## 2023-03-16 DIAGNOSIS — Z79.4 TYPE 2 DIABETES MELLITUS WITH HYPERGLYCEMIA, WITH LONG-TERM CURRENT USE OF INSULIN (HCC): ICD-10-CM

## 2023-03-16 DIAGNOSIS — E11.65 TYPE 2 DIABETES MELLITUS WITH HYPERGLYCEMIA, WITH LONG-TERM CURRENT USE OF INSULIN (HCC): ICD-10-CM

## 2023-03-16 DIAGNOSIS — E78.00 HYPERCHOLESTEREMIA: ICD-10-CM

## 2023-03-16 DIAGNOSIS — E66.01 MORBID OBESITY WITH BMI OF 40.0-44.9, ADULT (HCC): ICD-10-CM

## 2023-03-16 DIAGNOSIS — I10 ESSENTIAL HYPERTENSION: Primary | ICD-10-CM

## 2023-03-16 PROCEDURE — 3078F DIAST BP <80 MM HG: CPT | Performed by: INTERNAL MEDICINE

## 2023-03-16 PROCEDURE — 3077F SYST BP >= 140 MM HG: CPT | Performed by: INTERNAL MEDICINE

## 2023-03-16 PROCEDURE — 99214 OFFICE O/P EST MOD 30 MIN: CPT | Performed by: INTERNAL MEDICINE

## 2023-03-16 PROCEDURE — 3008F BODY MASS INDEX DOCD: CPT | Performed by: INTERNAL MEDICINE

## 2023-03-16 RX ORDER — PHENTERMINE HYDROCHLORIDE 15 MG/1
15 CAPSULE ORAL EVERY MORNING
Qty: 30 CAPSULE | Refills: 2 | Status: SHIPPED | OUTPATIENT
Start: 2023-03-16

## 2023-03-28 ENCOUNTER — TELEPHONE (OUTPATIENT)
Dept: BARIATRICS/WEIGHT MGMT | Age: 53
End: 2023-03-28

## 2023-04-12 ENCOUNTER — OFFICE VISIT (OUTPATIENT)
Dept: BARIATRICS/WEIGHT MGMT | Age: 53
End: 2023-04-12

## 2023-04-12 ENCOUNTER — HOSPITAL ENCOUNTER (OUTPATIENT)
Dept: MAMMOGRAPHY | Facility: HOSPITAL | Age: 53
Discharge: HOME | End: 2023-04-12
Attending: SURGERY
Payer: MEDICAID

## 2023-04-12 VITALS
HEIGHT: 63 IN | BODY MASS INDEX: 39.34 KG/M2 | HEART RATE: 94 BPM | SYSTOLIC BLOOD PRESSURE: 130 MMHG | WEIGHT: 222 LBS | DIASTOLIC BLOOD PRESSURE: 82 MMHG | OXYGEN SATURATION: 94 %

## 2023-04-12 DIAGNOSIS — C50.411 MALIGNANT NEOPLASM OF UPPER-OUTER QUADRANT OF RIGHT BREAST IN FEMALE, ESTROGEN RECEPTOR NEGATIVE (CMD): ICD-10-CM

## 2023-04-12 DIAGNOSIS — E11.65 TYPE 2 DIABETES MELLITUS WITH HYPERGLYCEMIA, WITH LONG-TERM CURRENT USE OF INSULIN (CMD): ICD-10-CM

## 2023-04-12 DIAGNOSIS — E66.01 MORBID OBESITY WITH BODY MASS INDEX OF 40.0-44.9 IN ADULT (CMD): ICD-10-CM

## 2023-04-12 DIAGNOSIS — C50.411 MALIGNANT NEOPLASM OF UPPER-OUTER QUADRANT OF RIGHT FEMALE BREAST, UNSPECIFIED ESTROGEN RECEPTOR STATUS (HCC): ICD-10-CM

## 2023-04-12 DIAGNOSIS — E55.9 VITAMIN D DEFICIENCY: ICD-10-CM

## 2023-04-12 DIAGNOSIS — G47.33 OBSTRUCTIVE SLEEP APNEA (ADULT) (PEDIATRIC): Primary | ICD-10-CM

## 2023-04-12 DIAGNOSIS — Z79.4 TYPE 2 DIABETES MELLITUS WITH HYPERGLYCEMIA, WITH LONG-TERM CURRENT USE OF INSULIN (CMD): ICD-10-CM

## 2023-04-12 DIAGNOSIS — E78.00 HYPERCHOLESTEREMIA: ICD-10-CM

## 2023-04-12 DIAGNOSIS — Z17.1 MALIGNANT NEOPLASM OF UPPER-OUTER QUADRANT OF RIGHT BREAST IN FEMALE, ESTROGEN RECEPTOR NEGATIVE (CMD): ICD-10-CM

## 2023-04-12 PROCEDURE — 99205 OFFICE O/P NEW HI 60 MIN: CPT | Performed by: NURSE PRACTITIONER

## 2023-04-12 PROCEDURE — 77062 BREAST TOMOSYNTHESIS BI: CPT | Performed by: SURGERY

## 2023-04-12 PROCEDURE — 77066 DX MAMMO INCL CAD BI: CPT | Performed by: SURGERY

## 2023-04-12 PROCEDURE — 3075F SYST BP GE 130 - 139MM HG: CPT | Performed by: NURSE PRACTITIONER

## 2023-04-12 PROCEDURE — 3079F DIAST BP 80-89 MM HG: CPT | Performed by: NURSE PRACTITIONER

## 2023-04-12 RX ORDER — MULTIVITAMIN,THER AND MINERALS
TABLET ORAL DAILY
COMMUNITY

## 2023-04-12 RX ORDER — INSULIN LISPRO 100 [IU]/ML
44 INJECTION, SUSPENSION SUBCUTANEOUS
COMMUNITY
Start: 2023-01-06

## 2023-04-12 ASSESSMENT — ENCOUNTER SYMPTOMS
FEVER: 0
SHORTNESS OF BREATH: 0
NAUSEA: 0
CONSTIPATION: 0
ADENOPATHY: 0
LIGHT-HEADEDNESS: 0
EYE PAIN: 0
BLOOD IN STOOL: 0
BACK PAIN: 0
EYE REDNESS: 0
HEADACHES: 0
APPETITE CHANGE: 0
WEAKNESS: 0
COUGH: 0
ABDOMINAL PAIN: 0
ABDOMINAL DISTENTION: 0
SLEEP DISTURBANCE: 1
CHOKING: 0
UNEXPECTED WEIGHT CHANGE: 0
ACTIVITY CHANGE: 0
ENDOCRINE NEGATIVE: 1
DIARRHEA: 0
SINUS PRESSURE: 1
VOMITING: 0
DIZZINESS: 0
ANAL BLEEDING: 0
SEIZURES: 0
AGITATION: 0
SPEECH DIFFICULTY: 0
WHEEZING: 0
BRUISES/BLEEDS EASILY: 0
EYE ITCHING: 0
APNEA: 1
CHILLS: 0
FACIAL ASYMMETRY: 0
EYE DISCHARGE: 0
FATIGUE: 0
TREMORS: 0
CHEST TIGHTNESS: 0
HALLUCINATIONS: 0
PHOTOPHOBIA: 0
NERVOUS/ANXIOUS: 0
DIAPHORESIS: 0
RECTAL PAIN: 0
CONFUSION: 0
STRIDOR: 0
NUMBNESS: 0

## 2023-04-13 ENCOUNTER — TELEMEDICINE (OUTPATIENT)
Dept: PULMONOLOGY | Facility: CLINIC | Age: 53
End: 2023-04-13

## 2023-04-13 DIAGNOSIS — G47.33 OBSTRUCTIVE SLEEP APNEA: Primary | ICD-10-CM

## 2023-04-13 PROCEDURE — 99213 OFFICE O/P EST LOW 20 MIN: CPT | Performed by: PHYSICIAN ASSISTANT

## 2023-04-13 NOTE — PROGRESS NOTES
Virtual Video Progress Note    Benito Camarillo verbally consents to to a Video Visit on 4/13/2023. Patient understands and accepts financial responsibility for any deductible, co-insurance and/or co-pays associated with this service. Please note that the following visit was completed using two-way, real-time interactive audio and/or video communication. This has been done in good bianca to provide continuity of care in the best interest of the provider-patient relationship, due to the ongoing public health crisis/national emergency and because of restrictions of visitation. There are limitations of this visit as the physical exam was observed through audio/video only. Every conscious effort was taken to allow for sufficient and adequate time. This billing was spent on reviewing labs, medications, radiology tests and decision making. Appropriate medical decision-making and tests are ordered as detailed in the plan of care above. History of Present Illness:  Andreea Wilson is a 47year old female presenting for virtual video visit today for FABRIZIO. Home sleep study demonstrated moderate FABRIZIO with combined AHI 22 events/h. She received CPAP 2 months ago and notes her sleep is more refreshing when she uses it. She tolerates it well. She woke up with chest discomfort on two occasions and was concerned it could be due to CPAP so she stopped using it temporarily. Does not feel the pressure is too strong and denies symptoms of aerophagia. Average usage on days used is 5 hours and 26 min with residual respiratory events of 0.1/h. She still intends to pursue bariatric surgery but with changes to insurance she will be going to [employer]. No date for surgery. Physical Exam: Limited as this is a video visit. Patient is alert and oriented x3, pleasant and cooperative, speaking in full sentences with no labored breathing. Assessment/Plan:  FABRIZIO - Moderate FABRIZIO.  Respiratory events <1/h when she uses CPAP but she simply needs to increase use. We will obtain data download again in 1 month.   Plan:  -Vigilance with CPAP every night all night  -Facilitate data download again in 1 month  -Weight loss  -Avoid alcohol and sedating drugs  -Never drive if sleepy    Kemar Lancaster PA-C  [facility] Pulmonary Medicine  4/13/2023

## 2023-05-10 ENCOUNTER — TELEPHONE (OUTPATIENT)
Dept: PULMONOLOGY | Facility: CLINIC | Age: 53
End: 2023-05-10

## 2023-05-11 ENCOUNTER — TELEPHONE (OUTPATIENT)
Dept: HEMATOLOGY/ONCOLOGY | Facility: HOSPITAL | Age: 53
End: 2023-05-11

## 2023-05-11 NOTE — TELEPHONE ENCOUNTER
Pt is calling and she would like a call back to discuss a medication question. She did not want to disclose any other information.  Please advise-NL

## 2023-05-11 NOTE — TELEPHONE ENCOUNTER
Data download demonstrates avg daily usage of 4 hours and 4 min on days used with residual respiratory events of 0.2/h on APAP 5-15 CWP. Usage is low with 27% usage >4 hours. I spoke with the patient to see how she is doing with CPAP. States she does well with it, feels better, no issues with machine or mask. She has been traveling a lot in last month and did not take her CPAP. We again discussed importance of vigilance with CPAP device as well as reviewed risks associated with untreated FABRIZIO.

## 2023-05-15 ENCOUNTER — OFFICE VISIT (OUTPATIENT)
Dept: HEMATOLOGY/ONCOLOGY | Facility: HOSPITAL | Age: 53
End: 2023-05-15
Attending: INTERNAL MEDICINE
Payer: MEDICAID

## 2023-05-15 VITALS
HEIGHT: 62 IN | DIASTOLIC BLOOD PRESSURE: 62 MMHG | WEIGHT: 223 LBS | HEART RATE: 84 BPM | TEMPERATURE: 99 F | OXYGEN SATURATION: 99 % | RESPIRATION RATE: 16 BRPM | BODY MASS INDEX: 41.04 KG/M2 | SYSTOLIC BLOOD PRESSURE: 132 MMHG

## 2023-05-15 DIAGNOSIS — Z17.1 MALIGNANT NEOPLASM OF UPPER-OUTER QUADRANT OF RIGHT BREAST IN FEMALE, ESTROGEN RECEPTOR NEGATIVE (HCC): Primary | ICD-10-CM

## 2023-05-15 DIAGNOSIS — C50.411 MALIGNANT NEOPLASM OF UPPER-OUTER QUADRANT OF RIGHT BREAST IN FEMALE, ESTROGEN RECEPTOR NEGATIVE (HCC): Primary | ICD-10-CM

## 2023-05-15 PROCEDURE — 99213 OFFICE O/P EST LOW 20 MIN: CPT | Performed by: INTERNAL MEDICINE

## 2023-05-16 ENCOUNTER — V-VISIT (OUTPATIENT)
Dept: BARIATRICS/WEIGHT MGMT | Age: 53
End: 2023-05-16

## 2023-05-16 DIAGNOSIS — E11.65 TYPE 2 DIABETES MELLITUS WITH HYPERGLYCEMIA, WITH LONG-TERM CURRENT USE OF INSULIN (CMD): Primary | ICD-10-CM

## 2023-05-16 DIAGNOSIS — E78.00 HYPERCHOLESTEREMIA: ICD-10-CM

## 2023-05-16 DIAGNOSIS — E55.9 VITAMIN D DEFICIENCY: ICD-10-CM

## 2023-05-16 DIAGNOSIS — Z79.4 TYPE 2 DIABETES MELLITUS WITH HYPERGLYCEMIA, WITH LONG-TERM CURRENT USE OF INSULIN (CMD): Primary | ICD-10-CM

## 2023-05-16 DIAGNOSIS — E66.01 MORBID OBESITY WITH BODY MASS INDEX OF 40.0-44.9 IN ADULT (CMD): ICD-10-CM

## 2023-05-16 DIAGNOSIS — Z85.3 HISTORY OF BREAST CANCER: Chronic | ICD-10-CM

## 2023-05-16 DIAGNOSIS — G47.33 OBSTRUCTIVE SLEEP APNEA (ADULT) (PEDIATRIC): ICD-10-CM

## 2023-05-16 PROCEDURE — 99215 OFFICE O/P EST HI 40 MIN: CPT | Performed by: SURGERY

## 2023-05-16 ASSESSMENT — ENCOUNTER SYMPTOMS
SLEEP DISTURBANCE: 0
DIARRHEA: 0
FEVER: 0
CONSTIPATION: 0
NERVOUS/ANXIOUS: 0
DIZZINESS: 0
LIGHT-HEADEDNESS: 0
ROS GI COMMENTS: GERD NONE
SHORTNESS OF BREATH: 0
SEIZURES: 0
CHILLS: 0

## 2023-06-16 ENCOUNTER — V-VISIT (OUTPATIENT)
Dept: BARIATRICS/WEIGHT MGMT | Age: 53
End: 2023-06-16

## 2023-06-16 DIAGNOSIS — G47.33 OBSTRUCTIVE SLEEP APNEA (ADULT) (PEDIATRIC): ICD-10-CM

## 2023-06-16 DIAGNOSIS — E11.65 TYPE 2 DIABETES MELLITUS WITH HYPERGLYCEMIA, WITH LONG-TERM CURRENT USE OF INSULIN (CMD): ICD-10-CM

## 2023-06-16 DIAGNOSIS — Z79.4 TYPE 2 DIABETES MELLITUS WITH HYPERGLYCEMIA, WITH LONG-TERM CURRENT USE OF INSULIN (CMD): ICD-10-CM

## 2023-06-16 DIAGNOSIS — E66.01 SEVERE OBESITY (BMI 35.0-39.9) WITH COMORBIDITY (CMD): Primary | ICD-10-CM

## 2023-06-16 DIAGNOSIS — E55.9 VITAMIN D DEFICIENCY: ICD-10-CM

## 2023-06-16 PROCEDURE — 99213 OFFICE O/P EST LOW 20 MIN: CPT | Performed by: STUDENT IN AN ORGANIZED HEALTH CARE EDUCATION/TRAINING PROGRAM

## 2023-06-16 ASSESSMENT — ENCOUNTER SYMPTOMS
NEUROLOGICAL NEGATIVE: 1
GASTROINTESTINAL NEGATIVE: 1
CONSTITUTIONAL NEGATIVE: 1
PSYCHIATRIC NEGATIVE: 1

## 2023-06-27 ENCOUNTER — NURSE ONLY (OUTPATIENT)
Dept: HEMATOLOGY/ONCOLOGY | Facility: HOSPITAL | Age: 53
End: 2023-06-27
Attending: INTERNAL MEDICINE
Payer: MEDICAID

## 2023-06-27 VITALS
HEIGHT: 62 IN | BODY MASS INDEX: 41.96 KG/M2 | SYSTOLIC BLOOD PRESSURE: 130 MMHG | TEMPERATURE: 99 F | OXYGEN SATURATION: 98 % | WEIGHT: 228 LBS | RESPIRATION RATE: 16 BRPM | HEART RATE: 79 BPM | DIASTOLIC BLOOD PRESSURE: 72 MMHG

## 2023-06-27 DIAGNOSIS — E55.9 VITAMIN D DEFICIENCY: ICD-10-CM

## 2023-06-27 DIAGNOSIS — Z17.1 MALIGNANT NEOPLASM OF UPPER-OUTER QUADRANT OF RIGHT BREAST IN FEMALE, ESTROGEN RECEPTOR NEGATIVE (HCC): ICD-10-CM

## 2023-06-27 DIAGNOSIS — Z17.1 MALIGNANT NEOPLASM OF UPPER-OUTER QUADRANT OF RIGHT BREAST IN FEMALE, ESTROGEN RECEPTOR NEGATIVE (HCC): Primary | ICD-10-CM

## 2023-06-27 DIAGNOSIS — C50.411 MALIGNANT NEOPLASM OF UPPER-OUTER QUADRANT OF RIGHT BREAST IN FEMALE, ESTROGEN RECEPTOR NEGATIVE (HCC): ICD-10-CM

## 2023-06-27 DIAGNOSIS — C50.411 MALIGNANT NEOPLASM OF UPPER-OUTER QUADRANT OF RIGHT BREAST IN FEMALE, ESTROGEN RECEPTOR NEGATIVE (HCC): Primary | ICD-10-CM

## 2023-06-27 LAB
ALBUMIN SERPL-MCNC: 3 G/DL (ref 3.4–5)
ALBUMIN/GLOB SERPL: 0.7 {RATIO} (ref 1–2)
ALP LIVER SERPL-CCNC: 78 U/L
ALT SERPL-CCNC: 20 U/L
ANION GAP SERPL CALC-SCNC: 6 MMOL/L (ref 0–18)
AST SERPL-CCNC: 9 U/L (ref 15–37)
BASOPHILS # BLD AUTO: 0.06 X10(3) UL (ref 0–0.2)
BASOPHILS NFR BLD AUTO: 0.8 %
BILIRUB SERPL-MCNC: 0.2 MG/DL (ref 0.1–2)
BUN BLD-MCNC: 14 MG/DL (ref 7–18)
BUN/CREAT SERPL: 12.7 (ref 10–20)
CALCIUM BLD-MCNC: 8.9 MG/DL (ref 8.5–10.1)
CHLORIDE SERPL-SCNC: 106 MMOL/L (ref 98–112)
CO2 SERPL-SCNC: 26 MMOL/L (ref 21–32)
CREAT BLD-MCNC: 1.1 MG/DL
DEPRECATED RDW RBC AUTO: 44.6 FL (ref 35.1–46.3)
EOSINOPHIL # BLD AUTO: 0.56 X10(3) UL (ref 0–0.7)
EOSINOPHIL NFR BLD AUTO: 7.8 %
ERYTHROCYTE [DISTWIDTH] IN BLOOD BY AUTOMATED COUNT: 14.4 % (ref 11–15)
FASTING STATUS PATIENT QL REPORTED: NO
GFR SERPLBLD BASED ON 1.73 SQ M-ARVRAT: 60 ML/MIN/1.73M2 (ref 60–?)
GLOBULIN PLAS-MCNC: 4.6 G/DL (ref 2.8–4.4)
GLUCOSE BLD-MCNC: 354 MG/DL (ref 70–99)
HCT VFR BLD AUTO: 38.5 %
HGB BLD-MCNC: 12.7 G/DL
IMM GRANULOCYTES # BLD AUTO: 0.01 X10(3) UL (ref 0–1)
IMM GRANULOCYTES NFR BLD: 0.1 %
LYMPHOCYTES # BLD AUTO: 2.7 X10(3) UL (ref 1–4)
LYMPHOCYTES NFR BLD AUTO: 37.8 %
MCH RBC QN AUTO: 28.3 PG (ref 26–34)
MCHC RBC AUTO-ENTMCNC: 33 G/DL (ref 31–37)
MCV RBC AUTO: 85.7 FL
MONOCYTES # BLD AUTO: 0.33 X10(3) UL (ref 0.1–1)
MONOCYTES NFR BLD AUTO: 4.6 %
NEUTROPHILS # BLD AUTO: 3.48 X10 (3) UL (ref 1.5–7.7)
NEUTROPHILS # BLD AUTO: 3.48 X10(3) UL (ref 1.5–7.7)
NEUTROPHILS NFR BLD AUTO: 48.9 %
OSMOLALITY SERPL CALC.SUM OF ELEC: 301 MOSM/KG (ref 275–295)
PLATELET # BLD AUTO: 239 10(3)UL (ref 150–450)
POTASSIUM SERPL-SCNC: 4.1 MMOL/L (ref 3.5–5.1)
PROT SERPL-MCNC: 7.6 G/DL (ref 6.4–8.2)
RBC # BLD AUTO: 4.49 X10(6)UL
SODIUM SERPL-SCNC: 138 MMOL/L (ref 136–145)
WBC # BLD AUTO: 7.1 X10(3) UL (ref 4–11)

## 2023-06-27 PROCEDURE — 99214 OFFICE O/P EST MOD 30 MIN: CPT | Performed by: INTERNAL MEDICINE

## 2023-06-27 PROCEDURE — 36415 COLL VENOUS BLD VENIPUNCTURE: CPT

## 2023-06-27 PROCEDURE — 85025 COMPLETE CBC W/AUTO DIFF WBC: CPT

## 2023-06-27 PROCEDURE — 80053 COMPREHEN METABOLIC PANEL: CPT

## 2023-06-29 ENCOUNTER — TELEPHONE (OUTPATIENT)
Dept: HEMATOLOGY/ONCOLOGY | Facility: HOSPITAL | Age: 53
End: 2023-06-29

## 2023-07-03 NOTE — TELEPHONE ENCOUNTER
Jackie/[facility] called in stating that Pt was there at the lab attempting to have lab work drawn, but there were not orders on Pt's chart. She was advised that a message will be sent, but Dr. Lisa Vaca may want to see her first, [address] [place] [address] 8/8/18. Please advise. Consent (Spinal Accessory)/Introductory Paragraph: The rationale for Mohs was explained to the patient and consent was obtained. The risks, benefits and alternatives to therapy were discussed in detail. Specifically, the risks of damage to the spinal accessory nerve, infection, scarring, bleeding, prolonged wound healing, incomplete removal, allergy to anesthesia, and recurrence were addressed. Prior to the procedure, the treatment site was clearly identified and confirmed by the patient. All components of Universal Protocol/PAUSE Rule completed.

## 2023-07-17 ENCOUNTER — V-VISIT (OUTPATIENT)
Dept: BARIATRICS/WEIGHT MGMT | Age: 53
End: 2023-07-17

## 2023-07-17 ENCOUNTER — E-ADVICE (OUTPATIENT)
Dept: BARIATRICS/WEIGHT MGMT | Age: 53
End: 2023-07-17

## 2023-07-17 DIAGNOSIS — E11.65 TYPE 2 DIABETES MELLITUS WITH HYPERGLYCEMIA, WITH LONG-TERM CURRENT USE OF INSULIN (CMD): ICD-10-CM

## 2023-07-17 DIAGNOSIS — E78.00 HYPERCHOLESTEREMIA: ICD-10-CM

## 2023-07-17 DIAGNOSIS — E55.9 VITAMIN D DEFICIENCY: ICD-10-CM

## 2023-07-17 DIAGNOSIS — G47.33 OBSTRUCTIVE SLEEP APNEA (ADULT) (PEDIATRIC): ICD-10-CM

## 2023-07-17 DIAGNOSIS — Z79.4 TYPE 2 DIABETES MELLITUS WITH HYPERGLYCEMIA, WITH LONG-TERM CURRENT USE OF INSULIN (CMD): ICD-10-CM

## 2023-07-17 DIAGNOSIS — E66.01 SEVERE OBESITY (BMI 35.0-39.9) WITH COMORBIDITY (CMD): Primary | ICD-10-CM

## 2023-07-17 PROCEDURE — 99213 OFFICE O/P EST LOW 20 MIN: CPT | Performed by: NURSE PRACTITIONER

## 2023-07-17 ASSESSMENT — ENCOUNTER SYMPTOMS
CHILLS: 0
EYE ITCHING: 0
COUGH: 0
FATIGUE: 0
EYE PAIN: 0
ADENOPATHY: 0
CONFUSION: 0
HEADACHES: 0
WHEEZING: 0
STRIDOR: 0
ANAL BLEEDING: 0
NAUSEA: 0
DIZZINESS: 0
FEVER: 0
NUMBNESS: 0
NERVOUS/ANXIOUS: 0
ACTIVITY CHANGE: 0
ABDOMINAL DISTENTION: 0
SHORTNESS OF BREATH: 0
BACK PAIN: 0
CHEST TIGHTNESS: 0
CONSTIPATION: 0
UNEXPECTED WEIGHT CHANGE: 0
SINUS PRESSURE: 0
LIGHT-HEADEDNESS: 0
EYE REDNESS: 0
HALLUCINATIONS: 0
DIAPHORESIS: 0
WEAKNESS: 0
ABDOMINAL PAIN: 0
RECTAL PAIN: 0
APPETITE CHANGE: 0
APNEA: 1
SEIZURES: 0
TREMORS: 0
BLOOD IN STOOL: 0
ENDOCRINE NEGATIVE: 1
EYE DISCHARGE: 0
FACIAL ASYMMETRY: 0
VOMITING: 0
CHOKING: 0
SLEEP DISTURBANCE: 1
PHOTOPHOBIA: 0
SPEECH DIFFICULTY: 0
DIARRHEA: 0
AGITATION: 0
BRUISES/BLEEDS EASILY: 0

## 2023-08-10 ENCOUNTER — TELEPHONE (OUTPATIENT)
Dept: PULMONOLOGY | Facility: CLINIC | Age: 53
End: 2023-08-10

## 2023-08-10 NOTE — TELEPHONE ENCOUNTER
Received physician's order for CPAP supplies from [facility]. Order placed in [address] [place] [address] folder for signature.

## 2023-08-11 NOTE — TELEPHONE ENCOUNTER
Order signed by Jennifer Randle and faxed back to [facility]. Confirmation received and sent to scanning.

## 2023-09-11 ENCOUNTER — E-ADVICE (OUTPATIENT)
Dept: BARIATRICS/WEIGHT MGMT | Age: 53
End: 2023-09-11

## 2023-09-12 ENCOUNTER — OFFICE VISIT (OUTPATIENT)
Dept: BARIATRICS/WEIGHT MGMT | Age: 53
End: 2023-09-12

## 2023-09-12 VITALS
OXYGEN SATURATION: 98 % | BODY MASS INDEX: 40.4 KG/M2 | HEIGHT: 63 IN | TEMPERATURE: 97.3 F | WEIGHT: 228 LBS | DIASTOLIC BLOOD PRESSURE: 75 MMHG | SYSTOLIC BLOOD PRESSURE: 128 MMHG | HEART RATE: 77 BPM

## 2023-09-12 DIAGNOSIS — E66.01 SEVERE OBESITY (BMI 35.0-39.9) WITH COMORBIDITY (CMD): Primary | ICD-10-CM

## 2023-09-12 PROCEDURE — 94690 O2 UPTK REST INDIRECT: CPT

## 2023-09-13 ENCOUNTER — TELEPHONE (OUTPATIENT)
Dept: BARIATRICS/WEIGHT MGMT | Age: 53
End: 2023-09-13

## 2023-09-13 ENCOUNTER — E-ADVICE (OUTPATIENT)
Dept: BARIATRICS/WEIGHT MGMT | Age: 53
End: 2023-09-13

## 2023-09-18 ENCOUNTER — TELEPHONE (OUTPATIENT)
Dept: HEMATOLOGY/ONCOLOGY | Facility: HOSPITAL | Age: 53
End: 2023-09-18

## 2023-10-16 ENCOUNTER — TELEPHONE (OUTPATIENT)
Dept: BARIATRICS/WEIGHT MGMT | Age: 53
End: 2023-10-16

## 2023-10-17 ENCOUNTER — HOSPITAL ENCOUNTER (OUTPATIENT)
Dept: ULTRASOUND IMAGING | Age: 53
Discharge: HOME | End: 2023-10-17
Attending: NURSE PRACTITIONER

## 2023-10-17 ENCOUNTER — APPOINTMENT (OUTPATIENT)
Dept: RESPIRATORY THERAPY | Age: 53
End: 2023-10-17
Attending: NURSE PRACTITIONER

## 2023-10-17 ENCOUNTER — HOSPITAL ENCOUNTER (OUTPATIENT)
Dept: RESPIRATORY THERAPY | Age: 53
Discharge: HOME | End: 2023-10-17
Attending: NURSE PRACTITIONER

## 2023-10-17 DIAGNOSIS — E11.65 TYPE 2 DIABETES MELLITUS WITH HYPERGLYCEMIA, WITH LONG-TERM CURRENT USE OF INSULIN (CMD): ICD-10-CM

## 2023-10-17 DIAGNOSIS — E55.9 VITAMIN D DEFICIENCY: ICD-10-CM

## 2023-10-17 DIAGNOSIS — G47.33 OBSTRUCTIVE SLEEP APNEA (ADULT) (PEDIATRIC): ICD-10-CM

## 2023-10-17 DIAGNOSIS — Z79.4 TYPE 2 DIABETES MELLITUS WITH HYPERGLYCEMIA, WITH LONG-TERM CURRENT USE OF INSULIN (CMD): ICD-10-CM

## 2023-10-17 DIAGNOSIS — E66.01 MORBID OBESITY WITH BODY MASS INDEX OF 40.0-44.9 IN ADULT (CMD): ICD-10-CM

## 2023-10-17 PROBLEM — K76.0 FATTY METAMORPHOSIS OF LIVER: Status: ACTIVE | Noted: 2023-10-17

## 2023-10-17 PROCEDURE — 94726 PLETHYSMOGRAPHY LUNG VOLUMES: CPT

## 2023-10-17 PROCEDURE — 94010 BREATHING CAPACITY TEST: CPT

## 2023-10-17 PROCEDURE — 94729 DIFFUSING CAPACITY: CPT

## 2023-10-17 PROCEDURE — 76705 ECHO EXAM OF ABDOMEN: CPT

## 2023-10-25 ENCOUNTER — HOSPITAL ENCOUNTER (OUTPATIENT)
Dept: MRI IMAGING | Facility: HOSPITAL | Age: 53
Discharge: HOME | End: 2023-10-25
Attending: INTERNAL MEDICINE

## 2023-10-25 DIAGNOSIS — C50.411 MALIGNANT NEOPLASM OF UPPER-OUTER QUADRANT OF RIGHT BREAST IN FEMALE, ESTROGEN RECEPTOR NEGATIVE: ICD-10-CM

## 2023-10-25 DIAGNOSIS — Z17.1 MALIGNANT NEOPLASM OF UPPER-OUTER QUADRANT OF RIGHT BREAST IN FEMALE, ESTROGEN RECEPTOR NEGATIVE: ICD-10-CM

## 2023-10-25 PROCEDURE — 77049 MRI BREAST C-+ W/CAD BI: CPT | Performed by: INTERNAL MEDICINE

## 2023-10-25 PROCEDURE — A9575 INJ GADOTERATE MEGLUMI 0.1ML: HCPCS | Performed by: INTERNAL MEDICINE

## 2023-10-25 RX ORDER — GADOTERATE MEGLUMINE 376.9 MG/ML
20 INJECTION INTRAVENOUS
Status: COMPLETED | OUTPATIENT
Start: 2023-10-25 | End: 2023-10-25

## 2023-10-25 RX ADMIN — GADOTERATE MEGLUMINE 20 ML: 376.9 INJECTION INTRAVENOUS at 11:23:00

## 2023-11-03 PROBLEM — Z92.21 PERSONAL HISTORY OF ANTINEOPLASTIC CHEMOTHERAPY: Status: ACTIVE | Noted: 2023-11-03

## 2023-11-03 RX ORDER — ORAL SEMAGLUTIDE 3 MG/1
3 TABLET ORAL DAILY
COMMUNITY

## 2023-11-03 RX ORDER — INSULIN ASPART 100 [IU]/ML
INJECTION, SUSPENSION SUBCUTANEOUS
COMMUNITY

## 2023-11-17 ENCOUNTER — OFFICE VISIT (OUTPATIENT)
Dept: BARIATRICS/WEIGHT MGMT | Age: 53
End: 2023-11-17

## 2023-11-17 ENCOUNTER — E-ADVICE (OUTPATIENT)
Dept: BARIATRICS/WEIGHT MGMT | Age: 53
End: 2023-11-17

## 2023-11-17 VITALS
HEIGHT: 63 IN | DIASTOLIC BLOOD PRESSURE: 73 MMHG | HEART RATE: 76 BPM | BODY MASS INDEX: 40.57 KG/M2 | OXYGEN SATURATION: 100 % | TEMPERATURE: 97.7 F | SYSTOLIC BLOOD PRESSURE: 128 MMHG | WEIGHT: 229 LBS

## 2023-11-17 DIAGNOSIS — E78.00 HYPERCHOLESTEREMIA: ICD-10-CM

## 2023-11-17 DIAGNOSIS — E11.65 TYPE 2 DIABETES MELLITUS WITH HYPERGLYCEMIA, WITH LONG-TERM CURRENT USE OF INSULIN (CMD): ICD-10-CM

## 2023-11-17 DIAGNOSIS — Z79.4 TYPE 2 DIABETES MELLITUS WITH HYPERGLYCEMIA, WITH LONG-TERM CURRENT USE OF INSULIN (CMD): ICD-10-CM

## 2023-11-17 DIAGNOSIS — E66.01 SEVERE OBESITY (BMI 35.0-39.9) WITH COMORBIDITY (CMD): Primary | ICD-10-CM

## 2023-11-17 DIAGNOSIS — G47.33 OBSTRUCTIVE SLEEP APNEA (ADULT) (PEDIATRIC): ICD-10-CM

## 2023-11-17 DIAGNOSIS — E55.9 VITAMIN D DEFICIENCY: ICD-10-CM

## 2023-11-17 DIAGNOSIS — Z85.3 HISTORY OF BREAST CANCER: Chronic | ICD-10-CM

## 2023-11-17 PROCEDURE — 3078F DIAST BP <80 MM HG: CPT | Performed by: SURGERY

## 2023-11-17 PROCEDURE — 3074F SYST BP LT 130 MM HG: CPT | Performed by: SURGERY

## 2023-11-17 PROCEDURE — 99214 OFFICE O/P EST MOD 30 MIN: CPT | Performed by: SURGERY

## 2023-11-17 RX ORDER — ORAL SEMAGLUTIDE 7 MG/1
1 TABLET ORAL DAILY
COMMUNITY
Start: 2023-11-18

## 2023-11-22 ENCOUNTER — LAB ENCOUNTER (OUTPATIENT)
Dept: LAB | Facility: HOSPITAL | Age: 53
End: 2023-11-22
Attending: INTERNAL MEDICINE
Payer: MEDICAID

## 2023-11-22 ENCOUNTER — OFFICE VISIT (OUTPATIENT)
Dept: HEMATOLOGY/ONCOLOGY | Facility: HOSPITAL | Age: 53
End: 2023-11-22
Attending: INTERNAL MEDICINE
Payer: COMMERCIAL

## 2023-11-22 VITALS
BODY MASS INDEX: 41.96 KG/M2 | SYSTOLIC BLOOD PRESSURE: 137 MMHG | HEIGHT: 62 IN | TEMPERATURE: 98 F | HEART RATE: 83 BPM | DIASTOLIC BLOOD PRESSURE: 58 MMHG | WEIGHT: 228 LBS | OXYGEN SATURATION: 97 % | RESPIRATION RATE: 16 BRPM

## 2023-11-22 DIAGNOSIS — C50.411 MALIGNANT NEOPLASM OF UPPER-OUTER QUADRANT OF RIGHT BREAST IN FEMALE, ESTROGEN RECEPTOR NEGATIVE: ICD-10-CM

## 2023-11-22 DIAGNOSIS — C50.411 MALIGNANT NEOPLASM OF UPPER-OUTER QUADRANT OF RIGHT BREAST IN FEMALE, ESTROGEN RECEPTOR NEGATIVE: Primary | ICD-10-CM

## 2023-11-22 DIAGNOSIS — Z92.21 PERSONAL HISTORY OF ANTINEOPLASTIC CHEMOTHERAPY: ICD-10-CM

## 2023-11-22 DIAGNOSIS — Z17.1 MALIGNANT NEOPLASM OF UPPER-OUTER QUADRANT OF RIGHT BREAST IN FEMALE, ESTROGEN RECEPTOR NEGATIVE: Primary | ICD-10-CM

## 2023-11-22 DIAGNOSIS — Z08 ENCOUNTER FOR FOLLOW-UP EXAMINATION AFTER COMPLETED TREATMENT FOR MALIGNANT NEOPLASM: ICD-10-CM

## 2023-11-22 DIAGNOSIS — E55.9 VITAMIN D DEFICIENCY: ICD-10-CM

## 2023-11-22 DIAGNOSIS — R92.30 BREAST DENSITY: ICD-10-CM

## 2023-11-22 DIAGNOSIS — Z17.1 MALIGNANT NEOPLASM OF UPPER-OUTER QUADRANT OF RIGHT BREAST IN FEMALE, ESTROGEN RECEPTOR NEGATIVE: ICD-10-CM

## 2023-11-22 LAB
ALBUMIN SERPL-MCNC: 4.2 G/DL (ref 3.2–4.8)
ALBUMIN/GLOB SERPL: 1.3 {RATIO} (ref 1–2)
ALP LIVER SERPL-CCNC: 75 U/L
ALT SERPL-CCNC: 11 U/L
ANION GAP SERPL CALC-SCNC: 2 MMOL/L (ref 0–18)
AST SERPL-CCNC: 10 U/L (ref ?–34)
BASOPHILS # BLD AUTO: 0.04 X10(3) UL (ref 0–0.2)
BASOPHILS NFR BLD AUTO: 0.6 %
BILIRUB SERPL-MCNC: 0.3 MG/DL (ref 0.3–1.2)
BUN BLD-MCNC: 13 MG/DL (ref 9–23)
BUN/CREAT SERPL: 11.4 (ref 10–20)
CALCIUM BLD-MCNC: 9.8 MG/DL (ref 8.7–10.4)
CHLORIDE SERPL-SCNC: 104 MMOL/L (ref 98–112)
CO2 SERPL-SCNC: 30 MMOL/L (ref 21–32)
CREAT BLD-MCNC: 1.14 MG/DL
DEPRECATED RDW RBC AUTO: 48.1 FL (ref 35.1–46.3)
EGFRCR SERPLBLD CKD-EPI 2021: 58 ML/MIN/1.73M2 (ref 60–?)
EOSINOPHIL # BLD AUTO: 0.24 X10(3) UL (ref 0–0.7)
EOSINOPHIL NFR BLD AUTO: 3.4 %
ERYTHROCYTE [DISTWIDTH] IN BLOOD BY AUTOMATED COUNT: 14.9 % (ref 11–15)
FASTING STATUS PATIENT QL REPORTED: NO
GLOBULIN PLAS-MCNC: 3.2 G/DL (ref 2.8–4.4)
GLUCOSE BLD-MCNC: 345 MG/DL (ref 70–99)
HCT VFR BLD AUTO: 40 %
HGB BLD-MCNC: 12.8 G/DL
IMM GRANULOCYTES # BLD AUTO: 0.02 X10(3) UL (ref 0–1)
IMM GRANULOCYTES NFR BLD: 0.3 %
LYMPHOCYTES # BLD AUTO: 3.05 X10(3) UL (ref 1–4)
LYMPHOCYTES NFR BLD AUTO: 42.6 %
MCH RBC QN AUTO: 28 PG (ref 26–34)
MCHC RBC AUTO-ENTMCNC: 32 G/DL (ref 31–37)
MCV RBC AUTO: 87.5 FL
MONOCYTES # BLD AUTO: 0.31 X10(3) UL (ref 0.1–1)
MONOCYTES NFR BLD AUTO: 4.3 %
NEUTROPHILS # BLD AUTO: 3.5 X10 (3) UL (ref 1.5–7.7)
NEUTROPHILS # BLD AUTO: 3.5 X10(3) UL (ref 1.5–7.7)
NEUTROPHILS NFR BLD AUTO: 48.8 %
OSMOLALITY SERPL CALC.SUM OF ELEC: 296 MOSM/KG (ref 275–295)
PLATELET # BLD AUTO: 289 10(3)UL (ref 150–450)
POTASSIUM SERPL-SCNC: 4 MMOL/L (ref 3.5–5.1)
PROT SERPL-MCNC: 7.4 G/DL (ref 5.7–8.2)
RBC # BLD AUTO: 4.57 X10(6)UL
SODIUM SERPL-SCNC: 136 MMOL/L (ref 136–145)
VIT D+METAB SERPL-MCNC: 38.1 NG/ML (ref 30–100)
WBC # BLD AUTO: 7.2 X10(3) UL (ref 4–11)

## 2023-11-22 PROCEDURE — 82306 VITAMIN D 25 HYDROXY: CPT

## 2023-11-22 PROCEDURE — 85025 COMPLETE CBC W/AUTO DIFF WBC: CPT

## 2023-11-22 PROCEDURE — 3008F BODY MASS INDEX DOCD: CPT | Performed by: INTERNAL MEDICINE

## 2023-11-22 PROCEDURE — 3078F DIAST BP <80 MM HG: CPT | Performed by: INTERNAL MEDICINE

## 2023-11-22 PROCEDURE — 36415 COLL VENOUS BLD VENIPUNCTURE: CPT

## 2023-11-22 PROCEDURE — 3075F SYST BP GE 130 - 139MM HG: CPT | Performed by: INTERNAL MEDICINE

## 2023-11-22 PROCEDURE — 80053 COMPREHEN METABOLIC PANEL: CPT

## 2023-11-22 PROCEDURE — 99214 OFFICE O/P EST MOD 30 MIN: CPT | Performed by: INTERNAL MEDICINE

## 2023-11-28 ENCOUNTER — ANESTHESIA EVENT (OUTPATIENT)
Dept: SURGERY | Facility: HOSPITAL | Age: 53
End: 2023-11-28
Payer: COMMERCIAL

## 2023-11-28 ENCOUNTER — ANESTHESIA (OUTPATIENT)
Dept: SURGERY | Facility: HOSPITAL | Age: 53
End: 2023-11-28
Payer: COMMERCIAL

## 2023-11-28 ENCOUNTER — HOSPITAL ENCOUNTER (OUTPATIENT)
Facility: HOSPITAL | Age: 53
Setting detail: HOSPITAL OUTPATIENT SURGERY
Discharge: HOME | End: 2023-11-28
Attending: OTOLARYNGOLOGY | Admitting: OTOLARYNGOLOGY
Payer: COMMERCIAL

## 2023-11-28 VITALS
WEIGHT: 229.75 LBS | HEIGHT: 62 IN | SYSTOLIC BLOOD PRESSURE: 157 MMHG | BODY MASS INDEX: 42.28 KG/M2 | HEART RATE: 64 BPM | DIASTOLIC BLOOD PRESSURE: 75 MMHG | TEMPERATURE: 97 F | OXYGEN SATURATION: 98 % | RESPIRATION RATE: 19 BRPM

## 2023-11-28 LAB
B-HCG UR QL: NEGATIVE
GLUCOSE BLD-MCNC: 208 MG/DL (ref 70–99)
GLUCOSE BLD-MCNC: 247 MG/DL (ref 70–99)
GLUCOSE BLD-MCNC: 247 MG/DL (ref 70–99)

## 2023-11-28 PROCEDURE — 82962 GLUCOSE BLOOD TEST: CPT

## 2023-11-28 PROCEDURE — 09BL8ZZ EXCISION OF NASAL TURBINATE, VIA NATURAL OR ARTIFICIAL OPENING ENDOSCOPIC: ICD-10-PCS | Performed by: OTOLARYNGOLOGY

## 2023-11-28 PROCEDURE — 09TV8ZZ RESECTION OF LEFT ETHMOID SINUS, VIA NATURAL OR ARTIFICIAL OPENING ENDOSCOPIC: ICD-10-PCS | Performed by: OTOLARYNGOLOGY

## 2023-11-28 PROCEDURE — 09TU8ZZ RESECTION OF RIGHT ETHMOID SINUS, VIA NATURAL OR ARTIFICIAL OPENING ENDOSCOPIC: ICD-10-PCS | Performed by: OTOLARYNGOLOGY

## 2023-11-28 PROCEDURE — 099W8ZZ DRAINAGE OF RIGHT SPHENOID SINUS, VIA NATURAL OR ARTIFICIAL OPENING ENDOSCOPIC: ICD-10-PCS | Performed by: OTOLARYNGOLOGY

## 2023-11-28 PROCEDURE — 099T8ZZ DRAINAGE OF LEFT FRONTAL SINUS, VIA NATURAL OR ARTIFICIAL OPENING ENDOSCOPIC: ICD-10-PCS | Performed by: OTOLARYNGOLOGY

## 2023-11-28 PROCEDURE — 8E09XBZ COMPUTER ASSISTED PROCEDURE OF HEAD AND NECK REGION: ICD-10-PCS | Performed by: OTOLARYNGOLOGY

## 2023-11-28 PROCEDURE — 099X8ZZ DRAINAGE OF LEFT SPHENOID SINUS, VIA NATURAL OR ARTIFICIAL OPENING ENDOSCOPIC: ICD-10-PCS | Performed by: OTOLARYNGOLOGY

## 2023-11-28 PROCEDURE — 09BR8ZZ EXCISION OF LEFT MAXILLARY SINUS, VIA NATURAL OR ARTIFICIAL OPENING ENDOSCOPIC: ICD-10-PCS | Performed by: OTOLARYNGOLOGY

## 2023-11-28 PROCEDURE — 81025 URINE PREGNANCY TEST: CPT

## 2023-11-28 PROCEDURE — 09BQ8ZZ EXCISION OF RIGHT MAXILLARY SINUS, VIA NATURAL OR ARTIFICIAL OPENING ENDOSCOPIC: ICD-10-PCS | Performed by: OTOLARYNGOLOGY

## 2023-11-28 PROCEDURE — 099S8ZZ DRAINAGE OF RIGHT FRONTAL SINUS, VIA NATURAL OR ARTIFICIAL OPENING ENDOSCOPIC: ICD-10-PCS | Performed by: OTOLARYNGOLOGY

## 2023-11-28 RX ORDER — NEOSTIGMINE METHYLSULFATE 1 MG/ML
INJECTION, SOLUTION INTRAVENOUS AS NEEDED
Status: DISCONTINUED | OUTPATIENT
Start: 2023-11-28 | End: 2023-11-28 | Stop reason: SURG

## 2023-11-28 RX ORDER — ACETAMINOPHEN 500 MG
1000 TABLET ORAL ONCE
Status: DISCONTINUED | OUTPATIENT
Start: 2023-11-28 | End: 2023-11-28 | Stop reason: HOSPADM

## 2023-11-28 RX ORDER — ONDANSETRON 2 MG/ML
4 INJECTION INTRAMUSCULAR; INTRAVENOUS EVERY 6 HOURS PRN
Status: DISCONTINUED | OUTPATIENT
Start: 2023-11-28 | End: 2023-11-28

## 2023-11-28 RX ORDER — SODIUM CHLORIDE, SODIUM LACTATE, POTASSIUM CHLORIDE, CALCIUM CHLORIDE 600; 310; 30; 20 MG/100ML; MG/100ML; MG/100ML; MG/100ML
INJECTION, SOLUTION INTRAVENOUS CONTINUOUS
Status: DISCONTINUED | OUTPATIENT
Start: 2023-11-28 | End: 2023-11-28

## 2023-11-28 RX ORDER — MIDAZOLAM HYDROCHLORIDE 1 MG/ML
INJECTION INTRAMUSCULAR; INTRAVENOUS AS NEEDED
Status: DISCONTINUED | OUTPATIENT
Start: 2023-11-28 | End: 2023-11-28 | Stop reason: SURG

## 2023-11-28 RX ORDER — OXYMETAZOLINE HYDROCHLORIDE 0.05 G/100ML
SPRAY NASAL AS NEEDED
Status: DISCONTINUED | OUTPATIENT
Start: 2023-11-28 | End: 2023-11-28 | Stop reason: HOSPADM

## 2023-11-28 RX ORDER — SCOLOPAMINE TRANSDERMAL SYSTEM 1 MG/1
1 PATCH, EXTENDED RELEASE TRANSDERMAL ONCE
Status: DISCONTINUED | OUTPATIENT
Start: 2023-11-28 | End: 2023-11-28

## 2023-11-28 RX ORDER — INSULIN ASPART 100 [IU]/ML
6 INJECTION, SOLUTION INTRAVENOUS; SUBCUTANEOUS ONCE
Status: COMPLETED | OUTPATIENT
Start: 2023-11-28 | End: 2023-11-28

## 2023-11-28 RX ORDER — LABETALOL HYDROCHLORIDE 5 MG/ML
5 INJECTION, SOLUTION INTRAVENOUS EVERY 5 MIN PRN
Status: DISCONTINUED | OUTPATIENT
Start: 2023-11-28 | End: 2023-11-28

## 2023-11-28 RX ORDER — ACETAMINOPHEN AND CODEINE PHOSPHATE 300; 30 MG/1; MG/1
1 TABLET ORAL EVERY 4 HOURS PRN
Qty: 20 TABLET | Refills: 0 | Status: SHIPPED | OUTPATIENT
Start: 2023-11-28

## 2023-11-28 RX ORDER — HYDROCODONE BITARTRATE AND ACETAMINOPHEN 5; 325 MG/1; MG/1
2 TABLET ORAL ONCE AS NEEDED
Status: COMPLETED | OUTPATIENT
Start: 2023-11-28 | End: 2023-11-28

## 2023-11-28 RX ORDER — DEXTROSE MONOHYDRATE 25 G/50ML
50 INJECTION, SOLUTION INTRAVENOUS
Status: DISCONTINUED | OUTPATIENT
Start: 2023-11-28 | End: 2023-11-28 | Stop reason: HOSPADM

## 2023-11-28 RX ORDER — NICOTINE POLACRILEX 4 MG
15 LOZENGE BUCCAL
Status: DISCONTINUED | OUTPATIENT
Start: 2023-11-28 | End: 2023-11-28 | Stop reason: HOSPADM

## 2023-11-28 RX ORDER — NALOXONE HYDROCHLORIDE 0.4 MG/ML
0.08 INJECTION, SOLUTION INTRAMUSCULAR; INTRAVENOUS; SUBCUTANEOUS AS NEEDED
Status: DISCONTINUED | OUTPATIENT
Start: 2023-11-28 | End: 2023-11-28

## 2023-11-28 RX ORDER — HYDROCODONE BITARTRATE AND ACETAMINOPHEN 5; 325 MG/1; MG/1
1 TABLET ORAL ONCE AS NEEDED
Status: COMPLETED | OUTPATIENT
Start: 2023-11-28 | End: 2023-11-28

## 2023-11-28 RX ORDER — HYDROMORPHONE HYDROCHLORIDE 1 MG/ML
0.6 INJECTION, SOLUTION INTRAMUSCULAR; INTRAVENOUS; SUBCUTANEOUS EVERY 5 MIN PRN
Status: DISCONTINUED | OUTPATIENT
Start: 2023-11-28 | End: 2023-11-28

## 2023-11-28 RX ORDER — GLYCOPYRROLATE 0.2 MG/ML
INJECTION, SOLUTION INTRAMUSCULAR; INTRAVENOUS AS NEEDED
Status: DISCONTINUED | OUTPATIENT
Start: 2023-11-28 | End: 2023-11-28 | Stop reason: SURG

## 2023-11-28 RX ORDER — PROCHLORPERAZINE EDISYLATE 5 MG/ML
5 INJECTION INTRAMUSCULAR; INTRAVENOUS EVERY 8 HOURS PRN
Status: DISCONTINUED | OUTPATIENT
Start: 2023-11-28 | End: 2023-11-28

## 2023-11-28 RX ORDER — CEPHALEXIN 500 MG/1
500 CAPSULE ORAL 2 TIMES DAILY
Qty: 20 CAPSULE | Refills: 0 | Status: SHIPPED | OUTPATIENT
Start: 2023-11-28 | End: 2023-12-08

## 2023-11-28 RX ORDER — LIDOCAINE HYDROCHLORIDE AND EPINEPHRINE 10; 10 MG/ML; UG/ML
INJECTION, SOLUTION INFILTRATION; PERINEURAL AS NEEDED
Status: DISCONTINUED | OUTPATIENT
Start: 2023-11-28 | End: 2023-11-28 | Stop reason: HOSPADM

## 2023-11-28 RX ORDER — CEFAZOLIN SODIUM/WATER 2 G/20 ML
2 SYRINGE (ML) INTRAVENOUS ONCE
Status: COMPLETED | OUTPATIENT
Start: 2023-11-28 | End: 2023-11-28

## 2023-11-28 RX ORDER — HYDROMORPHONE HYDROCHLORIDE 1 MG/ML
0.2 INJECTION, SOLUTION INTRAMUSCULAR; INTRAVENOUS; SUBCUTANEOUS EVERY 5 MIN PRN
Status: DISCONTINUED | OUTPATIENT
Start: 2023-11-28 | End: 2023-11-28

## 2023-11-28 RX ORDER — INSULIN ASPART 100 [IU]/ML
INJECTION, SOLUTION INTRAVENOUS; SUBCUTANEOUS ONCE
Status: COMPLETED | OUTPATIENT
Start: 2023-11-28 | End: 2023-11-28

## 2023-11-28 RX ORDER — NICOTINE POLACRILEX 4 MG
30 LOZENGE BUCCAL
Status: DISCONTINUED | OUTPATIENT
Start: 2023-11-28 | End: 2023-11-28 | Stop reason: HOSPADM

## 2023-11-28 RX ORDER — ACETAMINOPHEN 500 MG
1000 TABLET ORAL ONCE AS NEEDED
Status: COMPLETED | OUTPATIENT
Start: 2023-11-28 | End: 2023-11-28

## 2023-11-28 RX ORDER — LIDOCAINE HYDROCHLORIDE 10 MG/ML
INJECTION, SOLUTION EPIDURAL; INFILTRATION; INTRACAUDAL; PERINEURAL AS NEEDED
Status: DISCONTINUED | OUTPATIENT
Start: 2023-11-28 | End: 2023-11-28 | Stop reason: SURG

## 2023-11-28 RX ORDER — INSULIN ASPART 100 [IU]/ML
INJECTION, SOLUTION INTRAVENOUS; SUBCUTANEOUS
Status: COMPLETED
Start: 2023-11-28 | End: 2023-11-28

## 2023-11-28 RX ORDER — HYDROMORPHONE HYDROCHLORIDE 1 MG/ML
0.4 INJECTION, SOLUTION INTRAMUSCULAR; INTRAVENOUS; SUBCUTANEOUS EVERY 5 MIN PRN
Status: DISCONTINUED | OUTPATIENT
Start: 2023-11-28 | End: 2023-11-28

## 2023-11-28 RX ORDER — DIPHENHYDRAMINE HYDROCHLORIDE 50 MG/ML
12.5 INJECTION INTRAMUSCULAR; INTRAVENOUS AS NEEDED
Status: DISCONTINUED | OUTPATIENT
Start: 2023-11-28 | End: 2023-11-28

## 2023-11-28 RX ORDER — PHENYLEPHRINE HCL 10 MG/ML
VIAL (ML) INJECTION AS NEEDED
Status: DISCONTINUED | OUTPATIENT
Start: 2023-11-28 | End: 2023-11-28 | Stop reason: SURG

## 2023-11-28 RX ORDER — ROCURONIUM BROMIDE 10 MG/ML
INJECTION, SOLUTION INTRAVENOUS AS NEEDED
Status: DISCONTINUED | OUTPATIENT
Start: 2023-11-28 | End: 2023-11-28 | Stop reason: SURG

## 2023-11-28 RX ORDER — MEPERIDINE HYDROCHLORIDE 25 MG/ML
12.5 INJECTION INTRAMUSCULAR; INTRAVENOUS; SUBCUTANEOUS AS NEEDED
Status: DISCONTINUED | OUTPATIENT
Start: 2023-11-28 | End: 2023-11-28

## 2023-11-28 RX ORDER — LABETALOL HYDROCHLORIDE 5 MG/ML
INJECTION, SOLUTION INTRAVENOUS AS NEEDED
Status: DISCONTINUED | OUTPATIENT
Start: 2023-11-28 | End: 2023-11-28 | Stop reason: SURG

## 2023-11-28 RX ORDER — ONDANSETRON 2 MG/ML
INJECTION INTRAMUSCULAR; INTRAVENOUS AS NEEDED
Status: DISCONTINUED | OUTPATIENT
Start: 2023-11-28 | End: 2023-11-28 | Stop reason: SURG

## 2023-11-28 RX ADMIN — PHENYLEPHRINE HCL 100 MCG: 10 MG/ML VIAL (ML) INJECTION at 09:22:00

## 2023-11-28 RX ADMIN — ONDANSETRON 4 MG: 2 INJECTION INTRAMUSCULAR; INTRAVENOUS at 09:38:00

## 2023-11-28 RX ADMIN — PHENYLEPHRINE HCL 50 MCG: 10 MG/ML VIAL (ML) INJECTION at 09:40:00

## 2023-11-28 RX ADMIN — PHENYLEPHRINE HCL 100 MCG: 10 MG/ML VIAL (ML) INJECTION at 08:13:00

## 2023-11-28 RX ADMIN — PHENYLEPHRINE HCL 100 MCG: 10 MG/ML VIAL (ML) INJECTION at 09:05:00

## 2023-11-28 RX ADMIN — NEOSTIGMINE METHYLSULFATE 4 MG: 1 INJECTION, SOLUTION INTRAVENOUS at 09:45:00

## 2023-11-28 RX ADMIN — PHENYLEPHRINE HCL 100 MCG: 10 MG/ML VIAL (ML) INJECTION at 09:01:00

## 2023-11-28 RX ADMIN — PHENYLEPHRINE HCL 50 MCG: 10 MG/ML VIAL (ML) INJECTION at 09:28:00

## 2023-11-28 RX ADMIN — GLYCOPYRROLATE 0.4 MG: 0.2 INJECTION, SOLUTION INTRAMUSCULAR; INTRAVENOUS at 09:45:00

## 2023-11-28 RX ADMIN — PHENYLEPHRINE HCL 100 MCG: 10 MG/ML VIAL (ML) INJECTION at 09:34:00

## 2023-11-28 RX ADMIN — PHENYLEPHRINE HCL 50 MCG: 10 MG/ML VIAL (ML) INJECTION at 08:55:00

## 2023-11-28 RX ADMIN — SODIUM CHLORIDE, SODIUM LACTATE, POTASSIUM CHLORIDE, CALCIUM CHLORIDE: 600; 310; 30; 20 INJECTION, SOLUTION INTRAVENOUS at 07:32:00

## 2023-11-28 RX ADMIN — CEFAZOLIN SODIUM/WATER 2 G: 2 G/20 ML SYRINGE (ML) INTRAVENOUS at 07:45:00

## 2023-11-28 RX ADMIN — PHENYLEPHRINE HCL 50 MCG: 10 MG/ML VIAL (ML) INJECTION at 08:35:00

## 2023-11-28 RX ADMIN — PHENYLEPHRINE HCL 50 MCG: 10 MG/ML VIAL (ML) INJECTION at 08:57:00

## 2023-11-28 RX ADMIN — PHENYLEPHRINE HCL 50 MCG: 10 MG/ML VIAL (ML) INJECTION at 08:40:00

## 2023-11-28 RX ADMIN — LABETALOL HYDROCHLORIDE 5 MG: 5 INJECTION, SOLUTION INTRAVENOUS at 08:28:00

## 2023-11-28 RX ADMIN — PHENYLEPHRINE HCL 50 MCG: 10 MG/ML VIAL (ML) INJECTION at 08:09:00

## 2023-11-28 RX ADMIN — LIDOCAINE HYDROCHLORIDE 25 MG: 10 INJECTION, SOLUTION EPIDURAL; INFILTRATION; INTRACAUDAL; PERINEURAL at 07:38:00

## 2023-11-28 RX ADMIN — PHENYLEPHRINE HCL 50 MCG: 10 MG/ML VIAL (ML) INJECTION at 08:37:00

## 2023-11-28 RX ADMIN — MIDAZOLAM HYDROCHLORIDE 2 MG: 1 INJECTION INTRAMUSCULAR; INTRAVENOUS at 07:32:00

## 2023-11-28 RX ADMIN — PHENYLEPHRINE HCL 100 MCG: 10 MG/ML VIAL (ML) INJECTION at 08:11:00

## 2023-11-28 RX ADMIN — LABETALOL HYDROCHLORIDE 5 MG: 5 INJECTION, SOLUTION INTRAVENOUS at 08:50:00

## 2023-11-28 RX ADMIN — LABETALOL HYDROCHLORIDE 10 MG: 5 INJECTION, SOLUTION INTRAVENOUS at 08:03:00

## 2023-11-28 RX ADMIN — ROCURONIUM BROMIDE 50 MG: 10 INJECTION, SOLUTION INTRAVENOUS at 07:47:00

## 2023-11-28 NOTE — OPERATIVE REPORT
[facility] OPERATIVE REPORT     PATIENT NAME: Sandra Rome    MRN: [id number]   DATE OF OPERATION: 23   SITE: [facility] PERIOPERATIVE   : 3/4/1970    PREOPERATIVE DIAGNOSES   Bilateral chronic frontal, ethmoid, maxillary, and sphenoid sinusitis. Nasal polyposis. Inferior turbinate hypertrophy. POSTOPERATIVE DIAGNOSES   Bilateral chronic frontal, ethmoid, maxillary, and sphenoid sinusitis. Nasal polyposis. Inferior turbinate hypertrophy. PROCEDURES PERFORMED    Bilateral endoscopic frontal sinus exploration. Bilateral endoscopic total ethmoidectomy. Bilateral endoscopic maxillary antrostomy with tissue removal.  Bilateral endoscopic sphenoidotomy. Bilateral turbinate submucous resection. Stereotactic image guidance surgery. SURGEON: Cortez Gudino MD     ANESTHESIA: General endotracheal anesthesia. ESTIMATED BLOOD LOSS: 50 mL. SPECIMENS: Left and right nasal contents. FINDINGS   Massive bilateral nasal polyposis. Bilateral moderate to severe inferior turbinate hypetrophy. INDICATIONS FOR SURGERY:  The patient is a 47year old female who has difficulties with chronic sinusitis in the setting of hyperplastic polypoid disease. This has not responded to medical management. Polyps were confirmed on nasal endoscopy. CT scan confirmed pansinusitis. The polyps and symptoms are to the extent that she wishes to proceed with functional endoscopic sinus surgery. Given the massive nasal polyps, Stealth image guidance will be used. OPERATIVE COURSE: The patient was taken to operating room on 23, at [facility]. Prior to entering the operating room, a preoperative planning session was undertaken with the Stealth image guidance workstation. The patient was then brought to the operating room and placed in the supine position on the operating room table. General endotracheal anesthesia was induced without difficulty.  Topical oxymetazoline nasal spray was applied to bilateral nares. The Stealth image guidance headset was then secured to the patient's forehead. The patient was then draped with sterile towels. Nasal endoscopy revealed mild right septal deviation that did not inhibit access. There was also moderate to severe inferior turbinate hypertrophy. 1% lidocaine with 1:100,000 epinephrine was injected along bilateral maxillary lines and on both sides of the nasal septum. This was also injected into the nasal polyps. 1:1000 epinephrine pledgets were then packed into bilateral nares. The Stealth image guidance headset was then registered. It was registered using a tracer algorithm. It was registered to within a root mean square error of 2 mm. This was then verified on known landmarks. The epinephrine pledgets were then removed and surgery was begun on the left side. The nasal endoscope was used for all portions of the procedures specified below including the ethmoidectomies. The left inferior turbinate was outfractured using the Boies elevator. The polyps in the middle meatus and anterior to the middle turbinate were removed using the microdebrider. The middle turbinate was then medialized. The polyps in the middle meatus were grasped and removed using Blakesley forceps. The position of the maxillary antrostomy was then determined using the ostium seeker. It was opened posteriorly using true-biting Blakesley forceps and anteriorly using side-biting forceps to the right. Then, 45-degree and 70-degree scopes were used to ensure that the natural ostium was in good communication with the created maxillary antrostomy. The polypoid tissue within the maxillary sinus was then removed using the Heuwieser and right angle up-biting Blakesley forceps. The bulla ethmoidalis was then opened. The polyps filling the anterior ethmoid were then removed using up-biting Blakesley forceps and the microdebrider.   The basal lamella was then opened, and the polyps filling the posterior ethmoid air cells were removed using the Blakesley forceps and the microdebrider. The position of the orbit, sphenoid face and skull base were all confirmed using the Stealth image guidance system. The polyps medial to the middle turbinate were then removed using Blakesley forceps and the microdebrider. The polyps in the sphenoethmoid recess were also cleared. Using the superior turbinate as a landmark, the sphenoid was opened low and medial.  It was widened in the inferomedial direction using the spoon curette. Entry into the sphenoid sinus was confirmed using the Stealth image guidance system. The agger nasi air cells were then opened using a graduated series of spoon curettes. This allowed a 90-degree frontal sinus curet to reach into the left frontal sinus. Entry into the frontal sinus was confirmed using the Stealth image guidance system. The loose bone and polypoid tissue blocking the frontal sinus outflow track was then removed using a combination of the 90 degree rotating microdebrider blade and the giraffe forceps. The frontal sinus mucosa could then be seen via the created frontal sinusotomy and the 45-degree scope. The left inferior turbinate was then submucosally reduced using a 2 mm turbinate blade on the microdebrider. The right inferior turbinate was outfractured using the Boies elevator. The polyps in the middle meatus and anterior to the middle turbinate were removed using the microdebrider. The middle turbinate was then medialized. The position of the maxillary antrostomy was then determined using the ostium seeker. It was opened posteriorly using true-biting Blakesley forceps and anteriorly using side-biting forceps to the left. Then, 45-degree and 70-degree scopes were used to ensure that the natural ostium was in good communication with the created maxillary antrostomy. The polypoid tissue within the maxillary sinus was then removed using the Heuwieser and right angle up-biting Blakesley forceps. The bulla ethmoidalis was then opened. The polyps filling the anterior ethmoid were then removed using up-biting Blakesley forceps and the microdebrider. The basal lamella was then opened, and the polyps filling the posterior ethmoid air cells were removed using the Blakesley forceps and the microdebrider. The position of the orbit, sphenoid face and skull base were all confirmed using the Stealth image guidance system. The polyps medial to the middle turbinate were then removed using Blakesley forceps and the microdebrider. The polyps in the sphenoethmoid recess were also cleared. Using the superior turbinate as a landmark, the sphenoid was opened low and medial.  It was widened in the inferomedial direction using the spoon curette. Entry into the sphenoid sinus was confirmed using the Stealth image guidance system. The agger nasi air cells were then opened using a graduated series of spoon curettes. This allowed a 90-degree frontal sinus curet to reach into the right frontal sinus. Entry into the frontal sinus was confirmed using the Stealth image guidance system. The loose bone and polypoid tissue blocking the frontal sinus outflow track was then removed using a combination of the 90 degree rotating microdebrider blade and the giraffe forceps. The frontal sinus mucosa could then be seen via the created frontal sinusotomy and the 45-degree scope. The right inferior turbinate was then submucosally reduced using a 2 mm turbinate blade on the microdebrider. The nares were then suctioned free of blood. The patient was awakened from anesthesia and extubated without difficulty. She was taken to the postanesthetic care unit in stable condition. I was present for and performed this entire procedure. There were no intraoperative complications.      Mack Echeverria MD

## 2023-11-28 NOTE — BRIEF OP NOTE
Pre-operative Diagnosis: CHRONIC PANSINUSITIS; NASAL POLYPS; NASAL OBSTRUCTION; DEVIATED NASAL SEPTUM; NASAL TURBINATE HYPERTROPHY     Post-Operative Diagnosis: Same      Procedure Performed:   BILATERAL ENDOSCOPIC MAXILLARY ANTROSTOMIES WITH TISSUE REMOVAL, BILATERAL ENDOSCOPIC TOTAL ETHMOIDECTOMIES, BILATERAL ENDOSCOPIC FRONTAL SINUS EXPLORATION, BILATERAL ENDOSCOPIC SPHENOIDOTOMIES, BILATERAL ENDOSCOPIC INFERIOR TURBINATE SUBMUCOUS RESECTION, STEALTH IMAGE GUIDANCE    Surgeon(s) and Role:     Bertha Hoover MD - Primary    Surgical Findings: Massive bilateral polyposis. Specimen: Left and right sinus contents sent separately.      Estimated Blood Loss:  50 mL    Bar Navarrete MD  11/28/2023

## 2023-11-28 NOTE — ANESTHESIA PROCEDURE NOTES
Airway  Date/Time: 11/28/2023 7:40 AM  Urgency: elective      General Information and Staff    Patient location during procedure: OR  Anesthesiologist: Nino Lomas MD  Performed: anesthesiologist   Performed by: Nino Lomas MD  Authorized by: Nino Lomas MD      Indications and Patient Condition  Indications for airway management: anesthesia  Sedation level: deep  Preoxygenated: yes  Patient position: sniffing  Mask difficulty assessment: 0 - not attempted    Final Airway Details  Final airway type: endotracheal airway      Successful airway: ETT  Cuffed: yes   Successful intubation technique: direct laryngoscopy  Endotracheal tube insertion site: oral  Blade: Tacos  Blade size: #3  ETT size (mm): 7.5    Cormack-Lehane Classification: grade IIA - partial view of glottis  Placement verified by: capnometry   Cuff volume (mL): 7  Measured from: lips  ETT to lips (cm): 22  Number of attempts at approach: 1  Number of other approaches attempted: 0    Additional Comments  Smooth induction, smooth intubation, no trauma to teeth

## 2023-11-28 NOTE — INTERVAL H&P NOTE
Pre-op Diagnosis: CHRONIC PANSINUS; NASAL POLYPS; NASAL OBSTRUCTION; DEVIATED NASAL SEPTUM; NASAL TURBINATE HYPERTROPHY    The above referenced H&P by Dr. Shaista Hernández was reviewed by Dennis Alcantara MD on 11/28/2023, the patient was examined and no significant changes have occurred in the patient's condition since the H&P was performed. I discussed with the patient and/or legal representative the potential benefits, risks and side effects of this procedure; the likelihood of the patient achieving goals; and potential problems that might occur during recuperation. I discussed reasonable alternatives to the procedure, including risks, benefits and side effects related to the alternatives and risks related to not receiving this procedure. We will proceed with procedure as planned.

## 2023-11-28 NOTE — H&P
Ivan Gonzales MD - 2023 1:40 PM CST  Formatting of this note is different from the original.  Bobby Jackson is a 47year old female. Patient presents with:  Pre-Op    HPI:  Here for Presurgical exam.  Anticipated Surgery nasal surgery  Anticipated Surgeon Dr Dov Aguirre  Anticipated Surgery Date 23    Here for preop clearance excellent exercise tolerance no previous anesthesia or bleeding issues    Past Medical History:  Diagnosis Date  Anemia  Breast cancer ([facility] [address] 2020  Lumpectomy and chemo  Cancer ([facility] [address] 2020  RIGHT  Cup to disc asymmetry 2006  OU  No diabetic retinopathy in both eyes 2006  OU  Obesity (BMI 30-39. 9)  Obesity, unspecified  Other and unspecified hyperlipidemia  Personal history of antineoplastic chemotherapy 1925  Sleep apnea  Type II or unspecified type diabetes mellitus without mention of complication, not stated as uncontrolled  Unspecified essential hypertension  Visual impairment  GLASSES    Past Surgical History:  Procedure Laterality Date  ABLATION  abdominal      section  LUMPECTOMY RIGHT 2020  NEEDLE BIOPSY LEFT 2020  MRI bx  NEEDLE BIOPSY RIGHT 2020  us bx  RADIATION RIGHT Right 10/2020    Family History  Problem Relation Age of Onset  Neurological Disorder Father 61  Alzheimer's disease, Cause of death  Hypertension Mother  Diabetes Mother  Heart Attack Mother 62  MI, Cause of death  Heart Attack Cousin 47  Maternal cousin: MI, Cause of death  Lipids Other  Family H/o Hyperlipidemia  Cancer Maternal Aunt  told was paternal in inheritance  Cancer Maternal Grandfather  prostate ca  Breast Cancer Self 53  Glaucoma Neg  Macular degeneration Neg  Ovarian Cancer Neg    Social History  Tobacco Use  Smoking status: Never  Smokeless tobacco: Never  Vaping Use  Vaping Use: Never used  Alcohol use: Never  Alcohol/week: 0.0 standard drinks of alcohol  Comment: None.   Drug use: No    Allergies:  No Known Allergies  Current Meds:  Current Outpatient Medications  Medication Sig Dispense Refill  Semaglutide (RYBELSUS) 7 MG Oral Tab Take 1 tablet by mouth daily. Start after completing 3 mg dose 90 tablet 1  NovoLOG Mix 70/30 FlexPen 100 UNIT/ML Susp Pen-injector (Insulin Aspart Prot & Aspart 70/30) Inject 45 Units into the skin 2 (two) times daily before meals. 45 mL 2  Insulin Pen Needle (TRUEPLUS PEN NEEDLES) 32G X 4 MM Does not apply Misc Inject insulin twice daily 100 each 2  Vitamins/Minerals Oral Tab Take by mouth. rosuvastatin 5 MG Oral Tab Take 1 tablet (5 mg total) by mouth nightly. 90 tablet 1  fluticasone propionate 50 MCG/ACT Nasal Suspension 2 sprays by Each Nare route daily. 16 g 1  montelukast (SINGULAIR) 10 MG Oral Tab Take 1 tablet (10 mg total) by mouth nightly. 90 tablet 1  Nystatin 375651 UNIT/GM External Powder Apply 1 Dose topically 4 (four) times daily. 60 g 3  Insulin Pen Needle (BD PEN NEEDLE MINI U/F) 31G X 5 MM Does not apply Misc Use 4 times a day as directed 400 each 0      ROS:  10 point ros is negative    PHYSICAL EXAM:  GENERAL: well developed, well nourished, in no apparent distress  HEENT: no lymphadenopathy  RESPIRATORY: clear to auscultation  CARDIOVASCULAR: S1, S2 normal, RRR; no S3, no S4; no click; murmur negative  ABDOMEN: normal active BS+, soft, nondistended; no HSM; no masses; nontender  EXTREMETIES: no CCE  Neuro: CN normal motor normal Gait normal    ASSESSMENT/ PLAN:  Upcoming nasal surgery intermediate risk procedure.  RCRI is 0 EKG is nsr 80 with non specific T waves similar to previous normal stress lexiscan in 10-23 Excellent exercise tolerance Medically clear if lab is normal bmp done    DM managed by endo on Rybelsus and insulin    Joce anesthesia should be aware    E/M coding reflects one undiagnosed new problem with uncertain prognosis or acute illness with systemic symptoms    #[id number]  Electronically signed by Johanna Benitez MD at 11/27/2023 2:03 PM CST

## 2024-01-04 ENCOUNTER — APPOINTMENT (OUTPATIENT)
Dept: BARIATRICS/WEIGHT MGMT | Age: 54
End: 2024-01-04

## 2024-01-04 ENCOUNTER — E-ADVICE (OUTPATIENT)
Dept: BARIATRICS/WEIGHT MGMT | Age: 54
End: 2024-01-04

## 2024-01-04 VITALS
HEIGHT: 63 IN | DIASTOLIC BLOOD PRESSURE: 73 MMHG | WEIGHT: 223.3 LBS | TEMPERATURE: 98.2 F | SYSTOLIC BLOOD PRESSURE: 118 MMHG | HEART RATE: 84 BPM | OXYGEN SATURATION: 99 % | BODY MASS INDEX: 39.57 KG/M2

## 2024-01-04 DIAGNOSIS — E78.00 HYPERCHOLESTEREMIA: ICD-10-CM

## 2024-01-04 DIAGNOSIS — E11.65 TYPE 2 DIABETES MELLITUS WITH HYPERGLYCEMIA, WITH LONG-TERM CURRENT USE OF INSULIN (CMD): ICD-10-CM

## 2024-01-04 DIAGNOSIS — Z79.4 TYPE 2 DIABETES MELLITUS WITH HYPERGLYCEMIA, WITH LONG-TERM CURRENT USE OF INSULIN (CMD): ICD-10-CM

## 2024-01-04 DIAGNOSIS — E55.9 VITAMIN D DEFICIENCY: ICD-10-CM

## 2024-01-04 DIAGNOSIS — K76.0 FATTY METAMORPHOSIS OF LIVER: ICD-10-CM

## 2024-01-04 DIAGNOSIS — E66.01 SEVERE OBESITY (BMI 35.0-39.9) WITH COMORBIDITY (CMD): Primary | ICD-10-CM

## 2024-01-04 PROCEDURE — 3074F SYST BP LT 130 MM HG: CPT | Performed by: NURSE PRACTITIONER

## 2024-01-04 PROCEDURE — 3078F DIAST BP <80 MM HG: CPT | Performed by: NURSE PRACTITIONER

## 2024-01-04 PROCEDURE — 99213 OFFICE O/P EST LOW 20 MIN: CPT | Performed by: NURSE PRACTITIONER

## 2024-01-04 ASSESSMENT — ENCOUNTER SYMPTOMS
CONSTIPATION: 1
CONSTITUTIONAL NEGATIVE: 1
ALLERGIC/IMMUNOLOGIC NEGATIVE: 1
NEUROLOGICAL NEGATIVE: 1
SLEEP DISTURBANCE: 0

## 2024-02-06 ENCOUNTER — APPOINTMENT (OUTPATIENT)
Dept: BARIATRICS/WEIGHT MGMT | Age: 54
End: 2024-02-06

## 2024-02-06 ENCOUNTER — E-ADVICE (OUTPATIENT)
Dept: BARIATRICS/WEIGHT MGMT | Age: 54
End: 2024-02-06

## 2024-02-06 DIAGNOSIS — E55.9 VITAMIN D DEFICIENCY: ICD-10-CM

## 2024-02-06 DIAGNOSIS — G47.33 OBSTRUCTIVE SLEEP APNEA (ADULT) (PEDIATRIC): ICD-10-CM

## 2024-02-06 DIAGNOSIS — E78.00 HYPERCHOLESTEREMIA: ICD-10-CM

## 2024-02-06 DIAGNOSIS — K76.0 FATTY METAMORPHOSIS OF LIVER: ICD-10-CM

## 2024-02-06 DIAGNOSIS — E66.01 SEVERE OBESITY (BMI >= 40) (CMD): Primary | Chronic | ICD-10-CM

## 2024-02-06 DIAGNOSIS — Z79.4 TYPE 2 DIABETES MELLITUS WITH HYPERGLYCEMIA, WITH LONG-TERM CURRENT USE OF INSULIN (CMD): ICD-10-CM

## 2024-02-06 DIAGNOSIS — Z85.3 HISTORY OF BREAST CANCER: Chronic | ICD-10-CM

## 2024-02-06 DIAGNOSIS — E11.65 TYPE 2 DIABETES MELLITUS WITH HYPERGLYCEMIA, WITH LONG-TERM CURRENT USE OF INSULIN (CMD): ICD-10-CM

## 2024-02-06 PROCEDURE — 99215 OFFICE O/P EST HI 40 MIN: CPT | Performed by: SURGERY

## 2024-02-06 ASSESSMENT — ENCOUNTER SYMPTOMS
DIZZINESS: 0
RESPIRATORY NEGATIVE: 1
VOMITING: 0
NAUSEA: 0
ROS GI COMMENTS: GERD NONE
SHORTNESS OF BREATH: 0
PSYCHIATRIC NEGATIVE: 1
DIARRHEA: 1
CONSTIPATION: 0
SLEEP DISTURBANCE: 0
NERVOUS/ANXIOUS: 0
LIGHT-HEADEDNESS: 0
SEIZURES: 0

## 2024-02-26 ENCOUNTER — E-ADVICE (OUTPATIENT)
Dept: BARIATRICS/WEIGHT MGMT | Age: 54
End: 2024-02-26

## 2024-03-05 ENCOUNTER — TELEPHONE (OUTPATIENT)
Dept: OPHTHALMOLOGY | Facility: CLINIC | Age: 54
End: 2024-03-05

## 2024-03-05 NOTE — TELEPHONE ENCOUNTER
Patient would like to be seen sooner then first available for diabetic eye exam. Patient states that she is having blurry vision and the right eye is worse. Patient was last seen 1/28/2021.

## 2024-03-07 ENCOUNTER — V-VISIT (OUTPATIENT)
Dept: BARIATRICS/WEIGHT MGMT | Age: 54
End: 2024-03-07

## 2024-03-07 ENCOUNTER — E-ADVICE (OUTPATIENT)
Dept: BARIATRICS/WEIGHT MGMT | Age: 54
End: 2024-03-07

## 2024-03-07 ENCOUNTER — APPOINTMENT (OUTPATIENT)
Dept: BARIATRICS/WEIGHT MGMT | Age: 54
End: 2024-03-07

## 2024-03-07 DIAGNOSIS — G47.33 OBSTRUCTIVE SLEEP APNEA (ADULT) (PEDIATRIC): ICD-10-CM

## 2024-03-07 DIAGNOSIS — E66.01 SEVERE OBESITY (BMI >= 40) (CMD): Primary | Chronic | ICD-10-CM

## 2024-03-07 DIAGNOSIS — E11.65 TYPE 2 DIABETES MELLITUS WITH HYPERGLYCEMIA, WITH LONG-TERM CURRENT USE OF INSULIN (CMD): ICD-10-CM

## 2024-03-07 DIAGNOSIS — Z79.4 TYPE 2 DIABETES MELLITUS WITH HYPERGLYCEMIA, WITH LONG-TERM CURRENT USE OF INSULIN (CMD): ICD-10-CM

## 2024-03-07 DIAGNOSIS — E55.9 VITAMIN D DEFICIENCY: ICD-10-CM

## 2024-03-07 PROCEDURE — 99213 OFFICE O/P EST LOW 20 MIN: CPT | Performed by: NURSE PRACTITIONER

## 2024-04-06 ENCOUNTER — HOSPITAL ENCOUNTER (OUTPATIENT)
Dept: GENERAL RADIOLOGY | Age: 54
End: 2024-04-06

## 2024-04-06 DIAGNOSIS — E11.65 TYPE 2 DIABETES MELLITUS WITH HYPERGLYCEMIA, WITH LONG-TERM CURRENT USE OF INSULIN (CMD): ICD-10-CM

## 2024-04-06 DIAGNOSIS — E78.00 HYPERCHOLESTEREMIA: ICD-10-CM

## 2024-04-06 DIAGNOSIS — G47.33 OBSTRUCTIVE SLEEP APNEA (ADULT) (PEDIATRIC): ICD-10-CM

## 2024-04-06 DIAGNOSIS — E66.01 SEVERE OBESITY (BMI 35.0-39.9) WITH COMORBIDITY (CMD): ICD-10-CM

## 2024-04-06 DIAGNOSIS — Z85.3 HISTORY OF BREAST CANCER: Chronic | ICD-10-CM

## 2024-04-06 DIAGNOSIS — E55.9 VITAMIN D DEFICIENCY: ICD-10-CM

## 2024-04-06 DIAGNOSIS — Z79.4 TYPE 2 DIABETES MELLITUS WITH HYPERGLYCEMIA, WITH LONG-TERM CURRENT USE OF INSULIN (CMD): ICD-10-CM

## 2024-04-06 PROCEDURE — 71046 X-RAY EXAM CHEST 2 VIEWS: CPT

## 2024-04-11 ENCOUNTER — APPOINTMENT (OUTPATIENT)
Dept: BARIATRICS/WEIGHT MGMT | Age: 54
End: 2024-04-11

## 2024-04-12 ENCOUNTER — APPOINTMENT (OUTPATIENT)
Dept: BARIATRICS/WEIGHT MGMT | Age: 54
End: 2024-04-12

## 2024-04-15 ENCOUNTER — TELEPHONE (OUTPATIENT)
Dept: HEMATOLOGY/ONCOLOGY | Facility: HOSPITAL | Age: 54
End: 2024-04-15

## 2024-04-16 ENCOUNTER — APPOINTMENT (OUTPATIENT)
Dept: BARIATRICS/WEIGHT MGMT | Age: 54
End: 2024-04-16

## 2024-04-16 ENCOUNTER — E-ADVICE (OUTPATIENT)
Dept: BARIATRICS/WEIGHT MGMT | Age: 54
End: 2024-04-16

## 2024-04-16 ENCOUNTER — CLINICAL DOCUMENTATION (OUTPATIENT)
Dept: BARIATRICS/WEIGHT MGMT | Age: 54
End: 2024-04-16

## 2024-04-16 VITALS
HEIGHT: 63 IN | WEIGHT: 232.4 LBS | HEART RATE: 83 BPM | BODY MASS INDEX: 41.18 KG/M2 | OXYGEN SATURATION: 99 % | DIASTOLIC BLOOD PRESSURE: 74 MMHG | TEMPERATURE: 96.7 F | SYSTOLIC BLOOD PRESSURE: 120 MMHG

## 2024-04-16 DIAGNOSIS — E55.9 VITAMIN D DEFICIENCY: ICD-10-CM

## 2024-04-16 DIAGNOSIS — Z85.3 HISTORY OF BREAST CANCER: Chronic | ICD-10-CM

## 2024-04-16 DIAGNOSIS — G47.33 OBSTRUCTIVE SLEEP APNEA (ADULT) (PEDIATRIC): ICD-10-CM

## 2024-04-16 DIAGNOSIS — Z79.4 TYPE 2 DIABETES MELLITUS WITH HYPERGLYCEMIA, WITH LONG-TERM CURRENT USE OF INSULIN (CMD): ICD-10-CM

## 2024-04-16 DIAGNOSIS — E66.01 SEVERE OBESITY (BMI >= 40) (CMD): Primary | Chronic | ICD-10-CM

## 2024-04-16 DIAGNOSIS — K76.0 FATTY METAMORPHOSIS OF LIVER: ICD-10-CM

## 2024-04-16 DIAGNOSIS — E11.65 TYPE 2 DIABETES MELLITUS WITH HYPERGLYCEMIA, WITH LONG-TERM CURRENT USE OF INSULIN (CMD): ICD-10-CM

## 2024-04-16 PROCEDURE — 3074F SYST BP LT 130 MM HG: CPT | Performed by: SURGERY

## 2024-04-16 PROCEDURE — 3078F DIAST BP <80 MM HG: CPT | Performed by: SURGERY

## 2024-04-16 PROCEDURE — 99215 OFFICE O/P EST HI 40 MIN: CPT | Performed by: SURGERY

## 2024-04-16 RX ORDER — SEMAGLUTIDE 0.68 MG/ML
INJECTION, SOLUTION SUBCUTANEOUS
COMMUNITY
Start: 2024-02-08

## 2024-04-16 RX ORDER — TIZANIDINE 4 MG/1
4 TABLET ORAL EVERY 6 HOURS PRN
Qty: 5 TABLET | Refills: 0 | Status: SHIPPED | OUTPATIENT
Start: 2024-04-16 | End: 2024-04-21

## 2024-04-16 RX ORDER — HEPARIN SODIUM 5000 [USP'U]/ML
5000 INJECTION, SOLUTION INTRAVENOUS; SUBCUTANEOUS ONCE
OUTPATIENT
Start: 2024-04-16 | End: 2024-04-16

## 2024-04-16 RX ORDER — 0.9 % SODIUM CHLORIDE 0.9 %
2 VIAL (ML) INJECTION EVERY 12 HOURS SCHEDULED
OUTPATIENT
Start: 2024-04-16

## 2024-04-16 RX ORDER — ENOXAPARIN SODIUM 100 MG/ML
40 INJECTION SUBCUTANEOUS EVERY 12 HOURS
Qty: 11.2 ML | Refills: 0 | Status: SHIPPED | OUTPATIENT
Start: 2024-04-16 | End: 2024-04-30

## 2024-04-16 RX ORDER — TIZANIDINE 2 MG/1
4 TABLET ORAL ONCE
OUTPATIENT
Start: 2024-04-16 | End: 2024-04-16

## 2024-04-16 RX ORDER — GABAPENTIN 100 MG/1
100 CAPSULE ORAL
OUTPATIENT
Start: 2024-04-16

## 2024-04-16 RX ORDER — TIZANIDINE 2 MG/1
4 TABLET ORAL ONCE
Status: CANCELLED | OUTPATIENT
Start: 2024-04-16 | End: 2024-04-16

## 2024-04-16 RX ORDER — GABAPENTIN 100 MG/1
100 CAPSULE ORAL ONCE
Qty: 1 CAPSULE | Refills: 0 | Status: SHIPPED | OUTPATIENT
Start: 2024-04-16 | End: 2024-04-16

## 2024-04-16 RX ORDER — APREPITANT 40 MG/1
40 CAPSULE ORAL
Qty: 1 CAPSULE | Refills: 0 | Status: SHIPPED | OUTPATIENT
Start: 2024-04-16

## 2024-04-16 RX ORDER — CHLORHEXIDINE GLUCONATE ORAL RINSE 1.2 MG/ML
15 SOLUTION DENTAL
OUTPATIENT
Start: 2024-04-16

## 2024-04-16 RX ORDER — ONDANSETRON 4 MG/1
4 TABLET, FILM COATED ORAL EVERY 8 HOURS PRN
Qty: 5 TABLET | Refills: 0 | Status: SHIPPED | OUTPATIENT
Start: 2024-04-16 | End: 2024-04-21

## 2024-04-16 RX ORDER — SCOLOPAMINE TRANSDERMAL SYSTEM 1 MG/1
1 PATCH, EXTENDED RELEASE TRANSDERMAL ONCE
OUTPATIENT
Start: 2024-04-16 | End: 2024-04-16

## 2024-04-16 RX ORDER — APREPITANT 40 MG/1
40 CAPSULE ORAL
OUTPATIENT
Start: 2024-04-16

## 2024-04-16 RX ORDER — ACETAMINOPHEN 500 MG
1000 TABLET ORAL
OUTPATIENT
Start: 2024-04-16

## 2024-04-16 RX ORDER — SODIUM CHLORIDE, SODIUM LACTATE, POTASSIUM CHLORIDE, CALCIUM CHLORIDE 600; 310; 30; 20 MG/100ML; MG/100ML; MG/100ML; MG/100ML
INJECTION, SOLUTION INTRAVENOUS CONTINUOUS
OUTPATIENT
Start: 2024-04-16

## 2024-04-16 ASSESSMENT — ENCOUNTER SYMPTOMS
ROS GI COMMENTS: GERD NONE
PSYCHIATRIC NEGATIVE: 1
GASTROINTESTINAL NEGATIVE: 1
LIGHT-HEADEDNESS: 0
DIZZINESS: 0
CHILLS: 0
RESPIRATORY NEGATIVE: 1
CONSTIPATION: 0
FEVER: 0
VOMITING: 0
CONSTITUTIONAL NEGATIVE: 1
NAUSEA: 0
CHEST TIGHTNESS: 0
SHORTNESS OF BREATH: 0
SEIZURES: 0
BACK PAIN: 0
NERVOUS/ANXIOUS: 0
SLEEP DISTURBANCE: 0
COUGH: 0

## 2024-04-18 ENCOUNTER — E-ADVICE (OUTPATIENT)
Dept: BARIATRICS/WEIGHT MGMT | Age: 54
End: 2024-04-18

## 2024-04-22 ENCOUNTER — LAB SERVICES (OUTPATIENT)
Dept: LAB | Age: 54
End: 2024-04-22

## 2024-04-22 DIAGNOSIS — Z85.3 HISTORY OF BREAST CANCER: ICD-10-CM

## 2024-04-22 DIAGNOSIS — K76.0 FATTY METAMORPHOSIS OF LIVER: ICD-10-CM

## 2024-04-22 DIAGNOSIS — Z79.4 TYPE 2 DIABETES MELLITUS WITH HYPERGLYCEMIA, WITH LONG-TERM CURRENT USE OF INSULIN  (CMD): ICD-10-CM

## 2024-04-22 DIAGNOSIS — E55.9 VITAMIN D DEFICIENCY: ICD-10-CM

## 2024-04-22 DIAGNOSIS — G47.33 OBSTRUCTIVE SLEEP APNEA (ADULT) (PEDIATRIC): ICD-10-CM

## 2024-04-22 DIAGNOSIS — E11.65 TYPE 2 DIABETES MELLITUS WITH HYPERGLYCEMIA, WITH LONG-TERM CURRENT USE OF INSULIN  (CMD): ICD-10-CM

## 2024-04-22 DIAGNOSIS — E66.01 SEVERE OBESITY (BMI >= 40)  (CMD): ICD-10-CM

## 2024-04-22 LAB
ALBUMIN SERPL-MCNC: 3.3 G/DL (ref 3.6–5.1)
ALBUMIN/GLOB SERPL: 0.8 {RATIO} (ref 1–2.4)
ALP SERPL-CCNC: 67 UNITS/L (ref 45–117)
ALT SERPL-CCNC: 18 UNITS/L
ANION GAP SERPL CALC-SCNC: 8 MMOL/L (ref 7–19)
AST SERPL-CCNC: 20 UNITS/L
BASOPHILS # BLD: 0 K/MCL (ref 0–0.3)
BASOPHILS NFR BLD: 1 %
BILIRUB SERPL-MCNC: 0.3 MG/DL (ref 0.2–1)
BUN SERPL-MCNC: 19 MG/DL (ref 6–20)
BUN/CREAT SERPL: 20 (ref 7–25)
CALCIUM SERPL-MCNC: 9.3 MG/DL (ref 8.4–10.2)
CHLORIDE SERPL-SCNC: 108 MMOL/L (ref 97–110)
CO2 SERPL-SCNC: 27 MMOL/L (ref 21–32)
CREAT SERPL-MCNC: 0.96 MG/DL (ref 0.51–0.95)
DEPRECATED RDW RBC: 50.9 FL (ref 39–50)
EGFRCR SERPLBLD CKD-EPI 2021: 71 ML/MIN/{1.73_M2}
EOSINOPHIL # BLD: 0.3 K/MCL (ref 0–0.5)
EOSINOPHIL NFR BLD: 5 %
ERYTHROCYTE [DISTWIDTH] IN BLOOD: 15.5 % (ref 11–15)
FASTING DURATION TIME PATIENT: ABNORMAL H
GLOBULIN SER-MCNC: 4.1 G/DL (ref 2–4)
GLUCOSE SERPL-MCNC: 117 MG/DL (ref 70–99)
HCT VFR BLD CALC: 39.2 % (ref 36–46.5)
HGB BLD-MCNC: 12.2 G/DL (ref 12–15.5)
IMM GRANULOCYTES # BLD AUTO: 0 K/MCL (ref 0–0.2)
IMM GRANULOCYTES # BLD: 0 %
LYMPHOCYTES # BLD: 2.6 K/MCL (ref 1–4)
LYMPHOCYTES NFR BLD: 38 %
MCH RBC QN AUTO: 27.7 PG (ref 26–34)
MCHC RBC AUTO-ENTMCNC: 31.1 G/DL (ref 32–36.5)
MCV RBC AUTO: 88.9 FL (ref 78–100)
MONOCYTES # BLD: 0.4 K/MCL (ref 0.3–0.9)
MONOCYTES NFR BLD: 6 %
NEUTROPHILS # BLD: 3.5 K/MCL (ref 1.8–7.7)
NEUTROPHILS NFR BLD: 50 %
NRBC BLD MANUAL-RTO: 0 /100 WBC
PLATELET # BLD AUTO: 295 K/MCL (ref 140–450)
POTASSIUM SERPL-SCNC: 4.4 MMOL/L (ref 3.4–5.1)
PROT SERPL-MCNC: 7.4 G/DL (ref 6.4–8.2)
RBC # BLD: 4.41 MIL/MCL (ref 4–5.2)
SODIUM SERPL-SCNC: 139 MMOL/L (ref 135–145)
WBC # BLD: 6.9 K/MCL (ref 4.2–11)

## 2024-04-22 PROCEDURE — 36415 COLL VENOUS BLD VENIPUNCTURE: CPT | Performed by: SURGERY

## 2024-04-22 PROCEDURE — 80053 COMPREHEN METABOLIC PANEL: CPT | Performed by: INTERNAL MEDICINE

## 2024-04-22 PROCEDURE — 85025 COMPLETE CBC W/AUTO DIFF WBC: CPT | Performed by: INTERNAL MEDICINE

## 2024-04-24 RX ORDER — ONDANSETRON 4 MG/1
4 TABLET, FILM COATED ORAL EVERY 8 HOURS PRN
COMMUNITY

## 2024-04-24 RX ORDER — LOSARTAN POTASSIUM 25 MG/1
12.5 TABLET ORAL DAILY
Status: ON HOLD | COMMUNITY
End: 2024-04-30 | Stop reason: HOSPADM

## 2024-04-25 ASSESSMENT — COGNITIVE AND FUNCTIONAL STATUS - GENERAL
ARE YOU DEAF OR DO YOU HAVE SERIOUS DIFFICULTY  HEARING: NO
ARE YOU BLIND OR DO YOU HAVE SERIOUS DIFFICULTY SEEING, EVEN WHEN WEARING GLASSES: NO

## 2024-04-25 ASSESSMENT — ACTIVITIES OF DAILY LIVING (ADL)
ADL_SCORE: 12
ADL_BEFORE_ADMISSION: INDEPENDENT

## 2024-04-26 ENCOUNTER — HOSPITAL ENCOUNTER (OUTPATIENT)
Dept: MAMMOGRAPHY | Facility: HOSPITAL | Age: 54
Discharge: HOME | End: 2024-04-26
Attending: INTERNAL MEDICINE
Payer: COMMERCIAL

## 2024-04-26 DIAGNOSIS — Z17.1 MALIGNANT NEOPLASM OF UPPER-OUTER QUADRANT OF RIGHT BREAST IN FEMALE, ESTROGEN RECEPTOR NEGATIVE (HCC): ICD-10-CM

## 2024-04-26 DIAGNOSIS — Z08 ENCOUNTER FOR FOLLOW-UP EXAMINATION AFTER COMPLETED TREATMENT FOR MALIGNANT NEOPLASM: ICD-10-CM

## 2024-04-26 DIAGNOSIS — C50.411 MALIGNANT NEOPLASM OF UPPER-OUTER QUADRANT OF RIGHT BREAST IN FEMALE, ESTROGEN RECEPTOR NEGATIVE (HCC): ICD-10-CM

## 2024-04-26 PROCEDURE — 77062 BREAST TOMOSYNTHESIS BI: CPT | Performed by: INTERNAL MEDICINE

## 2024-04-26 PROCEDURE — 77066 DX MAMMO INCL CAD BI: CPT | Performed by: INTERNAL MEDICINE

## 2024-04-29 ENCOUNTER — HOSPITAL ENCOUNTER (INPATIENT)
Age: 54
LOS: 1 days | Discharge: HOME | DRG: 621 | End: 2024-04-30
Attending: SURGERY | Admitting: INTERNAL MEDICINE

## 2024-04-29 ENCOUNTER — ANESTHESIA EVENT (OUTPATIENT)
Dept: SURGERY | Age: 54
DRG: 621 | End: 2024-04-29

## 2024-04-29 ENCOUNTER — ANESTHESIA (OUTPATIENT)
Dept: SURGERY | Age: 54
DRG: 621 | End: 2024-04-29

## 2024-04-29 ENCOUNTER — TELEPHONE (OUTPATIENT)
Dept: HEMATOLOGY/ONCOLOGY | Facility: HOSPITAL | Age: 54
End: 2024-04-29

## 2024-04-29 ENCOUNTER — APPOINTMENT (OUTPATIENT)
Dept: GENERAL RADIOLOGY | Age: 54
DRG: 621 | End: 2024-04-29
Attending: SURGERY

## 2024-04-29 DIAGNOSIS — Z79.4 TYPE 2 DIABETES MELLITUS WITH HYPERGLYCEMIA, WITH LONG-TERM CURRENT USE OF INSULIN  (CMD): ICD-10-CM

## 2024-04-29 DIAGNOSIS — E11.65 TYPE 2 DIABETES MELLITUS WITH HYPERGLYCEMIA, WITH LONG-TERM CURRENT USE OF INSULIN  (CMD): ICD-10-CM

## 2024-04-29 DIAGNOSIS — E55.9 VITAMIN D DEFICIENCY: ICD-10-CM

## 2024-04-29 DIAGNOSIS — Z85.3 HISTORY OF BREAST CANCER: Chronic | ICD-10-CM

## 2024-04-29 DIAGNOSIS — E66.01 SEVERE OBESITY (BMI >= 40)  (CMD): ICD-10-CM

## 2024-04-29 DIAGNOSIS — K76.0 FATTY METAMORPHOSIS OF LIVER: ICD-10-CM

## 2024-04-29 DIAGNOSIS — E78.00 HYPERCHOLESTEREMIA: ICD-10-CM

## 2024-04-29 DIAGNOSIS — G47.33 OBSTRUCTIVE SLEEP APNEA (ADULT) (PEDIATRIC): ICD-10-CM

## 2024-04-29 DIAGNOSIS — C50.411 MALIGNANT NEOPLASM OF UPPER-OUTER QUADRANT OF RIGHT BREAST IN FEMALE, ESTROGEN RECEPTOR NEGATIVE  (CMD): Primary | ICD-10-CM

## 2024-04-29 DIAGNOSIS — Z17.1 MALIGNANT NEOPLASM OF UPPER-OUTER QUADRANT OF RIGHT BREAST IN FEMALE, ESTROGEN RECEPTOR NEGATIVE  (CMD): Primary | ICD-10-CM

## 2024-04-29 LAB
GLUCOSE BLDC GLUCOMTR-MCNC: 118 MG/DL (ref 70–99)
GLUCOSE BLDC GLUCOMTR-MCNC: 177 MG/DL (ref 70–99)
GLUCOSE BLDC GLUCOMTR-MCNC: 199 MG/DL (ref 70–99)
GLUCOSE BLDC GLUCOMTR-MCNC: 71 MG/DL (ref 70–99)
GLUCOSE BLDC GLUCOMTR-MCNC: 73 MG/DL (ref 70–99)
GLUCOSE BLDC GLUCOMTR-MCNC: 86 MG/DL (ref 70–99)
GLUCOSE BLDC GLUCOMTR-MCNC: 89 MG/DL (ref 70–99)
GLUCOSE BLDC GLUCOMTR-MCNC: 93 MG/DL (ref 70–99)

## 2024-04-29 PROCEDURE — 13000003 HB ANESTHESIA  GENERAL EA ADD MINUTE: Performed by: SURGERY

## 2024-04-29 PROCEDURE — 74240 X-RAY XM UPR GI TRC 1CNTRST: CPT

## 2024-04-29 PROCEDURE — 43775 LAP SLEEVE GASTRECTOMY: CPT | Performed by: SURGERY

## 2024-04-29 PROCEDURE — 10002807 HB RX 258: Performed by: ANESTHESIOLOGY

## 2024-04-29 PROCEDURE — 10002807 HB RX 258: Performed by: SURGERY

## 2024-04-29 PROCEDURE — 10002800 HB RX 250 W HCPCS: Performed by: SURGERY

## 2024-04-29 PROCEDURE — 10002800 HB RX 250 W HCPCS: Performed by: ANESTHESIOLOGY

## 2024-04-29 PROCEDURE — 10002803 HB RX 637: Performed by: SURGERY

## 2024-04-29 PROCEDURE — 10002803 HB RX 637

## 2024-04-29 PROCEDURE — 13000002 HB ANESTHESIA  GENERAL  S/U + 1ST 15 MIN: Performed by: SURGERY

## 2024-04-29 PROCEDURE — 10004451 HB PACU RECOVERY 1ST 30 MINUTES: Performed by: SURGERY

## 2024-04-29 PROCEDURE — 96372 THER/PROPH/DIAG INJ SC/IM: CPT | Performed by: SURGERY

## 2024-04-29 PROCEDURE — 10004452 HB PACU ADDL 30 MINUTES: Performed by: SURGERY

## 2024-04-29 PROCEDURE — 10002805 HB CONTRAST AGENT: Performed by: SURGERY

## 2024-04-29 PROCEDURE — 88307 TISSUE EXAM BY PATHOLOGIST: CPT | Performed by: SURGERY

## 2024-04-29 PROCEDURE — 13000098 HB GENERAL ROBOTIC CASE S/U + 1ST 15 MIN: Performed by: SURGERY

## 2024-04-29 PROCEDURE — 0DJ08ZZ INSPECTION OF UPPER INTESTINAL TRACT, VIA NATURAL OR ARTIFICIAL OPENING ENDOSCOPIC: ICD-10-PCS | Performed by: SURGERY

## 2024-04-29 PROCEDURE — 99222 1ST HOSP IP/OBS MODERATE 55: CPT | Performed by: INTERNAL MEDICINE

## 2024-04-29 PROCEDURE — 10004651 HB RX, NO CHARGE ITEM: Performed by: SURGERY

## 2024-04-29 PROCEDURE — 10006023 HB SUPPLY 272: Performed by: SURGERY

## 2024-04-29 PROCEDURE — 10002801 HB RX 250 W/O HCPCS: Performed by: ANESTHESIOLOGY

## 2024-04-29 PROCEDURE — 10002801 HB RX 250 W/O HCPCS

## 2024-04-29 PROCEDURE — 10006027 HB SUPPLY 278: Performed by: SURGERY

## 2024-04-29 PROCEDURE — 10006031 HB ROOM CHARGE TELEMETRY

## 2024-04-29 PROCEDURE — 0DB64Z3 EXCISION OF STOMACH, PERCUTANEOUS ENDOSCOPIC APPROACH, VERTICAL: ICD-10-PCS | Performed by: SURGERY

## 2024-04-29 PROCEDURE — 13000099 HB GENERAL ROBOTIC CASE EA ADD MINUTE: Performed by: SURGERY

## 2024-04-29 PROCEDURE — 8E0W4CZ ROBOTIC ASSISTED PROCEDURE OF TRUNK REGION, PERCUTANEOUS ENDOSCOPIC APPROACH: ICD-10-PCS | Performed by: SURGERY

## 2024-04-29 PROCEDURE — 82962 GLUCOSE BLOOD TEST: CPT

## 2024-04-29 DEVICE — STAPLER 60 RELOAD BLUE
Type: IMPLANTABLE DEVICE | Site: ABDOMEN | Status: FUNCTIONAL
Brand: SUREFORM

## 2024-04-29 DEVICE — STAPLER 60 RELOAD WHITE
Type: IMPLANTABLE DEVICE | Site: ABDOMEN | Status: FUNCTIONAL
Brand: SUREFORM

## 2024-04-29 RX ORDER — HEPARIN SODIUM 5000 [USP'U]/ML
5000 INJECTION, SOLUTION INTRAVENOUS; SUBCUTANEOUS ONCE
Status: COMPLETED | OUTPATIENT
Start: 2024-04-29 | End: 2024-04-29

## 2024-04-29 RX ORDER — PROPOFOL 10 MG/ML
INJECTION, EMULSION INTRAVENOUS PRN
Status: DISCONTINUED | OUTPATIENT
Start: 2024-04-29 | End: 2024-04-29

## 2024-04-29 RX ORDER — ACETAMINOPHEN 500 MG
500 TABLET ORAL EVERY 6 HOURS SCHEDULED
Status: DISCONTINUED | OUTPATIENT
Start: 2024-04-29 | End: 2024-04-30 | Stop reason: HOSPADM

## 2024-04-29 RX ORDER — NICOTINE POLACRILEX 4 MG
30 LOZENGE BUCCAL PRN
Status: DISCONTINUED | OUTPATIENT
Start: 2024-04-29 | End: 2024-04-30 | Stop reason: HOSPADM

## 2024-04-29 RX ORDER — ONDANSETRON 2 MG/ML
INJECTION INTRAMUSCULAR; INTRAVENOUS PRN
Status: DISCONTINUED | OUTPATIENT
Start: 2024-04-29 | End: 2024-04-29

## 2024-04-29 RX ORDER — LANOLIN ALCOHOL/MO/W.PET/CERES
400 CREAM (GRAM) TOPICAL DAILY
Status: DISCONTINUED | OUTPATIENT
Start: 2024-04-29 | End: 2024-04-30 | Stop reason: HOSPADM

## 2024-04-29 RX ORDER — AMOXICILLIN 250 MG
2 CAPSULE ORAL 2 TIMES DAILY
Status: DISCONTINUED | OUTPATIENT
Start: 2024-04-29 | End: 2024-04-30 | Stop reason: HOSPADM

## 2024-04-29 RX ORDER — SODIUM CHLORIDE, SODIUM LACTATE, POTASSIUM CHLORIDE, CALCIUM CHLORIDE 600; 310; 30; 20 MG/100ML; MG/100ML; MG/100ML; MG/100ML
INJECTION, SOLUTION INTRAVENOUS CONTINUOUS PRN
Status: DISCONTINUED | OUTPATIENT
Start: 2024-04-29 | End: 2024-04-29

## 2024-04-29 RX ORDER — DEXTROSE MONOHYDRATE 25 G/50ML
12.5 INJECTION, SOLUTION INTRAVENOUS PRN
Status: DISCONTINUED | OUTPATIENT
Start: 2024-04-29 | End: 2024-04-30 | Stop reason: HOSPADM

## 2024-04-29 RX ORDER — CHLORHEXIDINE GLUCONATE ORAL RINSE 1.2 MG/ML
15 SOLUTION DENTAL
Status: COMPLETED | OUTPATIENT
Start: 2024-04-29 | End: 2024-04-29

## 2024-04-29 RX ORDER — NICOTINE POLACRILEX 4 MG
15 LOZENGE BUCCAL PRN
Status: DISCONTINUED | OUTPATIENT
Start: 2024-04-29 | End: 2024-04-30 | Stop reason: HOSPADM

## 2024-04-29 RX ORDER — ONDANSETRON 4 MG/1
4 TABLET, ORALLY DISINTEGRATING ORAL EVERY 12 HOURS PRN
Status: DISCONTINUED | OUTPATIENT
Start: 2024-04-29 | End: 2024-04-30 | Stop reason: HOSPADM

## 2024-04-29 RX ORDER — APREPITANT 40 MG/1
40 CAPSULE ORAL
Status: DISCONTINUED | OUTPATIENT
Start: 2024-04-29 | End: 2024-04-29 | Stop reason: HOSPADM

## 2024-04-29 RX ORDER — EPHEDRINE SULFATE/0.9% NACL/PF 50 MG/10ML
SYRINGE (ML) INTRAVENOUS PRN
Status: DISCONTINUED | OUTPATIENT
Start: 2024-04-29 | End: 2024-04-29

## 2024-04-29 RX ORDER — SIMETHICONE 125 MG
TABLET,CHEWABLE ORAL
Status: COMPLETED
Start: 2024-04-29 | End: 2024-04-29

## 2024-04-29 RX ORDER — SODIUM CHLORIDE, SODIUM LACTATE, POTASSIUM CHLORIDE, CALCIUM CHLORIDE 600; 310; 30; 20 MG/100ML; MG/100ML; MG/100ML; MG/100ML
INJECTION, SOLUTION INTRAVENOUS CONTINUOUS
Status: DISCONTINUED | OUTPATIENT
Start: 2024-04-29 | End: 2024-04-29 | Stop reason: HOSPADM

## 2024-04-29 RX ORDER — HYDRALAZINE HYDROCHLORIDE 20 MG/ML
5 INJECTION INTRAMUSCULAR; INTRAVENOUS EVERY 10 MIN PRN
Status: DISCONTINUED | OUTPATIENT
Start: 2024-04-29 | End: 2024-04-29 | Stop reason: HOSPADM

## 2024-04-29 RX ORDER — SCOLOPAMINE TRANSDERMAL SYSTEM 1 MG/1
1 PATCH, EXTENDED RELEASE TRANSDERMAL PRN
Status: DISCONTINUED | OUTPATIENT
Start: 2024-04-29 | End: 2024-04-29 | Stop reason: HOSPADM

## 2024-04-29 RX ORDER — DIPHENHYDRAMINE HYDROCHLORIDE 50 MG/ML
25 INJECTION INTRAMUSCULAR; INTRAVENOUS EVERY 6 HOURS PRN
Status: DISCONTINUED | OUTPATIENT
Start: 2024-04-29 | End: 2024-04-30 | Stop reason: HOSPADM

## 2024-04-29 RX ORDER — DEXTROSE MONOHYDRATE 50 MG/ML
INJECTION, SOLUTION INTRAVENOUS CONTINUOUS PRN
Status: DISCONTINUED | OUTPATIENT
Start: 2024-04-29 | End: 2024-04-29

## 2024-04-29 RX ORDER — TIZANIDINE 4 MG/1
4 TABLET ORAL EVERY 8 HOURS
Status: DISCONTINUED | OUTPATIENT
Start: 2024-04-29 | End: 2024-04-30

## 2024-04-29 RX ORDER — LIDOCAINE HYDROCHLORIDE 20 MG/ML
INJECTION, SOLUTION INFILTRATION; PERINEURAL PRN
Status: DISCONTINUED | OUTPATIENT
Start: 2024-04-29 | End: 2024-04-29

## 2024-04-29 RX ORDER — ACETAMINOPHEN 10 MG/ML
1000 INJECTION, SOLUTION INTRAVENOUS ONCE
Status: DISCONTINUED | OUTPATIENT
Start: 2024-04-29 | End: 2024-04-29 | Stop reason: HOSPADM

## 2024-04-29 RX ORDER — NICOTINE POLACRILEX 4 MG
30 LOZENGE BUCCAL
Status: DISCONTINUED | OUTPATIENT
Start: 2024-04-29 | End: 2024-04-29 | Stop reason: HOSPADM

## 2024-04-29 RX ORDER — METOCLOPRAMIDE HYDROCHLORIDE 5 MG/ML
10 INJECTION INTRAMUSCULAR; INTRAVENOUS EVERY 6 HOURS PRN
Status: DISCONTINUED | OUTPATIENT
Start: 2024-04-29 | End: 2024-04-29 | Stop reason: SDUPTHER

## 2024-04-29 RX ORDER — DEXTROSE MONOHYDRATE 25 G/50ML
25 INJECTION, SOLUTION INTRAVENOUS PRN
Status: DISCONTINUED | OUTPATIENT
Start: 2024-04-29 | End: 2024-04-30 | Stop reason: HOSPADM

## 2024-04-29 RX ORDER — SCOLOPAMINE TRANSDERMAL SYSTEM 1 MG/1
1 PATCH, EXTENDED RELEASE TRANSDERMAL ONCE
Status: COMPLETED | OUTPATIENT
Start: 2024-04-29 | End: 2024-04-29

## 2024-04-29 RX ORDER — SODIUM CHLORIDE, SODIUM LACTATE, POTASSIUM CHLORIDE, CALCIUM CHLORIDE 600; 310; 30; 20 MG/100ML; MG/100ML; MG/100ML; MG/100ML
INJECTION, SOLUTION INTRAVENOUS CONTINUOUS
Status: DISCONTINUED | OUTPATIENT
Start: 2024-04-29 | End: 2024-04-30

## 2024-04-29 RX ORDER — 0.9 % SODIUM CHLORIDE 0.9 %
2 VIAL (ML) INJECTION EVERY 12 HOURS SCHEDULED
Status: DISCONTINUED | OUTPATIENT
Start: 2024-04-29 | End: 2024-04-29 | Stop reason: HOSPADM

## 2024-04-29 RX ORDER — ENOXAPARIN SODIUM 100 MG/ML
40 INJECTION SUBCUTANEOUS EVERY 12 HOURS
Status: DISCONTINUED | OUTPATIENT
Start: 2024-04-29 | End: 2024-04-30 | Stop reason: HOSPADM

## 2024-04-29 RX ORDER — GABAPENTIN 100 MG/1
100 CAPSULE ORAL
Status: DISCONTINUED | OUTPATIENT
Start: 2024-04-29 | End: 2024-04-29 | Stop reason: HOSPADM

## 2024-04-29 RX ORDER — TIZANIDINE 4 MG/1
4 TABLET ORAL ONCE
Status: DISCONTINUED | OUTPATIENT
Start: 2024-04-29 | End: 2024-04-29 | Stop reason: HOSPADM

## 2024-04-29 RX ORDER — SIMETHICONE 125 MG
125 TABLET,CHEWABLE ORAL EVERY 4 HOURS PRN
Status: DISCONTINUED | OUTPATIENT
Start: 2024-04-29 | End: 2024-04-30 | Stop reason: HOSPADM

## 2024-04-29 RX ORDER — ROCURONIUM BROMIDE 10 MG/ML
INJECTION, SOLUTION INTRAVENOUS PRN
Status: DISCONTINUED | OUTPATIENT
Start: 2024-04-29 | End: 2024-04-29

## 2024-04-29 RX ORDER — ONDANSETRON 2 MG/ML
4 INJECTION INTRAMUSCULAR; INTRAVENOUS EVERY 12 HOURS PRN
Status: DISCONTINUED | OUTPATIENT
Start: 2024-04-29 | End: 2024-04-30 | Stop reason: HOSPADM

## 2024-04-29 RX ORDER — ONDANSETRON 2 MG/ML
4 INJECTION INTRAMUSCULAR; INTRAVENOUS 2 TIMES DAILY PRN
Status: DISCONTINUED | OUTPATIENT
Start: 2024-04-29 | End: 2024-04-29 | Stop reason: HOSPADM

## 2024-04-29 RX ORDER — DEXAMETHASONE SODIUM PHOSPHATE 4 MG/ML
INJECTION, SOLUTION INTRA-ARTICULAR; INTRALESIONAL; INTRAMUSCULAR; INTRAVENOUS; SOFT TISSUE PRN
Status: DISCONTINUED | OUTPATIENT
Start: 2024-04-29 | End: 2024-04-29

## 2024-04-29 RX ORDER — DROPERIDOL 2.5 MG/ML
0.62 INJECTION, SOLUTION INTRAMUSCULAR; INTRAVENOUS
Status: DISCONTINUED | OUTPATIENT
Start: 2024-04-29 | End: 2024-04-29 | Stop reason: HOSPADM

## 2024-04-29 RX ORDER — HYDROCODONE BITARTRATE AND ACETAMINOPHEN 5; 325 MG/1; MG/1
0.5 TABLET ORAL EVERY 4 HOURS PRN
Status: DISCONTINUED | OUTPATIENT
Start: 2024-04-29 | End: 2024-04-30 | Stop reason: HOSPADM

## 2024-04-29 RX ORDER — METOCLOPRAMIDE 10 MG/1
10 TABLET ORAL EVERY 6 HOURS PRN
Status: DISCONTINUED | OUTPATIENT
Start: 2024-04-29 | End: 2024-04-30 | Stop reason: HOSPADM

## 2024-04-29 RX ORDER — DEXTROSE MONOHYDRATE 25 G/50ML
25 INJECTION, SOLUTION INTRAVENOUS PRN
Status: DISCONTINUED | OUTPATIENT
Start: 2024-04-29 | End: 2024-04-29 | Stop reason: HOSPADM

## 2024-04-29 RX ORDER — LIDOCAINE HYDROCHLORIDE 10 MG/ML
5 INJECTION, SOLUTION EPIDURAL; INFILTRATION; INTRACAUDAL; PERINEURAL PRN
Status: DISCONTINUED | OUTPATIENT
Start: 2024-04-29 | End: 2024-04-29 | Stop reason: HOSPADM

## 2024-04-29 RX ORDER — ACETAMINOPHEN 500 MG
1000 TABLET ORAL
Status: DISCONTINUED | OUTPATIENT
Start: 2024-04-29 | End: 2024-04-29 | Stop reason: HOSPADM

## 2024-04-29 RX ORDER — SODIUM CHLORIDE 9 MG/ML
INJECTION, SOLUTION INTRAVENOUS CONTINUOUS
Status: DISCONTINUED | OUTPATIENT
Start: 2024-04-29 | End: 2024-04-29 | Stop reason: HOSPADM

## 2024-04-29 RX ORDER — POLYETHYLENE GLYCOL 3350 17 G/17G
17 POWDER, FOR SOLUTION ORAL DAILY
Status: DISCONTINUED | OUTPATIENT
Start: 2024-04-30 | End: 2024-04-30 | Stop reason: HOSPADM

## 2024-04-29 RX ORDER — METOPROLOL SUCCINATE 25 MG/1
25 TABLET, EXTENDED RELEASE ORAL
Status: DISCONTINUED | OUTPATIENT
Start: 2024-04-29 | End: 2024-04-29 | Stop reason: HOSPADM

## 2024-04-29 RX ORDER — FAMOTIDINE 20 MG/1
20 TABLET, FILM COATED ORAL EVERY 12 HOURS SCHEDULED
Status: DISCONTINUED | OUTPATIENT
Start: 2024-04-29 | End: 2024-04-30 | Stop reason: HOSPADM

## 2024-04-29 RX ORDER — METOCLOPRAMIDE HYDROCHLORIDE 5 MG/ML
10 INJECTION INTRAMUSCULAR; INTRAVENOUS EVERY 6 HOURS PRN
Status: DISCONTINUED | OUTPATIENT
Start: 2024-04-29 | End: 2024-04-30 | Stop reason: HOSPADM

## 2024-04-29 RX ORDER — GABAPENTIN 100 MG/1
100 CAPSULE ORAL EVERY 8 HOURS
Qty: 1 CAPSULE | Refills: 0 | Status: COMPLETED | OUTPATIENT
Start: 2024-04-29 | End: 2024-04-29

## 2024-04-29 RX ADMIN — ENOXAPARIN SODIUM 40 MG: 100 INJECTION SUBCUTANEOUS at 17:56

## 2024-04-29 RX ADMIN — FENTANYL CITRATE 25 MCG: 50 INJECTION INTRAMUSCULAR; INTRAVENOUS at 09:00

## 2024-04-29 RX ADMIN — ONDANSETRON 4 MG: 2 INJECTION INTRAMUSCULAR; INTRAVENOUS at 08:05

## 2024-04-29 RX ADMIN — DEXAMETHASONE SODIUM PHOSPHATE 4 MG: 4 INJECTION INTRA-ARTICULAR; INTRALESIONAL; INTRAMUSCULAR; INTRAVENOUS; SOFT TISSUE at 07:00

## 2024-04-29 RX ADMIN — ACETAMINOPHEN 500 MG: 500 TABLET ORAL at 11:41

## 2024-04-29 RX ADMIN — SIMETHICONE 125 MG: 125 TABLET, CHEWABLE ORAL at 09:22

## 2024-04-29 RX ADMIN — Medication 400 MG: at 17:56

## 2024-04-29 RX ADMIN — HEPARIN SODIUM 5000 UNITS: 5000 INJECTION INTRAVENOUS; SUBCUTANEOUS at 06:29

## 2024-04-29 RX ADMIN — ACETAMINOPHEN 500 MG: 500 TABLET ORAL at 17:56

## 2024-04-29 RX ADMIN — PROPOFOL 200 MG: 10 INJECTION, EMULSION INTRAVENOUS at 07:00

## 2024-04-29 RX ADMIN — FENTANYL CITRATE 50 MCG: 50 INJECTION INTRAMUSCULAR; INTRAVENOUS at 08:32

## 2024-04-29 RX ADMIN — LIDOCAINE HYDROCHLORIDE 5 ML: 20 INJECTION, SOLUTION INFILTRATION; PERINEURAL at 07:00

## 2024-04-29 RX ADMIN — FAMOTIDINE 20 MG: 20 TABLET ORAL at 21:40

## 2024-04-29 RX ADMIN — FAMOTIDINE 20 MG: 20 TABLET ORAL at 11:41

## 2024-04-29 RX ADMIN — ROCURONIUM BROMIDE 70 MG: 10 INJECTION INTRAVENOUS at 07:00

## 2024-04-29 RX ADMIN — IOHEXOL 50 ML: 350 INJECTION, SOLUTION INTRAVENOUS at 15:58

## 2024-04-29 RX ADMIN — CHLORHEXIDINE GLUCONATE 15 ML: 1.2 RINSE ORAL at 05:39

## 2024-04-29 RX ADMIN — SCOPALAMINE 1 PATCH: 1 PATCH, EXTENDED RELEASE TRANSDERMAL at 05:39

## 2024-04-29 RX ADMIN — SIMETHICONE 125 MG: 125 TABLET, CHEWABLE ORAL at 17:57

## 2024-04-29 RX ADMIN — FENTANYL CITRATE 25 MCG: 50 INJECTION INTRAMUSCULAR; INTRAVENOUS at 09:17

## 2024-04-29 RX ADMIN — GABAPENTIN 100 MG: 100 CAPSULE ORAL at 11:41

## 2024-04-29 RX ADMIN — SUGAMMADEX 200 MG: 100 INJECTION, SOLUTION INTRAVENOUS at 08:32

## 2024-04-29 RX ADMIN — EPHEDRINE SULFATE 10 MG: 5 INJECTION INTRAVENOUS at 07:26

## 2024-04-29 RX ADMIN — SODIUM CHLORIDE, POTASSIUM CHLORIDE, SODIUM LACTATE AND CALCIUM CHLORIDE: 600; 310; 30; 20 INJECTION, SOLUTION INTRAVENOUS at 06:57

## 2024-04-29 RX ADMIN — SODIUM CHLORIDE 1000 ML: 9 INJECTION, SOLUTION INTRAVENOUS at 05:38

## 2024-04-29 RX ADMIN — CEFAZOLIN SODIUM 2000 MG: 300 INJECTION, POWDER, LYOPHILIZED, FOR SOLUTION INTRAVENOUS at 07:00

## 2024-04-29 ASSESSMENT — PAIN SCALES - GENERAL
PAINLEVEL_OUTOF10: 8
PAINLEVEL_OUTOF10: 4
PAINLEVEL_OUTOF10: 5
PAINLEVEL_OUTOF10: 0
PAINLEVEL_OUTOF10: 4
PAINLEVEL_OUTOF10: 8
PAINLEVEL_OUTOF10: 4

## 2024-04-29 NOTE — TELEPHONE ENCOUNTER
----- Message from Vianca Art RN sent at 4/29/2024 10:50 AM CDT -----  Regarding: needs appointment  Needs follow up in May. Please call patient to schedule. Thanks

## 2024-04-30 VITALS
DIASTOLIC BLOOD PRESSURE: 69 MMHG | RESPIRATION RATE: 18 BRPM | TEMPERATURE: 98.1 F | OXYGEN SATURATION: 98 % | SYSTOLIC BLOOD PRESSURE: 117 MMHG | BODY MASS INDEX: 40.12 KG/M2 | HEIGHT: 63 IN | WEIGHT: 226.41 LBS | HEART RATE: 71 BPM

## 2024-04-30 LAB
ALBUMIN SERPL-MCNC: 2.7 G/DL (ref 3.6–5.1)
ALBUMIN/GLOB SERPL: 0.7 {RATIO} (ref 1–2.4)
ALP SERPL-CCNC: 53 UNITS/L (ref 45–117)
ALT SERPL-CCNC: 49 UNITS/L
ANION GAP SERPL CALC-SCNC: 5 MMOL/L (ref 7–19)
AST SERPL-CCNC: 36 UNITS/L
BASOPHILS # BLD: 0 K/MCL (ref 0–0.3)
BASOPHILS NFR BLD: 0 %
BILIRUB SERPL-MCNC: 0.4 MG/DL (ref 0.2–1)
BUN SERPL-MCNC: 11 MG/DL (ref 6–20)
BUN/CREAT SERPL: 12 (ref 7–25)
CALCIUM SERPL-MCNC: 8.9 MG/DL (ref 8.4–10.2)
CHLORIDE SERPL-SCNC: 107 MMOL/L (ref 97–110)
CO2 SERPL-SCNC: 29 MMOL/L (ref 21–32)
CREAT SERPL-MCNC: 0.9 MG/DL (ref 0.51–0.95)
DEPRECATED RDW RBC: 48.5 FL (ref 39–50)
EGFRCR SERPLBLD CKD-EPI 2021: 76 ML/MIN/{1.73_M2}
EOSINOPHIL # BLD: 0 K/MCL (ref 0–0.5)
EOSINOPHIL NFR BLD: 0 %
ERYTHROCYTE [DISTWIDTH] IN BLOOD: 15.2 % (ref 11–15)
FACT X ACT/NOR PPP: 108 % (ref 50–150)
FASTING DURATION TIME PATIENT: ABNORMAL H
GLOBULIN SER-MCNC: 4.1 G/DL (ref 2–4)
GLUCOSE BLDC GLUCOMTR-MCNC: 145 MG/DL (ref 70–99)
GLUCOSE BLDC GLUCOMTR-MCNC: 153 MG/DL (ref 70–99)
GLUCOSE BLDC GLUCOMTR-MCNC: 178 MG/DL (ref 70–99)
GLUCOSE SERPL-MCNC: 158 MG/DL (ref 70–99)
HCT VFR BLD CALC: 36.3 % (ref 36–46.5)
HGB BLD-MCNC: 11.5 G/DL (ref 12–15.5)
IMM GRANULOCYTES # BLD AUTO: 0 K/MCL (ref 0–0.2)
IMM GRANULOCYTES # BLD: 0 %
LMWH PPP CHRO-ACNC: 0.37 UNITS/ML
LYMPHOCYTES # BLD: 3 K/MCL (ref 1–4)
LYMPHOCYTES NFR BLD: 35 %
MAGNESIUM SERPL-MCNC: 2.2 MG/DL (ref 1.7–2.4)
MCH RBC QN AUTO: 27.8 PG (ref 26–34)
MCHC RBC AUTO-ENTMCNC: 31.7 G/DL (ref 32–36.5)
MCV RBC AUTO: 87.7 FL (ref 78–100)
MONOCYTES # BLD: 0.4 K/MCL (ref 0.3–0.9)
MONOCYTES NFR BLD: 4 %
NEUTROPHILS # BLD: 5.1 K/MCL (ref 1.8–7.7)
NEUTROPHILS NFR BLD: 61 %
NRBC BLD MANUAL-RTO: 0 /100 WBC
PHOSPHATE SERPL-MCNC: 2.7 MG/DL (ref 2.4–4.7)
PLATELET # BLD AUTO: 260 K/MCL (ref 140–450)
POTASSIUM SERPL-SCNC: 4.1 MMOL/L (ref 3.4–5.1)
PROT SERPL-MCNC: 6.8 G/DL (ref 6.4–8.2)
RBC # BLD: 4.14 MIL/MCL (ref 4–5.2)
SODIUM SERPL-SCNC: 137 MMOL/L (ref 135–145)
WBC # BLD: 8.6 K/MCL (ref 4.2–11)

## 2024-04-30 PROCEDURE — 80053 COMPREHEN METABOLIC PANEL: CPT | Performed by: SURGERY

## 2024-04-30 PROCEDURE — 10004180 HB COUNTER-TRANSPORT

## 2024-04-30 PROCEDURE — 83735 ASSAY OF MAGNESIUM: CPT | Performed by: SURGERY

## 2024-04-30 PROCEDURE — 10002800 HB RX 250 W HCPCS: Performed by: SURGERY

## 2024-04-30 PROCEDURE — 85260 CLOT FACTOR X STUART-POWER: CPT | Performed by: SURGERY

## 2024-04-30 PROCEDURE — 99024 POSTOP FOLLOW-UP VISIT: CPT | Performed by: SURGERY

## 2024-04-30 PROCEDURE — 10002807 HB RX 258: Performed by: SURGERY

## 2024-04-30 PROCEDURE — 36415 COLL VENOUS BLD VENIPUNCTURE: CPT | Performed by: SURGERY

## 2024-04-30 PROCEDURE — 99239 HOSP IP/OBS DSCHRG MGMT >30: CPT | Performed by: INTERNAL MEDICINE

## 2024-04-30 PROCEDURE — 84100 ASSAY OF PHOSPHORUS: CPT | Performed by: SURGERY

## 2024-04-30 PROCEDURE — 10002803 HB RX 637: Performed by: SURGERY

## 2024-04-30 PROCEDURE — 96372 THER/PROPH/DIAG INJ SC/IM: CPT | Performed by: SURGERY

## 2024-04-30 PROCEDURE — 85520 HEPARIN ASSAY: CPT | Performed by: SURGERY

## 2024-04-30 PROCEDURE — 94660 CPAP INITIATION&MGMT: CPT

## 2024-04-30 PROCEDURE — 85025 COMPLETE CBC W/AUTO DIFF WBC: CPT | Performed by: SURGERY

## 2024-04-30 PROCEDURE — 10004651 HB RX, NO CHARGE ITEM: Performed by: SURGERY

## 2024-04-30 RX ORDER — SIMETHICONE 125 MG
125 TABLET,CHEWABLE ORAL EVERY 4 HOURS PRN
Status: SHIPPED | COMMUNITY
Start: 2024-04-30

## 2024-04-30 RX ORDER — ACETAMINOPHEN 500 MG
500 TABLET ORAL EVERY 6 HOURS SCHEDULED
Status: SHIPPED | COMMUNITY
Start: 2024-04-30

## 2024-04-30 RX ORDER — TIZANIDINE 4 MG/1
4 TABLET ORAL EVERY 8 HOURS PRN
Status: DISCONTINUED | OUTPATIENT
Start: 2024-04-30 | End: 2024-04-30 | Stop reason: HOSPADM

## 2024-04-30 RX ORDER — ENOXAPARIN SODIUM 100 MG/ML
40 INJECTION SUBCUTANEOUS EVERY 12 HOURS
Qty: 11.2 ML | Refills: 0 | Status: SHIPPED | OUTPATIENT
Start: 2024-04-30 | End: 2024-05-14

## 2024-04-30 RX ORDER — ONDANSETRON 4 MG/1
4 TABLET, ORALLY DISINTEGRATING ORAL EVERY 8 HOURS PRN
Qty: 5 TABLET | Refills: 0 | Status: SHIPPED
Start: 2024-04-30

## 2024-04-30 RX ADMIN — SODIUM CHLORIDE, POTASSIUM CHLORIDE, SODIUM LACTATE AND CALCIUM CHLORIDE: 600; 310; 30; 20 INJECTION, SOLUTION INTRAVENOUS at 00:44

## 2024-04-30 RX ADMIN — SENNOSIDES AND DOCUSATE SODIUM 2 TABLET: 50; 8.6 TABLET ORAL at 09:33

## 2024-04-30 RX ADMIN — ACETAMINOPHEN 500 MG: 500 TABLET ORAL at 00:30

## 2024-04-30 RX ADMIN — SIMETHICONE 125 MG: 125 TABLET, CHEWABLE ORAL at 09:33

## 2024-04-30 RX ADMIN — ENOXAPARIN SODIUM 40 MG: 100 INJECTION SUBCUTANEOUS at 05:33

## 2024-04-30 RX ADMIN — ACETAMINOPHEN 500 MG: 500 TABLET ORAL at 05:37

## 2024-04-30 RX ADMIN — SIMETHICONE 125 MG: 125 TABLET, CHEWABLE ORAL at 03:19

## 2024-04-30 RX ADMIN — Medication 400 MG: at 09:33

## 2024-04-30 RX ADMIN — POLYETHYLENE GLYCOL 3350 17 G: 17 POWDER, FOR SOLUTION ORAL at 09:37

## 2024-04-30 RX ADMIN — FAMOTIDINE 20 MG: 20 TABLET ORAL at 09:33

## 2024-04-30 ASSESSMENT — ENCOUNTER SYMPTOMS
ABDOMINAL DISTENTION: 0
DIAPHORESIS: 0
FATIGUE: 0
SPEECH DIFFICULTY: 0
APPETITE CHANGE: 0
BRUISES/BLEEDS EASILY: 0
NAUSEA: 0
VOMITING: 0
SHORTNESS OF BREATH: 0
WEAKNESS: 0
CHILLS: 0
CONSTIPATION: 0
ACTIVITY CHANGE: 0
APNEA: 0
ADENOPATHY: 0
TROUBLE SWALLOWING: 0
BACK PAIN: 0
ABDOMINAL PAIN: 0
DIARRHEA: 0
COUGH: 0
DIZZINESS: 0
LIGHT-HEADEDNESS: 0
CHEST TIGHTNESS: 0
HEADACHES: 0
WHEEZING: 0
FEVER: 0
BLOOD IN STOOL: 0
CHOKING: 0
CONFUSION: 0
VOICE CHANGE: 0
SLEEP DISTURBANCE: 0
UNEXPECTED WEIGHT CHANGE: 0

## 2024-04-30 ASSESSMENT — COGNITIVE AND FUNCTIONAL STATUS - GENERAL
DO YOU HAVE DIFFICULTY DRESSING OR BATHING: NO
DO YOU HAVE SERIOUS DIFFICULTY WALKING OR CLIMBING STAIRS: NO
BECAUSE OF A PHYSICAL, MENTAL, OR EMOTIONAL CONDITION, DO YOU HAVE SERIOUS DIFFICULTY CONCENTRATING, REMEMBERING OR MAKING DECISIONS: NO
BECAUSE OF A PHYSICAL, MENTAL, OR EMOTIONAL CONDITION, DO YOU HAVE DIFFICULTY DOING ERRANDS ALONE: NO

## 2024-04-30 ASSESSMENT — PAIN SCALES - GENERAL: PAINLEVEL_OUTOF10: 2

## 2024-05-02 ENCOUNTER — TELEPHONE (OUTPATIENT)
Dept: CARE COORDINATION | Age: 54
End: 2024-05-02

## 2024-05-02 ENCOUNTER — CLINICAL DOCUMENTATION (OUTPATIENT)
Dept: BARIATRICS/WEIGHT MGMT | Age: 54
End: 2024-05-02

## 2024-05-02 ENCOUNTER — E-ADVICE (OUTPATIENT)
Dept: BARIATRICS/WEIGHT MGMT | Age: 54
End: 2024-05-02

## 2024-05-02 LAB
ASR DISCLAIMER: NORMAL
CASE RPRT: NORMAL
CLINICAL INFO: NORMAL
PATH REPORT.FINAL DX SPEC: NORMAL
PATH REPORT.GROSS SPEC: NORMAL

## 2024-05-04 ENCOUNTER — LAB SERVICES (OUTPATIENT)
Dept: LAB | Age: 54
End: 2024-05-04

## 2024-05-04 DIAGNOSIS — E78.00 HYPERCHOLESTEREMIA: ICD-10-CM

## 2024-05-04 DIAGNOSIS — G47.33 OBSTRUCTIVE SLEEP APNEA (ADULT) (PEDIATRIC): ICD-10-CM

## 2024-05-04 DIAGNOSIS — Z79.4 TYPE 2 DIABETES MELLITUS WITH HYPERGLYCEMIA, WITH LONG-TERM CURRENT USE OF INSULIN  (CMD): ICD-10-CM

## 2024-05-04 DIAGNOSIS — K76.0 FATTY METAMORPHOSIS OF LIVER: ICD-10-CM

## 2024-05-04 DIAGNOSIS — E55.9 VITAMIN D DEFICIENCY: ICD-10-CM

## 2024-05-04 DIAGNOSIS — E11.65 TYPE 2 DIABETES MELLITUS WITH HYPERGLYCEMIA, WITH LONG-TERM CURRENT USE OF INSULIN  (CMD): ICD-10-CM

## 2024-05-04 DIAGNOSIS — C50.411 MALIGNANT NEOPLASM OF UPPER-OUTER QUADRANT OF RIGHT BREAST IN FEMALE, ESTROGEN RECEPTOR NEGATIVE  (CMD): ICD-10-CM

## 2024-05-04 DIAGNOSIS — Z85.3 HISTORY OF BREAST CANCER: ICD-10-CM

## 2024-05-04 DIAGNOSIS — Z17.1 MALIGNANT NEOPLASM OF UPPER-OUTER QUADRANT OF RIGHT BREAST IN FEMALE, ESTROGEN RECEPTOR NEGATIVE  (CMD): ICD-10-CM

## 2024-05-04 DIAGNOSIS — E66.01 SEVERE OBESITY (BMI >= 40)  (CMD): ICD-10-CM

## 2024-05-04 LAB
ALBUMIN SERPL-MCNC: 3.1 G/DL (ref 3.6–5.1)
ALBUMIN/GLOB SERPL: 0.8 {RATIO} (ref 1–2.4)
ALP SERPL-CCNC: 59 UNITS/L (ref 45–117)
ALT SERPL-CCNC: 72 UNITS/L
ANION GAP SERPL CALC-SCNC: 11 MMOL/L (ref 7–19)
AST SERPL-CCNC: 47 UNITS/L
BASOPHILS # BLD: 0 K/MCL (ref 0–0.3)
BASOPHILS NFR BLD: 0 %
BILIRUB SERPL-MCNC: 0.3 MG/DL (ref 0.2–1)
BUN SERPL-MCNC: 18 MG/DL (ref 6–20)
BUN/CREAT SERPL: 17 (ref 7–25)
CALCIUM SERPL-MCNC: 9.1 MG/DL (ref 8.4–10.2)
CHLORIDE SERPL-SCNC: 105 MMOL/L (ref 97–110)
CO2 SERPL-SCNC: 27 MMOL/L (ref 21–32)
CREAT SERPL-MCNC: 1.03 MG/DL (ref 0.51–0.95)
DEPRECATED RDW RBC: 47.2 FL (ref 39–50)
EGFRCR SERPLBLD CKD-EPI 2021: 65 ML/MIN/{1.73_M2}
EOSINOPHIL # BLD: 0.3 K/MCL (ref 0–0.5)
EOSINOPHIL NFR BLD: 5 %
ERYTHROCYTE [DISTWIDTH] IN BLOOD: 15 % (ref 11–15)
FASTING DURATION TIME PATIENT: ABNORMAL H
GLOBULIN SER-MCNC: 4.1 G/DL (ref 2–4)
GLUCOSE SERPL-MCNC: 145 MG/DL (ref 70–99)
HCT VFR BLD CALC: 38.3 % (ref 36–46.5)
HGB BLD-MCNC: 12.4 G/DL (ref 12–15.5)
IMM GRANULOCYTES # BLD AUTO: 0 K/MCL (ref 0–0.2)
IMM GRANULOCYTES # BLD: 0 %
LYMPHOCYTES # BLD: 2.6 K/MCL (ref 1–4)
LYMPHOCYTES NFR BLD: 40 %
MCH RBC QN AUTO: 27.9 PG (ref 26–34)
MCHC RBC AUTO-ENTMCNC: 32.4 G/DL (ref 32–36.5)
MCV RBC AUTO: 86.1 FL (ref 78–100)
MONOCYTES # BLD: 0.3 K/MCL (ref 0.3–0.9)
MONOCYTES NFR BLD: 4 %
NEUTROPHILS # BLD: 3.3 K/MCL (ref 1.8–7.7)
NEUTROPHILS NFR BLD: 51 %
NRBC BLD MANUAL-RTO: 0 /100 WBC
PLATELET # BLD AUTO: 247 K/MCL (ref 140–450)
POTASSIUM SERPL-SCNC: 4.2 MMOL/L (ref 3.4–5.1)
PROT SERPL-MCNC: 7.2 G/DL (ref 6.4–8.2)
RBC # BLD: 4.45 MIL/MCL (ref 4–5.2)
SODIUM SERPL-SCNC: 139 MMOL/L (ref 135–145)
WBC # BLD: 6.5 K/MCL (ref 4.2–11)

## 2024-05-04 PROCEDURE — 36415 COLL VENOUS BLD VENIPUNCTURE: CPT | Performed by: SURGERY

## 2024-05-04 PROCEDURE — 80053 COMPREHEN METABOLIC PANEL: CPT | Performed by: INTERNAL MEDICINE

## 2024-05-04 PROCEDURE — 85025 COMPLETE CBC W/AUTO DIFF WBC: CPT | Performed by: INTERNAL MEDICINE

## 2024-05-06 ENCOUNTER — TELEPHONE (OUTPATIENT)
Dept: BARIATRICS/WEIGHT MGMT | Age: 54
End: 2024-05-06

## 2024-05-06 ENCOUNTER — E-ADVICE (OUTPATIENT)
Dept: BARIATRICS/WEIGHT MGMT | Age: 54
End: 2024-05-06

## 2024-05-06 ENCOUNTER — OFFICE VISIT (OUTPATIENT)
Dept: BARIATRICS/WEIGHT MGMT | Age: 54
End: 2024-05-06

## 2024-05-06 VITALS
TEMPERATURE: 98.1 F | HEIGHT: 63 IN | SYSTOLIC BLOOD PRESSURE: 119 MMHG | HEART RATE: 82 BPM | OXYGEN SATURATION: 100 % | WEIGHT: 218 LBS | BODY MASS INDEX: 38.62 KG/M2 | DIASTOLIC BLOOD PRESSURE: 71 MMHG

## 2024-05-06 DIAGNOSIS — G47.33 OBSTRUCTIVE SLEEP APNEA (ADULT) (PEDIATRIC): ICD-10-CM

## 2024-05-06 DIAGNOSIS — E11.65 TYPE 2 DIABETES MELLITUS WITH HYPERGLYCEMIA, WITH LONG-TERM CURRENT USE OF INSULIN  (CMD): ICD-10-CM

## 2024-05-06 DIAGNOSIS — K76.0 FATTY METAMORPHOSIS OF LIVER: ICD-10-CM

## 2024-05-06 DIAGNOSIS — E78.00 HYPERCHOLESTEREMIA: ICD-10-CM

## 2024-05-06 DIAGNOSIS — E66.01 SEVERE OBESITY (BMI >= 40)  (CMD): Chronic | ICD-10-CM

## 2024-05-06 DIAGNOSIS — Z17.1 MALIGNANT NEOPLASM OF UPPER-OUTER QUADRANT OF RIGHT BREAST IN FEMALE, ESTROGEN RECEPTOR NEGATIVE  (CMD): ICD-10-CM

## 2024-05-06 DIAGNOSIS — Z90.3 S/P GASTRIC SLEEVE PROCEDURE: Primary | ICD-10-CM

## 2024-05-06 DIAGNOSIS — C50.411 MALIGNANT NEOPLASM OF UPPER-OUTER QUADRANT OF RIGHT BREAST IN FEMALE, ESTROGEN RECEPTOR NEGATIVE  (CMD): ICD-10-CM

## 2024-05-06 DIAGNOSIS — Z79.4 TYPE 2 DIABETES MELLITUS WITH HYPERGLYCEMIA, WITH LONG-TERM CURRENT USE OF INSULIN  (CMD): ICD-10-CM

## 2024-05-06 PROCEDURE — 3078F DIAST BP <80 MM HG: CPT

## 2024-05-06 PROCEDURE — 99214 OFFICE O/P EST MOD 30 MIN: CPT

## 2024-05-06 PROCEDURE — 3074F SYST BP LT 130 MM HG: CPT

## 2024-05-06 ASSESSMENT — ENCOUNTER SYMPTOMS
EYE PAIN: 0
BACK PAIN: 0
DEPRESSION: 0
TREMORS: 0
SHORTNESS OF BREATH: 0
SORE THROAT: 0
HEARTBURN: 0
FEVER: 0
DIARRHEA: 0
CHILLS: 0
COUGH: 0
EYE DISCHARGE: 0
BLURRED VISION: 0
BLOOD IN STOOL: 0
NAUSEA: 0
HEADACHES: 0
ABDOMINAL PAIN: 0
DOUBLE VISION: 0
TINGLING: 0
BRUISES/BLEEDS EASILY: 0
VOMITING: 0
CONSTIPATION: 0
WHEEZING: 0
INSOMNIA: 0
DIZZINESS: 0
PHOTOPHOBIA: 0
SINUS PAIN: 0
NERVOUS/ANXIOUS: 0

## 2024-05-06 ASSESSMENT — PAIN SCALES - GENERAL: PAINLEVEL: 0

## 2024-05-09 ENCOUNTER — TELEPHONE (OUTPATIENT)
Dept: CARE COORDINATION | Age: 54
End: 2024-05-09

## 2024-05-09 ENCOUNTER — OFFICE VISIT (OUTPATIENT)
Dept: HEMATOLOGY/ONCOLOGY | Facility: HOSPITAL | Age: 54
End: 2024-05-09
Attending: INTERNAL MEDICINE
Payer: COMMERCIAL

## 2024-05-09 VITALS
OXYGEN SATURATION: 99 % | RESPIRATION RATE: 16 BRPM | HEIGHT: 62 IN | DIASTOLIC BLOOD PRESSURE: 56 MMHG | TEMPERATURE: 98 F | BODY MASS INDEX: 39.93 KG/M2 | HEART RATE: 79 BPM | SYSTOLIC BLOOD PRESSURE: 114 MMHG | WEIGHT: 217 LBS

## 2024-05-09 DIAGNOSIS — Z92.21 PERSONAL HISTORY OF ANTINEOPLASTIC CHEMOTHERAPY: ICD-10-CM

## 2024-05-09 DIAGNOSIS — C50.411 MALIGNANT NEOPLASM OF UPPER-OUTER QUADRANT OF RIGHT BREAST IN FEMALE, ESTROGEN RECEPTOR NEGATIVE (HCC): Primary | ICD-10-CM

## 2024-05-09 DIAGNOSIS — T45.1X5A CHEMOTHERAPY-INDUCED NEUROPATHY (HCC): ICD-10-CM

## 2024-05-09 DIAGNOSIS — E66.9 OBESITY (BMI 30-39.9): ICD-10-CM

## 2024-05-09 DIAGNOSIS — Z17.1 MALIGNANT NEOPLASM OF UPPER-OUTER QUADRANT OF RIGHT BREAST IN FEMALE, ESTROGEN RECEPTOR NEGATIVE (HCC): Primary | ICD-10-CM

## 2024-05-09 DIAGNOSIS — Z08 ENCOUNTER FOR FOLLOW-UP EXAMINATION AFTER COMPLETED TREATMENT FOR MALIGNANT NEOPLASM: ICD-10-CM

## 2024-05-09 DIAGNOSIS — R92.333 HETEROGENEOUSLY DENSE TISSUE OF BOTH BREASTS ON MAMMOGRAPHY: ICD-10-CM

## 2024-05-09 DIAGNOSIS — G62.0 CHEMOTHERAPY-INDUCED NEUROPATHY (HCC): ICD-10-CM

## 2024-05-09 PROCEDURE — 99214 OFFICE O/P EST MOD 30 MIN: CPT | Performed by: INTERNAL MEDICINE

## 2024-05-09 NOTE — PROGRESS NOTES
Hematology Oncology Progress Note    Patient Name: Porsha Harrell   YOB: 1970   Medical Record Number: [id number]   PCP: Marybeth Dee MD   Attending Physician: Emily Horton MD     Date of Visit: 05/09/24      Chief Complaint:  Breast cancer    Oncologic History:  54 year old with self detected right breast cancer, mammogram/US 4 cm mass at 1:00 8 biopsy: Invasive ductal carcinoma, grade 3, triple negative KI 40%.  MRI:concordant, cortically thick axillary node high right axilla.  Left breast 9 mm irregular mass biopsy benign.  84 gene panel ([employer] multicancer panel) 2 VUS DONTE (c.5319+6_5319+7del [intronic]) and RAD50 (c.3292C>T)    Treatment History:  1/30/20: C1 taxol/carboplatin +/- Atezo  B59 trial. Taxol reaction improved with steroids.  8/03/20: [facility]  11/6/20: EBRT   12/9/20: Adjuvant atezolizumab completion    Interval History:  Here for 6 months follow up and review of mammogram.   Feeling great. Underwent sleeve gastrectomy 4/29/24 and recovering well.   Lost 19 lb in the last 3 weeks. Diabetes better controlled.   No breast changes or concerns. No lumps/bumps or enlarged nodes.   Stable neuropathy in the fingers, intermittent tingling and numbness.   No fatigue, bone pain, shortness of breath, headaches.      PMH/PSH  Breast cancer  DM2 with retinopathy  Obesity  HLD, HTN  Family History: no changed  Social History:  , lives with daughter (2 kids live in [place]). working at a bank in [facility]    Medications:    Current Outpatient Medications:     acetaminophen-codeine (TYLENOL WITH CODEINE #3) 300-30 MG Oral Tab, Take 1 tablet by mouth every 4 (four) hours as needed for Pain., Disp: 20 tablet, Rfl: 0    Semaglutide (RYBELSUS) 3 MG Oral Tab, Take 3 mg by mouth daily., Disp: , Rfl:     Insulin Aspart Protamine & Aspart 70/30 (70-30) 100 UNIT/ML Subcutaneous Suspension, Inject into the skin 2 (two) times daily before meals.  Takes 44 Units in am and 45Units in pm., Disp: , Rfl:     fluticasone propionate 50 MCG/ACT Nasal Suspension, 1 PUFF EACH NOSTRIL 2 TIMES A  DAY (Patient taking differently: 1 PUFF EACH NOSTRIL 2 TIMES A  DAY prn), Disp: 15.8 mL, Rfl: 0    Blood Glucose Monitoring Suppl (ONETOUCH VERIO FLEX SYSTEM) w/Device Does not apply Kit, 1 lancet by Finger stick route 3 (three) times daily., Disp: , Rfl:     Glucose Blood (ONETOUCH VERIO) In Vitro Strip, 1 lancet by Finger stick route 3 (three) times daily., Disp: , Rfl:     Insulin Pen Needle (BD PEN NEEDLE MINI U/F) 31G X 5 MM Does not apply Misc, Use 4 times a day as directed, Disp: 400 each, Rfl: 0       Review of Systems: Onc ROS negative    Vitals:  /56 (BP Location: Left arm, Patient Position: Sitting, Cuff Size: large)   Pulse 79   Temp 98 °F (36.7 °C) (Oral)   Resp 16   Ht 1.575 m (5' 2\")   Wt 98.4 kg (217 lb)   SpO2 99%   BMI 39.69 kg/m²      Weight:  Wt Readings from Last 6 Encounters:   05/09/24 98.4 kg (217 lb)   11/28/23 104.2 kg (229 lb 11.5 oz)   11/22/23 103.4 kg (228 lb)   06/27/23 103.4 kg (228 lb)   05/15/23 101.2 kg (223 lb)   03/16/23 102.4 kg (225 lb 11.2 oz)     Physical Exam:  General: alert and oriented x 3, not in acute distress.  HEENT: EOMs intact.  Chest: non-labored breathing.    Breast: right lumpectomy with post RT changes. No palpable masses in either breasts. No axillary lymphadenopathy b/l.   Abd: obese, healing surgical incisions. Non tender  Extremities: no edema  Neurological: motor strength grossly intact  Psych: appropriate mood and affect      Labs:  Lab Results   Component Value Date    WBC 7.2 11/22/2023    RBC 4.57 11/22/2023    HGB 12.8 11/22/2023    HCT 40.0 11/22/2023    MCV 87.5 11/22/2023    MCH 28.0 11/22/2023    MCHC 32.0 11/22/2023    RDW 14.9 11/22/2023    .0 11/22/2023    MPV 7.5 10/31/2018     Lab Results   Component Value Date     11/22/2023    K 4.0 11/22/2023     11/22/2023    CO2 30.0  11/22/2023    BUN 13 11/22/2023    CREATSERUM 1.14 (H) 11/22/2023     (H) 11/22/2023    CA 9.8 11/22/2023    ALKPHO 75 11/22/2023    ALT 11 11/22/2023    AST 10 11/22/2023    BILT 0.3 11/22/2023    ALB 4.2 11/22/2023    TP 7.4 11/22/2023      Radiology:  [facility] ISIDRA 2D+3D DIAGNOSTIC [facility]  BILAT (CPT=77066/52754)    Result Date: 4/26/2024  CONCLUSION:     No mammographic evidence of malignancy.  BI-RADS CATEGORY:   DIAGNOSTIC CATEGORY 2--BENIGN FINDING:   RECOMMENDATIONS:  ROUTINE MAMMOGRAM AND CLINICAL EVALUATION IN 12 MONTHS.       PLEASE NOTE: NORMAL MAMMOGRAM DOES NOT EXCLUDE THE POSSIBILITY OF BREAST CANCER.  A CLINICALLY SUSPICIOUS PALPABLE LUMP SHOULD BE BIOPSIED.   For patients over the age of 40, the target due date for the patient's next mammogram has been entered into a reminder system.   Patient received a discharge summary from the technologist after completion of exam.  Breast marker legend used on images  Triangle = Palpable lump [place] = Skin tag or mole BB = Nipple Linear dana = Scar Square = Pain    Dictated by (CST): Glen Beltran MD on 4/26/2024 at 2:10 PM     Finalized by (CST): Glen Beltran MD on 4/26/2024 at 2:16 PM             Cancer Staging   Malignant neoplasm of upper-outer quadrant of right breast in female, estrogen receptor negative (HCC)  Staging form: Breast, AJCC 8th Edition  - Clinical: Stage IIIB (cT3, cN0, cM0, G3, ER-, GA-, HER2-) - Signed by Nely Santiago MD on 1/16/2020  - Pathologic stage from 8/12/2020: No Stage Recommended (ypT0, pN0(sn), cM0) - Signed by Kaitlynn Atkinson MD on 8/12/2020    Impression and Plan:    Right breast invasive ductal carcinoma, grade 3, triple negative, [id number]:  - s/p neoadj chemo per NSABP B59--> lumpectomy 8/3/20 with pCR --> adjuvant atezo  - she is 4 years out with no clinical or radiographic evidence of disease  - no residual chemo or immunotherapy toxicity. Labs reviewed and unremarkable  - continue high risk surveillance due to  history and category C density; MRI breast and MMG alternating q6 months.   - bilateral diagnostic mammogram 4/2024 benign  - breast MRI ordered for 10/2024    Obesity:  - s/p sleeve gastrectomy 4/29/24  - will monitor periodically for any nutritional deficiencies    Neuropathy:  - mild and stable, probably chemo induced with a component of diabetic neuropathy  - not requiring meds.       Return in about 6 months (around 11/9/2024).       Orders Placed This Encounter   Procedures    MRI BREAST (W+WO) W/CAD BILAT (IOJ=[id number])        Emily Horton MD  Hematology Oncology

## 2024-05-10 ENCOUNTER — TELEPHONE (OUTPATIENT)
Dept: CARE COORDINATION | Age: 54
End: 2024-05-10

## 2024-05-16 ENCOUNTER — TELEPHONE (OUTPATIENT)
Dept: CARE COORDINATION | Age: 54
End: 2024-05-16

## 2024-05-17 ENCOUNTER — TELEPHONE (OUTPATIENT)
Dept: CARE COORDINATION | Age: 54
End: 2024-05-17

## 2024-05-22 ENCOUNTER — OFFICE VISIT (OUTPATIENT)
Dept: OPHTHALMOLOGY | Facility: CLINIC | Age: 54
End: 2024-05-22

## 2024-05-22 DIAGNOSIS — E11.3293 NON-PROLIFERATIVE DIABETIC RETINOPATHY, BOTH EYES (HCC): ICD-10-CM

## 2024-05-22 DIAGNOSIS — H25.13 AGE-RELATED NUCLEAR CATARACT OF BOTH EYES: Primary | ICD-10-CM

## 2024-05-22 PROCEDURE — 92004 COMPRE OPH EXAM NEW PT 1/>: CPT | Performed by: OPHTHALMOLOGY

## 2024-05-22 PROCEDURE — 92015 DETERMINE REFRACTIVE STATE: CPT | Performed by: OPHTHALMOLOGY

## 2024-05-22 NOTE — PATIENT INSTRUCTIONS
Age-related nuclear cataract of both eyes  Discussed mild cataracts in both eyes that are not affecting vision and are not surgical at this time.      New glasses rx given for distance only, update as needed.    Non-proliferative diabetic retinopathy, both eyes (HCC)  Diabetes type II: mild background diabetic retinopathy, no signs of neovascularization noted.  No treatment necessary at this time.  Patient was instructed to monitor vision for sudden changes and to call if visual changes noted.  Discussed ocular and systemic benefits of blood sugar control.     Stressed importance of yearly eye exams.    Will see patient in 1 year for a diabetic eye exam.

## 2024-05-22 NOTE — ASSESSMENT & PLAN NOTE
Discussed mild cataracts in both eyes that are not affecting vision and are not surgical at this time.      New glasses rx given for distance only, update as needed.

## 2024-05-22 NOTE — PROGRESS NOTES
Porsha Harrell is a 54 year old female.    HPI:     HPI    Pt in today for a diabetic eye exam. Pt states vision is stable. Occasionally vision gets blurry with her glasses.       Pt has been a diabetic for 24+ years       Pt's diabetes is currently controlled by insulin  Pt checks BS daily  Pt's last blood sugar was 139 today  Last HA1C was 7.8 on 24  Endocrinologist: Nick Sullivan     Last edited by Esther Paul, OT on 2024  4:23 PM.        Patient History:  Past Medical History:    Anemia    Breast cancer (HCC)    Lumpectomy and chemo     Cancer (HCC)    RIGHT     Cup to disc asymmetry    OU    Essential hypertension    Exposure to medical diagnostic radiation    last dose 10/2020    High cholesterol    Hx of motion sickness    No diabetic retinopathy in both eyes    OU    Non-proliferative diabetic retinopathy, both eyes (HCC)    Obesity (BMI 30-39.9)    Obesity, unspecified    Other and unspecified hyperlipidemia    Personal history of antineoplastic chemotherapy    last dose2020    Sleep apnea    Type II or unspecified type diabetes mellitus without mention of complication, not stated as uncontrolled    Unspecified essential hypertension    Visual impairment    GLASSES at noc       Surgical History: Porsha Harrell has a past surgical history that includes  () ( section); ablation (abdominal); needle biopsy right (2020) (us bx); needle biopsy left (2020) (MRI bx); lumpectomy right (2020); radiation right (Right, 10/2020); chemotherapy; and colonoscopy screening - referral (N/A, 2022) (Procedure: COLONOSCOPY-SCREENING/ESOPHAGOGASTRODUODENOSCOPY (EGD);  Surgeon: Martinez Pinon MD;  Location: [facility] ENDOSCOPY).    Family History   Problem Relation Age of Onset    Neurological Disorder Father 63        Alzheimer's disease, Cause of death    Hypertension Mother     Diabetes Mother     Heart Attack Mother 57        MI, Cause of death    Heart Attack  Cousin 54        Maternal cousin: MI, Cause of death    Lipids Other         Family H/o Hyperlipidemia    Cancer Maternal Aunt         told was paternal in inheritance    Cancer Maternal Grandfather         prostate ca    Breast Cancer Self 50    Glaucoma Neg     Macular degeneration Neg     Ovarian Cancer Neg        Social History:   Social History     Socioeconomic History    Marital status:    Tobacco Use    Smoking status: Never    Smokeless tobacco: Never   Vaping Use    Vaping status: Never Used   Substance and Sexual Activity    Alcohol use: Never     Alcohol/week: 0.0 standard drinks of alcohol     Comment: None.     Drug use: No    Sexual activity: Yes     Partners: Male     Birth control/protection: Tubal Ligation   Other Topics Concern    Caffeine Concern Yes     Comment: Coffee 1 cup daily;        Medications:  Current Outpatient Medications   Medication Sig Dispense Refill    acetaminophen-codeine (TYLENOL WITH CODEINE #3) 300-30 MG Oral Tab Take 1 tablet by mouth every 4 (four) hours as needed for Pain. 20 tablet 0    Semaglutide (RYBELSUS) 3 MG Oral Tab Take 3 mg by mouth daily.      Insulin Aspart Protamine & Aspart 70/30 (70-30) 100 UNIT/ML Subcutaneous Suspension Inject into the skin 2 (two) times daily before meals. Takes 44 Units in am and 45Units in pm.      fluticasone propionate 50 MCG/ACT Nasal Suspension 1 PUFF EACH NOSTRIL 2 TIMES A  DAY (Patient taking differently: 1 PUFF EACH NOSTRIL 2 TIMES A  DAY prn) 15.8 mL 0    Blood Glucose Monitoring Suppl (ONETOUCH VERIO FLEX SYSTEM) w/Device Does not apply Kit 1 lancet by Finger stick route 3 (three) times daily.      Glucose Blood (ONETOUCH VERIO) In Vitro Strip 1 lancet by Finger stick route 3 (three) times daily.      Insulin Pen Needle (BD PEN NEEDLE MINI U/F) 31G X 5 MM Does not apply Misc Use 4 times a day as directed 400 each 0       Allergies:  No Known Allergies    ROS:       PHYSICAL EXAM:     Base Eye Exam       Visual Acuity  (Snellen - Linear)         Right Left    Dist sc 20/50 20/30    Dist ph sc 20/30 20/25 -2    Near sc 20/40 20/40              Tonometry (Icare, 4:39 PM)         Right Left    Pressure 19 16              Pupils         Pupils    Right PERRL    Left PERRL              Visual Fields         Left Right     Full Full              Extraocular Movement         Right Left     Full, Ortho Full, Ortho              Neuro/Psych       Oriented x3: Yes    Mood/Affect: Normal              Dilation       Both eyes: 1.0% Mydriacyl and 2.5% Rafal Synephrine @ 4:40 PM              Dilation #2       Both eyes: 1.0% Mydriacyl and 2.5% Rafal Synephrine @ 4:40 PM                  Slit Lamp and Fundus Exam       Slit Lamp Exam         Right Left    Lids/Lashes Dermatochalasis, Meibomian gland dysfunction Dermatochalasis, Meibomian gland dysfunction    Conjunctiva/Sclera Ocular Melanosis Ocular Melanosis    Cornea Clear Clear    Anterior Chamber Deep and quiet Deep and quiet    Iris Normal Normal    Lens 1+ Nuclear sclerosis, Trace Cortical cataract 1+ Nuclear sclerosis, Trace Cortical cataract    Vitreous Clear Clear              Fundus Exam         Right Left    Disc Good rim, Temporal crescent Good rim, Temporal crescent    C/D Ratio 0.3 0.4    Macula 1 MA nasal to fovea 1 MA above fovea    Vessels Normal Normal    Periphery Normal Normal                  Refraction       Wearing Rx         Sphere Cylinder    Right -1.50 Sphere    Left -1.50 Sphere      Age: 3yrs    Type: Forgot Distance only              Manifest Refraction (Auto)         Sphere Cylinder Dist VA Add Near VA    Right -1.50 Sphere       Left -1.50 Sphere                 Manifest Refraction #2         Sphere Cylinder Dist VA Add Near VA    Right -1.75 Sphere 20/25 +2.00 20/20    Left -1.50 Sphere 20/25- +2.00 20/20              Final Rx         Sphere Cylinder Dist VA    Right -1.75 Sphere 20/25    Left -1.50 Sphere 20/25-      Type: Distance only                      ASSESSMENT/PLAN:     Diagnoses and Plan:     Age-related nuclear cataract of both eyes  Discussed mild cataracts in both eyes that are not affecting vision and are not surgical at this time.      New glasses rx given for distance only, update as needed.    Non-proliferative diabetic retinopathy, both eyes (HCC)  Diabetes type II: mild background diabetic retinopathy, no signs of neovascularization noted.  No treatment necessary at this time.  Patient was instructed to monitor vision for sudden changes and to call if visual changes noted.  Discussed ocular and systemic benefits of blood sugar control.     Stressed importance of yearly eye exams.    Will see patient in 1 year for a diabetic eye exam.       No orders of the defined types were placed in this encounter.      Meds This Visit:  Requested Prescriptions      No prescriptions requested or ordered in this encounter        Follow up instructions:  Return in about 1 year (around 5/22/2025) for Diabetic eye exam.    5/22/2024  Scribed by: Hilton Solis MD

## 2024-05-22 NOTE — ASSESSMENT & PLAN NOTE
Diabetes type II: no background of retinopathy, no signs of neovascularization noted.  Discussed ocular and systemic benefits of blood sugar control.  Diagnosis and treatment discussed in detail with patient.     Stressed importance of yearly eye exams.    Will see patient in 1 year for a diabetic eye exam.

## 2024-05-22 NOTE — ASSESSMENT & PLAN NOTE
Diabetes type II: mild background diabetic retinopathy, no signs of neovascularization noted.  No treatment necessary at this time.  Patient was instructed to monitor vision for sudden changes and to call if visual changes noted.  Discussed ocular and systemic benefits of blood sugar control.     Stressed importance of yearly eye exams.    Will see patient in 1 year for a diabetic eye exam.

## 2024-05-23 ENCOUNTER — TELEPHONE (OUTPATIENT)
Dept: CARE COORDINATION | Age: 54
End: 2024-05-23

## 2024-05-23 NOTE — TELEPHONE ENCOUNTER
Spoke with Deb. She ended up in ER yesterday b/c she states she was \"paranoid. \"  All her vitals were stable, no fever. MD offered something for anxiety and she declined. Today, she has not had a fever and her cough has improved.   I let her know th
warm blanket provided

## 2024-05-30 ENCOUNTER — TELEPHONE (OUTPATIENT)
Dept: CARE COORDINATION | Age: 54
End: 2024-05-30

## 2024-06-03 ENCOUNTER — APPOINTMENT (OUTPATIENT)
Dept: BARIATRICS/WEIGHT MGMT | Age: 54
End: 2024-06-03

## 2024-06-03 ENCOUNTER — E-ADVICE (OUTPATIENT)
Dept: OTHER | Age: 54
End: 2024-06-03

## 2024-06-11 ENCOUNTER — APPOINTMENT (OUTPATIENT)
Dept: BARIATRICS/WEIGHT MGMT | Age: 54
End: 2024-06-11

## 2024-06-11 ENCOUNTER — E-ADVICE (OUTPATIENT)
Dept: BARIATRICS/WEIGHT MGMT | Age: 54
End: 2024-06-11

## 2024-06-11 DIAGNOSIS — Z17.1 MALIGNANT NEOPLASM OF UPPER-OUTER QUADRANT OF RIGHT BREAST IN FEMALE, ESTROGEN RECEPTOR NEGATIVE  (CMD): ICD-10-CM

## 2024-06-11 DIAGNOSIS — Z90.3 S/P GASTRIC SLEEVE PROCEDURE: Primary | ICD-10-CM

## 2024-06-11 DIAGNOSIS — E78.00 HYPERCHOLESTEREMIA: ICD-10-CM

## 2024-06-11 DIAGNOSIS — C50.411 MALIGNANT NEOPLASM OF UPPER-OUTER QUADRANT OF RIGHT BREAST IN FEMALE, ESTROGEN RECEPTOR NEGATIVE  (CMD): ICD-10-CM

## 2024-06-11 DIAGNOSIS — E55.9 VITAMIN D DEFICIENCY: ICD-10-CM

## 2024-06-11 DIAGNOSIS — Z79.4 TYPE 2 DIABETES MELLITUS WITH HYPERGLYCEMIA, WITH LONG-TERM CURRENT USE OF INSULIN  (CMD): ICD-10-CM

## 2024-06-11 DIAGNOSIS — E11.65 TYPE 2 DIABETES MELLITUS WITH HYPERGLYCEMIA, WITH LONG-TERM CURRENT USE OF INSULIN  (CMD): ICD-10-CM

## 2024-06-11 DIAGNOSIS — G47.33 OBSTRUCTIVE SLEEP APNEA (ADULT) (PEDIATRIC): ICD-10-CM

## 2024-06-11 DIAGNOSIS — E66.01 SEVERE OBESITY (BMI >= 40)  (CMD): Chronic | ICD-10-CM

## 2024-06-11 PROCEDURE — X1171 BARIATRIC INITIAL PROGRAM FEE: HCPCS

## 2024-06-11 PROCEDURE — 99214 OFFICE O/P EST MOD 30 MIN: CPT

## 2024-06-11 RX ORDER — ACYCLOVIR 400 MG/1
TABLET ORAL
COMMUNITY
Start: 2024-05-09

## 2024-06-11 RX ORDER — LOSARTAN POTASSIUM 25 MG/1
TABLET ORAL
COMMUNITY
Start: 2024-04-15

## 2024-06-11 RX ORDER — INSULIN ASPART 100 [IU]/ML
20 INJECTION, SUSPENSION SUBCUTANEOUS DAILY
COMMUNITY
Start: 2024-05-08

## 2024-06-11 ASSESSMENT — ENCOUNTER SYMPTOMS
EYE DISCHARGE: 0
HEARTBURN: 0
INSOMNIA: 0
COUGH: 0
TINGLING: 0
DEPRESSION: 0
NAUSEA: 0
PHOTOPHOBIA: 0
EYE PAIN: 0
BACK PAIN: 0
WEAKNESS: 0
HEADACHES: 0
BLOOD IN STOOL: 0
SINUS PAIN: 0
ABDOMINAL PAIN: 0
CONSTIPATION: 0
WHEEZING: 0
TREMORS: 0
VOMITING: 0
SORE THROAT: 0
DIZZINESS: 0
BLURRED VISION: 0
FEVER: 0
NERVOUS/ANXIOUS: 0
CHILLS: 0
DIARRHEA: 0
SHORTNESS OF BREATH: 0
DOUBLE VISION: 0

## 2024-07-31 ENCOUNTER — APPOINTMENT (OUTPATIENT)
Dept: BARIATRICS/WEIGHT MGMT | Age: 54
End: 2024-07-31

## 2024-07-31 DIAGNOSIS — E55.9 VITAMIN D DEFICIENCY: ICD-10-CM

## 2024-07-31 DIAGNOSIS — Z79.4 TYPE 2 DIABETES MELLITUS WITH HYPERGLYCEMIA, WITH LONG-TERM CURRENT USE OF INSULIN  (CMD): ICD-10-CM

## 2024-07-31 DIAGNOSIS — E11.65 TYPE 2 DIABETES MELLITUS WITH HYPERGLYCEMIA, WITH LONG-TERM CURRENT USE OF INSULIN  (CMD): ICD-10-CM

## 2024-07-31 DIAGNOSIS — G47.33 OBSTRUCTIVE SLEEP APNEA (ADULT) (PEDIATRIC): ICD-10-CM

## 2024-07-31 DIAGNOSIS — Z90.3 S/P GASTRIC SLEEVE PROCEDURE: Primary | ICD-10-CM

## 2024-07-31 PROCEDURE — 99214 OFFICE O/P EST MOD 30 MIN: CPT | Performed by: NURSE PRACTITIONER

## 2024-07-31 ASSESSMENT — ENCOUNTER SYMPTOMS
NEUROLOGICAL NEGATIVE: 1
RESPIRATORY NEGATIVE: 1
ABDOMINAL PAIN: 0
NAUSEA: 0
FEVER: 0
CHILLS: 0
SLEEP DISTURBANCE: 0
FATIGUE: 0
DIARRHEA: 0
VOMITING: 0
CONSTIPATION: 0

## 2024-11-05 ENCOUNTER — RESEARCH ENCOUNTER (OUTPATIENT)
Dept: HEMATOLOGY/ONCOLOGY | Facility: HOSPITAL | Age: 54
End: 2024-11-05

## 2024-11-05 NOTE — PROGRESS NOTES
Research Phone Note:  Patient due for follow up in November. Noted that patient has not scheduled her MRI or follow up visit with Dr Horton. Called patient, she thanked me for reminder. I told her I would send a message to breast MRI scheduling for her, and that once her breast MRI is scheduled I'll call her to schedule her follow up with Dr Horton.

## 2024-11-18 ENCOUNTER — E-ADVICE (OUTPATIENT)
Dept: BARIATRICS/WEIGHT MGMT | Age: 54
End: 2024-11-18

## 2024-11-19 ENCOUNTER — TELEPHONE (OUTPATIENT)
Dept: OPHTHALMOLOGY | Facility: CLINIC | Age: 54
End: 2024-11-19

## 2024-11-29 ENCOUNTER — OFFICE VISIT (OUTPATIENT)
Dept: OBGYN CLINIC | Facility: CLINIC | Age: 54
End: 2024-11-29

## 2024-11-29 VITALS
SYSTOLIC BLOOD PRESSURE: 135 MMHG | BODY MASS INDEX: 34 KG/M2 | DIASTOLIC BLOOD PRESSURE: 74 MMHG | HEART RATE: 67 BPM | WEIGHT: 185 LBS

## 2024-11-29 DIAGNOSIS — Z01.419 ENCOUNTER FOR GYNECOLOGICAL EXAMINATION WITHOUT ABNORMAL FINDING: Primary | ICD-10-CM

## 2024-11-29 DIAGNOSIS — R93.89 THICKENED ENDOMETRIUM: ICD-10-CM

## 2024-11-29 DIAGNOSIS — Z12.4 SCREENING FOR MALIGNANT NEOPLASM OF CERVIX: ICD-10-CM

## 2024-11-29 DIAGNOSIS — Z98.890 HISTORY OF ENDOMETRIAL ABLATION: ICD-10-CM

## 2024-11-29 PROCEDURE — 3075F SYST BP GE 130 - 139MM HG: CPT | Performed by: OBSTETRICS & GYNECOLOGY

## 2024-11-29 PROCEDURE — 3078F DIAST BP <80 MM HG: CPT | Performed by: OBSTETRICS & GYNECOLOGY

## 2024-11-29 PROCEDURE — 99386 PREV VISIT NEW AGE 40-64: CPT | Performed by: OBSTETRICS & GYNECOLOGY

## 2024-12-02 LAB — HPV E6+E7 MRNA CVX QL NAA+PROBE: NEGATIVE

## 2024-12-06 NOTE — PROGRESS NOTES
Subjective:   Patient ID: Porsha Harrell is a 54 year old female.    HPI   and amenorrheic post ablation around . No new family history except daughter diagnosed with large fibroids at age 31. CT through [facility] showed thickened endometrium though we are not sure if radiologist knew she had previous ablation. Nor are we sure she is menopausal.     History/Other:   Review of Systems   Constitutional:  Negative for appetite change, fatigue and unexpected weight change.   Eyes:  Negative for visual disturbance.   Respiratory:  Negative for cough and shortness of breath.    Cardiovascular:  Negative for chest pain, palpitations and leg swelling.   Gastrointestinal:  Negative for abdominal distention, abdominal pain, blood in stool, constipation and diarrhea.   Genitourinary:  Negative for dyspareunia, dysuria, frequency, pelvic pain and urgency.   Musculoskeletal:  Negative for arthralgias and myalgias.   Skin:  Negative for rash.   Neurological:  Negative for weakness, numbness and headaches.   Psychiatric/Behavioral:  Negative for dysphoric mood. The patient is not nervous/anxious.      Current Outpatient Medications   Medication Sig Dispense Refill    acetaminophen-codeine (TYLENOL WITH CODEINE #3) 300-30 MG Oral Tab Take 1 tablet by mouth every 4 (four) hours as needed for Pain. 20 tablet 0    Insulin Aspart Protamine & Aspart 70/30 (70-30) 100 UNIT/ML Subcutaneous Suspension Inject into the skin 2 (two) times daily before meals. Takes 44 Units in am and 45Units in pm.      fluticasone propionate 50 MCG/ACT Nasal Suspension 1 PUFF EACH NOSTRIL 2 TIMES A  DAY 15.8 mL 0    Blood Glucose Monitoring Suppl (ONETOUCH VERIO FLEX SYSTEM) w/Device Does not apply Kit 1 lancet by Finger stick route 3 (three) times daily.      Glucose Blood (ONETOUCH VERIO) In Vitro Strip 1 lancet by Finger stick route 3 (three) times daily.      Insulin Pen Needle (BD PEN NEEDLE MINI U/F) 31G X 5 MM Does not apply Misc Use 4 times a  day as directed 400 each 0    Semaglutide (RYBELSUS) 3 MG Oral Tab Take 3 mg by mouth daily. (Patient not taking: Reported on 11/29/2024)       Allergies:Allergies[1]    Objective:   Physical Exam  Constitutional:       General: She is not in acute distress.     Appearance: She is well-developed. She is not diaphoretic.   Neck:      Thyroid: No thyromegaly.   Cardiovascular:      Rate and Rhythm: Normal rate and regular rhythm.      Heart sounds: Normal heart sounds. No murmur heard.  Pulmonary:      Effort: Pulmonary effort is normal.      Breath sounds: Normal breath sounds. No wheezing or rales.   Chest:   Breasts:     Right: Normal. No mass, nipple discharge, skin change or tenderness.      Left: Normal. No mass, nipple discharge, skin change or tenderness.      Comments:     Abdominal:      General: There is no distension.      Palpations: Abdomen is soft. There is no mass.      Tenderness: There is no abdominal tenderness. There is no guarding or rebound.   Genitourinary:     Labia:         Right: No rash or lesion.         Left: No rash or lesion.       Vagina: Normal. No vaginal discharge.      Cervix: No cervical motion tenderness or discharge.      Uterus: Not enlarged and not tender.       Adnexa:         Right: No mass, tenderness or fullness.          Left: No mass, tenderness or fullness.     Musculoskeletal:         General: No tenderness.      Cervical back: Neck supple.   Lymphadenopathy:      Cervical: No cervical adenopathy.      Upper Body:      Right upper body: No supraclavicular, axillary or pectoral adenopathy.      Left upper body: No supraclavicular, axillary or pectoral adenopathy.   Neurological:      Mental Status: She is alert.         Assessment & Plan:   1. Encounter for gynecological examination without abnormal finding    2. Screening for malignant neoplasm of cervix    3. Thickened endometrium    4. History of endometrial ablation        Orders Placed This Encounter   Procedures     Hpv Dna  High Risk , Thin Prep Collect    ThinPrep PAP Smear    THINPREP PAP SMEAR ONLY     PLAN: Breast surveillance with oncology. We'll check ultrasound here. Further recommendations to follow. We may also need FSH / E2.     Meds This Visit:  Requested Prescriptions      No prescriptions requested or ordered in this encounter       Imaging & Referrals:  US PELVIS W EV (CPT=76856/08974)         [1] No Known Allergies     no

## 2024-12-14 ENCOUNTER — HOSPITAL ENCOUNTER (OUTPATIENT)
Dept: MRI IMAGING | Facility: HOSPITAL | Age: 54
Discharge: HOME | End: 2024-12-14
Attending: INTERNAL MEDICINE
Payer: COMMERCIAL

## 2024-12-14 DIAGNOSIS — C50.411 MALIGNANT NEOPLASM OF UPPER-OUTER QUADRANT OF RIGHT BREAST IN FEMALE, ESTROGEN RECEPTOR NEGATIVE (HCC): ICD-10-CM

## 2024-12-14 DIAGNOSIS — Z08 ENCOUNTER FOR FOLLOW-UP EXAMINATION AFTER COMPLETED TREATMENT FOR MALIGNANT NEOPLASM: ICD-10-CM

## 2024-12-14 DIAGNOSIS — Z17.1 MALIGNANT NEOPLASM OF UPPER-OUTER QUADRANT OF RIGHT BREAST IN FEMALE, ESTROGEN RECEPTOR NEGATIVE (HCC): ICD-10-CM

## 2024-12-14 DIAGNOSIS — R92.333 HETEROGENEOUSLY DENSE TISSUE OF BOTH BREASTS ON MAMMOGRAPHY: ICD-10-CM

## 2024-12-14 PROCEDURE — 77049 MRI BREAST C-+ W/CAD BI: CPT | Performed by: INTERNAL MEDICINE

## 2024-12-14 PROCEDURE — A9575 INJ GADOTERATE MEGLUMI 0.1ML: HCPCS | Performed by: INTERNAL MEDICINE

## 2024-12-14 RX ORDER — GADOTERATE MEGLUMINE 376.9 MG/ML
20 INJECTION INTRAVENOUS
Status: COMPLETED | OUTPATIENT
Start: 2024-12-14 | End: 2024-12-14

## 2024-12-14 RX ADMIN — GADOTERATE MEGLUMINE 17 ML: 376.9 INJECTION INTRAVENOUS at 14:57:00

## 2024-12-17 ENCOUNTER — RESEARCH ENCOUNTER (OUTPATIENT)
Age: 54
End: 2024-12-17

## 2024-12-17 NOTE — PROGRESS NOTES
STUDY: B59      ID: [id number]  TIMEPOINT:  54 mo follow up (4.5 yr)     Patient was scheduled for clinic visit today with medical oncology but did not arrive. Phone call placed and VM left for patient to call and reschedule.

## 2024-12-30 ENCOUNTER — OFFICE VISIT (OUTPATIENT)
Age: 54
End: 2024-12-30
Attending: INTERNAL MEDICINE
Payer: COMMERCIAL

## 2024-12-30 VITALS
TEMPERATURE: 98 F | BODY MASS INDEX: 34.96 KG/M2 | HEIGHT: 62 IN | RESPIRATION RATE: 16 BRPM | WEIGHT: 190 LBS | HEART RATE: 77 BPM | SYSTOLIC BLOOD PRESSURE: 127 MMHG | DIASTOLIC BLOOD PRESSURE: 74 MMHG | OXYGEN SATURATION: 98 %

## 2024-12-30 DIAGNOSIS — Z92.89 HISTORY OF IMMUNOTHERAPY: ICD-10-CM

## 2024-12-30 DIAGNOSIS — C50.411 MALIGNANT NEOPLASM OF UPPER-OUTER QUADRANT OF RIGHT BREAST IN FEMALE, ESTROGEN RECEPTOR NEGATIVE (HCC): Primary | ICD-10-CM

## 2024-12-30 DIAGNOSIS — G62.0 CHEMOTHERAPY-INDUCED NEUROPATHY (HCC): ICD-10-CM

## 2024-12-30 DIAGNOSIS — Z12.31 ENCOUNTER FOR SCREENING MAMMOGRAM FOR BREAST CANCER: ICD-10-CM

## 2024-12-30 DIAGNOSIS — E66.9 OBESITY (BMI 30-39.9): ICD-10-CM

## 2024-12-30 DIAGNOSIS — R92.333 HETEROGENEOUSLY DENSE TISSUE OF BOTH BREASTS ON MAMMOGRAPHY: ICD-10-CM

## 2024-12-30 DIAGNOSIS — T45.1X5A CHEMOTHERAPY-INDUCED NEUROPATHY (HCC): ICD-10-CM

## 2024-12-30 DIAGNOSIS — Z08 ENCOUNTER FOR FOLLOW-UP SURVEILLANCE OF BREAST CANCER: ICD-10-CM

## 2024-12-30 DIAGNOSIS — Z92.21 PERSONAL HISTORY OF ANTINEOPLASTIC CHEMOTHERAPY: ICD-10-CM

## 2024-12-30 DIAGNOSIS — Z17.1 MALIGNANT NEOPLASM OF UPPER-OUTER QUADRANT OF RIGHT BREAST IN FEMALE, ESTROGEN RECEPTOR NEGATIVE (HCC): Primary | ICD-10-CM

## 2024-12-30 DIAGNOSIS — Z85.3 ENCOUNTER FOR FOLLOW-UP SURVEILLANCE OF BREAST CANCER: ICD-10-CM

## 2024-12-30 NOTE — PROGRESS NOTES
[facility] Hematology Oncology   Progress Note    Patient Name: Porsha Harrell   YOB: 1970   Medical Record Number: [id number]   PCP: Marybeth Dee MD   Attending Physician: Emily Horton MD     Date of Visit: 12/30/2024      Chief Complaint:  Breast cancer    Oncologic History:  54 year old with self detected right breast cancer, mammogram/US 4 cm mass at 1:00 8 biopsy: Invasive ductal carcinoma, grade 3, triple negative KI 40%.  MRI:concordant, cortically thick axillary node high right axilla.  Left breast 9 mm irregular mass biopsy benign.  84 gene panel (HOTELbeat multicancer panel) 2 VUS DONTE (c.5319+6_5319+7del [intronic]) and RAD50 (c.3292C>T)    Treatment History:  1/30/20: C1 taxol/carboplatin +/- Atezo  B59 trial. Taxol reaction improved with steroids.  8/03/20: Lumpectomy with sentinel node biopsy (Located within [facility]): pCR  11/6/20: EBRT   12/9/20: Adjuvant atezolizumab completion    Interval History:  Here for 6 months follow up and review of MRI breast. Overall she is doing well with no acute issues or complaints.  She is losing weight successfully since the gastric sleeve surgery in 4/2024.   She denies ay breast changes or concerns. No lumps/bumps or enlarged nodes.   Stable neuropathy in the fingers, intermittent tingling and numbness.   Denies fatigue, bone pain, shortness of breath, headaches.      PMH/PSH  Breast cancer  DM2 with retinopathy  Obesity  HLD, HTN  Family History: no changed  Social History:  , lives with daughter (2 kids live in [place]). working at a bank in [place]    Medications:    Current Outpatient Medications:     acetaminophen-codeine (TYLENOL WITH CODEINE #3) 300-30 MG Oral Tab, Take 1 tablet by mouth every 4 (four) hours as needed for Pain., Disp: 20 tablet, Rfl: 0    Insulin Aspart Protamine & Aspart 70/30 (70-30) 100 UNIT/ML Subcutaneous Suspension, Inject into the skin 2 (two) times daily before meals. Takes 44 Units in am and 45Units in pm.,  Disp: , Rfl:     fluticasone propionate 50 MCG/ACT Nasal Suspension, 1 PUFF EACH NOSTRIL 2 TIMES A  DAY, Disp: 15.8 mL, Rfl: 0    Blood Glucose Monitoring Suppl (ONETOUCH VERIO FLEX SYSTEM) w/Device Does not apply Kit, 1 lancet by Finger stick route 3 (three) times daily., Disp: , Rfl:     Glucose Blood (ONETOUCH VERIO) In Vitro Strip, 1 lancet by Finger stick route 3 (three) times daily., Disp: , Rfl:     Insulin Pen Needle (BD PEN NEEDLE MINI U/F) 31G X 5 MM Does not apply Misc, Use 4 times a day as directed, Disp: 400 each, Rfl: 0    Semaglutide (RYBELSUS) 3 MG Oral Tab, Take 3 mg by mouth daily. (Patient not taking: Reported on 12/30/2024), Disp: , Rfl:        Review of Systems: Onc ROS negative    Vitals:  /74 (BP Location: Left arm, Patient Position: Sitting, Cuff Size: large)   Pulse 77   Temp 98 °F (36.7 °C) (Oral)   Resp 16   Ht 1.575 m (5' 2\")   Wt 86.2 kg (190 lb)   SpO2 98%   BMI 34.75 kg/m²      Weight:  Wt Readings from Last 6 Encounters:   12/30/24 86.2 kg (190 lb)   11/29/24 83.9 kg (185 lb)   05/09/24 98.4 kg (217 lb)   11/28/23 104.2 kg (229 lb 11.5 oz)   11/22/23 103.4 kg (228 lb)   06/27/23 103.4 kg (228 lb)     Physical Exam:  General: alert and oriented x 3, not in acute distress.  HEENT: EOMs intact.  Chest: non-labored breathing.    Breast: right lumpectomy scare stable. No palpable masses, tenderness or skin/nipple changes in either breasts. No axillary lymphadenopathy b/l.   Abd: obese, soft, non tender.  Extremities: no edema  Neurological: grossly intact.   Psych: appropriate mood and affect      Labs:  Lab Results   Component Value Date    WBC 7.2 11/22/2023    RBC 4.57 11/22/2023    HGB 12.8 11/22/2023    HCT 40.0 11/22/2023    MCV 87.5 11/22/2023    MCH 28.0 11/22/2023    MCHC 32.0 11/22/2023    RDW 14.9 11/22/2023    .0 11/22/2023    MPV 7.5 10/31/2018     Lab Results   Component Value Date     11/22/2023    K 4.0 11/22/2023     11/22/2023    CO2 30.0  11/22/2023    BUN 13 11/22/2023    CREATSERUM 1.14 (H) 11/22/2023     (H) 11/22/2023    CA 9.8 11/22/2023    ALKPHO 75 11/22/2023    ALT 11 11/22/2023    AST 10 11/22/2023    BILT 0.3 11/22/2023    ALB 4.2 11/22/2023    TP 7.4 11/22/2023        Radiology:  MRI BREAST (W+WO) W/CAD BILAT (CPT=77049)    Result Date: 12/16/2024  CONCLUSION:  There is no MRI evidence of malignancy in either breast.  Annual screening breast MRI in conjunction with screening mammography at alternating six month intervals in recommended due to patient's high risk status.  BI-RADS CATEGORY: DIAGNOSTIC CATEGORY 2 - BENIGN FINDING:   RECOMMENDATIONS:  ROUTINE MAMMOGRAM AND CLINICAL EVALUATION IN 6 MONTHS.    PLEASE NOTE: A NORMAL MRI DOES NOT EXCLUDE THE POSSIBILITY OF BREAST CANCER.  A CLINICALLY SUSPICIOUS PALPABLE LUMP SHOULD BE BIOPSIED.         Dictated by (CST): Amanuel Wheeler DO on 12/16/2024 at 9:48 AM     Finalized by (CST): Amanuel Wheeler DO on 12/16/2024 at 10:06 AM             Cancer Staging   Malignant neoplasm of upper-outer quadrant of right breast in female, estrogen receptor negative (HCC)  Staging form: Breast, AJCC 8th Edition  - Clinical: Stage IIIB (cT3, cN0, cM0, G3, ER-, MO-, HER2-) - Signed by Nely Santiago MD on 1/16/2020  - Pathologic stage from 8/12/2020: No Stage Recommended (ypT0, pN0(sn), cM0) - Signed by Kaitlynn Atkinson MD on 8/12/2020    Impression and Plan:    Right breast invasive ductal carcinoma, grade 3, triple negative, [id number]:  - s/p neoadj chemo per NSABP B59--> lumpectomy 8/3/20 with pCR --> adjuvant atezo  - she is more than 4 years out with no clinical or radiographic evidence of disease  - no residual chemo or immunotherapy toxicity.   - continue high risk surveillance due to her history and category C density; MRI breast and MMG alternating q6 months.   - bilateral diagnostic mammogram 4/2024 and breast MRI 12/2024 with benign findings  - bilateral screening mammogram ordered for  4/2025    Obesity:  - s/p sleeve gastrectomy 4/29/24, lost > 30 lb since then   - will monitor periodically for any nutritional deficiencies    Neuropathy:  - mild and stable, probably chemo induced with a component of diabetic neuropathy  - not requiring meds.       Return in about 6 months (around 6/30/2025).       Orders Placed This Encounter   Procedures    [facility] ISIDRA 2D+3D SCREENING BILAT (CPT=77067/55513)        Emily Horton MD  Hematology Oncology

## 2025-01-07 ENCOUNTER — HOSPITAL ENCOUNTER (OUTPATIENT)
Dept: ULTRASOUND IMAGING | Facility: HOSPITAL | Age: 55
Discharge: HOME | End: 2025-01-07
Attending: OBSTETRICS & GYNECOLOGY
Payer: COMMERCIAL

## 2025-01-07 DIAGNOSIS — R93.89 THICKENED ENDOMETRIUM: ICD-10-CM

## 2025-01-07 DIAGNOSIS — Z98.890 HISTORY OF ENDOMETRIAL ABLATION: ICD-10-CM

## 2025-01-07 PROCEDURE — 76856 US EXAM PELVIC COMPLETE: CPT | Performed by: OBSTETRICS & GYNECOLOGY

## 2025-01-07 PROCEDURE — 76830 TRANSVAGINAL US NON-OB: CPT | Performed by: OBSTETRICS & GYNECOLOGY

## 2025-02-03 ENCOUNTER — HOSPITAL ENCOUNTER (EMERGENCY)
Facility: HOSPITAL | Age: 55
Discharge: HOME | End: 2025-02-03
Attending: EMERGENCY MEDICINE
Payer: COMMERCIAL

## 2025-02-03 ENCOUNTER — APPOINTMENT (OUTPATIENT)
Dept: GENERAL RADIOLOGY | Facility: HOSPITAL | Age: 55
End: 2025-02-03
Attending: EMERGENCY MEDICINE
Payer: COMMERCIAL

## 2025-02-03 VITALS
SYSTOLIC BLOOD PRESSURE: 102 MMHG | TEMPERATURE: 100 F | RESPIRATION RATE: 20 BRPM | OXYGEN SATURATION: 96 % | HEART RATE: 92 BPM | DIASTOLIC BLOOD PRESSURE: 65 MMHG

## 2025-02-03 DIAGNOSIS — J10.1 INFLUENZA A: Primary | ICD-10-CM

## 2025-02-03 LAB
FLUAV + FLUBV RNA SPEC NAA+PROBE: NEGATIVE
FLUAV + FLUBV RNA SPEC NAA+PROBE: POSITIVE
RSV RNA SPEC NAA+PROBE: NEGATIVE
SARS-COV-2 RNA RESP QL NAA+PROBE: NOT DETECTED

## 2025-02-03 PROCEDURE — 99284 EMERGENCY DEPT VISIT MOD MDM: CPT

## 2025-02-03 PROCEDURE — 0241U SARS-COV-2/FLU A AND B/RSV BY PCR (GENEXPERT): CPT | Performed by: EMERGENCY MEDICINE

## 2025-02-03 PROCEDURE — 71045 X-RAY EXAM CHEST 1 VIEW: CPT | Performed by: EMERGENCY MEDICINE

## 2025-02-03 PROCEDURE — 99283 EMERGENCY DEPT VISIT LOW MDM: CPT

## 2025-02-03 NOTE — ED PROVIDER NOTES
Patient Seen in: [facility] Emergency Department    History     Chief Complaint   Patient presents with    Cough    Fatigue       HPI    54-year-old female presents to the ED for evaluation of cough with clear white sputum, body aches, chills, fever, very low energy.  Patient states that she has had symptoms now for 4 days.  She denies any vomiting or diarrhea.  Denies any shortness of breath.    History from Independent Source:       External Records Reviewed: Available in EMR.  Patient was seen at the end of December by her oncologist for follow-up of breast cancer.  She was treated in  and had MRI completed recently which does not show any recurrence.    History reviewed.   Past Medical History:    Anemia    Breast cancer (HCC)    Lumpectomy and chemo     Cancer (HCC)    RIGHT     Cup to disc asymmetry    OU    Essential hypertension    Exposure to medical diagnostic radiation    last dose 10/2020    High cholesterol    Hx of motion sickness    No diabetic retinopathy in both eyes    OU    Non-proliferative diabetic retinopathy, both eyes (HCC)    Obesity (BMI 30-39.9)    Obesity, unspecified    Other and unspecified hyperlipidemia    Personal history of antineoplastic chemotherapy    last dose2020    Sleep apnea    Type II or unspecified type diabetes mellitus without mention of complication, not stated as uncontrolled    Unspecified essential hypertension    Visual impairment    GLASSES at [facility]       History reviewed.   Past Surgical History:   Procedure Laterality Date    Ablation      abdominal           section    Chemotherapy      Colonoscopy screening - referral N/A 2022    Procedure: COLONOSCOPY-SCREENING/ESOPHAGOGASTRODUODENOSCOPY (EGD);  Surgeon: Martinez Pinon MD;  Location: [facility] ENDOSCOPY    Lumpectomy right  2020    Needle biopsy left  2020    MRI bx    Needle biopsy right  2020    us bx    Radiation right Right 10/2020         Medications  :  Prescriptions Prior to Admission[1]     Family History   Problem Relation Age of Onset    Neurological Disorder Father 63        Alzheimer's disease, Cause of death    Hypertension Mother     Diabetes Mother     Heart Attack Mother 57        MI, Cause of death    Heart Attack Cousin 54        Maternal cousin: MI, Cause of death    Lipids Other         Family H/o Hyperlipidemia    Cancer Maternal Aunt         told was paternal in inheritance    Cancer Maternal Grandfather         prostate ca    Breast Cancer Self 50    Glaucoma Neg     Macular degeneration Neg     Ovarian Cancer Neg        Smoking Status:   Social History     Socioeconomic History    Marital status:    Tobacco Use    Smoking status: Never     Passive exposure: Never    Smokeless tobacco: Never   Vaping Use    Vaping status: Never Used   Substance and Sexual Activity    Alcohol use: Never     Alcohol/week: 0.0 standard drinks of alcohol     Comment: None.     Drug use: No    Sexual activity: Yes     Partners: Male     Birth control/protection: Tubal Ligation   Other Topics Concern    Caffeine Concern Yes     Comment: Coffee 1 cup daily;        Constitutional and vital signs reviewed.      Social History and Family History elements reviewed from today, pertinent positives to the presenting problem noted.    Physical Exam     ED Triage Vitals [02/03/25 1425]   /65   Pulse 92   Resp 20   Temp 99.9 °F (37.7 °C)   Temp src Oral   SpO2 96 %   O2 Device None (Room air)       Physical Exam   Constitutional: AAOx3, well nourished, NAD  HEENT: Normocephalic, PERRLA, MMM  CV: s1s2+, RRR, no m/r/g, normal distal pulses  Pulmonary/Chest: CTA b/l with no rales, wheezes.  No chest wall tenderness  Abdominal: Nontender.  Nondistended. Soft. Bowel sounds are normal.   Neck/Back:   :   Musculoskeletal: Normal range of motion. No deformity.   Neurological: Awake, alert. Normal reflexes. No cranial nerve deficit.    Skin: Skin is warm and dry. No rash  noted. No erythema.   Psychiatric:      All measures to prevent infection transmission during my interaction with the patient were taken. The patient was already wearing a droplet mask on my arrival to the room. Personal protective equipment was worn throughout the duration of the exam.      ED Course        Labs Reviewed   SARS-COV-2/FLU A AND B/RSV BY PCR (GENEXPERT) - Abnormal; Notable for the following components:       Result Value    Influenza A by PCR Positive (*)     All other components within normal limits    Narrative:     This test is intended for the qualitative detection and differentiation of SARS-CoV-2, influenza A, influenza B, and respiratory syncytial virus (RSV) viral RNA in nasopharyngeal or nares swabs from individuals suspected of respiratory viral infection consistent with COVID-19 by their healthcare provider. Signs and symptoms of respiratory viral infection due to SARS-CoV-2, influenza, and RSV can be similar.                                    Test performed using the Xpert Xpress SARS-CoV-2/FLU/RSV (real time RT-PCR)  assay on the [employer] instrument, [employer] [place] [employer] [place] [address].                   This test is being used under the Food and Drug Administration's Emergency Use Authorization.                                    The authorized Fact Sheet for Healthcare Providers for this assay is available upon request from the laboratory.     My Independent Interpretation of EKG (if performed):     Imaging Results Available and Reviewed while in ED: XR CHEST AP PORTABLE  (CPT=71045)    Result Date: 2/3/2025  CONCLUSION:  1. No acute cardiopulmonary finding.    Dictated by (CST): Choco Chopra MD on 2/03/2025 at 4:39 PM     Finalized by (CST): Choco Chopra MD on 2/03/2025 at 4:41 PM         ED Medications Administered: Medications - No data to display          MDM     Vitals:    02/03/25 1425   BP: 102/65   Pulse: 92   Resp: 20   Temp: 99.9 °F (37.7 °C)   TempSrc: Oral   SpO2: 96%     *I  personally reviewed and interpreted all ED vitals.    Independent Interpretation of Studies: I have independently reviewed chest x-ray and there are no acute findings    Social Determinants of Health:     Procedures:      Differential/MDM/Shared Decision Making: Differential Diagnosis includes influenza, COVID, pneumonia, viral URI, others    The patient already  has a past medical history of Anemia, Breast cancer ([facility]) (01/09/2020), Cancer (HCC) (01/09/2020), Cup to disc asymmetry (2006), Essential hypertension (07/16/2011), Exposure to medical diagnostic radiation, High cholesterol, motion sickness, No diabetic retinopathy in both eyes (2006), Non-proliferative diabetic retinopathy, both eyes ([facility]) (5/22/2024), Obesity (BMI 30-39.9), Obesity, unspecified, Other and unspecified hyperlipidemia, Personal history of antineoplastic chemotherapy (06/1925), Sleep apnea, Type II or unspecified type diabetes mellitus without mention of complication, not stated as uncontrolled, Unspecified essential hypertension, and Visual impairment.  to contribute to the complexity of this ED evaluation.           Medications, Diagnostics, or Disposition considered but not done:     Patient tested positive for influenza.  Chest x-ray is unremarkable.  Management of case was discussed with patient that she is comfortable with discharge and follow-up with her doctor.      Condition upon leaving the department: Stable    Disposition and Plan     Clinical Impression:  1. Influenza A        Disposition:  Discharge    Follow-up:  Marybeth Dee MD  [address] [place] [id number]  [phone]    Call in 2 day(s)        Medications Prescribed:  Current Discharge Medication List                   [1] (Not in a hospital admission)

## 2025-02-03 NOTE — ED INITIAL ASSESSMENT (HPI)
Pt to ED w/ c/o cough and generalized fatigue that started Thursday.   +fevers, denies n/v/d. Pt states, \"I have pain everywhere.\"   Denies any chest pain or shortness of breath. Respirations are equal and unlabored in triage.

## 2025-02-24 NOTE — PATIENT INSTRUCTIONS
Patient got tamiflu sent in Thursday. Now she is having ear pain and real bad congestion bad,bloody, yellow, and dark snot. Wants to know if something can be sent in   Understanding Your Sinuses  Your sinuses are air-filled spaces between the bones in your head. They have small openings that connect to the nasal cavity. The sinuses make mucus that drains into the nose.  This helps keep the nose moist and free of dust an

## 2025-03-13 ENCOUNTER — APPOINTMENT (OUTPATIENT)
Dept: CT IMAGING | Facility: HOSPITAL | Age: 55
End: 2025-03-13
Attending: EMERGENCY MEDICINE
Payer: COMMERCIAL

## 2025-03-13 ENCOUNTER — HOSPITAL ENCOUNTER (EMERGENCY)
Facility: HOSPITAL | Age: 55
Discharge: HOME | End: 2025-03-13
Attending: EMERGENCY MEDICINE
Payer: COMMERCIAL

## 2025-03-13 VITALS
TEMPERATURE: 98 F | DIASTOLIC BLOOD PRESSURE: 78 MMHG | HEART RATE: 70 BPM | OXYGEN SATURATION: 100 % | RESPIRATION RATE: 20 BRPM | SYSTOLIC BLOOD PRESSURE: 155 MMHG

## 2025-03-13 DIAGNOSIS — M54.16 LUMBAR RADICULOPATHY: Primary | ICD-10-CM

## 2025-03-13 PROCEDURE — 99284 EMERGENCY DEPT VISIT MOD MDM: CPT

## 2025-03-13 PROCEDURE — 72131 CT LUMBAR SPINE W/O DYE: CPT | Performed by: EMERGENCY MEDICINE

## 2025-03-13 RX ORDER — CYCLOBENZAPRINE HCL 10 MG
10 TABLET ORAL 3 TIMES DAILY PRN
Qty: 20 TABLET | Refills: 0 | Status: SHIPPED | OUTPATIENT
Start: 2025-03-13 | End: 2025-03-20

## 2025-03-13 RX ORDER — METHYLPREDNISOLONE 4 MG/1
TABLET ORAL
Qty: 1 EACH | Refills: 0 | Status: SHIPPED | OUTPATIENT
Start: 2025-03-13

## 2025-03-13 NOTE — ED PROVIDER NOTES
Patient Seen in: [facility] Emergency Department      History     Chief Complaint   Patient presents with    Leg Pain     Stated Complaint: L Leg pain, back pain    Subjective:   55-year-old female with a history of breast cancer and diabetes states for 2 to 3 days she has been having pain across her low back which radiates down her left leg when she ambulates.  Sitting there on the chair she said she is not really in any pain.  She has no recent trauma.  No weight loss.  No recent procedures.  No weakness or numbness or incontinence.  No fevers.  No IV drug use.  No pain in the hip or groin.  No falls.  No chest pain or shortness of breath.  No vomiting or diarrhea.  She has been using Tylenol and Aleve for this.  Still hurts when she walks              Objective:     Past Medical History:    Anemia    Breast cancer (HCC)    Lumpectomy and chemo     Cancer (HCC)    RIGHT     Cup to disc asymmetry    OU    Essential hypertension    Exposure to medical diagnostic radiation    last dose 10/2020    High cholesterol    Hx of motion sickness    No diabetic retinopathy in both eyes    OU    Non-proliferative diabetic retinopathy, both eyes (HCC)    Obesity (BMI 30-39.9)    Obesity, unspecified    Other and unspecified hyperlipidemia    Personal history of antineoplastic chemotherapy    last dose2020    Sleep apnea    Type II or unspecified type diabetes mellitus without mention of complication, not stated as uncontrolled    Unspecified essential hypertension    Visual impairment    GLASSES at [facility]              Past Surgical History:   Procedure Laterality Date    Ablation      abdominal           section    Chemotherapy      Colonoscopy screening - referral N/A 2022    Procedure: COLONOSCOPY-SCREENING/ESOPHAGOGASTRODUODENOSCOPY (EGD);  Surgeon: Martinez Pinon MD;  Location: [facility] ENDOSCOPY    Lumpectomy right  2020    Needle biopsy left  2020    MRI bx    Needle biopsy  right  01/07/2020    us bx    Radiation right Right 10/2020                Social History     Socioeconomic History    Marital status:    Tobacco Use    Smoking status: Never     Passive exposure: Never    Smokeless tobacco: Never   Vaping Use    Vaping status: Never Used   Substance and Sexual Activity    Alcohol use: Never     Alcohol/week: 0.0 standard drinks of alcohol     Comment: None.     Drug use: No    Sexual activity: Yes     Partners: Male     Birth control/protection: Tubal Ligation   Other Topics Concern    Caffeine Concern Yes     Comment: Coffee 1 cup daily;                   Physical Exam     ED Triage Vitals [03/13/25 1206]   /78   Pulse 70   Resp 20   Temp 98 °F (36.7 °C)   Temp src    SpO2 100 %   O2 Device        Current Vitals:   Vital Signs  BP: 155/78  Pulse: 70  Resp: 20  Temp: 98 °F (36.7 °C)    Oxygen Therapy  SpO2: 100 %        Physical Exam  HENT:      Head: Normocephalic.      Right Ear: External ear normal.      Left Ear: External ear normal.      Nose: Nose normal.      Mouth/Throat:      Mouth: Mucous membranes are moist.   Eyes:      Extraocular Movements: Extraocular movements intact.      Pupils: Pupils are equal, round, and reactive to light.   Cardiovascular:      Rate and Rhythm: Normal rate and regular rhythm.      Pulses: Normal pulses.      Heart sounds: Normal heart sounds.   Pulmonary:      Effort: Pulmonary effort is normal.      Breath sounds: Normal breath sounds.   Abdominal:      Palpations: Abdomen is soft.   Musculoskeletal:         General: No swelling or tenderness. Normal range of motion.      Cervical back: Normal range of motion and neck supple. No tenderness.      Right lower leg: No edema.      Left lower leg: No edema.      Comments: No calf tenderness.  No saddle anesthesia.  Negative straight leg raise.  No tenderness to the spine or back.  Full passive range of motion of the hip without pain.  No tenderness throughout the lower extremity.  No  calf tenderness.  Strong pedal pulses which are symmetric.  No discoloration.   Skin:     General: Skin is warm.      Capillary Refill: Capillary refill takes less than 2 seconds.   Neurological:      Mental Status: She is alert.      Sensory: No sensory deficit.      Motor: No weakness.             ED Course   Labs Reviewed - No data to display    ED Course as of 03/13/25 1420  ------------------------------------------------------------  Time: 03/13 1418  Comment: CT lumbar spine independently interpreted by me shows no acute process.  Patient feeling good at this time.  Comfortable going home.  She is comfortable taking the Medrol Dosepak and has done it before and knows to keep a close eye on her blood sugars and treat appropriately.  She will return for changes worsening.  Will follow-up with primary as well as physiatry.  She has for muscle relaxer prescription which I have given additionally as well.  She is distally neurovascularly intact.  In my opinion safe for discharge for further workup              MDM      CT SPINE LUMBAR (CPT=72131)    Result Date: 3/13/2025  CONCLUSION:   No acute fracture or malalignment lumbar spine.  Mild lumbar spine degenerative change without significant central canal or foraminal narrowing.     Dictated by (CST): Glen Beltran MD on 3/13/2025 at 1:38 PM     Finalized by (CST): Glen Beltran MD on 3/13/2025 at 1:44 PM                 Medical Decision Making  Patient with history of breast cancer and diabetes here with some low back pain for couple days that radiates down her left leg when she walks.  Pain-free sitting on the chair.  No concerning focal findings or vascular findings on exam.  With the breast cancer history there could be a concern for pathologic fracture but this also could be other causes such as disc protrusion, lumbar radiculopathy of other causes.  Nothing in the history or physical exam would cause me great concern that she needs an MRI at this  time but I do feel a CT of the lumbar spine would rule out some obvious pathology.  I would recommend that she see physiatry quickly for further evaluation and they can decide if she needs an MRI.  She understands to come back for changes or worsening.  She declined any pain medicine right now.  I will add Medrol Dosepak to her regimen.  She does not want any narcotics for home.    Amount and/or Complexity of Data Reviewed  External Data Reviewed: notes.  Radiology: ordered and independent interpretation performed.  Discussion of management or test interpretation with external provider(s): See ed course    Risk  Prescription drug management.  Risk Details: DM        Disposition and Plan     Clinical Impression:  1. Lumbar radiculopathy         Disposition:  Discharge  3/13/2025  2:18 pm    Follow-up:  Marybeth Dee MD  [address] [facility] [address]  [facility] [id number] [phone]    Follow up      Brad Sandoval MD  [address] [address] [facility] [address]  [facility] [id number] [phone]    Follow up            Medications Prescribed:  Current Discharge Medication List        START taking these medications    Details   methylPREDNISolone (MEDROL) 4 MG Oral Tablet Therapy Pack Dosepack: take as directed  Qty: 1 each, Refills: 0      cyclobenzaprine 10 MG Oral Tab Take 1 tablet (10 mg total) by mouth 3 (three) times daily as needed for Muscle spasms.  Qty: 20 tablet, Refills: 0                 Supplementary Documentation:

## 2025-03-13 NOTE — DISCHARGE INSTRUCTIONS
Please keep a close eye on your blood sugars this week.  Take the Medrol Dosepak.  Aleve 2 times a day for the next 3 to 4 days.  Ice affected area.  Use the muscle relaxer at night as needed.  You may also use Tylenol.  Please follow-up with the physiatry clinic for further evaluation and testing.  Follow-up with your primary doctor.  Return to the ER for changes or worsening.

## 2025-03-13 NOTE — ED INITIAL ASSESSMENT (HPI)
S: left leg pain that radiates from left lower back down left leg since Monday. Pt does endorse that she started working out recently.

## 2025-04-17 ENCOUNTER — TELEPHONE (OUTPATIENT)
Dept: GASTROENTEROLOGY | Facility: CLINIC | Age: 55
End: 2025-04-17

## 2025-04-17 NOTE — TELEPHONE ENCOUNTER
Patient outreach message received:    3 year colonoscopy recall entered into patient outreach in [facility]. Next due 7/25/2025.     Recall reminder letter sent out to patient via [email or web address].

## 2025-04-25 ENCOUNTER — HOSPITAL ENCOUNTER (OUTPATIENT)
Dept: MAMMOGRAPHY | Facility: HOSPITAL | Age: 55
Discharge: HOME | End: 2025-04-25
Attending: INTERNAL MEDICINE
Payer: COMMERCIAL

## 2025-04-25 DIAGNOSIS — Z17.1 MALIGNANT NEOPLASM OF UPPER-OUTER QUADRANT OF RIGHT BREAST IN FEMALE, ESTROGEN RECEPTOR NEGATIVE (HCC): ICD-10-CM

## 2025-04-25 DIAGNOSIS — Z12.31 ENCOUNTER FOR SCREENING MAMMOGRAM FOR BREAST CANCER: ICD-10-CM

## 2025-04-25 DIAGNOSIS — R92.333 HETEROGENEOUSLY DENSE TISSUE OF BOTH BREASTS ON MAMMOGRAPHY: ICD-10-CM

## 2025-04-25 DIAGNOSIS — C50.411 MALIGNANT NEOPLASM OF UPPER-OUTER QUADRANT OF RIGHT BREAST IN FEMALE, ESTROGEN RECEPTOR NEGATIVE (HCC): ICD-10-CM

## 2025-04-25 PROCEDURE — 77063 BREAST TOMOSYNTHESIS BI: CPT | Performed by: INTERNAL MEDICINE

## 2025-04-25 PROCEDURE — 77067 SCR MAMMO BI INCL CAD: CPT | Performed by: INTERNAL MEDICINE

## 2025-04-30 ENCOUNTER — TELEPHONE (OUTPATIENT)
Age: 55
End: 2025-04-30

## 2025-04-30 NOTE — TELEPHONE ENCOUNTER
Called patient per Dr. Horton let her know her mammogram is negative with no evidence of malignancy- scheduled patient for a follow up on 6/19.    ----- Message from Emily Horton sent at 4/29/2025  5:22 PM CDT -----  Please notify the patient that her mammogram is negative with no evidence of malignancy and that she needs to follow up in clinic in 2 months (around the end of June). Thanks   ----- Message -----  From: [facility]  Sent: 4/29/2025   3:43 PM CDT  To: Emily Horton MD

## 2025-05-01 ENCOUNTER — LAB ENCOUNTER (OUTPATIENT)
Dept: LAB | Facility: HOSPITAL | Age: 55
End: 2025-05-01
Attending: INTERNAL MEDICINE
Payer: COMMERCIAL

## 2025-05-01 ENCOUNTER — OFFICE VISIT (OUTPATIENT)
Dept: SURGERY | Facility: CLINIC | Age: 55
End: 2025-05-01
Payer: COMMERCIAL

## 2025-05-01 VITALS
RESPIRATION RATE: 16 BRPM | BODY MASS INDEX: 34.23 KG/M2 | WEIGHT: 186 LBS | SYSTOLIC BLOOD PRESSURE: 122 MMHG | HEIGHT: 62 IN | OXYGEN SATURATION: 96 % | HEART RATE: 82 BPM | DIASTOLIC BLOOD PRESSURE: 86 MMHG

## 2025-05-01 DIAGNOSIS — E44.1 MILD PROTEIN-CALORIE MALNUTRITION (HCC): Primary | ICD-10-CM

## 2025-05-01 DIAGNOSIS — E55.9 VITAMIN D DEFICIENCY: ICD-10-CM

## 2025-05-01 DIAGNOSIS — Z98.84 BARIATRIC SURGERY STATUS: ICD-10-CM

## 2025-05-01 DIAGNOSIS — Z98.84 H/O GASTRIC BYPASS: ICD-10-CM

## 2025-05-01 DIAGNOSIS — E11.65 TYPE 2 DIABETES MELLITUS WITH HYPERGLYCEMIA, WITH LONG-TERM CURRENT USE OF INSULIN (HCC): ICD-10-CM

## 2025-05-01 DIAGNOSIS — Z79.4 TYPE 2 DIABETES MELLITUS WITH HYPERGLYCEMIA, WITH LONG-TERM CURRENT USE OF INSULIN (HCC): ICD-10-CM

## 2025-05-01 DIAGNOSIS — E66.9 OBESITY (BMI 30-39.9): ICD-10-CM

## 2025-05-01 DIAGNOSIS — E66.9 OBESITY, UNSPECIFIED: ICD-10-CM

## 2025-05-01 DIAGNOSIS — E44.1 MALNUTRITION OF MILD DEGREE (HCC): Primary | ICD-10-CM

## 2025-05-01 DIAGNOSIS — Z79.4 ENCOUNTER FOR LONG-TERM (CURRENT) USE OF INSULIN (HCC): ICD-10-CM

## 2025-05-01 DIAGNOSIS — E44.1 MILD PROTEIN-CALORIE MALNUTRITION (HCC): ICD-10-CM

## 2025-05-01 LAB — VIT D+METAB SERPL-MCNC: 20 NG/ML (ref 30–100)

## 2025-05-01 PROCEDURE — 84425 ASSAY OF VITAMIN B-1: CPT

## 2025-05-01 PROCEDURE — 36415 COLL VENOUS BLD VENIPUNCTURE: CPT

## 2025-05-01 PROCEDURE — 82306 VITAMIN D 25 HYDROXY: CPT

## 2025-05-01 PROCEDURE — 82607 VITAMIN B-12: CPT | Performed by: INTERNAL MEDICINE

## 2025-05-01 RX ORDER — TIRZEPATIDE 2.5 MG/.5ML
2.5 INJECTION, SOLUTION SUBCUTANEOUS WEEKLY
Qty: 2 ML | Refills: 5 | Status: SHIPPED | OUTPATIENT
Start: 2025-05-01

## 2025-05-01 RX ORDER — ACYCLOVIR 400 MG/1
TABLET ORAL
COMMUNITY
Start: 2025-03-09

## 2025-05-01 NOTE — PROGRESS NOTES
[facility]  [address] [address]  Dept: [phone]    Patient:  Porsha Harrell  :      3/4/1970  MRN:      [id number]    Referring Provider: Marybeth Dee MD     Chief Complaint:     Chief Complaint   Patient presents with    Follow - Up    Weight Management       Date of Surgery:   2024   Surgery Type:   Laparoscopic gastric bypass surgery     DR [facility]    SW: 218    Subjective     Satiety:  positive  Food Intake:  <01 cup(s)  Fluid Intake:  48 oz  Protein Intake:  adeq grams  Vitamin:  No   Makes her nauseated    Exercise: No  Comorbidities:  Back pain-Improvement?  yes, Joint pain-Improvement?  yes, Diabetes-Improvement?  yes, Hyperlipidemia-Improvement?  yes, KENYATTA-Improvement?  yes, and Snoring-Improvement?  yes    Objective     Vitals: /86   Pulse 82   Resp 16   Ht 5' 2\" (1.575 m)   Wt 186 lb (84.4 kg)   SpO2 96%   BMI 34.02 kg/m²     Starting weight: 224   Current weight:    Interval weight loss: -38   Total weight loss:  -38    Last 3 Weights   25 1639 186 lb (84.4 kg)   24 1318 190 lb (86.2 kg)   24 0850 185 lb (83.9 kg)       Patient Medications:  Medications - Current[1]    Allergies:  Patient has no known allergies.     Social History:  Short Social Hx on File[2]  Surgical History:  Past Surgical History[3]    Family History:  Family History[4]    Physical Exam:  Vital signs: /86   Pulse 82   Resp 16   Ht 5' 2\" (1.575 m)   Wt 186 lb (84.4 kg)   SpO2 96%   BMI 34.02 kg/m²   General appearance: alert, appears stated age, cooperative, and mildly obese  Head: Normocephalic, without obvious abnormality, atraumatic  Lungs: clear to auscultation bilaterally  Heart: S1, S2 normal, no murmur, click, rub or gallop, regular rate and rhythm  Abdomen: soft, non-tender; bowel sounds normal; no masses,  no organomegaly  Extremities: extremities normal, atraumatic, no  cyanosis or edema  Pulses: 2+ and symmetric  Skin: Skin color, texture, turgor normal. No rashes or lesions  Neurologic: Grossly normal   ROS:    Constitutional: negative  Respiratory: negative  Cardiovascular: negative  Gastrointestinal: negative  Musculoskeletal:negative  Neurological: negative  Behavioral/Psych: negative  Endocrine: negative  All other systems were reviewed and are negative    Assessment       Encounter Diagnosis(ses):   Encounter Diagnoses   Name Primary?    Mild protein-calorie malnutrition (HCC) Yes    H/O gastric bypass     Type 2 diabetes mellitus with hyperglycemia, with long-term current use of insulin (HCC)     Obesity (BMI 30-39.9)        Plan     S/P Kendrick en Y 04/29/2024    Doing well    Should take mvi    DM2: not controlled  Will start Mounjaro  Did not tolerate Ozempic or Rybelsus in the past    Follow up with PCP and endo team      Star Cobb MD       [1]   Current Outpatient Medications:     Continuous Glucose Sensor (DEXCOM G7 SENSOR) Does not apply Misc, Every 10 days., Disp: , Rfl:     Tirzepatide (MOUNJARO) 2.5 MG/0.5ML Subcutaneous Solution Auto-injector, Inject 2.5 mg into the skin once a week., Disp: 2 mL, Rfl: 5    Insulin NPH & Regular (70-30) 100 UNIT/ML Subcutaneous Suspension Pen-injector, Inject into the skin., Disp: , Rfl:     Insulin Aspart Protamine & Aspart 70/30 (70-30) 100 UNIT/ML Subcutaneous Suspension, Inject into the skin 2 (two) times daily before meals. Takes 44 Units in am and 45Units in pm., Disp: , Rfl:     Blood Glucose Monitoring Suppl (ONETOUCH VERIO FLEX SYSTEM) w/Device Does not apply Kit, 1 lancet by Finger stick route 3 (three) times daily., Disp: , Rfl:   [2]   Social History  Socioeconomic History    Marital status:    Tobacco Use    Smoking status: Never     Passive exposure: Never    Smokeless tobacco: Never   Vaping Use    Vaping status: Never Used   Substance and Sexual Activity    Alcohol use: Never     Alcohol/week: 0.0 standard  drinks of alcohol     Comment: None.     Drug use: No    Sexual activity: Yes     Partners: Male     Birth control/protection: Tubal Ligation   Other Topics Concern    Caffeine Concern Yes     Comment: Coffee 1 cup daily;    [3]   Past Surgical History:  Procedure Laterality Date    Ablation      abdominal      2000     section    Chemotherapy      Colonoscopy screening - referral N/A 2022    Procedure: COLONOSCOPY-SCREENING/ESOPHAGOGASTRODUODENOSCOPY (EGD);  Surgeon: Martinez Pinon MD;  Location: [facility] ENDOSCOPY    Lap gastric bypass/altagracia-en-y  2024    Ky Cheek Mushtaq    Lumpectomy right  2020    Needle biopsy left  2020    MRI bx    Needle biopsy right  2020     bx    Radiation right Right 10/2020   [4]   Family History  Problem Relation Age of Onset    Breast Cancer Self 49    Hypertension Mother     Diabetes Mother     Heart Attack Mother 57        MI, Cause of death    Neurological Disorder Father 63        Alzheimer's disease, Cause of death    Cancer Maternal Grandfather         prostate ca    Cancer Maternal Aunt         told was paternal in inheritance    Heart Attack Cousin 54        Maternal cousin: MI, Cause of death    Lipids Other         Family H/o Hyperlipidemia    Glaucoma Neg     Macular degeneration Neg     Ovarian Cancer Neg     Prostate Cancer Neg     Pancreatic Cancer Neg

## 2025-05-02 ENCOUNTER — PATIENT MESSAGE (OUTPATIENT)
Dept: SURGERY | Facility: CLINIC | Age: 55
End: 2025-05-02

## 2025-05-02 DIAGNOSIS — E55.9 VITAMIN D DEFICIENCY: Primary | ICD-10-CM

## 2025-05-02 RX ORDER — ERGOCALCIFEROL 1.25 MG/1
50000 CAPSULE, LIQUID FILLED ORAL WEEKLY
Qty: 12 CAPSULE | Refills: 0 | Status: SHIPPED | OUTPATIENT
Start: 2025-05-02 | End: 2025-07-19

## 2025-05-09 LAB — VITAMIN B1 WHOLE BLD: 81.9 NMOL/L

## 2025-06-18 DIAGNOSIS — Z92.21 PERSONAL HISTORY OF ANTINEOPLASTIC CHEMOTHERAPY: Primary | ICD-10-CM

## 2025-06-19 ENCOUNTER — OFFICE VISIT (OUTPATIENT)
Age: 55
End: 2025-06-19
Attending: INTERNAL MEDICINE
Payer: COMMERCIAL

## 2025-06-19 ENCOUNTER — NURSE ONLY (OUTPATIENT)
Age: 55
End: 2025-06-19
Attending: INTERNAL MEDICINE
Payer: COMMERCIAL

## 2025-06-19 VITALS
TEMPERATURE: 98 F | SYSTOLIC BLOOD PRESSURE: 138 MMHG | HEIGHT: 62 IN | RESPIRATION RATE: 18 BRPM | HEART RATE: 71 BPM | DIASTOLIC BLOOD PRESSURE: 76 MMHG | BODY MASS INDEX: 34.41 KG/M2 | WEIGHT: 187 LBS | OXYGEN SATURATION: 97 %

## 2025-06-19 DIAGNOSIS — Z12.39 SCREENING FOR BREAST CANCER USING NON-MAMMOGRAM MODALITY: ICD-10-CM

## 2025-06-19 DIAGNOSIS — Z98.84 H/O GASTRIC BYPASS: ICD-10-CM

## 2025-06-19 DIAGNOSIS — Z17.1 MALIGNANT NEOPLASM OF UPPER-OUTER QUADRANT OF RIGHT BREAST IN FEMALE, ESTROGEN RECEPTOR NEGATIVE (HCC): Primary | ICD-10-CM

## 2025-06-19 DIAGNOSIS — Z92.21 PERSONAL HISTORY OF ANTINEOPLASTIC CHEMOTHERAPY: ICD-10-CM

## 2025-06-19 DIAGNOSIS — Z00.6 CLINICAL TRIAL PARTICIPANT: ICD-10-CM

## 2025-06-19 DIAGNOSIS — Z12.31 ENCOUNTER FOR SCREENING MAMMOGRAM FOR BREAST CANCER: ICD-10-CM

## 2025-06-19 DIAGNOSIS — R92.333 HETEROGENEOUSLY DENSE TISSUE OF BOTH BREASTS ON MAMMOGRAPHY: ICD-10-CM

## 2025-06-19 DIAGNOSIS — C50.411 MALIGNANT NEOPLASM OF UPPER-OUTER QUADRANT OF RIGHT BREAST IN FEMALE, ESTROGEN RECEPTOR NEGATIVE (HCC): Primary | ICD-10-CM

## 2025-06-19 LAB
ALBUMIN SERPL-MCNC: 4.1 G/DL (ref 3.2–4.8)
ALBUMIN/GLOB SERPL: 1.5 {RATIO} (ref 1–2)
ALP LIVER SERPL-CCNC: 62 U/L (ref 41–108)
ALT SERPL-CCNC: <7 U/L (ref 10–49)
ANION GAP SERPL CALC-SCNC: 3 MMOL/L (ref 0–18)
AST SERPL-CCNC: 13 U/L (ref ?–34)
BASOPHILS # BLD AUTO: 0.09 X10(3) UL (ref 0–0.2)
BASOPHILS NFR BLD AUTO: 1.5 %
BILIRUB SERPL-MCNC: 0.4 MG/DL (ref 0.3–1.2)
BUN BLD-MCNC: 11 MG/DL (ref 9–23)
BUN/CREAT SERPL: 11.1 (ref 10–20)
CALCIUM BLD-MCNC: 9 MG/DL (ref 8.7–10.4)
CHLORIDE SERPL-SCNC: 108 MMOL/L (ref 98–112)
CO2 SERPL-SCNC: 29 MMOL/L (ref 21–32)
CREAT BLD-MCNC: 0.99 MG/DL (ref 0.55–1.02)
DEPRECATED RDW RBC AUTO: 43.8 FL (ref 35.1–46.3)
EGFRCR SERPLBLD CKD-EPI 2021: 68 ML/MIN/1.73M2 (ref 60–?)
EOSINOPHIL # BLD AUTO: 0.45 X10(3) UL (ref 0–0.7)
EOSINOPHIL NFR BLD AUTO: 7.4 %
ERYTHROCYTE [DISTWIDTH] IN BLOOD BY AUTOMATED COUNT: 13.3 % (ref 11–15)
FASTING STATUS PATIENT QL REPORTED: NO
GLOBULIN PLAS-MCNC: 2.8 G/DL (ref 2–3.5)
GLUCOSE BLD-MCNC: 128 MG/DL (ref 70–99)
HCT VFR BLD AUTO: 36.8 % (ref 35–48)
HGB BLD-MCNC: 12.3 G/DL (ref 12–16)
IMM GRANULOCYTES # BLD AUTO: 0.01 X10(3) UL (ref 0–1)
IMM GRANULOCYTES NFR BLD: 0.2 %
LYMPHOCYTES # BLD AUTO: 2.68 X10(3) UL (ref 1–4)
LYMPHOCYTES NFR BLD AUTO: 44 %
MCH RBC QN AUTO: 29.9 PG (ref 26–34)
MCHC RBC AUTO-ENTMCNC: 33.4 G/DL (ref 31–37)
MCV RBC AUTO: 89.5 FL (ref 80–100)
MONOCYTES # BLD AUTO: 0.27 X10(3) UL (ref 0.1–1)
MONOCYTES NFR BLD AUTO: 4.4 %
NEUTROPHILS # BLD AUTO: 2.59 X10 (3) UL (ref 1.5–7.7)
NEUTROPHILS # BLD AUTO: 2.59 X10(3) UL (ref 1.5–7.7)
NEUTROPHILS NFR BLD AUTO: 42.5 %
OSMOLALITY SERPL CALC.SUM OF ELEC: 291 MOSM/KG (ref 275–295)
PLATELET # BLD AUTO: 264 10(3)UL (ref 150–450)
POTASSIUM SERPL-SCNC: 3.6 MMOL/L (ref 3.5–5.1)
PROT SERPL-MCNC: 6.9 G/DL (ref 5.7–8.2)
RBC # BLD AUTO: 4.11 X10(6)UL (ref 3.8–5.3)
SODIUM SERPL-SCNC: 140 MMOL/L (ref 136–145)
WBC # BLD AUTO: 6.1 X10(3) UL (ref 4–11)

## 2025-06-19 NOTE — PROGRESS NOTES
[facility] Hematology Oncology   Progress Note    Patient Name: Porsha Harrell   YOB: 1970   Medical Record Number: [id number]   PCP: Marybeth Dee MD   Attending Physician: Emily Horton MD     Porsha Harrell verbally consented to be recorded using ambient AI listening technology and understand that they can each withdraw their consent to this listening technology at any point by asking the clinician to turn off or pause the recording.      Date of Visit: 6/19/2025      Chief Complaint:  Breast cancer    Oncologic History:  55 year old with self detected right breast cancer, mammogram/US 4 cm mass at 1:00 8 biopsy: Invasive ductal carcinoma, grade 3, triple negative KI 40%.  MRI:concordant, cortically thick axillary node high right axilla.  Left breast 9 mm irregular mass biopsy benign.  84 gene panel (Bitcastitae multicancer panel) 2 VUS DONTE (c.5319+6_5319+7del [intronic]) and RAD50 (c.3292C>T)    Treatment History:  1/30/20: C1 taxol/carboplatin +/- Atezo  B59 trial. Taxol reaction improved with steroids.  8/03/20: Lumpectomy with sentinel node biopsy ([facility]): pCR  11/6/20: EBRT   12/9/20: Adjuvant atezolizumab completion    Interval History:  History of Present Illness  A 55-year-old female with a history of breast cancer presents for a 6 months follow-up visit and review of imaging results.     Overall she is feeling great and denies any acute issues or complaints. Her recent mammogram was negative. She is approaching five years since her breast surgery.     She has experienced significant weight loss since her surgery in April 2024, stabilizing at 180 pounds despite walking five miles a day. Her goal weight is between 160 and 165 pounds, having previously been at 234 pounds, resulting in a loss of approximately 50 pounds. She is currently on Mounjaro for weight management, and her diabetes doctor is monitoring her glucose and insulin intake.     She experiences minimal neuropathy with  tingling and numbness, which has not required medication. No changes in lumps, bumps, or swelling in the chest, armpit, or neck areas.     She continues to undergo high-risk screening with MRI and mammogram twice a year. Her last MRI was in December 2024, and her next is scheduled for mid-December 2025. Her mammogram will be scheduled for the end of April 2026.    She sees her diabetes doctor three times a year to monitor her A1c levels.      ECOG PS: 0    PMH/PSH  Breast cancer  DM2 with retinopathy  Obesity  HLD, HTN  Family History: no changed  Social History:  , lives with daughter (2 kids live in [place]). working at a [employer] in [place]    Medications:    Current Outpatient Medications:     ergocalciferol 1.25 MG (49252 UT) Oral Cap, Take 1 capsule (50,000 Units total) by mouth once a week for 12 doses., Disp: 12 capsule, Rfl: 0    Continuous Glucose Sensor (DEXCOM G7 SENSOR) Does not apply Misc, Every 10 days., Disp: , Rfl:     Insulin NPH & Regular (70-30) 100 UNIT/ML Subcutaneous Suspension Pen-injector, Inject into the skin., Disp: , Rfl:     Tirzepatide (MOUNJARO) 2.5 MG/0.5ML Subcutaneous Solution Auto-injector, Inject 2.5 mg into the skin once a week., Disp: 2 mL, Rfl: 5    Insulin Aspart Protamine & Aspart 70/30 (70-30) 100 UNIT/ML Subcutaneous Suspension, Inject into the skin in the morning and in the evening. Inject before meals. Takes 44 Units in am and 45Units in pm., Disp: , Rfl:     Blood Glucose Monitoring Suppl (ONETOUCH VERIO FLEX SYSTEM) w/Device Does not apply Kit, 1 lancet by Finger stick route in the morning and 1 lancet in the evening and 1 lancet before bedtime., Disp: , Rfl:        Review of Systems: Onc ROS negative    Vitals:  /76 (BP Location: Left arm, Patient Position: Sitting, Cuff Size: large)   Pulse 71   Temp 97.9 °F (36.6 °C) (Tympanic)   Resp 18   Ht 1.575 m (5' 2\")   Wt 84.8 kg (187 lb)   SpO2 97%   BMI 34.20 kg/m²      Weight:  Wt Readings  from Last 6 Encounters:   06/19/25 84.8 kg (187 lb)   05/01/25 84.4 kg (186 lb)   12/30/24 86.2 kg (190 lb)   11/29/24 83.9 kg (185 lb)   05/09/24 98.4 kg (217 lb)   11/28/23 104.2 kg (229 lb 11.5 oz)     Physical Exam:  General: alert and oriented x 3, not in acute distress.  HEENT: EOMs intact.  Chest: non-labored breathing.    Breast: right lumpectomy scare stable. No palpable masses, tenderness or skin/nipple changes in either breasts. No axillary lymphadenopathy b/l.   Abd: obese, soft, non tender.  Extremities: no edema  Neurological: grossly intact.   Psych: appropriate mood and affect      Labs:  Lab Results   Component Value Date    WBC 6.1 06/19/2025    RBC 4.11 06/19/2025    HGB 12.3 06/19/2025    HCT 36.8 06/19/2025    MCV 89.5 06/19/2025    MCH 29.9 06/19/2025    MCHC 33.4 06/19/2025    RDW 13.3 06/19/2025    .0 06/19/2025    MPV 7.5 10/31/2018     Lab Results   Component Value Date     06/19/2025    K 3.6 06/19/2025     06/19/2025    CO2 29.0 06/19/2025    BUN 11 06/19/2025    CREATSERUM 0.99 06/19/2025     (H) 06/19/2025    CA 9.0 06/19/2025    ALKPHO 62 06/19/2025    ALT <7 (L) 06/19/2025    AST 13 06/19/2025    BILT 0.4 06/19/2025    ALB 4.1 06/19/2025    TP 6.9 06/19/2025        Radiology:  [facility] ISIDRA 2D+3D SCREENING BILAT (CPT=77067/09022)  Result Date: 4/29/2025  CONCLUSION:  No mammographic evidence of malignancy in either breast.  Status post right lumpectomy.  No significant interval change when comparing prior studies.  BI-RADS CATEGORY:   DIAGNOSTIC CATEGORY 1 - NEGATIVE ASSESSMENT.   RECOMMENDATIONS:  ROUTINE MAMMOGRAM AND CLINICAL EVALUATION IN 12 MONTHS.    Your patient's answers to the health and family history questions collected during this mammogram indicate a potentially increased risk for breast cancer. It is recommended that this patient be evaluated in our Cancer Risk Assessment Clinic to determine eligibility for additional breast cancer screening, risk  reduction strategies and/or genetic testing. Providers are encouraged to contact our breast navigator, Princess Rosales, at [phone] with any questions or for guidance regarding this recommendation.   PLEASE NOTE: NORMAL MAMMOGRAM DOES NOT EXCLUDE THE POSSIBILITY OF BREAST CANCER.  A CLINICALLY SUSPICIOUS PALPABLE LUMP SHOULD BE BIOPSIED.   For patients over the age of 40, the target due date for the patient's next mammogram has been entered into a reminder system.    Dictated by (CST): Miriam Najera DO on 4/29/2025 at 3:41 PM     Finalized by (CST): Miriam Najera DO on 4/29/2025 at 3:42 PM      [address] [place] [address] [phone]        Cancer Staging   Malignant neoplasm of upper-outer quadrant of right breast in female, estrogen receptor negative (HCC)  Staging form: Breast, AJCC 8th Edition  - Clinical: Stage IIIB (cT3, cN0, cM0, G3, ER-, ND-, HER2-) - Signed by Nely Santiago MD on 1/16/2020  - Pathologic stage from 8/12/2020: No Stage Recommended (ypT0, pN0(sn), cM0) - Signed by Kaitlynn Atkinson MD on 8/12/2020    Impression and Plan:    Right breast invasive ductal carcinoma, grade 3, triple negative, stage IIB ([id number]):  - s/p neoadj chemo per NSABP B59--> lumpectomy 8/3/20 with pCR --> adjuvant atezo  - she is almost 5 years out with no clinical or radiographic evidence of disease  - no residual chemo or immunotherapy toxicity.   - screening mammogram 4/2025 negative.   - continue high risk surveillance due to her history and category C density; MRI breast and MMG alternating q6 months.   - MRI breast ordered for 12/2025 and bilateral screening mammogram ordered for 4/2026  - return to clinic annually + labs    Obesity:  - s/p sleeve gastrectomy 4/29/24, and currently on Mounjaro and following with weight management   - lost 50 lb so far, and her goal weight ~165 lb   - will monitor periodically for any nutritional deficiencies    Neuropathy:  -  mild and stable, probably chemo induced with a component of diabetic neuropathy  - not requiring meds.       Return in about 11 months (around 5/19/2026).       Orders Placed This Encounter   Procedures    CBC W/DIFF [E]    COMP METABOLIC PANEL [E]    MRI BREAST (W+WO) W/CAD BILAT (CPT=77049)    [facility] ISIDRA 2D+3D SCREENING BILAT (CPT=77067/27004)        Emily Horton MD  Hematology Oncology

## (undated) DIAGNOSIS — C50.411 MALIGNANT NEOPLASM OF UPPER-OUTER QUADRANT OF RIGHT BREAST IN FEMALE, ESTROGEN RECEPTOR NEGATIVE (HCC): ICD-10-CM

## (undated) DIAGNOSIS — Z12.11 COLON CANCER SCREENING: Primary | ICD-10-CM

## (undated) DIAGNOSIS — E66.9 OBESITY, UNSPECIFIED CLASSIFICATION, UNSPECIFIED OBESITY TYPE, UNSPECIFIED WHETHER SERIOUS COMORBIDITY PRESENT: ICD-10-CM

## (undated) DIAGNOSIS — Z17.1 MALIGNANT NEOPLASM OF UPPER-OUTER QUADRANT OF RIGHT BREAST IN FEMALE, ESTROGEN RECEPTOR NEGATIVE (HCC): ICD-10-CM

## (undated) DIAGNOSIS — Z08 ENCOUNTER FOR FOLLOW-UP EXAMINATION AFTER COMPLETED TREATMENT FOR MALIGNANT NEOPLASM: Primary | ICD-10-CM

## (undated) DEVICE — NEEDLE HPO 16GA 1.5IN REG WALL REG BVL LL HUB DEHP-FR STRL

## (undated) DEVICE — ADHESIVE DRMBND ADV .7ML LIQUID APL MCBL BRR FLXB 2 OCTYL

## (undated) DEVICE — BRA SURG ELIZ PINK XL

## (undated) DEVICE — 3M™ STERI-STRIP™ REINFORCED ADHESIVE SKIN CLOSURES, R1547, 1/2 IN X 4 IN (12 MM X 100 MM), 6 STRIPS/ENVELOPE: Brand: 3M™ STERI-STRIP™

## (undated) DEVICE — SYRINGE 10ML GRAD N-PYRG DEHP-FR PVC FREE STRL MED LF DISP

## (undated) DEVICE — GAUZE SPONGES,12 PLY: Brand: CURITY

## (undated) DEVICE — GLOVE SURG 7.5 PROTEXIS LF CRM PF SMTH BEAD CUFF STRL

## (undated) DEVICE — BLADE 1884080EM TRICUT 4MMX13CM M4 ROHS: Brand: FUSION®

## (undated) DEVICE — HEX-LOCKING BLADE ELECTRODE: Brand: EDGE

## (undated) DEVICE — COVER FLXB SEMIRIGID STRL LF DEVON LITEGLOVE LIGHT HNDL

## (undated) DEVICE — ELECTRODE PT RTN C30- LB 9FT CORD NONIRRITATE NONSENSITIZE

## (undated) DEVICE — SOLUTION IRRIG 1000ML 0.9% NACL USP BTL

## (undated) DEVICE — CLIP MED INTNL HMCLP TNTLM

## (undated) DEVICE — SUTURE MONOCRYL 4-0 PS-2

## (undated) DEVICE — CONMED SCOPE SAVER BITE BLOCK, 20X27 MM: Brand: SCOPE SAVER

## (undated) DEVICE — SNARE ENDOSCOPIC 10MM ROUND

## (undated) DEVICE — SUTURE VICRYL 3-0 SH

## (undated) DEVICE — COVER PRB NEOGUARD 30X2.6CM US

## (undated) DEVICE — SPONGE GAUZE 4X4IN CTN 12 PLY STRL CURITY

## (undated) DEVICE — STOPCOCK IV MEDEX HFL ULTRA MEDFUSION 3W SWVL MALE LL SM

## (undated) DEVICE — STAPLER INTERNAL ANVIL CENTRIC CANNULA STPL SUREFORM 60 DA

## (undated) DEVICE — FLEXIBLE YANKAUER,MEDIUM TIP, NO VACUUM CONTROL: Brand: ARGYLE

## (undated) DEVICE — SEALER TISS DA VINCI SLIM JAW XTD DISP

## (undated) DEVICE — TUBING INSFL PNEUMOCLEAR SET HFL

## (undated) DEVICE — SUTURE ETHILON MTPS 2-0 PS 18IN MONO NABSB BLK 585H

## (undated) DEVICE — PATIENT TRACKER 9734887XOM NON-INVASIVE

## (undated) DEVICE — IRRIGATOR SCT STRKFL 2 STRL LF DISP

## (undated) DEVICE — SNARE OPTMZ PLPCTM TRP

## (undated) DEVICE — SYRINGE 50ML ECNTRC TIP STRL MED LF DISP BD

## (undated) DEVICE — DRAPE PACK CHEST & U BAR

## (undated) DEVICE — MINOR GENERAL: Brand: MEDLINE INDUSTRIES, INC.

## (undated) DEVICE — Device: Brand: JELCO

## (undated) DEVICE — SYRINGE 50ML GRAD N-PYRG DEHP-FR PVC FREE STRL MED LF DISP

## (undated) DEVICE — KIT CLEAN ENDOKIT 1.1OZ GOWNX2

## (undated) DEVICE — SUTURE VCL+ MTPS 4-0 PS2 27IN BRAID COAT ABS

## (undated) DEVICE — DRAPE .75 SHT FNFLD 76X52IN SURG CNVRT STRL LF DISP TIBURON

## (undated) DEVICE — DRAPE CLMN EQUIPMENT DA VINCI XI

## (undated) DEVICE — NASAL PACKING CS3600-10 NOVAPAK 10PK STD: Brand: NOVAPAK

## (undated) DEVICE — FORCEPS LAPSCP 32.77CM 8MM DA VINCI XI EWRST 45D FENESTRATE

## (undated) DEVICE — RETRACTOR LAPSCP 4.5CM EWRST 0-65D SM MED GRASP FORCE

## (undated) DEVICE — INSTRUMENT TRACKER 9733533XOM ENT 1PK

## (undated) DEVICE — 35 ML SYRINGE REGULAR TIP: Brand: MONOJECT

## (undated) DEVICE — DEVICE V-LOC 180 23CM 0 GS-22 ABS GRN CLSR

## (undated) DEVICE — DRAPE ARM 21X19X10.5IN EQUIPMENT DA VINCI XI 21LB

## (undated) DEVICE — SOLUTION ANTIFOG W/ ADH BK FOAM SPNG RADPQ

## (undated) DEVICE — NEEDLE INSFL 14GA 100MM TROCAR BLDLS RADL EXPAND SLV BLUNT

## (undated) DEVICE — NEEDLE 18G 1-1/2 BLUNT FILL

## (undated) DEVICE — ADHESIVE MASTISOL 2/3CC VL

## (undated) DEVICE — FORCEP RADIAL JAW 4

## (undated) DEVICE — SPONGE: SPECIALTY PEANUT XR 100/CS: Brand: MEDICAL ACTION INDUSTRIES

## (undated) DEVICE — CAUTERY BLADE 2IN INS E1455

## (undated) DEVICE — 12 ML SYRINGE LUER-LOCK TIP: Brand: MONOJECT

## (undated) DEVICE — CONTAINER SPEC 4OZ 2.5X2.75IN OR POS INDCTR TMPR EVD LEAK

## (undated) DEVICE — EVACUATOR SMOKE ELIMINATION PSHBTN QUIET PNEUVIEW XE STRL

## (undated) DEVICE — GLOVE SURG 6.5 PROTEXIS LF BLUE PF SMTH BEAD CUFF INTLK STRL

## (undated) DEVICE — LUBRICANT INST 2OZ SRGLB BCTRST FLPTP CAP STRL

## (undated) DEVICE — SOLUTION PREP 4OZ 10% PVP IOD ANSEP SCR TOP BTL LIQUID LF

## (undated) DEVICE — SUTURE VICRYL 2-0 SH 27IN BRAID COAT ABS UNDYED J417H

## (undated) DEVICE — SUTURE PDS II 3-0 Z683G

## (undated) DEVICE — STERILE POLYISOPRENE POWDER-FREE SURGICAL GLOVES: Brand: PROTEXIS

## (undated) DEVICE — DRAIN INCS 19FR 15FR RND 40CM HEMADUCT JP SIL 30CM MLT NAR

## (undated) DEVICE — CONTAINER SPEC STR 4OZ GRY LID

## (undated) DEVICE — DRAPE SHEET LG

## (undated) DEVICE — ENCORE® PERRY STYLE 42 PF SIZE 6.5, STERILE LATEX POWDER-FREE SURGICAL GLOVE: Brand: ENCORE

## (undated) DEVICE — CLIP SM INTNL HMCLP TNTLM ESCP

## (undated) DEVICE — MEDI-VAC NON-CONDUCTIVE SUCTION TUBING 6MM X 1.8M (6FT.) L: Brand: CARDINAL HEALTH

## (undated) DEVICE — LINE MNTR ADLT SET O2 INTMD

## (undated) DEVICE — GOWN SURG XL L3 NONREINFORCE SET IN SLV STRL LF DISP BLUE

## (undated) DEVICE — BLADE 1882040 5PK INFERIOR TURB 2MM

## (undated) DEVICE — SINUS CDS: Brand: MEDLINE INDUSTRIES, INC.

## (undated) DEVICE — BULB 100CC SIL DRN LTWT LOW LVL SCT WND DRN STRL LF DISP

## (undated) DEVICE — SEAL CANNULA 5-12MM DISP

## (undated) DEVICE — GLOVE SURG 6 PROTEXIS LF CRM PF BEAD CUFF STRL PLISPRN 11.5

## (undated) DEVICE — 6 ML SYRINGE LUER-LOCK TIP: Brand: MONOJECT

## (undated) DEVICE — OBTURATOR LAPSCP 8MM WECK VISTA DISP BLDLS STRL LF

## (undated) DEVICE — WATER STRL 500ML PLASTIC POUR BTL LF

## (undated) DEVICE — BINDER ABD LG 12IN 63-74IN MULTIPANEL HOOK LOOP CLSR NS LF

## (undated) DEVICE — TRAP SPEC UNIV ULT FOR INTELLICART SYS

## (undated) DEVICE — Device

## (undated) DEVICE — SYSTEM IMG CLEARIFY 8X6IN WARM HUB TROCAR WIPE MRFBR DISP

## (undated) DEVICE — KIT ENDO ORCAPOD 160/180/190

## (undated) DEVICE — SOL  .9 1000ML BTL

## (undated) DEVICE — GLOVE SURG 8 PROTEXIS LF CRM PF BEAD CUFF STRL PLISPRN 12IN

## (undated) DEVICE — SLEEVE COMPR M KNEE LEN SGL USE KENDALL SCD

## (undated) DEVICE — BLADE 1883519HR RAD90 3PK M4 3.5MM ROTAT: Brand: RAD

## (undated) DEVICE — SUTURE SILK 2-0 FS

## (undated) DEVICE — DRAPE TAPE: Brand: CONVERTORS

## (undated) DEVICE — STANDARD HYPODERMIC NEEDLE,POLYPROPYLENE HUB: Brand: MONOJECT

## (undated) DEVICE — MARKER SKIN 2 TIP

## (undated) NOTE — Clinical Note
FYI: TCM outreach completed. Pt has not scheduled HFU appt, she does not have insurance until 12-1-19. Pt also needs 2 scripts sent and she will use [employer] for financial support. Sent TE to PCP office.

## (undated) NOTE — LETTER
[address], [place]  Authorization for Invasive Procedures  1. I hereby authorize Dr. Caleb Muñoz , my physician and whomever may be designated as the doctor's assistant, to perform the following operation and/or procedure:  Colonoscopy/ Esophagogastroduodenoscopy on Wilfred Bauman at [facility].    2. My physician has explained to me the nature and purpose of the operation or other procedure, possible alternative methods of treatment, the risks involved and the possibility of complications to me. I understand the probable consequences of declining the recommended procedure and the alternative methods of treatment. I acknowledge that no guarantee has been made as to the result that may be obtained. 3. I recognize that during the course of this operation or other procedure, unforeseen conditions may necessitate additional or different procedures than those listed above. I, therefore, further authorize and request that the above-named physician, his/her physician assistants, or designees perform such procedures as are, in his/her professional opinion, necessary and desirable. If I have a Do Not Attempt Resuscitation (DNAR) order in place, that status will be suspended while in the operating room, procedural suite, and during the recovery period unless otherwise explicitly stated by me (or a person authorized to consent on my behalf). The surgeon or my attending physician will determine when the applicable recovery period ends for purposes of reinstating the DNAR order. 4. Should the need arise during my operation or immediate post-operative period; I also consent to the administration of blood and/or blood products.  Further, I understand that despite careful testing and screening of blood and blood products, I may still be subject to ill effects as a result of recieving a blood transfusion an/or blood producst. The following are some, but not all, of the potential risks that can occur: fever and allergic reactions, hemolytic reactions, transmission of disease such as hepatitis, AIDS, cytomegalovirus (CMV), and flluid overload. In the event that I wish to have autologous transfusions of my own blood, or a directed donor transfusion, I will discuss this with my physician. 5. I consent to the photographing of the operations or procedures to be performed for the purposes of advancing medicine, science, and/or education, provided my identity is not revealed. If the procedure has been videotaped, the physician/surgeon will obtain the original videotape. The hospital will not be responsible for storage or maintenance of this tape. 6. I consent to the presence of a  or observer as deemed necessary by my physician or his designee. 7. Any tissues or organs removed in the operation or other procedure may be disposed of by and at the discretion of [facility].    8. I understand that the physician and his/her physician assistants may not be employees or agents of [facility], [facility], nor [facility], but are independent medical practitioners who have been permitted to use its facilities for the care and treatment of their patients. 9. Patients having a sterilization procedure: I understand that if the procedure is successful the results will be permanent and it will therefore be impossible for me to inseminate, conceive or bear children. I also understand that the procedure is intended to result in sterility, although the result has not been guaranteed. 10. I CERTIFY THAT I HAVE READ AND FULLY UNDERSTAND THE ABOVE CONSENT TO OPERATION and/or OTHER PROCEDURE. 11. I acknowledge that my physician has explained sedation/analgesia administration to me including the risks and benefits.  I consent to the administration of sedation/analgesia as may be necessary or desirable in the judgment of my physician. Signature of Patient:  ________________________________________________ Date: _________Time: _________    Responsible person in case of minor or unconscious: _____________________________Relationship: ____________     Witness Signature: ____________________________________________ Date: __________ Time: ___________    Statement of Physician  My signature below affirms that prior to the time of the procedure, I have explained to the patient and/or her legal representative, the risks and benefits involved in the proposed treatment and any reasonable alternative to the proposed treatment. I have also explained the risks and benefits involved in the refusal of the proposed treatment and have answered the patient's questions. If I have a significant financial interest in this procedure/surgery, I have disclosed this and had a discussion with my patient.     Signature of Physician:   ________________________________________Date: _________Time:_______ Patient Name: Jasvir Herrera  : 3/4/1970   Printed: 2022    Medical Record #: [id number]

## (undated) NOTE — LETTER
[address] [place] [address]      Authorization for Surgical Operation and Procedure     Date:___________                                                                                                         Time:_______ and/or blood products. The following are some, but not all, of the potential risks that can occur: fever and allergic reactions, hemolytic reactions, transmission of diseases such as Hepatitis, AIDS and Cytomegalovirus (CMV) and fluid overload.   In the ev (or a person authorized to consent on my behalf). The surgeon or my attending physician will determine when the applicable recovery period ends for purposes of reinstating the DNAR order.   10. Patients having a sterilization procedure: I understand that if procedure/surgery, I have disclosed this and had a discussion with my patient.     _______________________________________________________________ _____________________________  Jolene Speaks of Physician)

## (undated) NOTE — MR AVS SNAPSHOT
After Visit Summary   2/27/2020    Le French    MRN: [id number]           Visit Information     Date & Time  2/27/2020 10:30 AM Provider  [facility] Infusion Dept.  Phone  [phone]      Your Pen-injector 15 units subcutaneous with breakfast lunch and dinner    Insulin Pen Needle (BD PEN NEEDLE MINI U/F) 31G X 5 MM Does not apply Misc Use 4 times a day as directed    Glucose Blood (GITA CONTOUR NEXT TEST) In Vitro Strip Use to test Blood Sugar conditions such as allergies, colds, cough, fever, rash, sore throat, headache and pink eye. The cost for a Video Visit is currently $35.         If you receive a survey from [employer], please take a few minutes to complete it and provide feedback.  We s Conditions needing urgent attention, but are   non-life-threatening. Also available by appointment Average cost  $120*     EMERGENCY ROOM Life-threatening emergencies needing immediate intervention at a hospital emergency room.  Average cost  $2,300*   *

## (undated) NOTE — LETTER
03/27/20           Porsha,     Per our conversation, you are being directed to isolate away from any household members as much as possible and the general public COMPLETELY for 3 days after symptoms resolve, or 7 days total (whichever is longer).  If you c

## (undated) NOTE — ED AVS SNAPSHOT
Orion Sorensongood   MRN: [id number]    Department:  [facility] Emergency Department   Date of Visit:  12/30/2019           Disclosure     Insurance plans vary and the physician(s) referred by the ER may not be covered by your plan.  Please contact CARE PHYSICIAN AT ONCE OR RETURN IMMEDIATELY TO THE EMERGENCY DEPARTMENT. If you have been prescribed any medication(s), please fill your prescription right away and begin taking the medication(s) as directed.   If you believe that any of the medications

## (undated) NOTE — LETTER
2017    Patient: Erwin Elder  : 3/4/1970 Visit date: 2017    Dear  Dr. Trae Lipscomb MD,    Armando Albin is a pleasant but unfortunate 56year old,, female, who was referred to us for weight management recommendations.   Armando Talamantes is

## (undated) NOTE — MR AVS SNAPSHOT
After Visit Summary   2/13/2020    Ashwin Cordova    MRN: [id number]           Visit Information     Date & Time  2/13/2020 12:00 PM Provider  [facility] [address] [place] [address] - Infusion Dept.  Phone  [phone]      Your Misc Use 4 times a day as directed    Glucose Blood (GITA CONTOUR NEXT TEST) In Vitro Strip Use to test Blood Sugar 2 times daily    Blood Glucose Monitoring Suppl (GITA CONTOUR NEXT EZ) W/DEVICE Does not apply Kit 1 kit by Does not apply route 2 (two) t Injury & illness are never convenient. If you are dealing with a   non-emergency, consider your options before heading to an ER. VIDEO VISITS  Visit face-to-face with a [facility] physician or   VALERIE using your mobile device or computer   using [employer].     e-

## (undated) NOTE — MR AVS SNAPSHOT
[facility] [place] [address]  [address] [address]  [phone]               Thank you for choosing us for your health care visit with Rosario Lee MD.  We are glad to serve you and happy to provide you with this summary of your visit. Please help us to ensure we have accurate records. If you find anything that needs to be changed, please let our staff know as soon as possible.           After Visit Summary        Provider Location    6/30/2022 Rosario Lee, [address] [facility]      Your Appointments    Jul 25, 2022  8:15 AM  Procedure Mac with [facility], PROCEDURE  [facility] GI PROCEDURE (--) [facility] [place] [address]  [phone]      Aug 30, 2022  4:30 PM  RD Follow Up PreOp with Stacie Simon, RD  [address], [address], [facility]) [address] [phone]      Sep 13, 2022  8:00 AM  HEMATOLOGY ONCOLOGY FOLLOW UP with Gerhardt Crafts, [address] Hematology Oncology [facility]) [place]  [phone]         Today's Orders     Comp Metabolic Panel (14)   Complete by:  Jun 30, 2022 (Approximate)      Assoc Dx: Type 2 diabetes mellitus with hyperglycemia, with long-term current use of insulin (HCC) [E11.65, Z79.4], Morbid obesity with BMI of 40.0-44.9, adult ([address] [E66.01, Z68.41]         Ferritin   Complete by:  Jun 30, 2022 (Approximate)      Assoc Dx: Type 2 diabetes mellitus with hyperglycemia, with long-term current use of insulin ([address] [E11.65, Z79.4], Morbid obesity with BMI of 40.0-44.9, adult ([address] [E66.01, Z68.41]         Iron And Tibc   Complete by:  Jun 30, 2022 (Approximate)      Assoc Dx: Type 2 diabetes mellitus with hyperglycemia, with long-term current use of insulin ([address] [E11.65, Z79.4], Morbid obesity with BMI of 40.0-44.9, adult ([email or web address] [E66.01, Z68.41]         Vitamin B1 (Thiamine), Blood   Complete by:  Jun 30, 2022 (Approximate)      Assoc Dx: Type 2 diabetes mellitus with hyperglycemia, with long-term current use of insulin (HCC) [E11.65, Z79.4], Morbid obesity with BMI of 40.0-44.9, adult ([address] [E66.01, Z68.41]         Vitamin D, 25-Hydroxy   Complete by:  Jun 30, 2022 (Approximate)      Assoc Dx: Type 2 diabetes mellitus with hyperglycemia, with long-term current use of insulin ([facility] [E11.65, Z79.4], Morbid obesity with BMI of 40.0-44.9, adult ([facility] [E66.01, Z68.41]         Vitamin B12   Complete by:  Jun 30, 2022 (Approximate)      Assoc Dx: Type 2 diabetes mellitus with hyperglycemia, with long-term current use of insulin (HCC) [E11.65, Z79.4], Morbid obesity with BMI of 40.0-44.9, adult ([facility] [E66.01, K41.83]         Folic Acid Serum (Folate)   Complete by:  Jun 30, 2022 (Approximate)      Assoc Dx: Type 2 diabetes mellitus with hyperglycemia, with long-term current use of insulin ([facility] [E11.65, Z79.4], Morbid obesity with BMI of 40.0-44.9, adult ([address] [E66.01, Z68.41]         Lipid Panel   Complete by:  Jun 30, 2022 (Approximate)      Assoc Dx: Type 2 diabetes mellitus with hyperglycemia, with long-term current use of insulin (HCC) [E11.65, Z79.4], Morbid obesity with BMI of 40.0-44.9, adult ([address] [E66.01, Z68.41]         TSH W Reflex To Free T4   Complete by:  Jun 30, 2022 (Approximate)      Assoc Dx: Type 2 diabetes mellitus with hyperglycemia, with long-term current use of insulin ([address] [E11.65, Z79.4], Morbid obesity with BMI of 40.0-44.9, adult ([facility] [E66.01, Z68.41]         Hemoglobin A1C   Complete by:  Jun 30, 2022 (Approximate)      Assoc Dx: Type 2 diabetes mellitus with hyperglycemia, with long-term current use of insulin ([facility] [E11.65, Z79.4], Morbid obesity with BMI of 40.0-44.9, adult ([facility] [E66.01, Z68.41]         CBC, Platelet;  No Differential   Complete by:  Jun 30, 2022 (Approximate)      Assoc Dx: Type 2 diabetes mellitus with hyperglycemia, with long-term current use of insulin ([facility]) [E11.65, Z79.4], Morbid obesity with BMI of 40.0-44.9, adult ([facility] [E66.01, Z68.41]           Reason for Today's Visit     Follow - Up    Pre-Op Exam      Medical Issues Discussed Today    Type 2 diabetes mellitus with hyperglycemia, with long-term current use of insulin     Instructions and Information about Your Health    None     Allergies as of Jun 30, 2022    No Known Allergies               Today's Vital Signs  Most recent update: 6/30/2022  8:04 AM    BP   132/80    Pulse   78    Height   5' 3.8\" (1.621 m)    Weight   219 lb (99.3 kg)    Breastfeeding   No            Current Medications          Accurate as of June 30, 2022  9:00 AM. Always use your most recent med list.            amoxicillin clavulanate 875-125 MG Tabs  Take 1 tablet by mouth every 12 (twelve) hours. Commonly known as: Augmentin        azelastine 0.1 % Soln  2 sprays by Nasal route 2 (two) times daily. Commonly known as: Astelin        * BD Pen Needle Mini U/F 31G X 5 MM Misc  Generic drug: Insulin Pen Needle  Use 4 times a day as directed        * TRUEplus Pen Needles 32G X 4 MM Misc  Generic drug: Insulin Pen Needle  Use as MD directed. fluticasone propionate 50 MCG/ACT Susp  1 PUFF EACH NOSTRIL 2 TIMES A  DAY  Commonly known as: Flonase        Lantus SoloStar 100 UNIT/ML Sopn  Generic drug: insulin glargine  30 units subcutaneous daily        loratadine-pseudoephedrine ER 5-120 MG Tb12  Take 1 tablet by mouth every 12 (twelve) hours. Commonly known as: Claritin-D 12 Hour        montelukast 10 MG Tabs  Take 1 tablet (10 mg total) by mouth nightly. Commonly known as: Singulair        NovoLOG FlexPen 100 UNIT/ML Sopn  Generic drug: Insulin Aspart Pen  25 units 3 times daily with meals        Nystatin 675328 UNIT/GM Powd  Apply 1 Dose topically 4 (four) times daily. OneTouch Verio Flex System w/Device Kit  1 lancet by Finger stick route 3 (three) times daily.         OneTouch Verio Strp  1 lancet by Finger stick route 3 (three) times daily.        Ozempic (0.25 or 0.5 MG/DOSE) 2 MG/1.5ML Sopn  Generic drug: Semaglutide(0.25 or 0.5MG/DOS)  Inject 0.25 mg into the skin every 7 days. predniSONE 10 MG Tabs  6 tablets daily day one through 5, 4 tablets daily on days  6 and 7, 2 tablets daily on days 8 and 9, One tablet daily on days 10 and 11. Commonly known as: Deltasone        Semglee (yfgn) 100 UNIT/ML Sopn  Generic drug: Insulin Glargine-yfgn  Inject 30 Units as directed daily. * This list has 2 medication(s) that are the same as other medications prescribed for you. Read the directions carefully, and ask your doctor or other care provider to review them with you. MyChart    Visit [employer]  You can access your MyChart to more actively manage your health care and view more details from this visit by going to [email or web address]. If you've recently had a stay at the Hospital you can access your discharge instructions in [employer] by going to Visits < Admission Summaries. If you've been to the Emergency Department or your doctor's office, you can view your past visit information in [employer] by going to Visits < Visit Summaries. [employer] questions? Call [phone] for help. [employer] is NOT to be used for urgent needs. For medical emergencies, dial 911.           Visit [employer] online at  [email or web address]

## (undated) NOTE — LETTER
January 28, 2021    Carmen Schafer, [facility]     Patient: Jef Carney   YOB: 1970   Date of Visit: 1/28/2021       Dear Dr. Enma Walter MD:    Thank you for referring Jef Carney to me for evaluation.  H • Hypertension Mother    • Diabetes Mother    • Heart Attack Mother 62        MI, Cause of death   • Heart Attack Cousin 47        Maternal cousin: MI, Cause of death   • Lipids Other         Family H/o Hyperlipidemia   • Cancer Maternal Aunt         told Negative for: Constitutional, Gastrointestinal, Neurological, Skin, Genitourinary, Musculoskeletal, HENT, Cardiovascular, Respiratory, Psychiatric, Allergic/Imm, Heme/Lymph    Last edited by Oneil Loza OT on 1/28/2021 10:07 AM. (History)          P Diabetes type II: no background of retinopathy, no signs of neovascularization noted. Discussed ocular and systemic benefits of blood sugar control. Diagnosis and treatment discussed in detail with patient.   Discussed with patient that hemoglobin A1C of

## (undated) NOTE — LETTER
OUTSIDE TESTING RESULT REQUEST     IMPORTANT: FOR YOUR IMMEDIATE ATTENTION  Please FAX all test results listed below to: [phone]     Testing already done on or about: asap     * * * * If testing is NOT complete, arrange with patient A.S.A.P. * * * *      Patient Name: Alexandra Ramirez  Surgery Date: 2023  Medical Record: [id number]  CSN: [id number]  : 3/4/1970 - A: 47 y     Sex: female  Surgeon(s):  Lane Sanchez MD  Procedure: BILATERAL ENDOSCOPIC MAXILLARY ANTROSTOMIES WITH TISSUE REMOVAL, BILATERAL ENDOSCOPIC TOTAL ETHMOIDECTOMIES, BILATERAL ENDOSCOPIC FRONTAL SINUS EXPLORATION, BILATERAL ENDOSCOPIC SPHENOIDOTOMIES, POSSIBLE SEPTOPLASTY, BILATERAL ENDOSCOPIC INFERIOR TURBINATE SUBMUCOUS RESECTION, STEALTH IMAGE GUIDANCE  Anesthesia Type: General     Surgeon: Lane Sanchez MD     The following Testing and Time Line are REQUIRED PER ANESTHESIA     EKG READ AND SIGNED WITHIN   90 days  BMP (requires 4 hour fast) within  90 days      Thank You,   Sent by:WU

## (undated) NOTE — Clinical Note
I saw patient for follow up visit yesterday. HgA1c down from 12 to 8.5. She stopped Victoza 3 days ago because of persistent GI side effects but she is interested in continuing with GLP-1 therapy.  Wondering if can try to PA weekly alternative now that she

## (undated) NOTE — LETTER
[address], [place]  Authorization for Invasive Procedures  1.  I hereby authorize Dr. Sangeeta Goldsmith , my physician and whomever may be designated as the doctor's assistant, to perform the following operation and/or procedure:  Chest [facility] performed for the purposes of advancing medicine, science, and/or education, provided my identity is not revealed. If the procedure has been videotaped, the physician/surgeon will obtain the original videotape.  The hospital will not be responsible for stor My signature below affirms that prior to the time of the procedure, I have explained to the patient and/or her legal representative, the risks and benefits involved in the proposed treatment and any reasonable alternative to the proposed treatment.  I have

## (undated) NOTE — LETTER
[address], [place], [place]     AUTHORIZATION FOR SURGICAL OPERATION OR PROCEDURE    I hereby authorize                                       , my Physician(s) and whomever may be designated as the doctor's Assistant, 4. I consent to the photographing of procedure(s) to be performed for the purposes of advancing medicine, science and/or education, provided my identity is not revealed.  If the procedure has been videotaped, the physician/surgeon will obtain the original v (Witness signature)                                                                                                  (Date)                                (Time)  STATEMENT OF PHYSICIAN My signature below affirms that prior to the time of the procedure;  I

## (undated) NOTE — MR AVS SNAPSHOT
After Visit Summary   3/5/2020    Jarod Tian    MRN: [id number]           Visit Information     Date & Time  3/5/2020  2:00 PM Provider  EM [facility] [address] - Infusion Dept.  Phone  [phone]      Your Vi Pen-injector 15 units subcutaneous with breakfast lunch and dinner    Insulin Pen Needle (BD PEN NEEDLE MINI U/F) 31G X 5 MM Does not apply Misc Use 4 times a day as directed    Glucose Blood (GITA CONTOUR NEXT TEST) In Vitro Strip Use to test Blood Sugar complete it and provide feedback. We strive to deliver the best patient experience and are looking for ways to make improvements. Your feedback will help us do so. For more information on [employer], please visit [email or web address] at a hospital emergency room. Average cost  $2,300*   *Cost varies based on your insurance coverage  For more information about hours, locations or appointment options available at [facility],   visit [facility].[email or web address]/ImmediateCare or call [phone].  (

## (undated) NOTE — MR AVS SNAPSHOT
[address] [facility]  [phone]               Thank you for choosing us for your health care visit with Danette Lala MD.  We are glad to serve you and happy to provide you with this summar between 7:30am to 6pm and on Saturday between 8am and 1pm.  Evening and weekend appointments for your exam are available. [facility] ([address] [place] [address])  [address]    [place], [place]                            MRI Wide Bore A Test 2 times a day   Commonly known as:  ONETOUCH VERIO           * Glucose Blood Strp   Use to test Blood Sugar 2 times daily   Commonly known as:  GITA CONTOUR NEXT TEST           Insulin Aspart Prot & Aspart (70-30) 100 UNIT/ML Susp   50 units s/c bid

## (undated) NOTE — LETTER
[address] [place] [address]      Authorization for Surgical Operation and Procedure     Date:___________                                                                                                         Time:_______ be subject to ill effects as a result of receiving a blood transfusion and/or blood products.   The following are some, but not all, of the potential risks that can occur: fever and allergic reactions, hemolytic reactions, transmission of diseases such as H and during the recovery period unless otherwise explicitly stated by me (or a person authorized to consent on my behalf).  The surgeon or my attending physician will determine when the applicable recovery period ends for purposes of reinstating the DNAR ord or implant, or other significant relationship used in this procedure/surgery, I have disclosed this and had a discussion with my patient.     _______________________________________________________________ _____________________________  Ryan Friedman of Physi

## (undated) NOTE — MR AVS SNAPSHOT
[address] [address]  [phone]               Thank you for choosing us for your health care visit with Ambar Choudhary MD.  We are glad to serve you and happy to provide you with this summary Lipid Panel [E]    Complete by:   May 09, 2017 (Approximate)    Assoc Dx:  Uncontrolled type 2 diabetes mellitus without complication, with long-term current use of insulin (HCC) [E11.65, Z79.4]                 Follow-up Instructions     Return in ab Diagnoses:  Acute recurrent maxillary sinusitis   Order:  Ent - Internal    Danielle Williamson MD   [facility]   [address] [place] [address]   Phone:  [phone]   Fax:  [phone]         Referral Orders      Normal Orders This Visit    BA numbers can create reimbursement difficulties for you.     Assoc Dx:  Acute recurrent maxillary sinusitis [J01.01]          Reason for Today's Visit     Diabetes     Hypertension     Hyperlipidemia           Medical Issues Discussed Today     Acute maxillar Advised to continue the same dose of NovoLog mix, add Glucophage 500 mg 1 tablet 2 times daily with breakfast and with dinner.   Strict diet control to reduce starches that is fruits, roots, gr in every meal.  Increase lean protein in every meal.  Add veget Commonly known as:  ONETOUCH VERIO           * Glucose Blood Strp   Use to test Blood Sugar 2 times daily   Commonly known as:  GITA CONTOUR NEXT TEST           * Insulin Aspart Prot & Aspart (70-30) 100 UNIT/ML Susp   Inject 50 Units into the skin 2 (two - Insulin Aspart Prot & Aspart (70-30) 100 UNIT/ML Susp  - MetFORMIN HCl  MG Tb24  - predniSONE 5 MG Tabs            MyChart     Visit MyChart  You can access your MyChart to more actively manage your health care and view more details from this visit 2 ½ hours per week – spread out over time Use a kady to keep you motivated   Don’t forget strength training with weights and resistance Set goals and track your progress   You don’t need to join a gym. Home exercises work great.  Put more priority on exe

## (undated) NOTE — MR AVS SNAPSHOT
After Visit Summary   4/23/2020    Ashwin Cordova    MRN: [id number]           Visit Information     Date & Time  4/23/2020 10:30 AM Provider  EM [facility] [address] [facility] Infusion Dept.  Phone  [phone]      Your Glucose Blood (GITA CONTOUR NEXT TEST) In Vitro Strip Use to test Blood Sugar 2 times daily    Blood Glucose Monitoring Suppl (GITA CONTOUR NEXT EZ) W/DEVICE Does not apply Kit 1 kit by Does not apply route 2 (two) times daily.     GITA MICROLET LANCETS DO YOU KNOW WHERE TO GO? Injury & Illness are never convenient. If you are dealing with a   non-emergency, consider your options before heading to an ER.   VIDEO VISITS  Visit face-to-face with a [facility] physician or   VALERIE using your mobile de

## (undated) NOTE — LETTER
[address]  [address] [address]  [phone]  Authorization for Imaging Procedure    1. I hereby authorize Dr. Ophelia Hutchinson, my physician and his/her assistants (if applicable), which may include medical students, residents, and/or fellows, to perform the following procedure and administer such anesthesia as may be determined necessary by my physician: [address]  on [address]. 2.  I recognize that during the procedure, unforeseen conditions may necessitate additional or different procedures than those listed above. I, therefore, further authorize and request that the above-named physician, assistants, or designees perform such procedures as are, in their judgment, necessary and desirable. 3.  My physician has discussed prior to my procedure the potential benefits, risks and side effects of this procedure; the likelihood of achieving goals; and potential problems that might occur during recuperation. They also discussed reasonable alternatives to the procedure, including risks, benefits, and side effects related to the alternatives and risks related to not receiving this procedure. I have had all my questions answered and I acknowledge that no guarantee has been made as to the result that may be obtained. 4.  Should the need arise during my procedure, which includes change of level of care prior to discharge, I also consent to the administration of blood and/or blood products. Further, I understand that despite careful testing and screening of blood or blood products by collecting agencies, I may still be subject to ill effects as a result of receiving a blood transfusion and/or blood products.  The following are some, but not all, of the potential risks that can occur: fever and allergic reactions, hemolytic reactions, transmission of diseases such as Hepatitis, AIDS and Cytomegalovirus (CMV) and fluid overload. In the event that I wish to have an autologous transfusion of my own blood, or a directed donor transfusion, I will discuss this with my physician. Check only if Refusing Blood or Blood Products  I understand refusal of blood or blood products as deemed necessary by my physician may have serious consequences to my condition to include possible death. I hereby assume responsibility for my refusal and release the hospital, its personnel, and my physicians from any responsibility for the consequences of my refusal.   [  ] Patient Refuses Blood      5. I authorize the use of any specimen, organs, tissues, body parts or foreign objects that may be removed from my body during the procedure for diagnosis, research or teaching purposes and their subsequent disposal by hospital authorities. I also authorize the release of specimen test results and/or written reports to my treating physician on the hospital medical staff or other referring or consulting physicians involved in my care, at the discretion of the Pathologist or my treating physician. 6.  I consent to the photographing or videotaping of the procedures to be performed, including appropriate portions of my body for medical, scientific, or educational purposes, provided my identity is not revealed by the pictures or by descriptive texts accompanying them. If the procedure has been photographed/videotaped, the physician will obtain the original picture, image, videotape or CD. The hospital will not be responsible for storage, release or maintenance of the picture, image, tape or CD.   7.  I consent to the presence of a  or observers in the operating room as deemed necessary by my physician or their designees. 8.  I recognize that in the event my procedure results in extended X-Ray/fluoroscopy time, I may develop a skin reaction. 9.   If I have a Do Not Attempt Resuscitation (DNAR) order in place, that status will be suspended while in the operating room, procedural suite, and during the recovery period unless otherwise explicitly stated by me (or a person authorized to consent on my behalf). The performing physician or my attending physician will determine when the applicable recovery period ends for purposes of reinstating the DNAR order. 10.  I acknowledge that my physician has explained sedation/analgesia administration to me including the risk and benefits I consent to the administration of sedation/analgesia as may be necessary or desirable in the judgment of my physician. I CERTIFY THAT I HAVE READ AND FULLY UNDERSTAND THE ABOVE CONSENT FOR THE PROCEDURE.      Signature of Patient: _____________________________________________________________  Responsible person in case of minor, unconscious: ____________________________________  Relationship to patient:  __________________________________________________________    Signature of Witness: _______________________________Date: _________Time: __________    Signature of Provider: _______________________________Date: _________Time: __________    Patient Name: Paolo Platt : 3/4/1970  Printed: 2022   Medical Record #: [id number]

## (undated) NOTE — ED AVS SNAPSHOT
Donal Henderson   MRN: [id number]    Department:  [facility] Emergency Department   Date of Visit:  3/5/2020           Disclosure     Insurance plans vary and the physician(s) referred by the ER may not be covered by your plan.  Please contact y CARE PHYSICIAN AT ONCE OR RETURN IMMEDIATELY TO THE EMERGENCY DEPARTMENT. If you have been prescribed any medication(s), please fill your prescription right away and begin taking the medication(s) as directed.   If you believe that any of the medications

## (undated) NOTE — MR AVS SNAPSHOT
After Visit Summary   2/20/2020    Le French    MRN: [id number]           Visit Information     Date & Time  2/20/2020  8:30 AM Provider  [facility] [address] - Infusion Dept.  Phone  [phone]      Your Glucose Blood (GITA CONTOUR NEXT TEST) In Vitro Strip Use to test Blood Sugar 2 times daily    Blood Glucose Monitoring Suppl (GITA CONTOUR NEXT EZ) W/DEVICE Does not apply Kit 1 kit by Does not apply route 2 (two) times daily.     GITA MICROLET LANCETS headache and pink eye. The cost for a Video Visit is currently $35.         If you receive a survey from [employer], please take a few minutes to complete it and provide feedback.  We strive to deliver the best patient experience and are looking for ways Also available by appointment Average cost  $120*     EMERGENCY ROOM Life-threatening emergencies needing immediate intervention at a hospital emergency room.  Average cost  $2,300*   *Cost varies based on your insurance coverage  For more information about

## (undated) NOTE — Clinical Note
Saw Nohemy Beckham today. With her elevated A1C, I highly recommend a Kendrick en Y over the sleeve gastrectomy. She has a much better chance of getting off her insulin and protecting her renal function. I do not see her doing well with the sleeve, with her being hesitant on insulin. I am also recommending she starts thiamine OTC since she has a low baseline.      Thanks    [facility]

## (undated) NOTE — ED AVS SNAPSHOT
Lc Howard   MRN: [id number]    Department:  [facility] Emergency Department   Date of Visit:  10/31/2018           Disclosure     Insurance plans vary and the physician(s) referred by the ER may not be covered by your plan.  Please contact CARE PHYSICIAN AT ONCE OR RETURN IMMEDIATELY TO THE EMERGENCY DEPARTMENT. If you have been prescribed any medication(s), please fill your prescription right away and begin taking the medication(s) as directed.   If you believe that any of the medications

## (undated) NOTE — LETTER
[address] [place] [address]      Authorization for Surgical Operation and Procedure     Date:___________                                                                                                         Time:_______ be subject to ill effects as a result of receiving a blood transfusion and/or blood products.   The following are some, but not all, of the potential risks that can occur: fever and allergic reactions, hemolytic reactions, transmission of diseases such as H and during the recovery period unless otherwise explicitly stated by me (or a person authorized to consent on my behalf).  The surgeon or my attending physician will determine when the applicable recovery period ends for purposes of reinstating the DNAR ord or implant, or other significant relationship used in this procedure/surgery, I have disclosed this and had a discussion with my patient.     _______________________________________________________________ _____________________________  An[facility]

## (undated) NOTE — ED AVS SNAPSHOT
[facility] Emergency Department    [address]     [address] [place] [address]    Phone:  [phone]    Fax:  [phone]           Ricky Carlin   MRN: [id number]    Department:  [facility] Emergency Department   Date of Visit:  5/22 CONTINUE taking these medications     GITA CONTOUR NEXT EZ w/Device Kit   Quantity:  1 kit   1 kit by Does not apply route 2 (two) times daily.        GITA MICROLET LANCETS Misc   Quantity:  100 each   Use to test Blood Sugar 2 times daily       Insulin P discretion of that Physician.   If you need additional assistance selecting a physician, you may call the [facility] Physician Referral and Class Registration line at [phone] or find a doctor online by visiting [email or web address].    IF THE Additional Information       We are concerned for your overall well being:    - If you are a smoker or have smoked in the last 12 months, we encourage you to explore options for quitting.     - If you have concerns related to behavioral health issues or th

## (undated) NOTE — LETTER
4/17/2025    Porsha Harrell        [address] [place] [address]            Dear Porsha Harrell,      Our records indicate that you are due for an appointment for a Colonoscopy with Martinez Pinon MD. Our doctors are booking out about 3-6 months in advance for procedures.     Please call our office to schedule this appointment.  Your medical well-being is important to us.    If your insurance requires a referral, please call your primary care office to request one.      Thank you,      The Physicians and Staff at [facility]

## (undated) NOTE — LETTER
OUTSIDE TESTING RESULT REQUEST     IMPORTANT: FOR YOUR IMMEDIATE ATTENTION  Please FAX all test results listed below to: [phone]     Testing already done on or about: asap     * * * * If testing is NOT complete, arrange with patient A.S.A.P. * * * *      Patient Name: Caty Melo  Surgery Date: 2023  Medical Record: [id number]  CSN: [id number]  : 3/4/1970 - A: 47 y     Sex: female  Surgeon(s):  Manjeet Rodriguez MD  Procedure: BILATERAL ENDOSCOPIC MAXILLARY ANTROSTOMIES WITH TISSUE REMOVAL, BILATERAL ENDOSCOPIC TOTAL ETHMOIDECTOMIES, BILATERAL ENDOSCOPIC FRONTAL SINUS EXPLORATION, BILATERAL ENDOSCOPIC SPHENOIDOTOMIES, POSSIBLE SEPTOPLASTY, BILATERAL ENDOSCOPIC INFERIOR TURBINATE SUBMUCOUS RESECTION, STEALTH IMAGE GUIDANCE  Anesthesia Type: General     Surgeon: Manjeet Rodriguez MD     The following Testing and Time Line are REQUIRED PER ANESTHESIA     EKG READ AND SIGNED WITHIN   90 days  BMP (requires 4 hour fast) within  90 days      Thank You,   Sent by: Pat York RN

## (undated) NOTE — LETTER
10/9/2018              [address] [facility] [phone] [phone]         Dear Kendra Martinez,    It has come to my attention that you missed your last appointment with me on 10/9/18.   As you know, regular medical attention is essential to

## (undated) NOTE — ED AVS SNAPSHOT
Nellie Soulier   MRN: [id number]    Department:  [facility] Emergency Department   Date of Visit:  6/27/2018           Disclosure     Insurance plans vary and the physician(s) referred by the ER may not be covered by your plan.  Please contact CARE PHYSICIAN AT ONCE OR RETURN IMMEDIATELY TO THE EMERGENCY DEPARTMENT. If you have been prescribed any medication(s), please fill your prescription right away and begin taking the medication(s) as directed.   If you believe that any of the medications

## (undated) NOTE — ED AVS SNAPSHOT
[facility] Emergency Department    [address]     [address] [place] [address]    Phone:  [phone]    Fax:  [phone]           Yared Crease   MRN: [id number]    Department:  [facility] Emergency Department   Date of Visit:  5/22 and Class Registration line at [phone] or find a doctor online by visiting [email or web address].    IF THERE IS ANY CHANGE OR WORSENING OF YOUR CONDITION, CALL YOUR PRIMARY CARE PHYSICIAN AT ONCE OR RETURN IMMEDIATELY TO [address] [place] [address].     If

## (undated) NOTE — LETTER
[address]  [address] [place], [place]      Authorization for Surgical Operation and Procedure     Date:___________                                                                                                         Time:_______ be subject to ill effects as a result of receiving a blood transfusion and/or blood products.   The following are some, but not all, of the potential risks that can occur: fever and allergic reactions, hemolytic reactions, transmission of diseases such as H and during the recovery period unless otherwise explicitly stated by me (or a person authorized to consent on my behalf).  The surgeon or my attending physician will determine when the applicable recovery period ends for purposes of reinstating the DNAR ord or implant, or other significant relationship used in this procedure/surgery, I have disclosed this and had a discussion with my patient.     _______________________________________________________________ _____________________________  [facility]

## (undated) NOTE — MR AVS SNAPSHOT
After Visit Summary   2/6/2020    Shakeel Ruiz    MRN: [id number]           Visit Information     Date & Time  2/6/2020  8:30 AM Provider   [address] [facility],[address] Dept.  Phone  [phone]      Your Vi Glucose Blood (GITA CONTOUR NEXT TEST) In Vitro Strip Use to test Blood Sugar 2 times daily    Blood Glucose Monitoring Suppl (GITA CONTOUR NEXT EZ) W/DEVICE Does not apply Kit 1 kit by Does not apply route 2 (two) times daily.     GITA MICROLET LANCETS complete it and provide feedback. We strive to deliver the best patient experience and are looking for ways to make improvements. Your feedback will help us do so. For more information on [employer], please visit [email or web address] Life-threatening emergencies needing immediate intervention at a hospital emergency room.     Average cost  $2,300*   *Cost varies based on your insurance coverage  For more information about hours, locations or appointment options available at [facility],   visit

## (undated) NOTE — LETTER
Patient Name: Clem Monroe / Sex: 3/4/1970-A: 47 y  female   Medical Records: [id number] CSN: [id number]    ABNORMAL VALUES  Surgeon(s):  Geovanny Brownlee MD  Anesthesia Type: General  Date of Surgery: 11/28/2023  Procedure Description: BILATERAL ENDOSCOPIC MAXILLARY ANTROSTOMIES WITH TISSUE REMOVAL, BILATERAL ENDOSCOPIC TOTAL ETHMOIDECTOMIES, BILATERAL ENDOSCOPIC FRONTAL SINUS EXPLORATION, BILATERAL ENDOSCOPIC SPHENOIDOTOMIES, POSSIBLE SEPTOPLASTY, BILATERAL ENDOSCOPIC INFERIOR TURBINATE SUBMUCOUS RESECTION, STEALTH IMAGE GUIDANCE  Primary Surgeon:  Geovanny Brownlee MD  Phone Number: [phone]    PLEASE NOTE THE FOLLOWING ABNORMALITIES:   Chemistry  cmp done 11/22/23- glucose = 345  ________________________________________________________

## (undated) NOTE — MR AVS SNAPSHOT
[address]  [phone]               Thank you for choosing us for your health care visit with Manuel Hines.  Fco Mcadams MD.  We are glad to serve you and happy to provide you with this summar · Turbinates are ridges on the sides of the nasal cavity. Cilia keep sinuses clear  Air circulates freely though healthy sinuses. Tiny, hairlike structures called cilia line the sinuses.  Cilia move the thin, watery mucus through the sinuses and into the n Insulin Aspart Prot & Aspart (70-30) 100 UNIT/ML Susp   50 units s/c bid with meals   Commonly known as:  NOVOLOG MIX 70/30 FLEXPEN           Insulin Pen Needle 31G X 5 MM Misc   use as directed   Commonly known as:  BD PEN NEEDLE MINI U/F           lisin office, you can view your past visit information in [employer] by going to Visits < Visit Summaries. [employer] questions? Call [phone] for help. [employer] is NOT to be used for urgent needs. For medical emergencies, dial 911.            Visit [facility]

## (undated) NOTE — LETTER
[address] [place] [address]  Authorization for Surgical Operation or Procedure  Date: ______________       Time: _______________  1.  I hereby authorize Cynthia Beard , my physician and the assistant, to perform the following operation and/o 5. I consent to the photographing of the operations or procedures to be performed for the purposes of advancing medicine, science, and/or education, provided my identity is not revealed.  If the procedure has been videotaped, the physician/surgeon will obta risks and benefits involved in the proposed treatment and any reasonable alternative to the proposed treatment. I have also explained the risks and benefits involved in the refusal of the proposed treatment and have answered the patient's questions.  If I h

## (undated) NOTE — LETTER
5/23/2025    Porsha Harrell        [address] [facility] [address]            Dear Porsha Harrell,      Our records indicate that you are due for an appointment for a Colonoscopy with Martinez Pinon MD. Our doctors are booking out about 3-6 months in advance for procedures.     Please call our office to schedule this appointment.  Your medical well-being is important to us.    If your insurance requires a referral, please call your primary care office to request one.      Thank you,      The Physicians and Staff at [facility]

## (undated) NOTE — ED AVS SNAPSHOT
Lc Howard   MRN: [id number]    Department:  [facility] Emergency Department   Date of Visit:  9/19/2019           Disclosure     Insurance plans vary and the physician(s) referred by the ER may not be covered by your plan.  Please contact CARE PHYSICIAN AT ONCE OR RETURN IMMEDIATELY TO THE EMERGENCY DEPARTMENT. If you have been prescribed any medication(s), please fill your prescription right away and begin taking the medication(s) as directed.   If you believe that any of the medications

## (undated) NOTE — Clinical Note
Norberto Lin completed the six month pre surgical process.  Can you please put in a referral to the surgeons (Dr Mouna Leonardo)

## (undated) NOTE — MR AVS SNAPSHOT
After Visit Summary   4/16/2020    Le French    MRN: [id number]           Visit Information     Date & Time  4/16/2020 12:00 PM Provider  [facility] [address] - Infusion Dept.  Phone  [phone]      Your Insulin Aspart Pen (NOVOLOG FLEXPEN) 100 UNIT/ML Subcutaneous Solution Pen-injector 15 units subcutaneous with breakfast lunch and dinner    Insulin Pen Needle (BD PEN NEEDLE MINI U/F) 31G X 5 MM Does not apply Misc Use 4 times a day as directed    Glucos headache and pink eye. The cost for a Video Visit is currently $35.         If you receive a survey from [employer], please take a few minutes to complete it and provide feedback.  We strive to deliver the best patient experience and are looking for ways Also available by appointment Average cost  $120*     EMERGENCY ROOM Life-threatening emergencies needing immediate intervention at a hospital emergency room.  Average cost  $2,300*   *Cost varies based on your insurance coverage  For more information about

## (undated) NOTE — MR AVS SNAPSHOT
After Visit Summary   3/19/2020    Aracely Castañeda    MRN: [id number]           Visit Information     Date & Time  3/19/2020 10:30 AM Provider  [facility] [address] - Infusion Dept.  Phone  [phone]      Your Blood Glucose Monitoring Suppl (GITA CONTOUR NEXT EZ) W/DEVICE Does not apply Kit 1 kit by Does not apply route 2 (two) times daily.     GITA MICROLET LANCETS Does not apply Misc Use to test Blood Sugar 2 times daily      Diagnoses for This Visit    Chem 5/2/2020 8:00 AM EM [facility] [address] - Infusion    5/3/2020 7:45 AM EM [facility] INFR[facility] [address] - Infusion                [facility] now offers Video Visits through [address] for adult and pediatric patients.   Video Visits [address]   Monday – Friday  10:00 am – 10:00 pm   Saturday – Sunday  10:00 am – 4:00 pm     [address]   Monday – Friday  4:00 pm – 10:00 pm   Saturday – Sunday  10:00 am – 4:00 pm  WALK-IN CARE  E

## (undated) NOTE — LETTER
[address] [place] [address]      Authorization for Surgical Operation and Procedure     Date:___________                                                                                                         Time:_______ potential risks that can occur: fever and allergic reactions, hemolytic reactions, transmission of diseases such as Hepatitis, AIDS and Cytomegalovirus (CMV) and fluid overload.   In the event that I wish to have an autologous transfusion of my own blood, o attending physician will determine when the applicable recovery period ends for purposes of reinstating the DNAR order.   10. Patients having a sterilization procedure: I understand that if the procedure is successful the results will be permanent and it wi _______________________________________________________________ _____________________________  [facility] Phys[facility])                                                                                         (Date)                                   (Time

## (undated) NOTE — LETTER
October 14, 2017    Teodora Howard, [address]  [address]     Patient: Shakeel Ruiz   YOB: 1970   Date of Visit: 10/14/2017       Dear Dr. Michael Newman MD:    Thank you for referring Shakeel Ruiz to me for evaluation. Alcohol use: No               Comment: None. Medications:    Current Outpatient Prescriptions:  atorvastatin (LIPITOR) 10 MG Oral Tab 1 TAB PO QD Disp: 30 tablet Rfl: 3   Montelukast Sodium 10 MG Oral Tab Take 1 tablet (10 mg total) by mouth nightly. Dist cc 20/20 -2 20/25 -2    Near cc 20/20 20/20    Correction:  Glasses          Tonometry (Applanation, 7:27 AM)       Right Left    Pressure 17 17          Pupils       Pupils    Right PERRL    Left PERRL          Visual Fields       Left Right     Ful If you have questions, please do not hesitate to call me. I look forward to following [facility] along with you.     Sincerely,        Dasha Vaughn MD        CC: No Recipients    Document electronically generated by: Dasha Vaughn

## (undated) NOTE — ED AVS SNAPSHOT
Bushra Ovalles   MRN: [id number]    Department:  [facility] Emergency Department   Date of Visit:  9/23/2017           Disclosure     Insurance plans vary and the physician(s) referred by the ER may not be covered by your plan.  Please contact CARE PHYSICIAN AT ONCE OR RETURN IMMEDIATELY TO THE EMERGENCY DEPARTMENT. If you have been prescribed any medication(s), please fill your prescription right away and begin taking the medication(s) as directed.   If you believe that any of the medications

## (undated) NOTE — LETTER
[address] [place] [address] Rd, [place], [place]     AUTHORIZATION FOR SURGICAL OPERATION OR PROCEDURE    I hereby authorize                                                     , my Physician(s) and whomever may be designated as the doctor Physician. 4. I consent to the photographing of procedure(s) to be performed for the purposes of advancing medicine, science and/or education, provided my identity is not revealed.  If the procedure has been videotaped, the physician/surgeon will obtain th (Witness signature)                                                                                                  (Date)                                (Time)  STATEMENT OF PHYSICIAN My signature below affirms that prior to the time of the procedure;  I

## (undated) NOTE — LETTER
May 22, 2024      No Recipients     Patient: Porsha Harrell   YOB: 1970   Date of Visit: 2024       Dear Dr. Boothe Recipients:    Thank you for referring Porsha Harrell to me for evaluation. Here is my assessment and plan of care:    Porsha Harrell is a 54 year old female.    HPI:     HPI    Pt in today for a diabetic eye exam. Pt states vision is stable. Occasionally vision gets blurry with her glasses.       Pt has been a diabetic for 24+ years       Pt's diabetes is currently controlled by insulin  Pt checks BS daily  Pt's last blood sugar was 139 today  Last HA1C was 7.8 on 24  Endocrinologist: Nick Sullivan     Last edited by Esther Paul OT on 2024  4:23 PM.        Patient History:  Past Medical History:    Anemia    Breast cancer (HCC)    Lumpectomy and chemo     Cancer (HCC)    RIGHT     Cup to disc asymmetry    OU    Essential hypertension    Exposure to medical diagnostic radiation    last dose 10/2020    High cholesterol    Hx of motion sickness    No diabetic retinopathy in both eyes    OU    Non-proliferative diabetic retinopathy, both eyes (HCC)    Obesity (BMI 30-39.9)    Obesity, unspecified    Other and unspecified hyperlipidemia    Personal history of antineoplastic chemotherapy    last dose2020    Sleep apnea    Type II or unspecified type diabetes mellitus without mention of complication, not stated as uncontrolled    Unspecified essential hypertension    Visual impairment    GLASSES at noc       Surgical History: Porsha Harrell has a past surgical history that includes  () ( section); ablation (abdominal); needle biopsy right (2020) (us bx); needle biopsy left (2020) (MRI bx); lumpectomy right (2020); radiation right (Right, 10/2020); chemotherapy; and colonoscopy screening - referral (N/A, 2022) (Procedure: COLONOSCOPY-SCREENING/ESOPHAGOGASTRODUODENOSCOPY (EGD);  Surgeon: Martinez Pinon MD;   Location: [facility] ENDOSCOPY).    Family History   Problem Relation Age of Onset    Neurological Disorder Father 63        Alzheimer's disease, Cause of death    Hypertension Mother     Diabetes Mother     Heart Attack Mother 57        MI, Cause of death    Heart Attack Cousin 54        Maternal cousin: MI, Cause of death    Lipids Other         Family H/o Hyperlipidemia    Cancer Maternal Aunt         told was paternal in inheritance    Cancer Maternal Grandfather         prostate ca    Breast Cancer Self 50    Glaucoma Neg     Macular degeneration Neg     Ovarian Cancer Neg        Social History:   Social History     Socioeconomic History    Marital status:    Tobacco Use    Smoking status: Never    Smokeless tobacco: Never   Vaping Use    Vaping status: Never Used   Substance and Sexual Activity    Alcohol use: Never     Alcohol/week: 0.0 standard drinks of alcohol     Comment: None.     Drug use: No    Sexual activity: Yes     Partners: Male     Birth control/protection: Tubal Ligation   Other Topics Concern    Caffeine Concern Yes     Comment: Coffee 1 cup daily;        Medications:  Current Outpatient Medications   Medication Sig Dispense Refill    acetaminophen-codeine (TYLENOL WITH CODEINE #3) 300-30 MG Oral Tab Take 1 tablet by mouth every 4 (four) hours as needed for Pain. 20 tablet 0    Semaglutide (RYBELSUS) 3 MG Oral Tab Take 3 mg by mouth daily.      Insulin Aspart Protamine & Aspart 70/30 (70-30) 100 UNIT/ML Subcutaneous Suspension Inject into the skin 2 (two) times daily before meals. Takes 44 Units in am and 45Units in pm.      fluticasone propionate 50 MCG/ACT Nasal Suspension 1 PUFF EACH NOSTRIL 2 TIMES A  DAY (Patient taking differently: 1 PUFF EACH NOSTRIL 2 TIMES A  DAY prn) 15.8 mL 0    Blood Glucose Monitoring Suppl (ONETOUCH VERIO FLEX SYSTEM) w/Device Does not apply Kit 1 lancet by Finger stick route 3 (three) times daily.      Glucose Blood (ONETOUCH VERIO) In Vitro Strip 1 lancet by  Finger stick route 3 (three) times daily.      Insulin Pen Needle (BD PEN NEEDLE MINI U/F) 31G X 5 MM Does not apply Misc Use 4 times a day as directed 400 each 0       Allergies:  No Known Allergies    ROS:       PHYSICAL EXAM:     Base Eye Exam       Visual Acuity (Snellen - Linear)         Right Left    Dist sc 20/50 20/30    Dist ph sc 20/30 20/25 -2    Near sc 20/40 20/40              Tonometry (Icare, 4:39 PM)         Right Left    Pressure 19 16              Pupils         Pupils    Right PERRL    Left PERRL              Visual Fields         Left Right     Full Full              Extraocular Movement         Right Left     Full, Ortho Full, Ortho              Neuro/Psych       Oriented x3: Yes    Mood/Affect: Normal              Dilation       Both eyes: 1.0% Mydriacyl and 2.5% Rafal Synephrine @ 4:40 PM              Dilation #2       Both eyes: 1.0% Mydriacyl and 2.5% Rafal Synephrine @ 4:40 PM                  Slit Lamp and Fundus Exam       Slit Lamp Exam         Right Left    Lids/Lashes Dermatochalasis, Meibomian gland dysfunction Dermatochalasis, Meibomian gland dysfunction    Conjunctiva/Sclera Ocular Melanosis Ocular Melanosis    Cornea Clear Clear    Anterior Chamber Deep and quiet Deep and quiet    Iris Normal Normal    Lens 1+ Nuclear sclerosis, Trace Cortical cataract 1+ Nuclear sclerosis, Trace Cortical cataract    Vitreous Clear Clear              Fundus Exam         Right Left    Disc Good rim, Temporal crescent Good rim, Temporal crescent    C/D Ratio 0.3 0.4    Macula 1 MA nasal to fovea 1 MA above fovea    Vessels Normal Normal    Periphery Normal Normal                  Refraction       Wearing Rx         Sphere Cylinder    Right -1.50 Sphere    Left -1.50 Sphere      Age: 3yrs    Type: Forgot Distance only              Manifest Refraction (Auto)         Sphere Cylinder Dist VA Add Near VA    Right -1.50 Sphere       Left -1.50 Sphere                 Manifest Refraction #2         Sphere  Cylinder Dist VA Add Near VA    Right -1.75 Sphere 20/25 +2.00 20/20    Left -1.50 Sphere 20/25- +2.00 20/20              Final Rx         Sphere Cylinder Dist VA    Right -1.75 Sphere 20/25    Left -1.50 Sphere 20/25-      Type: Distance only                     ASSESSMENT/PLAN:     Diagnoses and Plan:     Age-related nuclear cataract of both eyes  Discussed mild cataracts in both eyes that are not affecting vision and are not surgical at this time.      New glasses rx given for distance only, update as needed.    Non-proliferative diabetic retinopathy, both eyes (HCC)  Diabetes type II: mild background diabetic retinopathy, no signs of neovascularization noted.  No treatment necessary at this time.  Patient was instructed to monitor vision for sudden changes and to call if visual changes noted.  Discussed ocular and systemic benefits of blood sugar control.     Stressed importance of yearly eye exams.    Will see patient in 1 year for a diabetic eye exam.       No orders of the defined types were placed in this encounter.      Meds This Visit:  Requested Prescriptions      No prescriptions requested or ordered in this encounter        Follow up instructions:  Return in about 1 year (around 5/22/2025) for Diabetic eye exam.    5/22/2024  Scribed by: Hilton Solis MD        If you have questions, please do not hesitate to call me. I look forward to following Porsha along with you.    Sincerely,        Hilton Solis MD        CC:   No Recipients    Document electronically generated by: Hilton Solis MD

## (undated) NOTE — LETTER
02/06/20        [address]  [address]  [facility] [id number]      Dear Alexandro Mcelroy,    [facility] records indicate that you have outstanding lab work and or testing that was ordered for you and has not yet been completed:  Orders Placed This Encoun

## (undated) NOTE — Clinical Note
Can you please check bariatric benefits for Patient and requirement? She is interested in bariatric surgery.

## (undated) NOTE — LETTER
[address] [place] [address]  Authorization for Surgical Operation or Procedure  Date: ______________       Time: _______________  1.  I hereby authorize Dr. Jose Chavez my physician and the assistant, to perform the following operation and/or 5. I consent to the photographing of the operations or procedures to be performed for the purposes of advancing medicine, science, and/or education, provided my identity is not revealed.  If the procedure has been videotaped, the physician/surgeon will obta risks and benefits involved in the proposed treatment and any reasonable alternative to the proposed treatment. I have also explained the risks and benefits involved in the refusal of the proposed treatment and have answered the patient's questions.  If I h

## (undated) NOTE — Clinical Note
Lionel Santos, [address]  [place], [place]       05/13/2017        Patient: Ashwin Cordova   YOB: 1970   Date of Visit: 5/13/2017       Dear  Dr. Bhumi Pritchett MD,      Thank you for referring Ashwin Cordova to my practice.

## (undated) NOTE — Clinical Note
Thank you for the consult. I saw Ms. Irene Oakley in the endocrine/diabetes clinic today. Please see attached my note. Please feel free to contact me with any questions. Thanks!